# Patient Record
Sex: MALE | Race: WHITE | NOT HISPANIC OR LATINO | Employment: OTHER | URBAN - METROPOLITAN AREA
[De-identification: names, ages, dates, MRNs, and addresses within clinical notes are randomized per-mention and may not be internally consistent; named-entity substitution may affect disease eponyms.]

---

## 2023-01-01 ENCOUNTER — NURSING HOME VISIT (OUTPATIENT)
Dept: POST ACUTE CARE | Facility: EXTERNAL LOCATION | Age: 68
End: 2023-01-01
Payer: MEDICARE

## 2023-01-01 ENCOUNTER — TELEPHONE (OUTPATIENT)
Dept: PRIMARY CARE | Facility: CLINIC | Age: 68
End: 2023-01-01
Payer: MEDICARE

## 2023-01-01 ENCOUNTER — NURSING HOME VISIT (OUTPATIENT)
Dept: POST ACUTE CARE | Facility: EXTERNAL LOCATION | Age: 68
End: 2023-01-01

## 2023-01-01 DIAGNOSIS — L08.9 WOUND INFECTION: Primary | ICD-10-CM

## 2023-01-01 DIAGNOSIS — R52 TINGLING PAIN: ICD-10-CM

## 2023-01-01 DIAGNOSIS — R53.1 WEAKNESS: ICD-10-CM

## 2023-01-01 DIAGNOSIS — B95.62 METHICILLIN RESISTANT STAPHYLOCOCCUS AUREUS INFECTION AS THE CAUSE OF DISEASES CLASSIFIED ELSEWHERE: ICD-10-CM

## 2023-01-01 DIAGNOSIS — N40.1 BENIGN PROSTATIC HYPERPLASIA WITH LOWER URINARY TRACT SYMPTOMS, SYMPTOM DETAILS UNSPECIFIED: ICD-10-CM

## 2023-01-01 DIAGNOSIS — F41.9 ANXIETY: ICD-10-CM

## 2023-01-01 DIAGNOSIS — I50.9 CONGESTIVE HEART FAILURE, UNSPECIFIED HF CHRONICITY, UNSPECIFIED HEART FAILURE TYPE (MULTI): ICD-10-CM

## 2023-01-01 DIAGNOSIS — I10 PRIMARY HYPERTENSION: ICD-10-CM

## 2023-01-01 DIAGNOSIS — R56.9 SEIZURE (MULTI): ICD-10-CM

## 2023-01-01 DIAGNOSIS — J44.9 CHRONIC OBSTRUCTIVE PULMONARY DISEASE, UNSPECIFIED COPD TYPE (MULTI): ICD-10-CM

## 2023-01-01 DIAGNOSIS — I50.9 CONGESTIVE HEART FAILURE, UNSPECIFIED HF CHRONICITY, UNSPECIFIED HEART FAILURE TYPE (MULTI): Primary | ICD-10-CM

## 2023-01-01 DIAGNOSIS — E43 UNSPECIFIED SEVERE PROTEIN-CALORIE MALNUTRITION (MULTI): Primary | ICD-10-CM

## 2023-01-01 DIAGNOSIS — F41.9 ANXIETY: Primary | ICD-10-CM

## 2023-01-01 DIAGNOSIS — E66.2 MORBID (SEVERE) OBESITY WITH ALVEOLAR HYPOVENTILATION (MULTI): ICD-10-CM

## 2023-01-01 DIAGNOSIS — H52.7 REFRACTIVE ERRORS: Primary | ICD-10-CM

## 2023-01-01 DIAGNOSIS — I10 HYPERTENSION, UNSPECIFIED TYPE: ICD-10-CM

## 2023-01-01 DIAGNOSIS — R53.1 WEAKNESS: Primary | ICD-10-CM

## 2023-01-01 DIAGNOSIS — J44.9 CHRONIC OBSTRUCTIVE PULMONARY DISEASE, UNSPECIFIED COPD TYPE (MULTI): Primary | ICD-10-CM

## 2023-01-01 DIAGNOSIS — T14.8XXA WOUND INFECTION: Primary | ICD-10-CM

## 2023-01-01 DIAGNOSIS — R30.0 DYSURIA: Primary | ICD-10-CM

## 2023-01-01 DIAGNOSIS — Z99.2 DEPENDENCE ON RENAL DIALYSIS (CMS-HCC): ICD-10-CM

## 2023-01-01 DIAGNOSIS — K59.00 CONSTIPATION, UNSPECIFIED CONSTIPATION TYPE: Primary | ICD-10-CM

## 2023-01-01 DIAGNOSIS — B95.62 METHICILLIN RESISTANT STAPHYLOCOCCUS AUREUS INFECTION AS THE CAUSE OF DISEASES CLASSIFIED ELSEWHERE: Primary | ICD-10-CM

## 2023-01-01 DIAGNOSIS — D69.6 THROMBOCYTOPENIA, UNSPECIFIED (CMS-HCC): ICD-10-CM

## 2023-01-01 DIAGNOSIS — G89.4 CHRONIC PAIN SYNDROME: Primary | ICD-10-CM

## 2023-01-01 DIAGNOSIS — R30.0 DYSURIA: ICD-10-CM

## 2023-01-01 DIAGNOSIS — R14.0 ABDOMINAL BLOATING: Primary | ICD-10-CM
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
## 2023-01-01 DIAGNOSIS — R56.9 SEIZURE (MULTI): Primary | ICD-10-CM

## 2023-01-01 LAB
ABO GROUP (TYPE) IN BLOOD: NORMAL
ABO GROUP (TYPE) IN BLOOD: NORMAL
ALBUMIN (G/DL) IN SER/PLAS: 2.6 G/DL (ref 3.4–5)
ALBUMIN (G/DL) IN SER/PLAS: 2.7 G/DL (ref 3.4–5)
ALBUMIN (G/DL) IN SER/PLAS: 2.8 G/DL (ref 3.4–5)
ALBUMIN (G/DL) IN SER/PLAS: 3 G/DL (ref 3.4–5)
ALBUMIN (G/DL) IN SER/PLAS: 3.1 G/DL (ref 3.4–5)
ALBUMIN (G/DL) IN SER/PLAS: 3.2 G/DL (ref 3.4–5)
ANION GAP IN SER/PLAS: 10 MMOL/L (ref 10–20)
ANION GAP IN SER/PLAS: 11 MMOL/L (ref 10–20)
ANION GAP IN SER/PLAS: 6 MMOL/L (ref 10–20)
ANION GAP IN SER/PLAS: 6 MMOL/L (ref 10–20)
ANION GAP IN SER/PLAS: 7 MMOL/L (ref 10–20)
ANION GAP IN SER/PLAS: 8 MMOL/L (ref 10–20)
ANION GAP IN SER/PLAS: 8 MMOL/L (ref 10–20)
ANION GAP IN SER/PLAS: 9 MMOL/L (ref 10–20)
ANTIBODY SCREEN: NORMAL
ANTIBODY SCREEN: NORMAL
ATRIAL RATE: 81 BPM
BASOPHILS (10*3/UL) IN BLOOD BY AUTOMATED COUNT: 0.03 X10E9/L (ref 0–0.1)
BASOPHILS (10*3/UL) IN BLOOD BY AUTOMATED COUNT: 0.03 X10E9/L (ref 0–0.1)
BASOPHILS (10*3/UL) IN BLOOD BY AUTOMATED COUNT: 0.04 X10E9/L (ref 0–0.1)
BASOPHILS (10*3/UL) IN BLOOD BY AUTOMATED COUNT: 0.04 X10E9/L (ref 0–0.1)
BASOPHILS (10*3/UL) IN BLOOD BY AUTOMATED COUNT: 0.05 X10E9/L (ref 0–0.1)
BASOPHILS (10*3/UL) IN BLOOD BY AUTOMATED COUNT: 0.06 X10E9/L (ref 0–0.1)
BASOPHILS/100 LEUKOCYTES IN BLOOD BY AUTOMATED COUNT: 0.3 % (ref 0–2)
BASOPHILS/100 LEUKOCYTES IN BLOOD BY AUTOMATED COUNT: 0.5 % (ref 0–2)
BASOPHILS/100 LEUKOCYTES IN BLOOD BY AUTOMATED COUNT: 0.6 % (ref 0–2)
BASOPHILS/100 LEUKOCYTES IN BLOOD BY AUTOMATED COUNT: 0.8 % (ref 0–2)
BASOPHILS/100 LEUKOCYTES IN BLOOD BY AUTOMATED COUNT: 1 % (ref 0–2)
C REACTIVE PROTEIN (MG/L) IN SER/PLAS: 7.55 MG/DL
CALCIUM (MG/DL) IN SER/PLAS: 9 MG/DL (ref 8.6–10.6)
CALCIUM (MG/DL) IN SER/PLAS: 9.1 MG/DL (ref 8.6–10.6)
CALCIUM (MG/DL) IN SER/PLAS: 9.2 MG/DL (ref 8.6–10.6)
CALCIUM (MG/DL) IN SER/PLAS: 9.2 MG/DL (ref 8.6–10.6)
CALCIUM (MG/DL) IN SER/PLAS: 9.3 MG/DL (ref 8.6–10.6)
CALCIUM (MG/DL) IN SER/PLAS: 9.3 MG/DL (ref 8.6–10.6)
CALCIUM (MG/DL) IN SER/PLAS: 9.4 MG/DL (ref 8.6–10.6)
CALCIUM (MG/DL) IN SER/PLAS: 9.5 MG/DL (ref 8.6–10.6)
CALCIUM (MG/DL) IN SER/PLAS: 9.6 MG/DL (ref 8.6–10.6)
CALCIUM (MG/DL) IN SER/PLAS: 9.6 MG/DL (ref 8.6–10.6)
CARBON DIOXIDE, TOTAL (MMOL/L) IN SER/PLAS: 38 MMOL/L (ref 21–32)
CARBON DIOXIDE, TOTAL (MMOL/L) IN SER/PLAS: 39 MMOL/L (ref 21–32)
CARBON DIOXIDE, TOTAL (MMOL/L) IN SER/PLAS: 40 MMOL/L (ref 21–32)
CARBON DIOXIDE, TOTAL (MMOL/L) IN SER/PLAS: 41 MMOL/L (ref 21–32)
CARBON DIOXIDE, TOTAL (MMOL/L) IN SER/PLAS: 42 MMOL/L (ref 21–32)
CARBON DIOXIDE, TOTAL (MMOL/L) IN SER/PLAS: 43 MMOL/L (ref 21–32)
CARBON DIOXIDE, TOTAL (MMOL/L) IN SER/PLAS: 44 MMOL/L (ref 21–32)
CHLORIDE (MMOL/L) IN SER/PLAS: 90 MMOL/L (ref 98–107)
CHLORIDE (MMOL/L) IN SER/PLAS: 91 MMOL/L (ref 98–107)
CHLORIDE (MMOL/L) IN SER/PLAS: 92 MMOL/L (ref 98–107)
CHLORIDE (MMOL/L) IN SER/PLAS: 93 MMOL/L (ref 98–107)
CHLORIDE (MMOL/L) IN SER/PLAS: 94 MMOL/L (ref 98–107)
CHLORIDE (MMOL/L) IN SER/PLAS: 94 MMOL/L (ref 98–107)
CHLORIDE (MMOL/L) IN SER/PLAS: 95 MMOL/L (ref 98–107)
CHLORIDE (MMOL/L) IN SER/PLAS: 95 MMOL/L (ref 98–107)
CHLORIDE (MMOL/L) IN SER/PLAS: 96 MMOL/L (ref 98–107)
CHLORIDE (MMOL/L) IN SER/PLAS: 97 MMOL/L (ref 98–107)
CHLORIDE (MOL/L) IN SPOT URINE: 36 MMOL/L
CHLORIDE/CREATININE (MMOL/G) IN URINE: 67 MMOL/G CREAT (ref 23–275)
CREATININE (MG/DL) IN SER/PLAS: 0.48 MG/DL (ref 0.5–1.3)
CREATININE (MG/DL) IN SER/PLAS: 0.53 MG/DL (ref 0.5–1.3)
CREATININE (MG/DL) IN SER/PLAS: 0.6 MG/DL (ref 0.5–1.3)
CREATININE (MG/DL) IN SER/PLAS: 0.69 MG/DL (ref 0.5–1.3)
CREATININE (MG/DL) IN SER/PLAS: 0.71 MG/DL (ref 0.5–1.3)
CREATININE (MG/DL) IN SER/PLAS: 0.77 MG/DL (ref 0.5–1.3)
CREATININE (MG/DL) IN SER/PLAS: 0.8 MG/DL (ref 0.5–1.3)
CREATININE (MG/DL) IN SER/PLAS: 0.84 MG/DL (ref 0.5–1.3)
CREATININE (MG/DL) IN SER/PLAS: 0.86 MG/DL (ref 0.5–1.3)
CREATININE (MG/DL) IN SER/PLAS: 0.86 MG/DL (ref 0.5–1.3)
CREATININE (MG/DL) IN SER/PLAS: 0.91 MG/DL (ref 0.5–1.3)
CREATININE (MG/DL) IN SER/PLAS: 0.97 MG/DL (ref 0.5–1.3)
CREATININE (MG/DL) IN URINE: 53.9 MG/DL (ref 20–370)
EOSINOPHILS (10*3/UL) IN BLOOD BY AUTOMATED COUNT: 0.08 X10E9/L (ref 0–0.7)
EOSINOPHILS (10*3/UL) IN BLOOD BY AUTOMATED COUNT: 0.14 X10E9/L (ref 0–0.7)
EOSINOPHILS (10*3/UL) IN BLOOD BY AUTOMATED COUNT: 0.18 X10E9/L (ref 0–0.7)
EOSINOPHILS (10*3/UL) IN BLOOD BY AUTOMATED COUNT: 0.25 X10E9/L (ref 0–0.7)
EOSINOPHILS (10*3/UL) IN BLOOD BY AUTOMATED COUNT: 0.29 X10E9/L (ref 0–0.7)
EOSINOPHILS (10*3/UL) IN BLOOD BY AUTOMATED COUNT: 0.29 X10E9/L (ref 0–0.7)
EOSINOPHILS (10*3/UL) IN BLOOD BY AUTOMATED COUNT: 0.33 X10E9/L (ref 0–0.7)
EOSINOPHILS (10*3/UL) IN BLOOD BY AUTOMATED COUNT: 0.51 X10E9/L (ref 0–0.7)
EOSINOPHILS (10*3/UL) IN BLOOD BY AUTOMATED COUNT: 0.58 X10E9/L (ref 0–0.7)
EOSINOPHILS/100 LEUKOCYTES IN BLOOD BY AUTOMATED COUNT: 0.8 % (ref 0–6)
EOSINOPHILS/100 LEUKOCYTES IN BLOOD BY AUTOMATED COUNT: 1.6 % (ref 0–6)
EOSINOPHILS/100 LEUKOCYTES IN BLOOD BY AUTOMATED COUNT: 2.8 % (ref 0–6)
EOSINOPHILS/100 LEUKOCYTES IN BLOOD BY AUTOMATED COUNT: 3.6 % (ref 0–6)
EOSINOPHILS/100 LEUKOCYTES IN BLOOD BY AUTOMATED COUNT: 4.4 % (ref 0–6)
EOSINOPHILS/100 LEUKOCYTES IN BLOOD BY AUTOMATED COUNT: 4.5 % (ref 0–6)
EOSINOPHILS/100 LEUKOCYTES IN BLOOD BY AUTOMATED COUNT: 5.2 % (ref 0–6)
EOSINOPHILS/100 LEUKOCYTES IN BLOOD BY AUTOMATED COUNT: 7.6 % (ref 0–6)
EOSINOPHILS/100 LEUKOCYTES IN BLOOD BY AUTOMATED COUNT: 9.4 % (ref 0–6)
ERYTHROCYTE DISTRIBUTION WIDTH (RATIO) BY AUTOMATED COUNT: 11.7 % (ref 11.5–14.5)
ERYTHROCYTE DISTRIBUTION WIDTH (RATIO) BY AUTOMATED COUNT: 11.8 % (ref 11.5–14.5)
ERYTHROCYTE DISTRIBUTION WIDTH (RATIO) BY AUTOMATED COUNT: 11.9 % (ref 11.5–14.5)
ERYTHROCYTE DISTRIBUTION WIDTH (RATIO) BY AUTOMATED COUNT: 12.1 % (ref 11.5–14.5)
ERYTHROCYTE DISTRIBUTION WIDTH (RATIO) BY AUTOMATED COUNT: 12.2 % (ref 11.5–14.5)
ERYTHROCYTE DISTRIBUTION WIDTH (RATIO) BY AUTOMATED COUNT: 12.2 % (ref 11.5–14.5)
ERYTHROCYTE MEAN CORPUSCULAR HEMOGLOBIN CONCENTRATION (G/DL) BY AUTOMATED: 28.3 G/DL (ref 32–36)
ERYTHROCYTE MEAN CORPUSCULAR HEMOGLOBIN CONCENTRATION (G/DL) BY AUTOMATED: 29 G/DL (ref 32–36)
ERYTHROCYTE MEAN CORPUSCULAR HEMOGLOBIN CONCENTRATION (G/DL) BY AUTOMATED: 29.1 G/DL (ref 32–36)
ERYTHROCYTE MEAN CORPUSCULAR HEMOGLOBIN CONCENTRATION (G/DL) BY AUTOMATED: 29.2 G/DL (ref 32–36)
ERYTHROCYTE MEAN CORPUSCULAR HEMOGLOBIN CONCENTRATION (G/DL) BY AUTOMATED: 29.2 G/DL (ref 32–36)
ERYTHROCYTE MEAN CORPUSCULAR HEMOGLOBIN CONCENTRATION (G/DL) BY AUTOMATED: 29.3 G/DL (ref 32–36)
ERYTHROCYTE MEAN CORPUSCULAR HEMOGLOBIN CONCENTRATION (G/DL) BY AUTOMATED: 29.7 G/DL (ref 32–36)
ERYTHROCYTE MEAN CORPUSCULAR HEMOGLOBIN CONCENTRATION (G/DL) BY AUTOMATED: 30 G/DL (ref 32–36)
ERYTHROCYTE MEAN CORPUSCULAR HEMOGLOBIN CONCENTRATION (G/DL) BY AUTOMATED: 30.3 G/DL (ref 32–36)
ERYTHROCYTE MEAN CORPUSCULAR VOLUME (FL) BY AUTOMATED COUNT: 109 FL (ref 80–100)
ERYTHROCYTE MEAN CORPUSCULAR VOLUME (FL) BY AUTOMATED COUNT: 109 FL (ref 80–100)
ERYTHROCYTE MEAN CORPUSCULAR VOLUME (FL) BY AUTOMATED COUNT: 110 FL (ref 80–100)
ERYTHROCYTE MEAN CORPUSCULAR VOLUME (FL) BY AUTOMATED COUNT: 110 FL (ref 80–100)
ERYTHROCYTE MEAN CORPUSCULAR VOLUME (FL) BY AUTOMATED COUNT: 111 FL (ref 80–100)
ERYTHROCYTE MEAN CORPUSCULAR VOLUME (FL) BY AUTOMATED COUNT: 112 FL (ref 80–100)
ERYTHROCYTE MEAN CORPUSCULAR VOLUME (FL) BY AUTOMATED COUNT: 112 FL (ref 80–100)
ERYTHROCYTES (10*6/UL) IN BLOOD BY AUTOMATED COUNT: 2.16 X10E12/L (ref 4.5–5.9)
ERYTHROCYTES (10*6/UL) IN BLOOD BY AUTOMATED COUNT: 2.24 X10E12/L (ref 4.5–5.9)
ERYTHROCYTES (10*6/UL) IN BLOOD BY AUTOMATED COUNT: 2.26 X10E12/L (ref 4.5–5.9)
ERYTHROCYTES (10*6/UL) IN BLOOD BY AUTOMATED COUNT: 2.3 X10E12/L (ref 4.5–5.9)
ERYTHROCYTES (10*6/UL) IN BLOOD BY AUTOMATED COUNT: 2.31 X10E12/L (ref 4.5–5.9)
ERYTHROCYTES (10*6/UL) IN BLOOD BY AUTOMATED COUNT: 2.32 X10E12/L (ref 4.5–5.9)
ERYTHROCYTES (10*6/UL) IN BLOOD BY AUTOMATED COUNT: 2.35 X10E12/L (ref 4.5–5.9)
ERYTHROCYTES (10*6/UL) IN BLOOD BY AUTOMATED COUNT: 2.44 X10E12/L (ref 4.5–5.9)
ERYTHROCYTES (10*6/UL) IN BLOOD BY AUTOMATED COUNT: 2.53 X10E12/L (ref 4.5–5.9)
FUNGAL CULTURE/SMEAR: NORMAL
FUNGAL SMEAR: NORMAL
GFR MALE: 84 ML/MIN/1.73M2
GFR MALE: >90 ML/MIN/1.73M2
GLUCOSE (MG/DL) IN SER/PLAS: 101 MG/DL (ref 74–99)
GLUCOSE (MG/DL) IN SER/PLAS: 102 MG/DL (ref 74–99)
GLUCOSE (MG/DL) IN SER/PLAS: 103 MG/DL (ref 74–99)
GLUCOSE (MG/DL) IN SER/PLAS: 109 MG/DL (ref 74–99)
GLUCOSE (MG/DL) IN SER/PLAS: 110 MG/DL (ref 74–99)
GLUCOSE (MG/DL) IN SER/PLAS: 111 MG/DL (ref 74–99)
GLUCOSE (MG/DL) IN SER/PLAS: 112 MG/DL (ref 74–99)
GLUCOSE (MG/DL) IN SER/PLAS: 114 MG/DL (ref 74–99)
GLUCOSE (MG/DL) IN SER/PLAS: 114 MG/DL (ref 74–99)
GLUCOSE (MG/DL) IN SER/PLAS: 85 MG/DL (ref 74–99)
GLUCOSE (MG/DL) IN SER/PLAS: 86 MG/DL (ref 74–99)
GLUCOSE (MG/DL) IN SER/PLAS: 99 MG/DL (ref 74–99)
HEMATOCRIT (%) IN BLOOD BY AUTOMATED COUNT: 23.8 % (ref 41–52)
HEMATOCRIT (%) IN BLOOD BY AUTOMATED COUNT: 25.1 % (ref 41–52)
HEMATOCRIT (%) IN BLOOD BY AUTOMATED COUNT: 25.2 % (ref 41–52)
HEMATOCRIT (%) IN BLOOD BY AUTOMATED COUNT: 25.6 % (ref 41–52)
HEMATOCRIT (%) IN BLOOD BY AUTOMATED COUNT: 25.7 % (ref 41–52)
HEMATOCRIT (%) IN BLOOD BY AUTOMATED COUNT: 25.7 % (ref 41–52)
HEMATOCRIT (%) IN BLOOD BY AUTOMATED COUNT: 25.8 % (ref 41–52)
HEMATOCRIT (%) IN BLOOD BY AUTOMATED COUNT: 26.7 % (ref 41–52)
HEMATOCRIT (%) IN BLOOD BY AUTOMATED COUNT: 27.6 % (ref 41–52)
HEMOGLOBIN (G/DL) IN BLOOD: 7.1 G/DL (ref 13.5–17.5)
HEMOGLOBIN (G/DL) IN BLOOD: 7.2 G/DL (ref 13.5–17.5)
HEMOGLOBIN (G/DL) IN BLOOD: 7.3 G/DL (ref 13.5–17.5)
HEMOGLOBIN (G/DL) IN BLOOD: 7.5 G/DL (ref 13.5–17.5)
HEMOGLOBIN (G/DL) IN BLOOD: 8 G/DL (ref 13.5–17.5)
HEMOGLOBIN (G/DL) IN BLOOD: 8.2 G/DL (ref 13.5–17.5)
IMMATURE GRANULOCYTES/100 LEUKOCYTES IN BLOOD BY AUTOMATED COUNT: 0.5 % (ref 0–0.9)
IMMATURE GRANULOCYTES/100 LEUKOCYTES IN BLOOD BY AUTOMATED COUNT: 0.7 % (ref 0–0.9)
IMMATURE GRANULOCYTES/100 LEUKOCYTES IN BLOOD BY AUTOMATED COUNT: 0.8 % (ref 0–0.9)
IMMATURE GRANULOCYTES/100 LEUKOCYTES IN BLOOD BY AUTOMATED COUNT: 0.8 % (ref 0–0.9)
IMMATURE GRANULOCYTES/100 LEUKOCYTES IN BLOOD BY AUTOMATED COUNT: 1.1 % (ref 0–0.9)
IMMATURE GRANULOCYTES/100 LEUKOCYTES IN BLOOD BY AUTOMATED COUNT: 1.2 % (ref 0–0.9)
IMMATURE GRANULOCYTES/100 LEUKOCYTES IN BLOOD BY AUTOMATED COUNT: 1.5 % (ref 0–0.9)
LEUKOCYTES (10*3/UL) IN BLOOD BY AUTOMATED COUNT: 6.2 X10E9/L (ref 4.4–11.3)
LEUKOCYTES (10*3/UL) IN BLOOD BY AUTOMATED COUNT: 6.4 X10E9/L (ref 4.4–11.3)
LEUKOCYTES (10*3/UL) IN BLOOD BY AUTOMATED COUNT: 6.5 X10E9/L (ref 4.4–11.3)
LEUKOCYTES (10*3/UL) IN BLOOD BY AUTOMATED COUNT: 6.7 X10E9/L (ref 4.4–11.3)
LEUKOCYTES (10*3/UL) IN BLOOD BY AUTOMATED COUNT: 6.9 X10E9/L (ref 4.4–11.3)
LEUKOCYTES (10*3/UL) IN BLOOD BY AUTOMATED COUNT: 8.8 X10E9/L (ref 4.4–11.3)
LEUKOCYTES (10*3/UL) IN BLOOD BY AUTOMATED COUNT: 9.5 X10E9/L (ref 4.4–11.3)
LYMPHOCYTES (10*3/UL) IN BLOOD BY AUTOMATED COUNT: 0.8 X10E9/L (ref 1.2–4.8)
LYMPHOCYTES (10*3/UL) IN BLOOD BY AUTOMATED COUNT: 0.85 X10E9/L (ref 1.2–4.8)
LYMPHOCYTES (10*3/UL) IN BLOOD BY AUTOMATED COUNT: 0.89 X10E9/L (ref 1.2–4.8)
LYMPHOCYTES (10*3/UL) IN BLOOD BY AUTOMATED COUNT: 0.94 X10E9/L (ref 1.2–4.8)
LYMPHOCYTES (10*3/UL) IN BLOOD BY AUTOMATED COUNT: 0.96 X10E9/L (ref 1.2–4.8)
LYMPHOCYTES (10*3/UL) IN BLOOD BY AUTOMATED COUNT: 1.06 X10E9/L (ref 1.2–4.8)
LYMPHOCYTES (10*3/UL) IN BLOOD BY AUTOMATED COUNT: 1.07 X10E9/L (ref 1.2–4.8)
LYMPHOCYTES (10*3/UL) IN BLOOD BY AUTOMATED COUNT: 1.11 X10E9/L (ref 1.2–4.8)
LYMPHOCYTES (10*3/UL) IN BLOOD BY AUTOMATED COUNT: 1.26 X10E9/L (ref 1.2–4.8)
LYMPHOCYTES/100 LEUKOCYTES IN BLOOD BY AUTOMATED COUNT: 11.6 % (ref 13–44)
LYMPHOCYTES/100 LEUKOCYTES IN BLOOD BY AUTOMATED COUNT: 13.3 % (ref 13–44)
LYMPHOCYTES/100 LEUKOCYTES IN BLOOD BY AUTOMATED COUNT: 13.7 % (ref 13–44)
LYMPHOCYTES/100 LEUKOCYTES IN BLOOD BY AUTOMATED COUNT: 14 % (ref 13–44)
LYMPHOCYTES/100 LEUKOCYTES IN BLOOD BY AUTOMATED COUNT: 15.1 % (ref 13–44)
LYMPHOCYTES/100 LEUKOCYTES IN BLOOD BY AUTOMATED COUNT: 16 % (ref 13–44)
LYMPHOCYTES/100 LEUKOCYTES IN BLOOD BY AUTOMATED COUNT: 16.2 % (ref 13–44)
LYMPHOCYTES/100 LEUKOCYTES IN BLOOD BY AUTOMATED COUNT: 20.4 % (ref 13–44)
LYMPHOCYTES/100 LEUKOCYTES IN BLOOD BY AUTOMATED COUNT: 9 % (ref 13–44)
MAGNESIUM (MG/DL) IN SER/PLAS: 1.79 MG/DL (ref 1.6–2.4)
MAGNESIUM (MG/DL) IN SER/PLAS: 1.85 MG/DL (ref 1.6–2.4)
MAGNESIUM (MG/DL) IN SER/PLAS: 1.94 MG/DL (ref 1.6–2.4)
MAGNESIUM (MG/DL) IN SER/PLAS: 1.94 MG/DL (ref 1.6–2.4)
MAGNESIUM (MG/DL) IN SER/PLAS: 1.95 MG/DL (ref 1.6–2.4)
MAGNESIUM (MG/DL) IN SER/PLAS: 1.96 MG/DL (ref 1.6–2.4)
MAGNESIUM (MG/DL) IN SER/PLAS: 1.99 MG/DL (ref 1.6–2.4)
MAGNESIUM (MG/DL) IN SER/PLAS: 2.02 MG/DL (ref 1.6–2.4)
MAGNESIUM (MG/DL) IN SER/PLAS: 2.04 MG/DL (ref 1.6–2.4)
MONOCYTES (10*3/UL) IN BLOOD BY AUTOMATED COUNT: 0.53 X10E9/L (ref 0.1–1)
MONOCYTES (10*3/UL) IN BLOOD BY AUTOMATED COUNT: 0.57 X10E9/L (ref 0.1–1)
MONOCYTES (10*3/UL) IN BLOOD BY AUTOMATED COUNT: 0.59 X10E9/L (ref 0.1–1)
MONOCYTES (10*3/UL) IN BLOOD BY AUTOMATED COUNT: 0.6 X10E9/L (ref 0.1–1)
MONOCYTES (10*3/UL) IN BLOOD BY AUTOMATED COUNT: 0.6 X10E9/L (ref 0.1–1)
MONOCYTES (10*3/UL) IN BLOOD BY AUTOMATED COUNT: 0.68 X10E9/L (ref 0.1–1)
MONOCYTES (10*3/UL) IN BLOOD BY AUTOMATED COUNT: 0.71 X10E9/L (ref 0.1–1)
MONOCYTES (10*3/UL) IN BLOOD BY AUTOMATED COUNT: 0.75 X10E9/L (ref 0.1–1)
MONOCYTES (10*3/UL) IN BLOOD BY AUTOMATED COUNT: 0.83 X10E9/L (ref 0.1–1)
MONOCYTES/100 LEUKOCYTES IN BLOOD BY AUTOMATED COUNT: 10.2 % (ref 2–10)
MONOCYTES/100 LEUKOCYTES IN BLOOD BY AUTOMATED COUNT: 11.5 % (ref 2–10)
MONOCYTES/100 LEUKOCYTES IN BLOOD BY AUTOMATED COUNT: 13 % (ref 2–10)
MONOCYTES/100 LEUKOCYTES IN BLOOD BY AUTOMATED COUNT: 6.8 % (ref 2–10)
MONOCYTES/100 LEUKOCYTES IN BLOOD BY AUTOMATED COUNT: 7.9 % (ref 2–10)
MONOCYTES/100 LEUKOCYTES IN BLOOD BY AUTOMATED COUNT: 8.3 % (ref 2–10)
MONOCYTES/100 LEUKOCYTES IN BLOOD BY AUTOMATED COUNT: 8.6 % (ref 2–10)
MONOCYTES/100 LEUKOCYTES IN BLOOD BY AUTOMATED COUNT: 8.9 % (ref 2–10)
MONOCYTES/100 LEUKOCYTES IN BLOOD BY AUTOMATED COUNT: 9.2 % (ref 2–10)
NEUTROPHILS (10*3/UL) IN BLOOD BY AUTOMATED COUNT: 3.48 X10E9/L (ref 1.2–7.7)
NEUTROPHILS (10*3/UL) IN BLOOD BY AUTOMATED COUNT: 4.29 X10E9/L (ref 1.2–7.7)
NEUTROPHILS (10*3/UL) IN BLOOD BY AUTOMATED COUNT: 4.32 X10E9/L (ref 1.2–7.7)
NEUTROPHILS (10*3/UL) IN BLOOD BY AUTOMATED COUNT: 4.46 X10E9/L (ref 1.2–7.7)
NEUTROPHILS (10*3/UL) IN BLOOD BY AUTOMATED COUNT: 4.46 X10E9/L (ref 1.2–7.7)
NEUTROPHILS (10*3/UL) IN BLOOD BY AUTOMATED COUNT: 4.62 X10E9/L (ref 1.2–7.7)
NEUTROPHILS (10*3/UL) IN BLOOD BY AUTOMATED COUNT: 4.97 X10E9/L (ref 1.2–7.7)
NEUTROPHILS (10*3/UL) IN BLOOD BY AUTOMATED COUNT: 7.21 X10E9/L (ref 1.2–7.7)
NEUTROPHILS (10*3/UL) IN BLOOD BY AUTOMATED COUNT: 7.49 X10E9/L (ref 1.2–7.7)
NEUTROPHILS/100 LEUKOCYTES IN BLOOD BY AUTOMATED COUNT: 56.2 % (ref 40–80)
NEUTROPHILS/100 LEUKOCYTES IN BLOOD BY AUTOMATED COUNT: 64.4 % (ref 40–80)
NEUTROPHILS/100 LEUKOCYTES IN BLOOD BY AUTOMATED COUNT: 67.6 % (ref 40–80)
NEUTROPHILS/100 LEUKOCYTES IN BLOOD BY AUTOMATED COUNT: 68.2 % (ref 40–80)
NEUTROPHILS/100 LEUKOCYTES IN BLOOD BY AUTOMATED COUNT: 70 % (ref 40–80)
NEUTROPHILS/100 LEUKOCYTES IN BLOOD BY AUTOMATED COUNT: 72.3 % (ref 40–80)
NEUTROPHILS/100 LEUKOCYTES IN BLOOD BY AUTOMATED COUNT: 72.5 % (ref 40–80)
NEUTROPHILS/100 LEUKOCYTES IN BLOOD BY AUTOMATED COUNT: 78.7 % (ref 40–80)
NEUTROPHILS/100 LEUKOCYTES IN BLOOD BY AUTOMATED COUNT: 81.5 % (ref 40–80)
NON-UH HIE A/G RATIO: 0.7
NON-UH HIE ALB: 2.2 G/DL (ref 3.4–5)
NON-UH HIE ALK PHOS: 95 UNIT/L (ref 46–116)
NON-UH HIE BASO COUNT: 0.04 X1000 (ref 0–0.2)
NON-UH HIE BASOS %: 0.9 %
NON-UH HIE BILIRUBIN, TOTAL: <0.2 MG/DL (ref 0.2–1)
NON-UH HIE BUN/CREAT RATIO: 23.8
NON-UH HIE BUN: 19 MG/DL (ref 9–23)
NON-UH HIE CALCIUM: 9.2 MG/DL (ref 8.7–10.4)
NON-UH HIE CALCULATED OSMOLALITY: 283 MOSM/KG (ref 275–295)
NON-UH HIE CHLORIDE: 101 MMOL/L (ref 98–107)
NON-UH HIE CO2, VENOUS: >40 MMOL/L (ref 20–31)
NON-UH HIE CREATININE: 0.8 MG/DL (ref 0.6–1.1)
NON-UH HIE DIFF?: NO
NON-UH HIE EOS COUNT: 0.18 X1000 (ref 0–0.5)
NON-UH HIE EOSIN %: 4 %
NON-UH HIE GFR AA: >60
NON-UH HIE GLOBULIN: 3 G/DL
NON-UH HIE GLOMERULAR FILTRATION RATE: >60 ML/MIN/1.73M?
NON-UH HIE GLUCOSE: 129 MG/DL (ref 74–106)
NON-UH HIE GOT: 13 UNIT/L (ref 15–37)
NON-UH HIE GPT: <7 UNIT/L (ref 10–49)
NON-UH HIE HCT: 28 % (ref 41–52)
NON-UH HIE HGB: 9 G/DL (ref 13.5–17.5)
NON-UH HIE INSTR WBC: 4.3
NON-UH HIE K: 4.7 MMOL/L (ref 3.5–5.1)
NON-UH HIE LACTATE: 0.5 MMOL/L (ref 0.5–2.2)
NON-UH HIE LYMPH %: 20.3 %
NON-UH HIE LYMPH COUNT: 0.88 X1000 (ref 1.2–4.8)
NON-UH HIE MCH: 31.2 PG (ref 27–34)
NON-UH HIE MCHC: 32.1 G/DL (ref 32–37)
NON-UH HIE MCV: 97.1 FL (ref 80–100)
NON-UH HIE MONO %: 7.1 %
NON-UH HIE MONO COUNT: 0.31 X1000 (ref 0.1–1)
NON-UH HIE MPV: 7.5 FL (ref 7.4–10.4)
NON-UH HIE NA: 140 MMOL/L (ref 135–145)
NON-UH HIE NEUTROPHIL %: 67.7 %
NON-UH HIE NEUTROPHIL COUNT (ANC): 2.93 X1000 (ref 1.4–8.8)
NON-UH HIE NUCLEATED RBC: 0 /100WBC
NON-UH HIE PLATELET: 271 X10 (ref 150–450)
NON-UH HIE PROCALCITONIN: <0.05 NG/ML
NON-UH HIE RBC: 2.88 X10 (ref 4.7–6.1)
NON-UH HIE RDW: 13.6 % (ref 11.5–14.5)
NON-UH HIE SED RATE WESTERGREN: 72 MM/HR (ref 0–20)
NON-UH HIE TOTAL PROTEIN: 5.2 G/DL (ref 5.7–8.2)
NON-UH HIE WBC: 4.3 X10 (ref 4.5–11)
NRBC (PER 100 WBCS) BY AUTOMATED COUNT: 0 /100 WBC (ref 0–0)
OSMOLALITY, RANDOM URINE: 446 MOSM/KG (ref 200–1200)
P AXIS: 44 DEGREES
P OFFSET: 198 MS
P ONSET: 144 MS
PATIENT TEMPERATURE: 37 DEGREES C
PATIENT TEMPERATURE: 37 DEGREES C
PHOSPHATE (MG/DL) IN SER/PLAS: 2.6 MG/DL (ref 2.5–4.9)
PHOSPHATE (MG/DL) IN SER/PLAS: 2.6 MG/DL (ref 2.5–4.9)
PHOSPHATE (MG/DL) IN SER/PLAS: 2.7 MG/DL (ref 2.5–4.9)
PHOSPHATE (MG/DL) IN SER/PLAS: 2.9 MG/DL (ref 2.5–4.9)
PHOSPHATE (MG/DL) IN SER/PLAS: 3 MG/DL (ref 2.5–4.9)
PHOSPHATE (MG/DL) IN SER/PLAS: 3.1 MG/DL (ref 2.5–4.9)
PHOSPHATE (MG/DL) IN SER/PLAS: 3.2 MG/DL (ref 2.5–4.9)
PHOSPHATE (MG/DL) IN SER/PLAS: 3.4 MG/DL (ref 2.5–4.9)
PHOSPHATE (MG/DL) IN SER/PLAS: 3.5 MG/DL (ref 2.5–4.9)
PHOSPHATE (MG/DL) IN SER/PLAS: 3.6 MG/DL (ref 2.5–4.9)
PHOSPHATE (MG/DL) IN SER/PLAS: 3.8 MG/DL (ref 2.5–4.9)
PHOSPHATE (MG/DL) IN SER/PLAS: 4.1 MG/DL (ref 2.5–4.9)
PLATELETS (10*3/UL) IN BLOOD AUTOMATED COUNT: 272 X10E9/L (ref 150–450)
PLATELETS (10*3/UL) IN BLOOD AUTOMATED COUNT: 277 X10E9/L (ref 150–450)
PLATELETS (10*3/UL) IN BLOOD AUTOMATED COUNT: 292 X10E9/L (ref 150–450)
PLATELETS (10*3/UL) IN BLOOD AUTOMATED COUNT: 303 X10E9/L (ref 150–450)
PLATELETS (10*3/UL) IN BLOOD AUTOMATED COUNT: 315 X10E9/L (ref 150–450)
PLATELETS (10*3/UL) IN BLOOD AUTOMATED COUNT: 317 X10E9/L (ref 150–450)
PLATELETS (10*3/UL) IN BLOOD AUTOMATED COUNT: 320 X10E9/L (ref 150–450)
PLATELETS (10*3/UL) IN BLOOD AUTOMATED COUNT: 323 X10E9/L (ref 150–450)
PLATELETS (10*3/UL) IN BLOOD AUTOMATED COUNT: 347 X10E9/L (ref 150–450)
POCT BASE EXCESS, VENOUS: 12.8 MMOL/L (ref -2–3)
POCT BASE EXCESS, VENOUS: 16 MMOL/L (ref -2–3)
POCT HCO3, VENOUS: 43.3 MMOL/L (ref 22–26)
POCT HCO3, VENOUS: 44.8 MMOL/L (ref 22–26)
POCT OXY HEMOGLOBIN, VENOUS: 89.2 % (ref 45–75)
POCT OXY HEMOGLOBIN, VENOUS: 97.7 % (ref 45–75)
POCT PCO2, VENOUS: 74 MMHG (ref 41–51)
POCT PCO2, VENOUS: 88 MMHG (ref 41–51)
POCT PH, VENOUS: 7.3 (ref 7.33–7.43)
POCT PH, VENOUS: 7.39 (ref 7.33–7.43)
POCT PO2, VENOUS: 121 MMHG (ref 35–45)
POCT PO2, VENOUS: 57 MMHG (ref 35–45)
POCT SO2, VENOUS: 100 % (ref 45–75)
POCT SO2, VENOUS: 91 % (ref 45–75)
POTASSIUM (MMOL/L) IN SER/PLAS: 3.5 MMOL/L (ref 3.5–5.3)
POTASSIUM (MMOL/L) IN SER/PLAS: 4.1 MMOL/L (ref 3.5–5.3)
POTASSIUM (MMOL/L) IN SER/PLAS: 4.3 MMOL/L (ref 3.5–5.3)
POTASSIUM (MMOL/L) IN SER/PLAS: 4.4 MMOL/L (ref 3.5–5.3)
POTASSIUM (MMOL/L) IN SER/PLAS: 4.6 MMOL/L (ref 3.5–5.3)
POTASSIUM (MMOL/L) IN SER/PLAS: 4.8 MMOL/L (ref 3.5–5.3)
POTASSIUM (MMOL/L) IN SER/PLAS: 5 MMOL/L (ref 3.5–5.3)
POTASSIUM (MMOL/L) IN SER/PLAS: 5.1 MMOL/L (ref 3.5–5.3)
POTASSIUM URINE RANDOM: 27 MMOL/L
POTASSIUM/CREATININE (MMOL/G) IN URINE: 50 MMOL/G CREAT
PR INTERVAL: 162 MS
Q ONSET: 225 MS
QRS COUNT: 13 BEATS
QRS DURATION: 86 MS
QT INTERVAL: 324 MS
QTC CALCULATION(BAZETT): 376 MS
QTC FREDERICIA: 358 MS
R AXIS: -13 DEGREES
RH FACTOR: NORMAL
RH FACTOR: NORMAL
SARS-COV-2 RESULT: NORMAL
SARS-COV-2 RESULT: NOT DETECTED
SARS-COV-2 RESULT: NOT DETECTED
SODIUM (MMOL/L) IN SER/PLAS: 135 MMOL/L (ref 136–145)
SODIUM (MMOL/L) IN SER/PLAS: 135 MMOL/L (ref 136–145)
SODIUM (MMOL/L) IN SER/PLAS: 136 MMOL/L (ref 136–145)
SODIUM (MMOL/L) IN SER/PLAS: 138 MMOL/L (ref 136–145)
SODIUM (MMOL/L) IN SER/PLAS: 138 MMOL/L (ref 136–145)
SODIUM (MMOL/L) IN SER/PLAS: 139 MMOL/L (ref 136–145)
SODIUM (MMOL/L) IN SER/PLAS: 139 MMOL/L (ref 136–145)
SODIUM (MMOL/L) IN SER/PLAS: 141 MMOL/L (ref 136–145)
SODIUM (MMOL/L) IN SER/PLAS: 142 MMOL/L (ref 136–145)
SODIUM (MMOL/L) IN SER/PLAS: 142 MMOL/L (ref 136–145)
SODIUM URINE RANDOM: 43 MMOL/L
SODIUM/CREATININE (MMOL/G) IN URINE: 80 MMOL/G CREAT
T AXIS: 43 DEGREES
T OFFSET: 387 MS
UREA NITROGEN (MG/DL) IN SER/PLAS: 13 MG/DL (ref 6–23)
UREA NITROGEN (MG/DL) IN SER/PLAS: 18 MG/DL (ref 6–23)
UREA NITROGEN (MG/DL) IN SER/PLAS: 19 MG/DL (ref 6–23)
UREA NITROGEN (MG/DL) IN SER/PLAS: 19 MG/DL (ref 6–23)
UREA NITROGEN (MG/DL) IN SER/PLAS: 25 MG/DL (ref 6–23)
UREA NITROGEN (MG/DL) IN SER/PLAS: 27 MG/DL (ref 6–23)
UREA NITROGEN (MG/DL) IN SER/PLAS: 28 MG/DL (ref 6–23)
UREA NITROGEN (MG/DL) IN SER/PLAS: 30 MG/DL (ref 6–23)
UREA NITROGEN (MG/DL) IN SER/PLAS: 31 MG/DL (ref 6–23)
UREA NITROGEN (MG/DL) IN SER/PLAS: 33 MG/DL (ref 6–23)
UREA NITROGEN (MG/DL) IN SER/PLAS: 35 MG/DL (ref 6–23)
UREA NITROGEN (MG/DL) IN SER/PLAS: 37 MG/DL (ref 6–23)
UREA NITROGEN, RANDOM URINE: 724 MG/DL
UREA NITROGEN/CREATININE (G/G) URINE: 13.4 G/G CREAT
URINE CULTURE: NO GROWTH
VANCOMYCIN (UG/ML) IN SER/PLAS - TROUGH: 12.3 UG/ML (ref 5–20)
VANCOMYCIN (UG/ML) IN SER/PLAS - TROUGH: 15.8 UG/ML (ref 5–20)
VANCOMYCIN (UG/ML) IN SER/PLAS - TROUGH: 17.5 UG/ML (ref 5–20)
VANCOMYCIN (UG/ML) IN SER/PLAS - TROUGH: 20.5 UG/ML (ref 5–20)
VANCOMYCIN (UG/ML) IN SER/PLAS: 14.7 UG/ML
VANCOMYCIN (UG/ML) IN SER/PLAS: 16 UG/ML
VANCOMYCIN (UG/ML) IN SER/PLAS: 16.8 UG/ML
VANCOMYCIN (UG/ML) IN SER/PLAS: 17.7 UG/ML
VANCOMYCIN (UG/ML) IN SER/PLAS: NORMAL
VENTRICULAR RATE: 81 BPM

## 2023-01-01 PROCEDURE — 99308 SBSQ NF CARE LOW MDM 20: CPT | Performed by: REGISTERED NURSE

## 2023-01-01 PROCEDURE — 99309 SBSQ NF CARE MODERATE MDM 30: CPT

## 2023-01-01 PROCEDURE — 99305 1ST NF CARE MODERATE MDM 35: CPT | Performed by: INTERNAL MEDICINE

## 2023-01-01 PROCEDURE — 99309 SBSQ NF CARE MODERATE MDM 30: CPT | Performed by: REGISTERED NURSE

## 2023-01-01 PROCEDURE — 99222 1ST HOSP IP/OBS MODERATE 55: CPT | Performed by: FAMILY MEDICINE

## 2023-01-01 PROCEDURE — 99309 SBSQ NF CARE MODERATE MDM 30: CPT | Performed by: INTERNAL MEDICINE

## 2023-01-01 PROCEDURE — 99239 HOSP IP/OBS DSCHRG MGMT >30: CPT | Performed by: FAMILY MEDICINE

## 2023-01-01 PROCEDURE — 99308 SBSQ NF CARE LOW MDM 20: CPT | Performed by: INTERNAL MEDICINE

## 2023-02-14 ENCOUNTER — HOSPITAL ENCOUNTER (INPATIENT)
Dept: DATA CONVERSION | Facility: HOSPITAL | Age: 68
Discharge: NURSING, REHAB OR LONG-TERM CARE | End: 2023-03-18
Attending: STUDENT IN AN ORGANIZED HEALTH CARE EDUCATION/TRAINING PROGRAM | Admitting: STUDENT IN AN ORGANIZED HEALTH CARE EDUCATION/TRAINING PROGRAM
Payer: MEDICARE

## 2023-02-14 DIAGNOSIS — I11.0 HYPERTENSIVE HEART DISEASE WITH HEART FAILURE (MULTI): ICD-10-CM

## 2023-02-14 DIAGNOSIS — T81.32XA DISRUPTION OF INTERNAL OPERATION (SURGICAL) WOUND, NOT ELSEWHERE CLASSIFIED, INITIAL ENCOUNTER: ICD-10-CM

## 2023-02-14 DIAGNOSIS — F41.9 ANXIETY DISORDER, UNSPECIFIED: ICD-10-CM

## 2023-02-14 DIAGNOSIS — Z79.82 LONG TERM (CURRENT) USE OF ASPIRIN: ICD-10-CM

## 2023-02-14 DIAGNOSIS — M46.26 OSTEOMYELITIS OF VERTEBRA, LUMBAR REGION (MULTI): ICD-10-CM

## 2023-02-14 DIAGNOSIS — G47.33 OBSTRUCTIVE SLEEP APNEA (ADULT) (PEDIATRIC): ICD-10-CM

## 2023-02-14 DIAGNOSIS — Z20.822 CONTACT WITH AND (SUSPECTED) EXPOSURE TO COVID-19: ICD-10-CM

## 2023-02-14 DIAGNOSIS — M10.9 GOUT, UNSPECIFIED: ICD-10-CM

## 2023-02-14 DIAGNOSIS — L08.9 LOCAL INFECTION OF THE SKIN AND SUBCUTANEOUS TISSUE, UNSPECIFIED: ICD-10-CM

## 2023-02-14 DIAGNOSIS — T81.49XA INFECTION FOLLOWING A PROCEDURE, OTHER SURGICAL SITE, INITIAL ENCOUNTER: ICD-10-CM

## 2023-02-14 DIAGNOSIS — G40.909 EPILEPSY, UNSPECIFIED, NOT INTRACTABLE, WITHOUT STATUS EPILEPTICUS (MULTI): ICD-10-CM

## 2023-02-14 DIAGNOSIS — N40.0 BENIGN PROSTATIC HYPERPLASIA WITHOUT LOWER URINARY TRACT SYMPTOMS: ICD-10-CM

## 2023-02-14 DIAGNOSIS — D64.9 ANEMIA, UNSPECIFIED: ICD-10-CM

## 2023-02-14 DIAGNOSIS — N39.0 URINARY TRACT INFECTION, SITE NOT SPECIFIED: ICD-10-CM

## 2023-02-14 DIAGNOSIS — E78.5 HYPERLIPIDEMIA, UNSPECIFIED: ICD-10-CM

## 2023-02-14 DIAGNOSIS — I50.32 CHRONIC DIASTOLIC (CONGESTIVE) HEART FAILURE (MULTI): ICD-10-CM

## 2023-02-14 DIAGNOSIS — R19.7 DIARRHEA, UNSPECIFIED: ICD-10-CM

## 2023-02-14 DIAGNOSIS — L89.621 PRESSURE ULCER OF LEFT HEEL, STAGE 1: ICD-10-CM

## 2023-02-14 DIAGNOSIS — E87.5 HYPERKALEMIA: ICD-10-CM

## 2023-02-14 DIAGNOSIS — N17.9 ACUTE KIDNEY FAILURE, UNSPECIFIED (CMS-HCC): ICD-10-CM

## 2023-02-14 DIAGNOSIS — G89.29 OTHER CHRONIC PAIN: ICD-10-CM

## 2023-02-14 DIAGNOSIS — J96.10 CHRONIC RESPIRATORY FAILURE, UNSPECIFIED WHETHER WITH HYPOXIA OR HYPERCAPNIA (MULTI): ICD-10-CM

## 2023-02-14 DIAGNOSIS — T81.42XA INFECTION FOLLOWING A PROCEDURE, DEEP INCISIONAL SURGICAL SITE, INITIAL ENCOUNTER: ICD-10-CM

## 2023-02-14 DIAGNOSIS — Z16.29 RESISTANCE TO OTHER SINGLE SPECIFIED ANTIBIOTIC: ICD-10-CM

## 2023-02-14 DIAGNOSIS — Z91.199 PATIENT'S NONCOMPLIANCE WITH OTHER MEDICAL TREATMENT AND REGIMEN DUE TO UNSPECIFIED REASON: ICD-10-CM

## 2023-02-14 DIAGNOSIS — Z99.81 DEPENDENCE ON SUPPLEMENTAL OXYGEN: ICD-10-CM

## 2023-02-14 DIAGNOSIS — M41.9 SCOLIOSIS, UNSPECIFIED: ICD-10-CM

## 2023-02-14 DIAGNOSIS — B95.62 METHICILLIN RESISTANT STAPHYLOCOCCUS AUREUS INFECTION AS THE CAUSE OF DISEASES CLASSIFIED ELSEWHERE: ICD-10-CM

## 2023-02-14 DIAGNOSIS — J44.9 CHRONIC OBSTRUCTIVE PULMONARY DISEASE, UNSPECIFIED (MULTI): ICD-10-CM

## 2023-02-14 DIAGNOSIS — L89.154 PRESSURE ULCER OF SACRAL REGION, STAGE 4 (MULTI): ICD-10-CM

## 2023-02-14 DIAGNOSIS — M54.50 LOW BACK PAIN, UNSPECIFIED: ICD-10-CM

## 2023-02-14 LAB
ABO GROUP (TYPE) IN BLOOD: NORMAL
ALBUMIN (G/DL) IN SER/PLAS: 2.6 G/DL (ref 3.4–5)
ANION GAP IN SER/PLAS: 9 MMOL/L (ref 10–20)
ANTIBODY SCREEN: NORMAL
APPEARANCE, URINE: ABNORMAL
BASOPHILS (10*3/UL) IN BLOOD BY AUTOMATED COUNT: 0.08 X10E9/L (ref 0–0.1)
BASOPHILS/100 LEUKOCYTES IN BLOOD BY AUTOMATED COUNT: 0.9 % (ref 0–2)
BILIRUBIN, URINE: NEGATIVE
BLOOD, URINE: NEGATIVE
C REACTIVE PROTEIN (MG/L) IN SER/PLAS: 4.48 MG/DL
C REACTIVE PROTEIN (MG/L) IN SER/PLAS: 5.26 MG/DL
CALCIUM (MG/DL) IN SER/PLAS: 9.8 MG/DL (ref 8.6–10.6)
CARBON DIOXIDE, TOTAL (MMOL/L) IN SER/PLAS: 35 MMOL/L (ref 21–32)
CHLORIDE (MMOL/L) IN SER/PLAS: 102 MMOL/L (ref 98–107)
COLOR, URINE: YELLOW
CREATININE (MG/DL) IN SER/PLAS: 0.86 MG/DL (ref 0.5–1.3)
EOSINOPHILS (10*3/UL) IN BLOOD BY AUTOMATED COUNT: 0.37 X10E9/L (ref 0–0.7)
EOSINOPHILS/100 LEUKOCYTES IN BLOOD BY AUTOMATED COUNT: 4.1 % (ref 0–6)
ERYTHROCYTE DISTRIBUTION WIDTH (RATIO) BY AUTOMATED COUNT: 12.4 % (ref 11.5–14.5)
ERYTHROCYTE MEAN CORPUSCULAR HEMOGLOBIN CONCENTRATION (G/DL) BY AUTOMATED: 29.9 G/DL (ref 32–36)
ERYTHROCYTE MEAN CORPUSCULAR VOLUME (FL) BY AUTOMATED COUNT: 109 FL (ref 80–100)
ERYTHROCYTES (10*6/UL) IN BLOOD BY AUTOMATED COUNT: 2.86 X10E12/L (ref 4.5–5.9)
GFR MALE: >90 ML/MIN/1.73M2
GLUCOSE (MG/DL) IN SER/PLAS: 105 MG/DL (ref 74–99)
GLUCOSE, URINE: NEGATIVE MG/DL
HEMATOCRIT (%) IN BLOOD BY AUTOMATED COUNT: 31.1 % (ref 41–52)
HEMOGLOBIN (G/DL) IN BLOOD: 9.3 G/DL (ref 13.5–17.5)
IMMATURE GRANULOCYTES/100 LEUKOCYTES IN BLOOD BY AUTOMATED COUNT: 3.2 % (ref 0–0.9)
INR IN PPP BY COAGULATION ASSAY: 1 (ref 0.9–1.1)
KETONES, URINE: NEGATIVE MG/DL
LEUKOCYTE ESTERASE, URINE: ABNORMAL
LEUKOCYTES (10*3/UL) IN BLOOD BY AUTOMATED COUNT: 9 X10E9/L (ref 4.4–11.3)
LYMPHOCYTES (10*3/UL) IN BLOOD BY AUTOMATED COUNT: 1.06 X10E9/L (ref 1.2–4.8)
LYMPHOCYTES/100 LEUKOCYTES IN BLOOD BY AUTOMATED COUNT: 11.8 % (ref 13–44)
MAGNESIUM (MG/DL) IN SER/PLAS: 1.66 MG/DL (ref 1.6–2.4)
MONOCYTES (10*3/UL) IN BLOOD BY AUTOMATED COUNT: 0.79 X10E9/L (ref 0.1–1)
MONOCYTES/100 LEUKOCYTES IN BLOOD BY AUTOMATED COUNT: 8.8 % (ref 2–10)
MUCUS, URINE: ABNORMAL /LPF
NEUTROPHILS (10*3/UL) IN BLOOD BY AUTOMATED COUNT: 6.39 X10E9/L (ref 1.2–7.7)
NEUTROPHILS/100 LEUKOCYTES IN BLOOD BY AUTOMATED COUNT: 71.2 % (ref 40–80)
NITRITE, URINE: NEGATIVE
NRBC (PER 100 WBCS) BY AUTOMATED COUNT: 0 /100 WBC (ref 0–0)
PH, URINE: 5 (ref 5–8)
PHOSPHATE (MG/DL) IN SER/PLAS: 3.2 MG/DL (ref 2.5–4.9)
PLATELETS (10*3/UL) IN BLOOD AUTOMATED COUNT: 414 X10E9/L (ref 150–450)
POTASSIUM (MMOL/L) IN SER/PLAS: 4.6 MMOL/L (ref 3.5–5.3)
PROTEIN, URINE: ABNORMAL MG/DL
PROTHROMBIN TIME (PT) IN PPP BY COAGULATION ASSAY: 12 SEC (ref 9.8–13.4)
RBC, URINE: 11 /HPF (ref 0–5)
RH FACTOR: NORMAL
SARS-COV-2 RESULT: NOT DETECTED
SODIUM (MMOL/L) IN SER/PLAS: 141 MMOL/L (ref 136–145)
SPECIFIC GRAVITY, URINE: 1.02 (ref 1–1.03)
UREA NITROGEN (MG/DL) IN SER/PLAS: 15 MG/DL (ref 6–23)
UROBILINOGEN, URINE: <2 MG/DL (ref 0–1.9)
WBC CLUMPS, URINE: ABNORMAL /HPF
WBC, URINE: 297 /HPF (ref 0–5)

## 2023-02-15 LAB
ALBUMIN (G/DL) IN SER/PLAS: 2.7 G/DL (ref 3.4–5)
ANION GAP IN SER/PLAS: 12 MMOL/L (ref 10–20)
BASOPHILS (10*3/UL) IN BLOOD BY AUTOMATED COUNT: 0.07 X10E9/L (ref 0–0.1)
BASOPHILS/100 LEUKOCYTES IN BLOOD BY AUTOMATED COUNT: 0.9 % (ref 0–2)
CALCIUM (MG/DL) IN SER/PLAS: 9.5 MG/DL (ref 8.6–10.6)
CARBON DIOXIDE, TOTAL (MMOL/L) IN SER/PLAS: 32 MMOL/L (ref 21–32)
CHLORIDE (MMOL/L) IN SER/PLAS: 101 MMOL/L (ref 98–107)
CREATININE (MG/DL) IN SER/PLAS: 0.92 MG/DL (ref 0.5–1.3)
EOSINOPHILS (10*3/UL) IN BLOOD BY AUTOMATED COUNT: 0.44 X10E9/L (ref 0–0.7)
EOSINOPHILS/100 LEUKOCYTES IN BLOOD BY AUTOMATED COUNT: 5.9 % (ref 0–6)
ERYTHROCYTE DISTRIBUTION WIDTH (RATIO) BY AUTOMATED COUNT: 12.4 % (ref 11.5–14.5)
ERYTHROCYTE MEAN CORPUSCULAR HEMOGLOBIN CONCENTRATION (G/DL) BY AUTOMATED: 29.5 G/DL (ref 32–36)
ERYTHROCYTE MEAN CORPUSCULAR VOLUME (FL) BY AUTOMATED COUNT: 109 FL (ref 80–100)
ERYTHROCYTES (10*6/UL) IN BLOOD BY AUTOMATED COUNT: 2.7 X10E12/L (ref 4.5–5.9)
GFR MALE: 90 ML/MIN/1.73M2
GLUCOSE (MG/DL) IN SER/PLAS: 94 MG/DL (ref 74–99)
HEMATOCRIT (%) IN BLOOD BY AUTOMATED COUNT: 29.5 % (ref 41–52)
HEMOGLOBIN (G/DL) IN BLOOD: 8.7 G/DL (ref 13.5–17.5)
IMMATURE GRANULOCYTES/100 LEUKOCYTES IN BLOOD BY AUTOMATED COUNT: 3.3 % (ref 0–0.9)
INR IN PPP BY COAGULATION ASSAY: 1 (ref 0.9–1.1)
LEUKOCYTES (10*3/UL) IN BLOOD BY AUTOMATED COUNT: 7.5 X10E9/L (ref 4.4–11.3)
LYMPHOCYTES (10*3/UL) IN BLOOD BY AUTOMATED COUNT: 1.28 X10E9/L (ref 1.2–4.8)
LYMPHOCYTES/100 LEUKOCYTES IN BLOOD BY AUTOMATED COUNT: 17.1 % (ref 13–44)
MAGNESIUM (MG/DL) IN SER/PLAS: 1.98 MG/DL (ref 1.6–2.4)
MONOCYTES (10*3/UL) IN BLOOD BY AUTOMATED COUNT: 0.66 X10E9/L (ref 0.1–1)
MONOCYTES/100 LEUKOCYTES IN BLOOD BY AUTOMATED COUNT: 8.8 % (ref 2–10)
NEUTROPHILS (10*3/UL) IN BLOOD BY AUTOMATED COUNT: 4.77 X10E9/L (ref 1.2–7.7)
NEUTROPHILS/100 LEUKOCYTES IN BLOOD BY AUTOMATED COUNT: 64 % (ref 40–80)
NRBC (PER 100 WBCS) BY AUTOMATED COUNT: 0 /100 WBC (ref 0–0)
PATIENT TEMPERATURE: 37 DEGREES C
PHOSPHATE (MG/DL) IN SER/PLAS: 3.7 MG/DL (ref 2.5–4.9)
PLATELETS (10*3/UL) IN BLOOD AUTOMATED COUNT: 396 X10E9/L (ref 150–450)
POCT ANION GAP, ARTERIAL: 5 MMOL/L (ref 10–25)
POCT BASE EXCESS, ARTERIAL: 5.7 MMOL/L (ref -2–3)
POCT CHLORIDE, ARTERIAL: 105 MMOL/L (ref 98–107)
POCT GLUCOSE, ARTERIAL: 114 MG/DL (ref 74–99)
POCT HCO3, ARTERIAL: 31.1 MMOL/L (ref 22–26)
POCT HEMATOCRIT, ARTERIAL: 27 % (ref 41–52)
POCT HEMOGLOBIN, ARTERIAL: 8.9 G/DL (ref 13.5–17.5)
POCT IONIZED CALCIUM, ARTERIAL: 1.4 MMOL/L (ref 1.1–1.33)
POCT LACTATE, ARTERIAL: 0.5 MMOL/L (ref 0.4–2)
POCT OXY HEMOGLOBIN, ARTERIAL: 97.6 % (ref 94–98)
POCT PCO2, ARTERIAL: 49 MMHG (ref 38–42)
POCT PH, ARTERIAL: 7.41 (ref 7.38–7.42)
POCT PO2, ARTERIAL: 202 MMHG (ref 85–95)
POCT POTASSIUM, ARTERIAL: 4.4 MMOL/L (ref 3.5–5.3)
POCT SO2, ARTERIAL: 100 % (ref 94–100)
POCT SODIUM, ARTERIAL: 137 MMOL/L (ref 136–145)
POTASSIUM (MMOL/L) IN SER/PLAS: 4.6 MMOL/L (ref 3.5–5.3)
PROTHROMBIN TIME (PT) IN PPP BY COAGULATION ASSAY: 11.6 SEC (ref 9.8–13.4)
SODIUM (MMOL/L) IN SER/PLAS: 140 MMOL/L (ref 136–145)
UREA NITROGEN (MG/DL) IN SER/PLAS: 19 MG/DL (ref 6–23)

## 2023-02-16 LAB
ALBUMIN (G/DL) IN SER/PLAS: 2.6 G/DL (ref 3.4–5)
ANION GAP IN SER/PLAS: 8 MMOL/L (ref 10–20)
BASOPHILS (10*3/UL) IN BLOOD BY AUTOMATED COUNT: 0.07 X10E9/L (ref 0–0.1)
BASOPHILS/100 LEUKOCYTES IN BLOOD BY AUTOMATED COUNT: 0.8 % (ref 0–2)
CALCIUM (MG/DL) IN SER/PLAS: 9.2 MG/DL (ref 8.6–10.6)
CARBON DIOXIDE, TOTAL (MMOL/L) IN SER/PLAS: 35 MMOL/L (ref 21–32)
CHLORIDE (MMOL/L) IN SER/PLAS: 102 MMOL/L (ref 98–107)
CREATININE (MG/DL) IN SER/PLAS: 1.11 MG/DL (ref 0.5–1.3)
EOSINOPHILS (10*3/UL) IN BLOOD BY AUTOMATED COUNT: 0.35 X10E9/L (ref 0–0.7)
EOSINOPHILS/100 LEUKOCYTES IN BLOOD BY AUTOMATED COUNT: 3.8 % (ref 0–6)
ERYTHROCYTE DISTRIBUTION WIDTH (RATIO) BY AUTOMATED COUNT: 12.4 % (ref 11.5–14.5)
ERYTHROCYTE MEAN CORPUSCULAR HEMOGLOBIN CONCENTRATION (G/DL) BY AUTOMATED: 29.4 G/DL (ref 32–36)
ERYTHROCYTE MEAN CORPUSCULAR VOLUME (FL) BY AUTOMATED COUNT: 110 FL (ref 80–100)
ERYTHROCYTES (10*6/UL) IN BLOOD BY AUTOMATED COUNT: 2.54 X10E12/L (ref 4.5–5.9)
GFR MALE: 72 ML/MIN/1.73M2
GLUCOSE (MG/DL) IN SER/PLAS: 100 MG/DL (ref 74–99)
HEMATOCRIT (%) IN BLOOD BY AUTOMATED COUNT: 27.9 % (ref 41–52)
HEMOGLOBIN (G/DL) IN BLOOD: 8.2 G/DL (ref 13.5–17.5)
IMMATURE GRANULOCYTES/100 LEUKOCYTES IN BLOOD BY AUTOMATED COUNT: 3.1 % (ref 0–0.9)
LEUKOCYTES (10*3/UL) IN BLOOD BY AUTOMATED COUNT: 9.2 X10E9/L (ref 4.4–11.3)
LYMPHOCYTES (10*3/UL) IN BLOOD BY AUTOMATED COUNT: 1.25 X10E9/L (ref 1.2–4.8)
LYMPHOCYTES/100 LEUKOCYTES IN BLOOD BY AUTOMATED COUNT: 13.6 % (ref 13–44)
MAGNESIUM (MG/DL) IN SER/PLAS: 1.76 MG/DL (ref 1.6–2.4)
MONOCYTES (10*3/UL) IN BLOOD BY AUTOMATED COUNT: 0.77 X10E9/L (ref 0.1–1)
MONOCYTES/100 LEUKOCYTES IN BLOOD BY AUTOMATED COUNT: 8.4 % (ref 2–10)
NEUTROPHILS (10*3/UL) IN BLOOD BY AUTOMATED COUNT: 6.48 X10E9/L (ref 1.2–7.7)
NEUTROPHILS/100 LEUKOCYTES IN BLOOD BY AUTOMATED COUNT: 70.3 % (ref 40–80)
NRBC (PER 100 WBCS) BY AUTOMATED COUNT: 0 /100 WBC (ref 0–0)
PHOSPHATE (MG/DL) IN SER/PLAS: 3.9 MG/DL (ref 2.5–4.9)
PLATELETS (10*3/UL) IN BLOOD AUTOMATED COUNT: 354 X10E9/L (ref 150–450)
POTASSIUM (MMOL/L) IN SER/PLAS: 5.1 MMOL/L (ref 3.5–5.3)
SODIUM (MMOL/L) IN SER/PLAS: 140 MMOL/L (ref 136–145)
UREA NITROGEN (MG/DL) IN SER/PLAS: 20 MG/DL (ref 6–23)

## 2023-02-17 LAB
ALBUMIN (G/DL) IN SER/PLAS: 2.8 G/DL (ref 3.4–5)
ANION GAP IN SER/PLAS: 8 MMOL/L (ref 10–20)
BASOPHILS (10*3/UL) IN BLOOD BY AUTOMATED COUNT: 0.06 X10E9/L (ref 0–0.1)
BASOPHILS/100 LEUKOCYTES IN BLOOD BY AUTOMATED COUNT: 0.8 % (ref 0–2)
CALCIUM (MG/DL) IN SER/PLAS: 8.9 MG/DL (ref 8.6–10.6)
CARBON DIOXIDE, TOTAL (MMOL/L) IN SER/PLAS: 35 MMOL/L (ref 21–32)
CHLORIDE (MMOL/L) IN SER/PLAS: 103 MMOL/L (ref 98–107)
CREATININE (MG/DL) IN SER/PLAS: 1.05 MG/DL (ref 0.5–1.3)
EOSINOPHILS (10*3/UL) IN BLOOD BY AUTOMATED COUNT: 0.38 X10E9/L (ref 0–0.7)
EOSINOPHILS/100 LEUKOCYTES IN BLOOD BY AUTOMATED COUNT: 4.8 % (ref 0–6)
ERYTHROCYTE DISTRIBUTION WIDTH (RATIO) BY AUTOMATED COUNT: 12.5 % (ref 11.5–14.5)
ERYTHROCYTE MEAN CORPUSCULAR HEMOGLOBIN CONCENTRATION (G/DL) BY AUTOMATED: 29 G/DL (ref 32–36)
ERYTHROCYTE MEAN CORPUSCULAR VOLUME (FL) BY AUTOMATED COUNT: 112 FL (ref 80–100)
ERYTHROCYTES (10*6/UL) IN BLOOD BY AUTOMATED COUNT: 2.56 X10E12/L (ref 4.5–5.9)
GFR MALE: 77 ML/MIN/1.73M2
GLUCOSE (MG/DL) IN SER/PLAS: 97 MG/DL (ref 74–99)
HEMATOCRIT (%) IN BLOOD BY AUTOMATED COUNT: 28.6 % (ref 41–52)
HEMOGLOBIN (G/DL) IN BLOOD: 8.3 G/DL (ref 13.5–17.5)
IMMATURE GRANULOCYTES/100 LEUKOCYTES IN BLOOD BY AUTOMATED COUNT: 3.6 % (ref 0–0.9)
LEUKOCYTES (10*3/UL) IN BLOOD BY AUTOMATED COUNT: 7.9 X10E9/L (ref 4.4–11.3)
LYMPHOCYTES (10*3/UL) IN BLOOD BY AUTOMATED COUNT: 1.2 X10E9/L (ref 1.2–4.8)
LYMPHOCYTES/100 LEUKOCYTES IN BLOOD BY AUTOMATED COUNT: 15.3 % (ref 13–44)
MAGNESIUM (MG/DL) IN SER/PLAS: 1.82 MG/DL (ref 1.6–2.4)
MONOCYTES (10*3/UL) IN BLOOD BY AUTOMATED COUNT: 0.64 X10E9/L (ref 0.1–1)
MONOCYTES/100 LEUKOCYTES IN BLOOD BY AUTOMATED COUNT: 8.2 % (ref 2–10)
NEUTROPHILS (10*3/UL) IN BLOOD BY AUTOMATED COUNT: 5.29 X10E9/L (ref 1.2–7.7)
NEUTROPHILS/100 LEUKOCYTES IN BLOOD BY AUTOMATED COUNT: 67.3 % (ref 40–80)
NRBC (PER 100 WBCS) BY AUTOMATED COUNT: 0 /100 WBC (ref 0–0)
PHOSPHATE (MG/DL) IN SER/PLAS: 3.3 MG/DL (ref 2.5–4.9)
PLATELETS (10*3/UL) IN BLOOD AUTOMATED COUNT: 332 X10E9/L (ref 150–450)
POTASSIUM (MMOL/L) IN SER/PLAS: 4.9 MMOL/L (ref 3.5–5.3)
SODIUM (MMOL/L) IN SER/PLAS: 141 MMOL/L (ref 136–145)
UREA NITROGEN (MG/DL) IN SER/PLAS: 18 MG/DL (ref 6–23)

## 2023-02-18 LAB
ALBUMIN (G/DL) IN SER/PLAS: 2.9 G/DL (ref 3.4–5)
ANION GAP IN SER/PLAS: 9 MMOL/L (ref 10–20)
BASOPHILS (10*3/UL) IN BLOOD BY AUTOMATED COUNT: 0.09 X10E9/L (ref 0–0.1)
BASOPHILS/100 LEUKOCYTES IN BLOOD BY AUTOMATED COUNT: 1 % (ref 0–2)
C. DIFFICILE TOXIN, PCR: NOT DETECTED
CALCIUM (MG/DL) IN SER/PLAS: 9.2 MG/DL (ref 8.6–10.6)
CARBON DIOXIDE, TOTAL (MMOL/L) IN SER/PLAS: 35 MMOL/L (ref 21–32)
CHLORIDE (MMOL/L) IN SER/PLAS: 98 MMOL/L (ref 98–107)
CREATININE (MG/DL) IN SER/PLAS: 1.19 MG/DL (ref 0.5–1.3)
EOSINOPHILS (10*3/UL) IN BLOOD BY AUTOMATED COUNT: 0.42 X10E9/L (ref 0–0.7)
EOSINOPHILS/100 LEUKOCYTES IN BLOOD BY AUTOMATED COUNT: 4.7 % (ref 0–6)
ERYTHROCYTE DISTRIBUTION WIDTH (RATIO) BY AUTOMATED COUNT: 12.3 % (ref 11.5–14.5)
ERYTHROCYTE MEAN CORPUSCULAR HEMOGLOBIN CONCENTRATION (G/DL) BY AUTOMATED: 29.9 G/DL (ref 32–36)
ERYTHROCYTE MEAN CORPUSCULAR VOLUME (FL) BY AUTOMATED COUNT: 111 FL (ref 80–100)
ERYTHROCYTES (10*6/UL) IN BLOOD BY AUTOMATED COUNT: 2.44 X10E12/L (ref 4.5–5.9)
GFR MALE: 66 ML/MIN/1.73M2
GLUCOSE (MG/DL) IN SER/PLAS: 103 MG/DL (ref 74–99)
HEMATOCRIT (%) IN BLOOD BY AUTOMATED COUNT: 27.1 % (ref 41–52)
HEMOGLOBIN (G/DL) IN BLOOD: 8.1 G/DL (ref 13.5–17.5)
IMMATURE GRANULOCYTES/100 LEUKOCYTES IN BLOOD BY AUTOMATED COUNT: 2.8 % (ref 0–0.9)
LEUKOCYTES (10*3/UL) IN BLOOD BY AUTOMATED COUNT: 9 X10E9/L (ref 4.4–11.3)
LYMPHOCYTES (10*3/UL) IN BLOOD BY AUTOMATED COUNT: 1.26 X10E9/L (ref 1.2–4.8)
LYMPHOCYTES/100 LEUKOCYTES IN BLOOD BY AUTOMATED COUNT: 14 % (ref 13–44)
MAGNESIUM (MG/DL) IN SER/PLAS: 1.79 MG/DL (ref 1.6–2.4)
MONOCYTES (10*3/UL) IN BLOOD BY AUTOMATED COUNT: 0.65 X10E9/L (ref 0.1–1)
MONOCYTES/100 LEUKOCYTES IN BLOOD BY AUTOMATED COUNT: 7.2 % (ref 2–10)
NEUTROPHILS (10*3/UL) IN BLOOD BY AUTOMATED COUNT: 6.36 X10E9/L (ref 1.2–7.7)
NEUTROPHILS/100 LEUKOCYTES IN BLOOD BY AUTOMATED COUNT: 70.3 % (ref 40–80)
NRBC (PER 100 WBCS) BY AUTOMATED COUNT: 0 /100 WBC (ref 0–0)
PHOSPHATE (MG/DL) IN SER/PLAS: 2.9 MG/DL (ref 2.5–4.9)
PLATELETS (10*3/UL) IN BLOOD AUTOMATED COUNT: 327 X10E9/L (ref 150–450)
POTASSIUM (MMOL/L) IN SER/PLAS: 4.6 MMOL/L (ref 3.5–5.3)
SODIUM (MMOL/L) IN SER/PLAS: 137 MMOL/L (ref 136–145)
UREA NITROGEN (MG/DL) IN SER/PLAS: 18 MG/DL (ref 6–23)

## 2023-02-19 LAB
ALBUMIN (G/DL) IN SER/PLAS: 2.9 G/DL (ref 3.4–5)
ANION GAP IN SER/PLAS: 8 MMOL/L (ref 10–20)
BASOPHILS (10*3/UL) IN BLOOD BY AUTOMATED COUNT: 0.11 X10E9/L (ref 0–0.1)
BASOPHILS/100 LEUKOCYTES IN BLOOD BY AUTOMATED COUNT: 1.3 % (ref 0–2)
CALCIUM (MG/DL) IN SER/PLAS: 9.2 MG/DL (ref 8.6–10.6)
CARBON DIOXIDE, TOTAL (MMOL/L) IN SER/PLAS: 38 MMOL/L (ref 21–32)
CHLORIDE (MMOL/L) IN SER/PLAS: 97 MMOL/L (ref 98–107)
CREATININE (MG/DL) IN SER/PLAS: 1.42 MG/DL (ref 0.5–1.3)
EOSINOPHILS (10*3/UL) IN BLOOD BY AUTOMATED COUNT: 0.44 X10E9/L (ref 0–0.7)
EOSINOPHILS/100 LEUKOCYTES IN BLOOD BY AUTOMATED COUNT: 5.3 % (ref 0–6)
ERYTHROCYTE DISTRIBUTION WIDTH (RATIO) BY AUTOMATED COUNT: 12.1 % (ref 11.5–14.5)
ERYTHROCYTE MEAN CORPUSCULAR HEMOGLOBIN CONCENTRATION (G/DL) BY AUTOMATED: 29.1 G/DL (ref 32–36)
ERYTHROCYTE MEAN CORPUSCULAR VOLUME (FL) BY AUTOMATED COUNT: 111 FL (ref 80–100)
ERYTHROCYTES (10*6/UL) IN BLOOD BY AUTOMATED COUNT: 2.42 X10E12/L (ref 4.5–5.9)
GFR MALE: 53 ML/MIN/1.73M2
GLUCOSE (MG/DL) IN SER/PLAS: 99 MG/DL (ref 74–99)
HEMATOCRIT (%) IN BLOOD BY AUTOMATED COUNT: 26.8 % (ref 41–52)
HEMOGLOBIN (G/DL) IN BLOOD: 7.8 G/DL (ref 13.5–17.5)
IMMATURE GRANULOCYTES/100 LEUKOCYTES IN BLOOD BY AUTOMATED COUNT: 3.2 % (ref 0–0.9)
LEUKOCYTES (10*3/UL) IN BLOOD BY AUTOMATED COUNT: 8.4 X10E9/L (ref 4.4–11.3)
LYMPHOCYTES (10*3/UL) IN BLOOD BY AUTOMATED COUNT: 1.34 X10E9/L (ref 1.2–4.8)
LYMPHOCYTES/100 LEUKOCYTES IN BLOOD BY AUTOMATED COUNT: 16 % (ref 13–44)
MAGNESIUM (MG/DL) IN SER/PLAS: 1.84 MG/DL (ref 1.6–2.4)
MONOCYTES (10*3/UL) IN BLOOD BY AUTOMATED COUNT: 0.69 X10E9/L (ref 0.1–1)
MONOCYTES/100 LEUKOCYTES IN BLOOD BY AUTOMATED COUNT: 8.3 % (ref 2–10)
NEUTROPHILS (10*3/UL) IN BLOOD BY AUTOMATED COUNT: 5.51 X10E9/L (ref 1.2–7.7)
NEUTROPHILS/100 LEUKOCYTES IN BLOOD BY AUTOMATED COUNT: 65.9 % (ref 40–80)
NRBC (PER 100 WBCS) BY AUTOMATED COUNT: 0 /100 WBC (ref 0–0)
PHOSPHATE (MG/DL) IN SER/PLAS: 3.3 MG/DL (ref 2.5–4.9)
PLATELETS (10*3/UL) IN BLOOD AUTOMATED COUNT: 333 X10E9/L (ref 150–450)
POTASSIUM (MMOL/L) IN SER/PLAS: 4.8 MMOL/L (ref 3.5–5.3)
SODIUM (MMOL/L) IN SER/PLAS: 138 MMOL/L (ref 136–145)
UREA NITROGEN (MG/DL) IN SER/PLAS: 20 MG/DL (ref 6–23)
VANCOMYCIN (UG/ML) IN SER/PLAS - TROUGH: 21.8 UG/ML (ref 5–20)

## 2023-02-20 LAB
ALBUMIN (G/DL) IN SER/PLAS: 2.8 G/DL (ref 3.4–5)
ANION GAP IN SER/PLAS: 7 MMOL/L (ref 10–20)
CALCIUM (MG/DL) IN SER/PLAS: 9.2 MG/DL (ref 8.6–10.6)
CARBON DIOXIDE, TOTAL (MMOL/L) IN SER/PLAS: 37 MMOL/L (ref 21–32)
CHLORIDE (MMOL/L) IN SER/PLAS: 100 MMOL/L (ref 98–107)
CREATININE (MG/DL) IN SER/PLAS: 0.83 MG/DL (ref 0.5–1.3)
ERYTHROCYTE DISTRIBUTION WIDTH (RATIO) BY AUTOMATED COUNT: 12.3 % (ref 11.5–14.5)
ERYTHROCYTE MEAN CORPUSCULAR HEMOGLOBIN CONCENTRATION (G/DL) BY AUTOMATED: 29.9 G/DL (ref 32–36)
ERYTHROCYTE MEAN CORPUSCULAR VOLUME (FL) BY AUTOMATED COUNT: 111 FL (ref 80–100)
ERYTHROCYTES (10*6/UL) IN BLOOD BY AUTOMATED COUNT: 2.35 X10E12/L (ref 4.5–5.9)
GFR MALE: >90 ML/MIN/1.73M2
GLUCOSE (MG/DL) IN SER/PLAS: 102 MG/DL (ref 74–99)
HEMATOCRIT (%) IN BLOOD BY AUTOMATED COUNT: 26.1 % (ref 41–52)
HEMOGLOBIN (G/DL) IN BLOOD: 7.8 G/DL (ref 13.5–17.5)
LEUKOCYTES (10*3/UL) IN BLOOD BY AUTOMATED COUNT: 7.3 X10E9/L (ref 4.4–11.3)
MAGNESIUM (MG/DL) IN SER/PLAS: 1.79 MG/DL (ref 1.6–2.4)
NRBC (PER 100 WBCS) BY AUTOMATED COUNT: 0 /100 WBC (ref 0–0)
PHOSPHATE (MG/DL) IN SER/PLAS: 3.1 MG/DL (ref 2.5–4.9)
PLATELETS (10*3/UL) IN BLOOD AUTOMATED COUNT: 331 X10E9/L (ref 150–450)
POTASSIUM (MMOL/L) IN SER/PLAS: 5 MMOL/L (ref 3.5–5.3)
SODIUM (MMOL/L) IN SER/PLAS: 139 MMOL/L (ref 136–145)
UREA NITROGEN (MG/DL) IN SER/PLAS: 19 MG/DL (ref 6–23)

## 2023-02-21 LAB
ABO GROUP (TYPE) IN BLOOD: NORMAL
ALBUMIN (G/DL) IN SER/PLAS: 3.2 G/DL (ref 3.4–5)
ANION GAP IN SER/PLAS: 6 MMOL/L (ref 10–20)
ANTIBODY SCREEN: NORMAL
BASOPHILS (10*3/UL) IN BLOOD BY AUTOMATED COUNT: 0.09 X10E9/L (ref 0–0.1)
BASOPHILS/100 LEUKOCYTES IN BLOOD BY AUTOMATED COUNT: 1.2 % (ref 0–2)
CALCIUM (MG/DL) IN SER/PLAS: 9.9 MG/DL (ref 8.6–10.6)
CARBON DIOXIDE, TOTAL (MMOL/L) IN SER/PLAS: 38 MMOL/L (ref 21–32)
CHLORIDE (MMOL/L) IN SER/PLAS: 98 MMOL/L (ref 98–107)
CREATININE (MG/DL) IN SER/PLAS: 0.98 MG/DL (ref 0.5–1.3)
EOSINOPHILS (10*3/UL) IN BLOOD BY AUTOMATED COUNT: 0.37 X10E9/L (ref 0–0.7)
EOSINOPHILS/100 LEUKOCYTES IN BLOOD BY AUTOMATED COUNT: 4.9 % (ref 0–6)
ERYTHROCYTE DISTRIBUTION WIDTH (RATIO) BY AUTOMATED COUNT: 12.5 % (ref 11.5–14.5)
ERYTHROCYTE MEAN CORPUSCULAR HEMOGLOBIN CONCENTRATION (G/DL) BY AUTOMATED: 29 G/DL (ref 32–36)
ERYTHROCYTE MEAN CORPUSCULAR VOLUME (FL) BY AUTOMATED COUNT: 111 FL (ref 80–100)
ERYTHROCYTES (10*6/UL) IN BLOOD BY AUTOMATED COUNT: 2.58 X10E12/L (ref 4.5–5.9)
GFR MALE: 83 ML/MIN/1.73M2
GLUCOSE (MG/DL) IN SER/PLAS: 96 MG/DL (ref 74–99)
HEMATOCRIT (%) IN BLOOD BY AUTOMATED COUNT: 28.6 % (ref 41–52)
HEMOGLOBIN (G/DL) IN BLOOD: 8.3 G/DL (ref 13.5–17.5)
IMMATURE GRANULOCYTES/100 LEUKOCYTES IN BLOOD BY AUTOMATED COUNT: 3 % (ref 0–0.9)
LEUKOCYTES (10*3/UL) IN BLOOD BY AUTOMATED COUNT: 7.6 X10E9/L (ref 4.4–11.3)
LYMPHOCYTES (10*3/UL) IN BLOOD BY AUTOMATED COUNT: 1.85 X10E9/L (ref 1.2–4.8)
LYMPHOCYTES/100 LEUKOCYTES IN BLOOD BY AUTOMATED COUNT: 24.3 % (ref 13–44)
MAGNESIUM (MG/DL) IN SER/PLAS: 1.85 MG/DL (ref 1.6–2.4)
MONOCYTES (10*3/UL) IN BLOOD BY AUTOMATED COUNT: 0.62 X10E9/L (ref 0.1–1)
MONOCYTES/100 LEUKOCYTES IN BLOOD BY AUTOMATED COUNT: 8.1 % (ref 2–10)
NEUTROPHILS (10*3/UL) IN BLOOD BY AUTOMATED COUNT: 4.46 X10E9/L (ref 1.2–7.7)
NEUTROPHILS/100 LEUKOCYTES IN BLOOD BY AUTOMATED COUNT: 58.5 % (ref 40–80)
NRBC (PER 100 WBCS) BY AUTOMATED COUNT: 0 /100 WBC (ref 0–0)
PHOSPHATE (MG/DL) IN SER/PLAS: 3.2 MG/DL (ref 2.5–4.9)
PLATELETS (10*3/UL) IN BLOOD AUTOMATED COUNT: 317 X10E9/L (ref 150–450)
POTASSIUM (MMOL/L) IN SER/PLAS: 5.2 MMOL/L (ref 3.5–5.3)
RH FACTOR: NORMAL
SARS-COV-2 RESULT: NOT DETECTED
SODIUM (MMOL/L) IN SER/PLAS: 137 MMOL/L (ref 136–145)
UREA NITROGEN (MG/DL) IN SER/PLAS: 24 MG/DL (ref 6–23)
VANCOMYCIN (UG/ML) IN SER/PLAS - TROUGH: 23.3 UG/ML (ref 5–20)

## 2023-02-22 LAB
ALBUMIN (G/DL) IN SER/PLAS: 3.2 G/DL (ref 3.4–5)
ANION GAP IN SER/PLAS: 9 MMOL/L (ref 10–20)
BASOPHILS (10*3/UL) IN BLOOD BY AUTOMATED COUNT: 0.11 X10E9/L (ref 0–0.1)
BASOPHILS/100 LEUKOCYTES IN BLOOD BY AUTOMATED COUNT: 1.3 % (ref 0–2)
CALCIUM (MG/DL) IN SER/PLAS: 9.8 MG/DL (ref 8.6–10.6)
CARBON DIOXIDE, TOTAL (MMOL/L) IN SER/PLAS: 37 MMOL/L (ref 21–32)
CHLORIDE (MMOL/L) IN SER/PLAS: 99 MMOL/L (ref 98–107)
CREATININE (MG/DL) IN SER/PLAS: 0.96 MG/DL (ref 0.5–1.3)
EOSINOPHILS (10*3/UL) IN BLOOD BY AUTOMATED COUNT: 0.43 X10E9/L (ref 0–0.7)
EOSINOPHILS/100 LEUKOCYTES IN BLOOD BY AUTOMATED COUNT: 5.1 % (ref 0–6)
ERYTHROCYTE DISTRIBUTION WIDTH (RATIO) BY AUTOMATED COUNT: 12.7 % (ref 11.5–14.5)
ERYTHROCYTE MEAN CORPUSCULAR HEMOGLOBIN CONCENTRATION (G/DL) BY AUTOMATED: 29.1 G/DL (ref 32–36)
ERYTHROCYTE MEAN CORPUSCULAR VOLUME (FL) BY AUTOMATED COUNT: 112 FL (ref 80–100)
ERYTHROCYTES (10*6/UL) IN BLOOD BY AUTOMATED COUNT: 2.55 X10E12/L (ref 4.5–5.9)
GFR MALE: 85 ML/MIN/1.73M2
GLUCOSE (MG/DL) IN SER/PLAS: 115 MG/DL (ref 74–99)
HEMATOCRIT (%) IN BLOOD BY AUTOMATED COUNT: 28.5 % (ref 41–52)
HEMOGLOBIN (G/DL) IN BLOOD: 8.3 G/DL (ref 13.5–17.5)
IMMATURE GRANULOCYTES/100 LEUKOCYTES IN BLOOD BY AUTOMATED COUNT: 1.9 % (ref 0–0.9)
LEUKOCYTES (10*3/UL) IN BLOOD BY AUTOMATED COUNT: 8.4 X10E9/L (ref 4.4–11.3)
LYMPHOCYTES (10*3/UL) IN BLOOD BY AUTOMATED COUNT: 1.61 X10E9/L (ref 1.2–4.8)
LYMPHOCYTES/100 LEUKOCYTES IN BLOOD BY AUTOMATED COUNT: 19.2 % (ref 13–44)
MAGNESIUM (MG/DL) IN SER/PLAS: 1.82 MG/DL (ref 1.6–2.4)
MONOCYTES (10*3/UL) IN BLOOD BY AUTOMATED COUNT: 0.7 X10E9/L (ref 0.1–1)
MONOCYTES/100 LEUKOCYTES IN BLOOD BY AUTOMATED COUNT: 8.4 % (ref 2–10)
NEUTROPHILS (10*3/UL) IN BLOOD BY AUTOMATED COUNT: 5.36 X10E9/L (ref 1.2–7.7)
NEUTROPHILS/100 LEUKOCYTES IN BLOOD BY AUTOMATED COUNT: 64.1 % (ref 40–80)
NRBC (PER 100 WBCS) BY AUTOMATED COUNT: 0 /100 WBC (ref 0–0)
PHOSPHATE (MG/DL) IN SER/PLAS: 3.3 MG/DL (ref 2.5–4.9)
PLATELETS (10*3/UL) IN BLOOD AUTOMATED COUNT: 331 X10E9/L (ref 150–450)
POTASSIUM (MMOL/L) IN SER/PLAS: 4.8 MMOL/L (ref 3.5–5.3)
SODIUM (MMOL/L) IN SER/PLAS: 140 MMOL/L (ref 136–145)
UREA NITROGEN (MG/DL) IN SER/PLAS: 25 MG/DL (ref 6–23)
VANCOMYCIN (UG/ML) IN SER/PLAS - TROUGH: 25 UG/ML (ref 5–20)

## 2023-02-23 LAB
ALBUMIN (G/DL) IN SER/PLAS: 3.2 G/DL (ref 3.4–5)
ALBUMIN (G/DL) IN SER/PLAS: NORMAL
ANION GAP IN SER/PLAS: 9 MMOL/L (ref 10–20)
ANION GAP IN SER/PLAS: NORMAL
BASOPHILS (10*3/UL) IN BLOOD BY MANUAL COUNT - WAM: 0.1 X10E9/L (ref 0–0.1)
BASOPHILS/100 LEUKOCYTES IN BLOOD BY MANUAL COUNT - WAM: 1.7 % (ref 0–2)
CALCIUM (MG/DL) IN SER/PLAS: 9.6 MG/DL (ref 8.6–10.6)
CALCIUM (MG/DL) IN SER/PLAS: NORMAL
CARBON DIOXIDE, TOTAL (MMOL/L) IN SER/PLAS: 37 MMOL/L (ref 21–32)
CARBON DIOXIDE, TOTAL (MMOL/L) IN SER/PLAS: NORMAL
CHLORIDE (MMOL/L) IN SER/PLAS: 97 MMOL/L (ref 98–107)
CHLORIDE (MMOL/L) IN SER/PLAS: NORMAL
CREATININE (MG/DL) IN SER/PLAS: 0.9 MG/DL (ref 0.5–1.3)
CREATININE (MG/DL) IN SER/PLAS: NORMAL
EOSINOPHILS (10*3/UL) IN BLOOD BY MANUAL COUNT - WAM: 0.21 X10E9/L (ref 0–0.7)
EOSINOPHILS/100 LEUKOCYTES IN BLOOD BY MANUAL COUNT - WAM: 3.5 % (ref 0–6)
ERYTHROCYTE DISTRIBUTION WIDTH (RATIO) BY AUTOMATED COUNT: 12.9 % (ref 11.5–14.5)
ERYTHROCYTE MEAN CORPUSCULAR HEMOGLOBIN CONCENTRATION (G/DL) BY AUTOMATED: 27.7 G/DL (ref 32–36)
ERYTHROCYTE MEAN CORPUSCULAR VOLUME (FL) BY AUTOMATED COUNT: 117 FL (ref 80–100)
ERYTHROCYTES (10*6/UL) IN BLOOD BY AUTOMATED COUNT: 2.18 X10E12/L (ref 4.5–5.9)
GFR FEMALE: NORMAL
GFR MALE: >90 ML/MIN/1.73M2
GFR MALE: NORMAL
GLUCOSE (MG/DL) IN SER/PLAS: 92 MG/DL (ref 74–99)
GLUCOSE (MG/DL) IN SER/PLAS: NORMAL
HEMATOCRIT (%) IN BLOOD BY AUTOMATED COUNT: 25.6 % (ref 41–52)
HEMOGLOBIN (G/DL) IN BLOOD: 7.1 G/DL (ref 13.5–17.5)
IMMATURE GRANULOCYTES/100 LEUKOCYTES IN BLOOD BY AUTOMATED COUNT: 2.1 % (ref 0–0.9)
LEUKOCYTES (10*3/UL) IN BLOOD BY AUTOMATED COUNT: 6.1 X10E9/L (ref 4.4–11.3)
LYMPHOCYTES (10*3/UL) IN BLOOD BY MANUAL COUNT - WAM: 0.69 X10E9/L (ref 1.2–4.8)
LYMPHOCYTES/100 LEUKOCYTES IN BLOOD BY MANUAL COUNT - WAM: 11.3 % (ref 13–44)
MAGNESIUM (MG/DL) IN SER/PLAS: 1.89 MG/DL (ref 1.6–2.4)
MAGNESIUM (MG/DL) IN SER/PLAS: NORMAL
MANUAL DIFFERENTIAL Y/N: ABNORMAL
METAMYELOCYTES (10*3/UL) IN BLOOD BY MANUAL COUNT - WAM: 0.1 X10E9/L (ref 0–0)
METAMYELOCYTES/100 LEUKOCYTES IN BLOOD BY MANUAL COUNT - WAM: 1.7 % (ref 0–0)
MONOCYTES (10*3/UL) IN BLOOD BY MANUAL COUNT - WAM: 0.21 X10E9/L (ref 0.1–1)
MONOCYTES/100 LEUKOCYTES IN BLOOD BY MANUAL COUNT - WAM: 3.5 % (ref 2–10)
NEUTROPHILS (SEGS+BANDS) (10*3/UL) MANUAL COUNT - WAM: 4.78 X10E9/L (ref 1.2–7.7)
NRBC (PER 100 WBCS) BY AUTOMATED COUNT: 0 /100 WBC (ref 0–0)
PHOSPHATE (MG/DL) IN SER/PLAS: 4 MG/DL (ref 2.5–4.9)
PHOSPHATE (MG/DL) IN SER/PLAS: NORMAL
PLATELETS (10*3/UL) IN BLOOD AUTOMATED COUNT: 276 X10E9/L (ref 150–450)
POTASSIUM (MMOL/L) IN SER/PLAS: 5.2 MMOL/L (ref 3.5–5.3)
POTASSIUM (MMOL/L) IN SER/PLAS: NORMAL
RBC MORPHOLOGY IN BLOOD: NORMAL
SEGMENTED NEUTROPHILS (10*3/UL) BLOOD MANUAL - WAM: 4.78 X10E9/L (ref 1.2–7)
SEGMENTED NEUTROPHILS/100 LEUKOCYTES BY MANUAL COUNT -: 78.3 % (ref 40–80)
SODIUM (MMOL/L) IN SER/PLAS: 138 MMOL/L (ref 136–145)
SODIUM (MMOL/L) IN SER/PLAS: NORMAL
STOMATOCYTES IN BLOOD BY LIGHT MICROSCOPY: NORMAL
UREA NITROGEN (MG/DL) IN SER/PLAS: 25 MG/DL (ref 6–23)
UREA NITROGEN (MG/DL) IN SER/PLAS: NORMAL
VANCOMYCIN (UG/ML) IN SER/PLAS - TROUGH: 21.8 UG/ML (ref 5–20)

## 2023-02-24 LAB
ABO GROUP (TYPE) IN BLOOD: NORMAL
ALBUMIN (G/DL) IN SER/PLAS: 3.3 G/DL (ref 3.4–5)
ALBUMIN (G/DL) IN SER/PLAS: 3.4 G/DL (ref 3.4–5)
ALBUMIN (G/DL) IN SER/PLAS: 3.5 G/DL (ref 3.4–5)
ALBUMIN (G/DL) IN SER/PLAS: NORMAL
ANION GAP IN SER/PLAS: 10 MMOL/L (ref 10–20)
ANION GAP IN SER/PLAS: 11 MMOL/L (ref 10–20)
ANION GAP IN SER/PLAS: 9 MMOL/L (ref 10–20)
ANION GAP IN SER/PLAS: NORMAL
ANTIBODY SCREEN: NORMAL
CALCIUM (MG/DL) IN SER/PLAS: 10.6 MG/DL (ref 8.6–10.6)
CALCIUM (MG/DL) IN SER/PLAS: 9.8 MG/DL (ref 8.6–10.6)
CALCIUM (MG/DL) IN SER/PLAS: 9.8 MG/DL (ref 8.6–10.6)
CALCIUM (MG/DL) IN SER/PLAS: NORMAL
CARBON DIOXIDE, TOTAL (MMOL/L) IN SER/PLAS: 37 MMOL/L (ref 21–32)
CARBON DIOXIDE, TOTAL (MMOL/L) IN SER/PLAS: 38 MMOL/L (ref 21–32)
CARBON DIOXIDE, TOTAL (MMOL/L) IN SER/PLAS: 40 MMOL/L (ref 21–32)
CARBON DIOXIDE, TOTAL (MMOL/L) IN SER/PLAS: NORMAL
CBC DIFFERENTIAL PATH REVIEW: NORMAL
CHLORIDE (MMOL/L) IN SER/PLAS: 94 MMOL/L (ref 98–107)
CHLORIDE (MMOL/L) IN SER/PLAS: 94 MMOL/L (ref 98–107)
CHLORIDE (MMOL/L) IN SER/PLAS: 95 MMOL/L (ref 98–107)
CHLORIDE (MMOL/L) IN SER/PLAS: NORMAL
CREATININE (MG/DL) IN SER/PLAS: 0.98 MG/DL (ref 0.5–1.3)
CREATININE (MG/DL) IN SER/PLAS: 1 MG/DL (ref 0.5–1.3)
CREATININE (MG/DL) IN SER/PLAS: 1.03 MG/DL (ref 0.5–1.3)
CREATININE (MG/DL) IN SER/PLAS: NORMAL
ERYTHROCYTE DISTRIBUTION WIDTH (RATIO) BY AUTOMATED COUNT: 12.5 % (ref 11.5–14.5)
ERYTHROCYTE MEAN CORPUSCULAR HEMOGLOBIN CONCENTRATION (G/DL) BY AUTOMATED: 30.2 G/DL (ref 32–36)
ERYTHROCYTE MEAN CORPUSCULAR VOLUME (FL) BY AUTOMATED COUNT: 107 FL (ref 80–100)
ERYTHROCYTES (10*6/UL) IN BLOOD BY AUTOMATED COUNT: 2.66 X10E12/L (ref 4.5–5.9)
GFR FEMALE: NORMAL
GFR MALE: 78 ML/MIN/1.73M2
GFR MALE: 81 ML/MIN/1.73M2
GFR MALE: 83 ML/MIN/1.73M2
GFR MALE: NORMAL
GLUCOSE (MG/DL) IN SER/PLAS: 104 MG/DL (ref 74–99)
GLUCOSE (MG/DL) IN SER/PLAS: 113 MG/DL (ref 74–99)
GLUCOSE (MG/DL) IN SER/PLAS: 120 MG/DL (ref 74–99)
GLUCOSE (MG/DL) IN SER/PLAS: NORMAL
HEMATOCRIT (%) IN BLOOD BY AUTOMATED COUNT: 28.5 % (ref 41–52)
HEMOGLOBIN (G/DL) IN BLOOD: 8.6 G/DL (ref 13.5–17.5)
LEUKOCYTES (10*3/UL) IN BLOOD BY AUTOMATED COUNT: 10.2 X10E9/L (ref 4.4–11.3)
MAGNESIUM (MG/DL) IN SER/PLAS: 1.98 MG/DL (ref 1.6–2.4)
MAGNESIUM (MG/DL) IN SER/PLAS: NORMAL
NRBC (PER 100 WBCS) BY AUTOMATED COUNT: 0 /100 WBC (ref 0–0)
PHOSPHATE (MG/DL) IN SER/PLAS: 3.7 MG/DL (ref 2.5–4.9)
PHOSPHATE (MG/DL) IN SER/PLAS: 3.9 MG/DL (ref 2.5–4.9)
PHOSPHATE (MG/DL) IN SER/PLAS: 4.2 MG/DL (ref 2.5–4.9)
PHOSPHATE (MG/DL) IN SER/PLAS: NORMAL
PLATELETS (10*3/UL) IN BLOOD AUTOMATED COUNT: 334 X10E9/L (ref 150–450)
POTASSIUM (MMOL/L) IN SER/PLAS: 5.9 MMOL/L (ref 3.5–5.3)
POTASSIUM (MMOL/L) IN SER/PLAS: 6 MMOL/L (ref 3.5–5.3)
POTASSIUM (MMOL/L) IN SER/PLAS: 6.1 MMOL/L (ref 3.5–5.3)
POTASSIUM (MMOL/L) IN SER/PLAS: NORMAL
RH FACTOR: NORMAL
SODIUM (MMOL/L) IN SER/PLAS: 136 MMOL/L (ref 136–145)
SODIUM (MMOL/L) IN SER/PLAS: 137 MMOL/L (ref 136–145)
SODIUM (MMOL/L) IN SER/PLAS: 137 MMOL/L (ref 136–145)
SODIUM (MMOL/L) IN SER/PLAS: NORMAL
UREA NITROGEN (MG/DL) IN SER/PLAS: 25 MG/DL (ref 6–23)
UREA NITROGEN (MG/DL) IN SER/PLAS: 26 MG/DL (ref 6–23)
UREA NITROGEN (MG/DL) IN SER/PLAS: 28 MG/DL (ref 6–23)
UREA NITROGEN (MG/DL) IN SER/PLAS: NORMAL

## 2023-02-25 LAB
ALBUMIN (G/DL) IN SER/PLAS: 3 G/DL (ref 3.4–5)
ALBUMIN (G/DL) IN SER/PLAS: 3.4 G/DL (ref 3.4–5)
ANION GAP IN SER/PLAS: 8 MMOL/L (ref 10–20)
ANION GAP IN SER/PLAS: 9 MMOL/L (ref 10–20)
BASOPHILS (10*3/UL) IN BLOOD BY AUTOMATED COUNT: 0.06 X10E9/L (ref 0–0.1)
BASOPHILS/100 LEUKOCYTES IN BLOOD BY AUTOMATED COUNT: 0.7 % (ref 0–2)
CALCIUM (MG/DL) IN SER/PLAS: 10 MG/DL (ref 8.6–10.6)
CALCIUM (MG/DL) IN SER/PLAS: 10.1 MG/DL (ref 8.6–10.6)
CARBON DIOXIDE, TOTAL (MMOL/L) IN SER/PLAS: 39 MMOL/L (ref 21–32)
CARBON DIOXIDE, TOTAL (MMOL/L) IN SER/PLAS: 40 MMOL/L (ref 21–32)
CHLORIDE (MMOL/L) IN SER/PLAS: 93 MMOL/L (ref 98–107)
CHLORIDE (MMOL/L) IN SER/PLAS: 95 MMOL/L (ref 98–107)
CREATININE (MG/DL) IN SER/PLAS: 0.97 MG/DL (ref 0.5–1.3)
CREATININE (MG/DL) IN SER/PLAS: 1.2 MG/DL (ref 0.5–1.3)
EOSINOPHILS (10*3/UL) IN BLOOD BY AUTOMATED COUNT: 0.26 X10E9/L (ref 0–0.7)
EOSINOPHILS/100 LEUKOCYTES IN BLOOD BY AUTOMATED COUNT: 3.2 % (ref 0–6)
ERYTHROCYTE DISTRIBUTION WIDTH (RATIO) BY AUTOMATED COUNT: 12.6 % (ref 11.5–14.5)
ERYTHROCYTE MEAN CORPUSCULAR HEMOGLOBIN CONCENTRATION (G/DL) BY AUTOMATED: 29.6 G/DL (ref 32–36)
ERYTHROCYTE MEAN CORPUSCULAR VOLUME (FL) BY AUTOMATED COUNT: 112 FL (ref 80–100)
ERYTHROCYTES (10*6/UL) IN BLOOD BY AUTOMATED COUNT: 2.38 X10E12/L (ref 4.5–5.9)
GFR MALE: 65 ML/MIN/1.73M2
GFR MALE: 84 ML/MIN/1.73M2
GLUCOSE (MG/DL) IN SER/PLAS: 122 MG/DL (ref 74–99)
GLUCOSE (MG/DL) IN SER/PLAS: 141 MG/DL (ref 74–99)
HEMATOCRIT (%) IN BLOOD BY AUTOMATED COUNT: 26.7 % (ref 41–52)
HEMOGLOBIN (G/DL) IN BLOOD: 7.9 G/DL (ref 13.5–17.5)
IMMATURE GRANULOCYTES/100 LEUKOCYTES IN BLOOD BY AUTOMATED COUNT: 1.2 % (ref 0–0.9)
LEUKOCYTES (10*3/UL) IN BLOOD BY AUTOMATED COUNT: 8.1 X10E9/L (ref 4.4–11.3)
LYMPHOCYTES (10*3/UL) IN BLOOD BY AUTOMATED COUNT: 0.98 X10E9/L (ref 1.2–4.8)
LYMPHOCYTES/100 LEUKOCYTES IN BLOOD BY AUTOMATED COUNT: 12.1 % (ref 13–44)
MAGNESIUM (MG/DL) IN SER/PLAS: 1.91 MG/DL (ref 1.6–2.4)
MONOCYTES (10*3/UL) IN BLOOD BY AUTOMATED COUNT: 0.73 X10E9/L (ref 0.1–1)
MONOCYTES/100 LEUKOCYTES IN BLOOD BY AUTOMATED COUNT: 9 % (ref 2–10)
NEUTROPHILS (10*3/UL) IN BLOOD BY AUTOMATED COUNT: 5.99 X10E9/L (ref 1.2–7.7)
NEUTROPHILS/100 LEUKOCYTES IN BLOOD BY AUTOMATED COUNT: 73.8 % (ref 40–80)
NRBC (PER 100 WBCS) BY AUTOMATED COUNT: 0 /100 WBC (ref 0–0)
PHOSPHATE (MG/DL) IN SER/PLAS: 3.2 MG/DL (ref 2.5–4.9)
PHOSPHATE (MG/DL) IN SER/PLAS: 3.7 MG/DL (ref 2.5–4.9)
PLATELETS (10*3/UL) IN BLOOD AUTOMATED COUNT: 290 X10E9/L (ref 150–450)
POTASSIUM (MMOL/L) IN SER/PLAS: 5.3 MMOL/L (ref 3.5–5.3)
POTASSIUM (MMOL/L) IN SER/PLAS: 5.8 MMOL/L (ref 3.5–5.3)
SODIUM (MMOL/L) IN SER/PLAS: 136 MMOL/L (ref 136–145)
SODIUM (MMOL/L) IN SER/PLAS: 137 MMOL/L (ref 136–145)
UREA NITROGEN (MG/DL) IN SER/PLAS: 29 MG/DL (ref 6–23)
UREA NITROGEN (MG/DL) IN SER/PLAS: 30 MG/DL (ref 6–23)
VANCOMYCIN (UG/ML) IN SER/PLAS: 15.2 UG/ML

## 2023-02-26 LAB
ALBUMIN (G/DL) IN SER/PLAS: 3.3 G/DL (ref 3.4–5)
ANION GAP IN SER/PLAS: 9 MMOL/L (ref 10–20)
BASOPHILS (10*3/UL) IN BLOOD BY AUTOMATED COUNT: 0.09 X10E9/L (ref 0–0.1)
BASOPHILS/100 LEUKOCYTES IN BLOOD BY AUTOMATED COUNT: 1 % (ref 0–2)
CALCIUM (MG/DL) IN SER/PLAS: 10 MG/DL (ref 8.6–10.6)
CARBON DIOXIDE, TOTAL (MMOL/L) IN SER/PLAS: 39 MMOL/L (ref 21–32)
CHLORIDE (MMOL/L) IN SER/PLAS: 94 MMOL/L (ref 98–107)
CREATININE (MG/DL) IN SER/PLAS: 0.9 MG/DL (ref 0.5–1.3)
EOSINOPHILS (10*3/UL) IN BLOOD BY AUTOMATED COUNT: 0.46 X10E9/L (ref 0–0.7)
EOSINOPHILS/100 LEUKOCYTES IN BLOOD BY AUTOMATED COUNT: 5.2 % (ref 0–6)
ERYTHROCYTE DISTRIBUTION WIDTH (RATIO) BY AUTOMATED COUNT: 12.3 % (ref 11.5–14.5)
ERYTHROCYTE MEAN CORPUSCULAR HEMOGLOBIN CONCENTRATION (G/DL) BY AUTOMATED: 29.2 G/DL (ref 32–36)
ERYTHROCYTE MEAN CORPUSCULAR VOLUME (FL) BY AUTOMATED COUNT: 110 FL (ref 80–100)
ERYTHROCYTES (10*6/UL) IN BLOOD BY AUTOMATED COUNT: 2.48 X10E12/L (ref 4.5–5.9)
GFR MALE: >90 ML/MIN/1.73M2
GLUCOSE (MG/DL) IN SER/PLAS: 94 MG/DL (ref 74–99)
HEMATOCRIT (%) IN BLOOD BY AUTOMATED COUNT: 27.4 % (ref 41–52)
HEMOGLOBIN (G/DL) IN BLOOD: 8 G/DL (ref 13.5–17.5)
IMMATURE GRANULOCYTES/100 LEUKOCYTES IN BLOOD BY AUTOMATED COUNT: 0.8 % (ref 0–0.9)
LEUKOCYTES (10*3/UL) IN BLOOD BY AUTOMATED COUNT: 8.8 X10E9/L (ref 4.4–11.3)
LYMPHOCYTES (10*3/UL) IN BLOOD BY AUTOMATED COUNT: 1.22 X10E9/L (ref 1.2–4.8)
LYMPHOCYTES/100 LEUKOCYTES IN BLOOD BY AUTOMATED COUNT: 13.9 % (ref 13–44)
MAGNESIUM (MG/DL) IN SER/PLAS: 1.9 MG/DL (ref 1.6–2.4)
MONOCYTES (10*3/UL) IN BLOOD BY AUTOMATED COUNT: 0.88 X10E9/L (ref 0.1–1)
MONOCYTES/100 LEUKOCYTES IN BLOOD BY AUTOMATED COUNT: 10 % (ref 2–10)
NEUTROPHILS (10*3/UL) IN BLOOD BY AUTOMATED COUNT: 6.08 X10E9/L (ref 1.2–7.7)
NEUTROPHILS/100 LEUKOCYTES IN BLOOD BY AUTOMATED COUNT: 69.1 % (ref 40–80)
NRBC (PER 100 WBCS) BY AUTOMATED COUNT: 0 /100 WBC (ref 0–0)
PHOSPHATE (MG/DL) IN SER/PLAS: 3 MG/DL (ref 2.5–4.9)
PLATELETS (10*3/UL) IN BLOOD AUTOMATED COUNT: 301 X10E9/L (ref 150–450)
POTASSIUM (MMOL/L) IN SER/PLAS: 5 MMOL/L (ref 3.5–5.3)
SODIUM (MMOL/L) IN SER/PLAS: 137 MMOL/L (ref 136–145)
UREA NITROGEN (MG/DL) IN SER/PLAS: 29 MG/DL (ref 6–23)

## 2023-02-27 LAB
ALBUMIN (G/DL) IN SER/PLAS: 3.1 G/DL (ref 3.4–5)
ALBUMIN (G/DL) IN SER/PLAS: NORMAL
ANION GAP IN SER/PLAS: 8 MMOL/L (ref 10–20)
ANION GAP IN SER/PLAS: NORMAL
BASOPHILS (10*3/UL) IN BLOOD BY AUTOMATED COUNT: 0.06 X10E9/L (ref 0–0.1)
BASOPHILS/100 LEUKOCYTES IN BLOOD BY AUTOMATED COUNT: 1 % (ref 0–2)
CALCIUM (MG/DL) IN SER/PLAS: 9.4 MG/DL (ref 8.6–10.6)
CALCIUM (MG/DL) IN SER/PLAS: NORMAL
CARBON DIOXIDE, TOTAL (MMOL/L) IN SER/PLAS: 41 MMOL/L (ref 21–32)
CARBON DIOXIDE, TOTAL (MMOL/L) IN SER/PLAS: NORMAL
CHLORIDE (MMOL/L) IN SER/PLAS: 94 MMOL/L (ref 98–107)
CHLORIDE (MMOL/L) IN SER/PLAS: NORMAL
CREATININE (MG/DL) IN SER/PLAS: 0.72 MG/DL (ref 0.5–1.3)
CREATININE (MG/DL) IN SER/PLAS: NORMAL
EOSINOPHILS (10*3/UL) IN BLOOD BY AUTOMATED COUNT: 0.26 X10E9/L (ref 0–0.7)
EOSINOPHILS/100 LEUKOCYTES IN BLOOD BY AUTOMATED COUNT: 4.5 % (ref 0–6)
ERYTHROCYTE DISTRIBUTION WIDTH (RATIO) BY AUTOMATED COUNT: 12.5 % (ref 11.5–14.5)
ERYTHROCYTE MEAN CORPUSCULAR HEMOGLOBIN CONCENTRATION (G/DL) BY AUTOMATED: 30.3 G/DL (ref 32–36)
ERYTHROCYTE MEAN CORPUSCULAR VOLUME (FL) BY AUTOMATED COUNT: 107 FL (ref 80–100)
ERYTHROCYTES (10*6/UL) IN BLOOD BY AUTOMATED COUNT: 3.75 X10E12/L (ref 4.5–5.9)
GFR FEMALE: NORMAL
GFR MALE: >90 ML/MIN/1.73M2
GFR MALE: NORMAL
GLUCOSE (MG/DL) IN SER/PLAS: 90 MG/DL (ref 74–99)
GLUCOSE (MG/DL) IN SER/PLAS: NORMAL
HEMATOCRIT (%) IN BLOOD BY AUTOMATED COUNT: 40 % (ref 41–52)
HEMOGLOBIN (G/DL) IN BLOOD: 12.1 G/DL (ref 13.5–17.5)
IMMATURE GRANULOCYTES/100 LEUKOCYTES IN BLOOD BY AUTOMATED COUNT: 0.7 % (ref 0–0.9)
LEUKOCYTES (10*3/UL) IN BLOOD BY AUTOMATED COUNT: 5.8 X10E9/L (ref 4.4–11.3)
LYMPHOCYTES (10*3/UL) IN BLOOD BY AUTOMATED COUNT: 0.74 X10E9/L (ref 1.2–4.8)
LYMPHOCYTES/100 LEUKOCYTES IN BLOOD BY AUTOMATED COUNT: 12.7 % (ref 13–44)
MAGNESIUM (MG/DL) IN SER/PLAS: 1.83 MG/DL (ref 1.6–2.4)
MONOCYTES (10*3/UL) IN BLOOD BY AUTOMATED COUNT: 0.64 X10E9/L (ref 0.1–1)
MONOCYTES/100 LEUKOCYTES IN BLOOD BY AUTOMATED COUNT: 11 % (ref 2–10)
NEUTROPHILS (10*3/UL) IN BLOOD BY AUTOMATED COUNT: 4.08 X10E9/L (ref 1.2–7.7)
NEUTROPHILS/100 LEUKOCYTES IN BLOOD BY AUTOMATED COUNT: 70.1 % (ref 40–80)
NRBC (PER 100 WBCS) BY AUTOMATED COUNT: 0 /100 WBC (ref 0–0)
PHOSPHATE (MG/DL) IN SER/PLAS: 3.2 MG/DL (ref 2.5–4.9)
PHOSPHATE (MG/DL) IN SER/PLAS: NORMAL
PLATELETS (10*3/UL) IN BLOOD AUTOMATED COUNT: 204 X10E9/L (ref 150–450)
POTASSIUM (MMOL/L) IN SER/PLAS: 4.5 MMOL/L (ref 3.5–5.3)
POTASSIUM (MMOL/L) IN SER/PLAS: NORMAL
SARS-COV-2 RESULT: NOT DETECTED
SODIUM (MMOL/L) IN SER/PLAS: 138 MMOL/L (ref 136–145)
SODIUM (MMOL/L) IN SER/PLAS: NORMAL
UREA NITROGEN (MG/DL) IN SER/PLAS: 25 MG/DL (ref 6–23)
UREA NITROGEN (MG/DL) IN SER/PLAS: NORMAL
VANCOMYCIN (UG/ML) IN SER/PLAS: 13.2 UG/ML

## 2023-02-28 LAB
ABO GROUP (TYPE) IN BLOOD: NORMAL
ALBUMIN (G/DL) IN SER/PLAS: 2.9 G/DL (ref 3.4–5)
ANION GAP IN SER/PLAS: 9 MMOL/L (ref 10–20)
ANTIBODY SCREEN: NORMAL
APPEARANCE, URINE: ABNORMAL
ATRIAL RATE: 65 BPM
BASOPHILS (10*3/UL) IN BLOOD BY AUTOMATED COUNT: 0.09 X10E9/L (ref 0–0.1)
BASOPHILS/100 LEUKOCYTES IN BLOOD BY AUTOMATED COUNT: 1.3 % (ref 0–2)
BILIRUBIN, URINE: NEGATIVE
BLOOD, URINE: ABNORMAL
CALCIUM (MG/DL) IN SER/PLAS: 9.4 MG/DL (ref 8.6–10.6)
CARBON DIOXIDE, TOTAL (MMOL/L) IN SER/PLAS: 40 MMOL/L (ref 21–32)
CHLORIDE (MMOL/L) IN SER/PLAS: 94 MMOL/L (ref 98–107)
COLOR, URINE: YELLOW
CREATININE (MG/DL) IN SER/PLAS: 0.81 MG/DL (ref 0.5–1.3)
EOSINOPHILS (10*3/UL) IN BLOOD BY AUTOMATED COUNT: 0.35 X10E9/L (ref 0–0.7)
EOSINOPHILS/100 LEUKOCYTES IN BLOOD BY AUTOMATED COUNT: 4.9 % (ref 0–6)
ERYTHROCYTE DISTRIBUTION WIDTH (RATIO) BY AUTOMATED COUNT: 12.5 % (ref 11.5–14.5)
ERYTHROCYTE MEAN CORPUSCULAR HEMOGLOBIN CONCENTRATION (G/DL) BY AUTOMATED: 30.1 G/DL (ref 32–36)
ERYTHROCYTE MEAN CORPUSCULAR VOLUME (FL) BY AUTOMATED COUNT: 110 FL (ref 80–100)
ERYTHROCYTES (10*6/UL) IN BLOOD BY AUTOMATED COUNT: 2.27 X10E12/L (ref 4.5–5.9)
GFR MALE: >90 ML/MIN/1.73M2
GLUCOSE (MG/DL) IN SER/PLAS: 107 MG/DL (ref 74–99)
GLUCOSE, URINE: NEGATIVE MG/DL
HEMATOCRIT (%) IN BLOOD BY AUTOMATED COUNT: 24.9 % (ref 41–52)
HEMOGLOBIN (G/DL) IN BLOOD: 7.5 G/DL (ref 13.5–17.5)
IMMATURE GRANULOCYTES/100 LEUKOCYTES IN BLOOD BY AUTOMATED COUNT: 0.8 % (ref 0–0.9)
KETONES, URINE: NEGATIVE MG/DL
LEUKOCYTE ESTERASE, URINE: ABNORMAL
LEUKOCYTES (10*3/UL) IN BLOOD BY AUTOMATED COUNT: 7.1 X10E9/L (ref 4.4–11.3)
LYMPHOCYTES (10*3/UL) IN BLOOD BY AUTOMATED COUNT: 1.12 X10E9/L (ref 1.2–4.8)
LYMPHOCYTES/100 LEUKOCYTES IN BLOOD BY AUTOMATED COUNT: 15.8 % (ref 13–44)
MAGNESIUM (MG/DL) IN SER/PLAS: 1.86 MG/DL (ref 1.6–2.4)
MONOCYTES (10*3/UL) IN BLOOD BY AUTOMATED COUNT: 0.71 X10E9/L (ref 0.1–1)
MONOCYTES/100 LEUKOCYTES IN BLOOD BY AUTOMATED COUNT: 10 % (ref 2–10)
NEUTROPHILS (10*3/UL) IN BLOOD BY AUTOMATED COUNT: 4.78 X10E9/L (ref 1.2–7.7)
NEUTROPHILS/100 LEUKOCYTES IN BLOOD BY AUTOMATED COUNT: 67.2 % (ref 40–80)
NITRITE, URINE: NEGATIVE
NRBC (PER 100 WBCS) BY AUTOMATED COUNT: 0 /100 WBC (ref 0–0)
P AXIS: 44 DEGREES
P OFFSET: 196 MS
P ONSET: 139 MS
PH, URINE: 7 (ref 5–8)
PHOSPHATE (MG/DL) IN SER/PLAS: 3.6 MG/DL (ref 2.5–4.9)
PLATELETS (10*3/UL) IN BLOOD AUTOMATED COUNT: 286 X10E9/L (ref 150–450)
POTASSIUM (MMOL/L) IN SER/PLAS: 4.5 MMOL/L (ref 3.5–5.3)
PR INTERVAL: 166 MS
PROTEIN, URINE: ABNORMAL MG/DL
Q ONSET: 222 MS
QRS COUNT: 11 BEATS
QRS DURATION: 100 MS
QT INTERVAL: 368 MS
QTC CALCULATION(BAZETT): 382 MS
QTC FREDERICIA: 377 MS
R AXIS: -8 DEGREES
RBC, URINE: 278 /HPF (ref 0–5)
RH FACTOR: NORMAL
SODIUM (MMOL/L) IN SER/PLAS: 138 MMOL/L (ref 136–145)
SPECIFIC GRAVITY, URINE: 1.01 (ref 1–1.03)
T AXIS: 41 DEGREES
T OFFSET: 406 MS
UREA NITROGEN (MG/DL) IN SER/PLAS: 26 MG/DL (ref 6–23)
UROBILINOGEN, URINE: <2 MG/DL (ref 0–1.9)
VENTRICULAR RATE: 65 BPM
WBC, URINE: 1858 /HPF (ref 0–5)

## 2023-03-01 LAB
ALBUMIN (G/DL) IN SER/PLAS: 3 G/DL (ref 3.4–5)
ALBUMIN (G/DL) IN SER/PLAS: 3.1 G/DL (ref 3.4–5)
ANION GAP IN SER/PLAS: 12 MMOL/L (ref 10–20)
ANION GAP IN SER/PLAS: 9 MMOL/L (ref 10–20)
APPEARANCE, URINE: ABNORMAL
ATRIAL RATE: 72 BPM
BASOPHILS (10*3/UL) IN BLOOD BY AUTOMATED COUNT: 0.01 X10E9/L (ref 0–0.1)
BASOPHILS/100 LEUKOCYTES IN BLOOD BY AUTOMATED COUNT: 0.1 % (ref 0–2)
BILIRUBIN, URINE: NEGATIVE
BLOOD, URINE: ABNORMAL
CALCIUM (MG/DL) IN SER/PLAS: 9.5 MG/DL (ref 8.6–10.6)
CALCIUM (MG/DL) IN SER/PLAS: 9.8 MG/DL (ref 8.6–10.6)
CARBON DIOXIDE, TOTAL (MMOL/L) IN SER/PLAS: 39 MMOL/L (ref 21–32)
CARBON DIOXIDE, TOTAL (MMOL/L) IN SER/PLAS: 41 MMOL/L (ref 21–32)
CHLORIDE (MMOL/L) IN SER/PLAS: 92 MMOL/L (ref 98–107)
CHLORIDE (MMOL/L) IN SER/PLAS: 93 MMOL/L (ref 98–107)
COLOR, URINE: YELLOW
CREATININE (MG/DL) IN SER/PLAS: 1.15 MG/DL (ref 0.5–1.3)
CREATININE (MG/DL) IN SER/PLAS: 1.18 MG/DL (ref 0.5–1.3)
EOSINOPHILS (10*3/UL) IN BLOOD BY AUTOMATED COUNT: 0 X10E9/L (ref 0–0.7)
EOSINOPHILS/100 LEUKOCYTES IN BLOOD BY AUTOMATED COUNT: 0 % (ref 0–6)
ERYTHROCYTE DISTRIBUTION WIDTH (RATIO) BY AUTOMATED COUNT: 12.2 % (ref 11.5–14.5)
ERYTHROCYTE MEAN CORPUSCULAR HEMOGLOBIN CONCENTRATION (G/DL) BY AUTOMATED: 29.8 G/DL (ref 32–36)
ERYTHROCYTE MEAN CORPUSCULAR VOLUME (FL) BY AUTOMATED COUNT: 108 FL (ref 80–100)
ERYTHROCYTES (10*6/UL) IN BLOOD BY AUTOMATED COUNT: 2.55 X10E12/L (ref 4.5–5.9)
GFR MALE: 67 ML/MIN/1.73M2
GFR MALE: 69 ML/MIN/1.73M2
GLUCOSE (MG/DL) IN SER/PLAS: 144 MG/DL (ref 74–99)
GLUCOSE (MG/DL) IN SER/PLAS: 156 MG/DL (ref 74–99)
GLUCOSE, URINE: NEGATIVE MG/DL
HEMATOCRIT (%) IN BLOOD BY AUTOMATED COUNT: 27.5 % (ref 41–52)
HEMOGLOBIN (G/DL) IN BLOOD: 8.2 G/DL (ref 13.5–17.5)
IMMATURE GRANULOCYTES/100 LEUKOCYTES IN BLOOD BY AUTOMATED COUNT: 1.1 % (ref 0–0.9)
KETONES, URINE: ABNORMAL MG/DL
LEUKOCYTE ESTERASE, URINE: ABNORMAL
LEUKOCYTES (10*3/UL) IN BLOOD BY AUTOMATED COUNT: 7.2 X10E9/L (ref 4.4–11.3)
LYMPHOCYTES (10*3/UL) IN BLOOD BY AUTOMATED COUNT: 0.44 X10E9/L (ref 1.2–4.8)
LYMPHOCYTES/100 LEUKOCYTES IN BLOOD BY AUTOMATED COUNT: 6.2 % (ref 13–44)
MAGNESIUM (MG/DL) IN SER/PLAS: 1.87 MG/DL (ref 1.6–2.4)
MONOCYTES (10*3/UL) IN BLOOD BY AUTOMATED COUNT: 0.26 X10E9/L (ref 0.1–1)
MONOCYTES/100 LEUKOCYTES IN BLOOD BY AUTOMATED COUNT: 3.6 % (ref 2–10)
MUCUS, URINE: ABNORMAL /LPF
NEUTROPHILS (10*3/UL) IN BLOOD BY AUTOMATED COUNT: 6.36 X10E9/L (ref 1.2–7.7)
NEUTROPHILS/100 LEUKOCYTES IN BLOOD BY AUTOMATED COUNT: 89 % (ref 40–80)
NITRITE, URINE: NEGATIVE
NRBC (PER 100 WBCS) BY AUTOMATED COUNT: 0 /100 WBC (ref 0–0)
P AXIS: 39 DEGREES
P OFFSET: 195 MS
P ONSET: 135 MS
PH, URINE: 6 (ref 5–8)
PHOSPHATE (MG/DL) IN SER/PLAS: 3.9 MG/DL (ref 2.5–4.9)
PHOSPHATE (MG/DL) IN SER/PLAS: 4.3 MG/DL (ref 2.5–4.9)
PLATELETS (10*3/UL) IN BLOOD AUTOMATED COUNT: 330 X10E9/L (ref 150–450)
POTASSIUM (MMOL/L) IN SER/PLAS: 5.4 MMOL/L (ref 3.5–5.3)
POTASSIUM (MMOL/L) IN SER/PLAS: 5.5 MMOL/L (ref 3.5–5.3)
PR INTERVAL: 180 MS
PROTEIN, URINE: ABNORMAL MG/DL
Q ONSET: 225 MS
QRS COUNT: 11 BEATS
QRS DURATION: 86 MS
QT INTERVAL: 334 MS
QTC CALCULATION(BAZETT): 365 MS
QTC FREDERICIA: 355 MS
R AXIS: -1 DEGREES
RBC, URINE: 27 /HPF (ref 0–5)
SODIUM (MMOL/L) IN SER/PLAS: 137 MMOL/L (ref 136–145)
SODIUM (MMOL/L) IN SER/PLAS: 138 MMOL/L (ref 136–145)
SPECIFIC GRAVITY, URINE: 1.02 (ref 1–1.03)
SQUAMOUS EPITHELIAL CELLS, URINE: 1 /HPF
T AXIS: 39 DEGREES
T OFFSET: 392 MS
UREA NITROGEN (MG/DL) IN SER/PLAS: 27 MG/DL (ref 6–23)
UREA NITROGEN (MG/DL) IN SER/PLAS: 34 MG/DL (ref 6–23)
URINE CULTURE: NO GROWTH
UROBILINOGEN, URINE: <2 MG/DL (ref 0–1.9)
VANCOMYCIN (UG/ML) IN SER/PLAS - TROUGH: 15.1 UG/ML (ref 5–20)
VENTRICULAR RATE: 72 BPM
WBC, URINE: 199 /HPF (ref 0–5)

## 2023-03-03 NOTE — PROGRESS NOTES
Service: Infectious Disease     Subjective Data:   RAJ HARMON is a 67 year old Male who is Hospital Day # 13 and POD #11 for 1. exploration of lumbar wound dehiscence;2. removal of lumbar spinal hardware (set caps, rods, and screws);3. irrigation  and debridement of lumbar wound.     Experience chills and tachycardia (105) yesterday, no fever, slightly anxious about the future of the wound on his back. Improved overnight after pain medications. Feeling better this morning, denies  fevers, chills.    Objective Data:     Objective Information:      T   P  R  BP   MAP  SpO2   Value  36.8  78  19  134/68      96%  Date/Time 2/26 5:31 2/26 5:31 2/26 5:31 2/26 5:31    2/26 5:31  Range  (36.1C - 37C )  (78 - 107 )  (18 - 28 )  (126 - 136 )/ (56 - 68 )    (95% - 98% )   As of 25-Feb-2023 19:50:00, patient is on 5 L/min of oxygen via nasal cannula.  Highest temp of 37 C was recorded at 2/25 18:38      Pain reported at 2/26 5:12: 8 = Severe      T   P  R  BP   MAP  SpO2   Value  36.8  78  19  134/68      96%  Date/Time 2/26 5:31 2/26 5:31 2/26 5:31 2/26 5:31    2/26 5:31  Range  (36.1C - 37C )  (78 - 107 )  (18 - 28 )  (126 - 136 )/ (56 - 68 )    (95% - 98% )   As of 25-Feb-2023 19:50:00, patient is on 5 L/min of oxygen via nasal cannula.  Highest temp of 37 C was recorded at 2/25 18:38    Physical Exam Narrative:  ·  Physical Exam:    Gen: well-appearing, NAD  Head and neck: NCAT, neck supple without LAD, severe kyphoscoliosis   HEENT: MMM, normal nose without congestion, poor dentition   CV: RRR no mrg  Pulm: CTAB, prolonged exp phase, no wheezing or crackles, normal WOB on 4L  Abd: obese, soft, non-tender, non-distended  Ext: no cyanosis or clubbing, trace LE edema, thenar wasting bilat; 2-3cm diameter stage 1 ulcer on L heel  Neuro: alert and oriented x3, normal tone, face symmetric, moves all extremities spontaneously. mild tremor in hands bilaterally  Back: Wound vac placed and secured. No blood oozing or  purulence. skin irritation under the wound vac tape      Medication:    Medications:          Continuous Medications       --------------------------------  No continuous medications are active       Scheduled Medications       --------------------------------    1. Acetaminophen:  650  mg  Oral  Every 6 Hours    2. Allopurinol:  300  mg  Oral  Every 24 Hours    3. amLODIPine (NORVASC):  10  mg  Oral  Daily    4. Ascorbic Acid:  1000  mg  Oral  Daily    5. Atorvastatin:  20  mg  Oral  Daily    6. busPIRone (BUSPAR):  5  mg  Oral  Every 12 Hours    7. Docusate 50 mg - Senna 8.6 m  tablet(s)  Oral  2 Times a Day    8. Enoxaparin SubCutaneous:  40  mg  SubCutaneous  Every 24 Hours    9. fentaNYL 50 micrograms/ hour TransDermal:  1  patch  TransDermal  Every 72 Hours    10. Hydrocortisone 1% Topical:  1  application(s)  Topical  2 Times a Day    11. levETIRAcetam (KEPPRA):  500  mg  Oral  2 Times a Day    12. Metoprolol Tartrate:  12.5  mg  Oral  2 Times a Day    13. Polyethylene Glycol:  17  gram(s)  Oral  Daily    14. Sodium Hypochlorite 0.125% (Dakins Quarter):  1  application(s)  Topical  3 Times a Day    15. Sodium Zirconium Cyclosilicate:  10  gram(s)  Oral  3 Times a Day    16. Tamsulosin:  0.4  mg  Oral  Daily    17. Vancomycin - RPh to Dose - IV Piggy Back:  1  each  As Specified  Variable    18. Vancomycin 500 mg IVPB/ Premixed Soln 100 mL:  500  mg  IntraVenous Piggyback  Every 12 Hours         PRN Medications       --------------------------------    1. Acetaminophen:  650  mg  Oral  Once    2. Albuterol 90 micrograms/ Inhalation MDI:  2  inhalation  Inhalation  Every 6 Hours    3. diphenhydrAMINE:  25  mg  Oral  Every 24 Hours    4. Heparin Flush 10 unit/ mL Injectable PRN:  5  mL  IntraVenous Flush  According to Flush Policy    5. HYDROmorphone Injectable:  1  mg  IntraVenous Push  Every 3 Hours    6. Melatonin:  3  mg  Oral  At Bedtime    7. Naloxone Injectable:  0.2  mg  IntraVenous Push  Once    8.  Sodium Chloride 0.9% Injectable Flush:  10  mL  IntraVenous Flush  Every 8 Hours and as Needed        Recent Lab Results:    Results:    CBC: 2/26/2023 04:57              \     Hgb     /                              \     8.0 L    /  WBC  ----------------  Plt               8.8       ----------------    301              /     Hct     \                              /     27.4 L    \            RBC: 2.48 L    MCV: 110 H    Neutrophil  %: 69.1      RFP: 2/26/2023 04:57  NA+        Cl-     BUN  /                         137    94 L   29 H  /  --------------------------------  Glucose                ---------------------------  94    K+     HCO3-   Creat \                         5.0    39 H   0.90  \  Calcium : 10.0Anion Gap : 9 L         Albumin : 3.3 L     Phos : 3.0        I have reviewed these laboratory results:    Renal Function Panel  26-Feb-2023 04:57:00      Result Value    Glucose, Serum  94    NA  137    K  5.0    CL  94   L   Bicarbonate, Serum  39   H   Anion Gap, Serum  9   L   BUN  29   H   CREAT  0.90    GFR Male  >90    Calcium, Serum  10.0    Phosphorus, Serum  3.0    ALB  3.3   L     Complete Blood Count + Differential  26-Feb-2023 04:57:00      Result Value    White Blood Cell Count  8.8    Nucleated Erythrocyte Count  0.0    Red Blood Cell Count  2.48   L   HGB  8.0   L   HCT  27.4   L   MCV  110   H   MCHC  29.2   L   PLT  301    RDW-CV  12.3    Neutrophil %  69.1    Immature Granulocytes %  0.8    Lymphocyte %  13.9    Monocyte %  10.0    Eosinophil %  5.2    Basophil %  1.0    Neutrophil Count  6.08    Lymphocyte Count  1.22    Monocyte Count  0.88    Eosinophil Count  0.46    Basophil Count  0.09      Magnesium, Serum  26-Feb-2023 04:57:00      Result Value    Magnesium, Serum  1.90        Assessment and Plan:   Daily Risk Screen:  ·  Does patient have a central line? yes   ·  Central Line Type non-tunneled   ·  Plan for non-tunneled central line removal today? no   ·  The patient  continues to require non-tunneled central line access for parenteral medication     Comorbidities:  ·  Comorbidity Other     Code Status:  ·  Code Status Full Code     Assessment:    67 year old Male with CHF, COPD on 4L at home, seizure disorder and a history of multiple lumbar spine surgeries complicated  by MRSA wound infection with a known chronic open lumbar wound status post multiple debridements by plastic surgery, who presented to [place] with worsening low back pain as well as generalized fatigue and weakness, found to have MRSA infection of sacral  wound with exposed hardware, and elevated inflammatory markers. He presented to [facility] as a transfer for surgical evaluation by spine team. He was started on vancomcyin and zosyn on 2/10 in [place], which were continued on admission. BCx from 2/10 with NGTD.  Nsgy was consulted, and he underwent removal of lumbar spinal hardware and irrigation and debridement of lumbar wound on 2/15. Plastic surgery was consulted and recommended leaving wound open with TID dressing changes and plan for return to OR on 2/22  for lumbar spinal wound I&D and potential wound vac. Intraoperative wound cultures had no growth aerobically or anaerobically. Pt has a hx of chronic pain, and pain was managed after surgery with fentanyl patch (per home regimen), Tylenol (per home regimen),  and IV dilaudid 1mg IV q3h for moderate pain and 2mg IV q3h for severe pain. Pain regimen can be down-titrated to home regimen after plastic surgery intervention. Zosyn was discontinued on 2/19 given no evidence of pseudomonas. Pt continues to be on vancomycin.  Patient was having loose bowel movements multiple times daily but was negative for C.diff and stool softeners were stopped. Plastic surgery recommended wound vac and it was placed on 2/22, replaced on 2/24.  Patient had hyperkalemic episode on 2/34 of potassium 6.0 (repeat from 6.1) with EKG showing peaked T-waves, ordered insulin & D5 & Ca gluconate  & Lokelma  10 mg TID, held Spironolactone. Hyperkalemia resolved with these interventions.    Updates 2/26:  -Potassium 6.0 yesterday (repeat from 6.1) with EKG showing peaked T-waves, ordered insulin & D5 & Ca gluconate & Lokelma,  held Spironolactone.   -K 5.0 today, c/w Lokelma TID  -hydrocortisone for itching on lower back   -Plan to evaluate patient in OR next week Tues  -Wound vac replaced by plastics on 2/24  -Vanc level 15.2 on 2/25 , c/w Vanc 500 q12h  -Vanc to continue for 8 weeks until 2/15 - 4/12  -Closely monitor vancomycin levels as patient had a break  in medication and needs to be ensured to continue to receive   -Pain regimen currently at 2mg q3h as needed  -Tremor to be discussed with patient and wife and chart review   -Outpatient f/u with Dr. Harrison    Cx history:     ·  2/10 OSH bedside wound cx - MRSA (final)      ·  2/15 OR deep wound cs - NGTD (final 2/17)    Abx hx:  1) Vancomcyin 2/10-   2) Zosyn 2/10- 2/19 (d/c given no culture evidence of pseudomonas)      #MRSA sacral wound infection  Micro:   -BCx 2/10 x2 NGTD  -[place] Wound cx 2/10/23: MRSA (S: clinda, vanc; R: TMP-SMX, tetracyclines)  -ESR elevated at >130  -CRP elevated at 5.26  -Intraoperative wound cx 2/15/23: no growth aerobically or anaerobically   - S/p below on 2/15:   1. exploration of lumbar wound dehiscence  2. removal of lumbar spinal hardware (set caps, rods, and screws)  3. irrigation and debridement of lumbar wound  - Plastic surgery recs  2/15:   1. WTD Kerlix dressings with Dakin's/Saline solution packed to the entire depth of the wound three times daily and PRN if soiled   -Lumbar CT without contrast, obtained on 2/18: soft tissue defect overlying lumbar and lower thoracic spine, soft tissue thickening, scattered soft tissue emphysema; multilevel degenerative changes of lumbar spine  - Plastics decided to place wound vac on 2/22 instead of I&D, reassessed and replaced wound vac on 2/24  Abx:   Plan:   1) Deliaomninain  2/10 - 4/12   2) Zosyn 2/10- 2/19 (d/c given no culture evidence of pseudomonas)    #Chronic pain - lower back  -home pain regimen: scheduled acetaminophen, fentanyl patch 50mcg q72h, dilaudid 4mg PO TID prn breakthrough  -bowel regimen  -follows with pain medicine Dr. Almazan at [facility]   -post-op pain regimen: Tylenol 650mg q6h, fentanyl patch 50mcg q72h, 1mg IV dilaudid q3h for moderate pain, 2mg IV dilaudid q3h for severe pain    #Hyperkalemia - resolved 5.0 now  -6.0 on 2/24/2023 with EKG of peaked T-waves, 5.8 on 2/23 resolved peaked T-waves  -hx of hyperkalemic  episodes on review  -no CKD  -given insulin, d50, calcium gluconate, and lokelma   -will hold maxime   -conitnue to monitor    #COPD, 4L at baseline  -SpO2 goal 88-92%  -Started on proventill (takes at home per wife)     #HFpEF   #HTN  -TTE Feb 2022: EF 65-70%, normal LV diastolic function, normal RV  -Hold ASA for possible procedure  -C/w home atorva, metop 12.5 BID, maxime 50 BID, losartan 50 qd, amlodipine 10  -Hold maxime for hyperkalemia on 2/24    #Seizure disorder - continue Keppra  #Anxiety - continue Buspar   #BPH - continue tamsulosin   #Gout - continue allopurinol     DVT ppx: lovenox   Code status: FULL (confirmed on admission)  NOK: Wife Mike [phone]            Attestation:   Note Completion:  I am a:  Resident/Fellow   Attending Attestation I saw and evaluated the patient.  I personally obtained the key and critical portions of the history and physical exam or was physically present for key and  critical portions performed by the resident/fellow. I reviewed the resident/fellow?s documentation and discussed the patient with the resident/fellow.  I agree with the resident/fellow?s medical decision making as documented in the note.     I personally evaluated the patient on 26-Feb-2023         Electronic Signatures:  Mame Kent)  (Signed 26-Feb-2023 10:48)   Authored: Note Completion   Co-Signer: Service, Subjective Data,  Objective Data, Assessment and Plan, Note Completion  Freddie Caraballo (Resident))  (Signed 26-Feb-2023 10:41)   Authored: Service, Subjective Data, Objective Data, Assessment  and Plan, Note Completion      Last Updated: 26-Feb-2023 10:48 by Mame Kent)

## 2023-03-03 NOTE — PROGRESS NOTES
Service: Plastic Surgery     Subjective Data:   RAJ HARMON is a 67 year old Male who is Hospital Day #6 and POD #4 for 1. exploration of lumbar wound dehiscence; 2. removal of lumbar spinal hardware (set caps, rods, and screws); 3. irrigation  and debridement of lumbar wound.     Patient found resting comfortably in bed on AM assessment. Aware of plan for return to OR with plastic surgery on Wednesday 2/22 for additional lumbar spinal wound I&D, possible application of wound  vac. Having interim dressing changes completed per nursing. Patient OOB with assistance for voiding in bedside commode. Denies headache, dizziness, fever, chills, chest pain, dyspnea, abdominal pain, nausea, vomiting, diarrhea or constipation.    Overnight Events: Patient had an uneventful night.     Objective Data:     Objective Information:      T   P  R  BP   MAP  SpO2   Value  36.6  78  18  152/86      99%  Date/Time 2/19 12:54 2/19 12:54 2/19 12:54 2/19 12:54    2/19 12:54  Range  (35.8C - 36.6C )  (71 - 90 )  (17 - 18 )  (119 - 152 )/ (69 - 86 )    (96% - 100% )    As of 19-Feb-2023 07:30:00, patient is on 4 L/min of oxygen via nasal cannula.    Pain reported at 2/19 14:35: 10 = Severe    Physical Exam by System:    Constitutional: Alert and cooperative, NAD, nontoxic  in appearance.   Eyes: EOMI, PERRL.   ENMT: MMM, poor dentition, no ulceration/lesions.   Head/Neck: NC/AT.   Respiratory/Thorax: Unlabored respirations on 4L  NC.   Cardiovascular: Regular rate, extremities W/WP.   Gastrointestinal: Soft, large panus, nt/nd.   Genitourinary: Voiding per urinal.   Musculoskeletal: Generalized deconditioning.   Neurological: A&O x3.   Psychological: Appropriate behavior and mood.   Skin: Stage 1 ulcer on L heal. Large, 10-15 cm, full  thickness wound to the midline lumbosacral region. Wound bed with mixed fibroglandular appearance and slight slough. Surrounding tissue intact, mild periwound erythema.      Medication:    Medications:        Continuous Medications       --------------------------------  No continuous medications are active       Scheduled Medications       --------------------------------  1. Acetaminophen:  650  mg  Oral  Every 6 Hours    2. Allopurinol:  300  mg  Oral  Every 24 Hours    3. amLODIPine (NORVASC):  10  mg  Oral  Daily    4. Ascorbic Acid:  1000  mg  Oral  Daily    5. Atorvastatin:  20  mg  Oral  Daily    6. busPIRone (BUSPAR):  5  mg  Oral  Every 12 Hours    7. Docusate 50 mg - Senna 8.6 m  tablet(s)  Oral  2 Times a Day    8. Enoxaparin SubCutaneous:  40  mg  SubCutaneous  Every 24 Hours    9. fentaNYL 50 micrograms/ hour TransDermal:  1  patch  TransDermal  Every 72 Hours    10. levETIRAcetam (KEPPRA):  500  mg  Oral  2 Times a Day    11. Metoprolol Tartrate:  12.5  mg  Oral  2 Times a Day    12. Polyethylene Glycol:  17  gram(s)  Oral  Daily    13. Sodium Hypochlorite 0.125% (Dakins Quarter):  1  application(s)  Topical  3 Times a Day    14. Spironolactone:  50  mg  Oral  2 Times a Day    15. Tamsulosin:  0.4  mg  Oral  Daily    16. Vancomycin - h to Dose - IV Piggy Back:  1  each  As Specified  Variable    17. Vancomycin IV Piggy Back:  1250  mg  IntraVenous Piggyback  Every 12 Hours         PRN Medications       --------------------------------  1. Acetaminophen:  650  mg  Oral  Once    2. Albuterol 90 micrograms/ Inhalation MDI:  2  inhalation  Inhalation  Every 6 Hours    3. HYDROmorphone Injectable:  1  mg  IntraVenous Push  Every 3 Hours    4. HYDROmorphone Injectable:  2  mg  IntraVenous Push  Every 3 Hours    5. Melatonin:  3  mg  Oral  At Bedtime    6. Naloxone Injectable:  0.2  mg  IntraVenous Push  Once    7. Sodium Chloride 0.9% Injectable Flush:  10  mL  IntraVenous Flush  Every 8 Hours and as Needed        Recent Lab Results:    Results:    I have reviewed these laboratory results:    Complete Blood Count + Differential  2023 05:24:00      Result Value     White Blood Cell Count  8.4    Nucleated Erythrocyte Count  0.0    Red Blood Cell Count  2.42   L   HGB  7.8   L   HCT  26.8   L   MCV  111   H   MCHC  29.1   L   PLT  333    RDW-CV  12.1    Neutrophil %  65.9    Immature Granulocytes %  3.2   H   Lymphocyte %  16.0    Monocyte %  8.3    Eosinophil %  5.3    Basophil %  1.3    Neutrophil Count  5.51    Lymphocyte Count  1.34    Monocyte Count  0.69    Eosinophil Count  0.44    Basophil Count  0.11   H     Renal Function Panel  19-Feb-2023 05:24:00      Result Value    Glucose, Serum  99    NA  138    K  4.8    CL  97   L   Bicarbonate, Serum  38   H   Anion Gap, Serum  8   L   BUN  20    CREAT  1.42   H   GFR Male  53   A   Calcium, Serum  9.2    Phosphorus, Serum  3.3    ALB  2.9   L     Magnesium, Serum  19-Feb-2023 05:24:00      Result Value    Magnesium, Serum  1.84        Radiology Results:    Results:    Impression:  1. There has been interval removal of the posterior fusion hardware  from the lumbar spine. There is again a large soft tissue defect with  marked thinning of the skin and subcutaneous soft tissues and  probable dehiscence overlying the lumbar and lower thoracic spine  with nonspecific soft tissue thickening and loss of fat soft tissue  planes as well as scattered soft tissue emphysema along the periphery  of the defect. The possibility of osteomyelitis cannot be excluded on  the basis of this examination.  2. Marked, multilevel degenerative changes of the lumbar spine with  multilevel postoperative changes from laminectomies and  posterolateral fusion masses.    CT L Spine without Contrast [Feb 18 2023  2:16PM]      Assessment and Plan:   Daily Risk Screen:  ·  Does patient have a central line? n/a consulting service     Comorbidities:  ·  Comorbidity Other     Code Status:  ·  Code Status Full Code     Assessment:    HARMON RAJ CALI is a 67 year old Male known to the plastic surgery team with a h/o multiple lumbar spinal surgeries which  have been c/b recurrent MRSA infections  requiring multiple extensive plastic surgery interventions including multiple debridements and attempted closures. Admitted to [facility] as transfer on 2/14 for concern of MRSA infection of chronically open lumbar wound with exposed hardware. Patient now  s/p lumbar spinal wound I&D and removal of spinal hardware per NSGY on 2/15. Plastic Surgery consulted for spinal wound debridement and coverage.     OR course (this admission):   2/15 - Lumbar spinal wound exploration, I&D and removal of spinal hardware (per NSGY)    Plan/Recommendations:   - Plan for OR with plastics Wednesday 2/22 for lumbar spinal wound I&D, possible application wound vac       · Please ensure COVID and T&S are updated for return to OR     · NPO at [facility] on 2/22  - Continue interim TID and PRN local wound care/dressing changes (nursing orders placed)     ·  WTD kerlix dressings with 1/4 strength Dankins solution packed to the entire depth of the wound  - CT L-spine w/o contrast obtained for surgical planning, will f/u results with plastics attending 2/20  - Ensure diligent pressure offloading to lumbar spine/wound site with frequent repositioning, q2h turns   - Nutrition optimization to promote wound healing      ·  Can consider addition of daily MV and nutritional supplements/shakes as able   - Cx history:     ·  2/10 OSH bedside wound cx - MRSA (final)      ·  2/15 OR deep wound cs - NGTD (final 2/17)  - Continue IV ABX per ID recs (currently IV Vanc/Zosyn)   - Appreciate remaining supportive care per primary service   - Plastics will continue to follow    Patient and plan discussed with Dr. Jaime.     Kiki Lin PA-C  Plastic and Reconstructive Surgery   [facility]  Pager #[phone]  Team phones: [phone]    Time spent on the assessment of patient, gathering and interpreting data, review of medical record/patient history, personally reviewing radiographic imaging and formulation of this note  40 minutes. With greater than 50% spent in personal discussion with  patient.       Electronic Signatures:  Kiki Lin (PAC)  (Signed 19-Feb-2023 17:19)   Authored: Service, Subjective Data, Objective Data, Assessment  and Plan, Note Completion      Last Updated: 19-Feb-2023 17:19 by Kiki Lin (PAC)

## 2023-03-03 NOTE — PROGRESS NOTES
Service: Infectious Disease     Subjective Data:   RAJ HARMON is a 67 year old Male who is Hospital Day # 7 and POD #5 for 1. exploration of lumbar wound dehiscence;2. removal of lumbar spinal hardware (set caps, rods, and screws);3. irrigation  and debridement of lumbar wound.     No acute events overnight. Patient had not acute complaints. Still having loose bowel movements. Denies fevers, chills, abdominal pain. Patient says he would rather not go to SNF and that his wife can  administer the IV medications as she is a retired nurse but he will discuss this with her again.    Objective Data:     Objective Information:      T   P  R  BP   MAP  SpO2   Value  36.7  80  18  133/70      98%  Date/Time 2/20 13:09 2/20 13:09 2/20 13:09 2/20 13:09 2/20 13:09  Range  (36C - 36.7C )  (71 - 83 )  (18 - 18 )  (121 - 152 )/ (56 - 86 )    (97% - 100% )   As of 20-Feb-2023 13:09:00, patient is on 4 L/min of oxygen via ventilator assisted;  nasal cannula.      Pain reported at 2/20 13:09: 8 = Severe      T   P  R  BP   MAP  SpO2   Value  36.7  80  18  133/70      98%  Date/Time 2/20 13:09 2/20 13:09 2/20 13:09 2/20 13:09 2/20 13:09  Range  (36C - 36.7C )  (71 - 83 )  (18 - 18 )  (121 - 152 )/ (56 - 86 )    (97% - 100% )   As of 20-Feb-2023 13:09:00, patient is on 4 L/min of oxygen via ventilator assisted;  nasal cannula.    Physical Exam Narrative:  ·  Physical Exam:    Gen: well-appearing, NAD  Head and neck: NCAT, neck supple without LAD, severe kyphoscoliosis   HEENT: MMM, normal nose without congestion, poor dentition   CV: RRR no mrg  Pulm: CTAB, prolonged exp phase, no wheezing or crackles, normal WOB on 4L  Abd: obese, soft, non-tender, non-distended  Ext: no cyanosis or clubbing, trace LE edema, thenar wasting bilat; 2-3cm diameter stage 1 ulcer on L heel  Neuro: alert and oriented x3, normal tone, face symmetric, moves all extremities spontaneously       Medication:    Medications:          Continuous  Medications       --------------------------------  No continuous medications are active       Scheduled Medications       --------------------------------    1. Acetaminophen:  650  mg  Oral  Every 6 Hours    2. Allopurinol:  300  mg  Oral  Every 24 Hours    3. amLODIPine (NORVASC):  10  mg  Oral  Daily    4. Ascorbic Acid:  1000  mg  Oral  Daily    5. Atorvastatin:  20  mg  Oral  Daily    6. busPIRone (BUSPAR):  5  mg  Oral  Every 12 Hours    7. Docusate 50 mg - Senna 8.6 m  tablet(s)  Oral  2 Times a Day    8. Enoxaparin SubCutaneous:  40  mg  SubCutaneous  Every 24 Hours    9. fentaNYL 50 micrograms/ hour TransDermal:  1  patch  TransDermal  Every 72 Hours    10. levETIRAcetam (KEPPRA):  500  mg  Oral  2 Times a Day    11. Metoprolol Tartrate:  12.5  mg  Oral  2 Times a Day    12. Polyethylene Glycol:  17  gram(s)  Oral  Daily    13. Sodium Hypochlorite 0.125% (Dakins Quarter):  1  application(s)  Topical  3 Times a Day    14. Spironolactone:  50  mg  Oral  2 Times a Day    15. Tamsulosin:  0.4  mg  Oral  Daily    16. Vancomycin - RPh to Dose - IV Piggy Back:  1  each  As Specified  Variable    17. Vancomycin IV Piggy Back:  1250  mg  IntraVenous Piggyback  Every 12 Hours         PRN Medications       --------------------------------    1. Acetaminophen:  650  mg  Oral  Once    2. Albuterol 90 micrograms/ Inhalation MDI:  2  inhalation  Inhalation  Every 6 Hours    3. HYDROmorphone Injectable:  1  mg  IntraVenous Push  Every 3 Hours    4. HYDROmorphone Injectable:  2  mg  IntraVenous Push  Every 3 Hours    5. Melatonin:  3  mg  Oral  At Bedtime    6. Naloxone Injectable:  0.2  mg  IntraVenous Push  Once    7. Sodium Chloride 0.9% Injectable Flush:  10  mL  IntraVenous Flush  Every 8 Hours and as Needed        Recent Lab Results:    Results:    CBC: 2023 05:46              \     Hgb     /                              \     7.8 L    /  WBC  ----------------  Plt               7.3       ----------------     331              /     Hct     \                              /     26.1 L    \            RBC: 2.35 L    MCV: 111 H          RFP: 2/20/2023 05:46  NA+        Cl-     BUN  /                         139    100    19  /  --------------------------------  Glucose                ---------------------------  102 H    K+     HCO3-   Creat \                         5.0    37 H   0.83  \  Calcium : 9.2Anion Gap : 7 L         Albumin : 2.8 L     Phos : 3.1        I have reviewed these laboratory results:    Complete Blood Count  20-Feb-2023 05:46:00      Result Value    White Blood Cell Count  7.3    Nucleated Erythrocyte Count  0.0    Red Blood Cell Count  2.35   L   HGB  7.8   L   HCT  26.1   L   MCV  111   H   MCHC  29.9   L   PLT  331    RDW-CV  12.3      Renal Function Panel  20-Feb-2023 05:46:00      Result Value    Glucose, Serum  102   H   NA  139    K  5.0    CL  100    Bicarbonate, Serum  37   H   Anion Gap, Serum  7   L   BUN  19    CREAT  0.83    GFR Male  >90    Calcium, Serum  9.2    Phosphorus, Serum  3.1    ALB  2.8   L     Magnesium, Serum  20-Feb-2023 05:46:00      Result Value    Magnesium, Serum  1.79        Radiology Results:    Results:        Impression:    1. There has been interval removal of the posterior fusion hardware  from the lumbar spine. There is again a large soft tissue defect with  marked thinning of the skin and subcutaneous soft tissues and  probable dehiscence overlying the lumbar and lower thoracic spine  with nonspecific soft tissue thickening and loss of fat soft tissue  planes as well as scattered soft tissue emphysema along the periphery  of the defect. The possibility of osteomyelitis cannot be excluded on  the basis of this examination.  2. Marked, multilevel degenerative changes of the lumbar spine with  multilevel postoperative changes from laminectomies and  posterolateral fusion masses.     CT L Spine without Contrast [Feb 18 2023  2:16PM]      Assessment and Plan:    Daily Risk Screen:  ·  Does patient have a central line? yes   ·  Central Line Type PICC   ·  Plan for PICC removal today? no   ·  The patient continues to require a PICC for parenteral medication     Comorbidities:  ·  Comorbidity Other     Code Status:  ·  Code Status Full Code     Assessment:    67 year old Male with CHF, COPD on 4L at home, seizure disorder and a history of multiple lumbar spine surgeries complicated  by MRSA wound infection with a known chronic open lumbar wound status post multiple debridements by plastic surgery, who presented to [place] with worsening low back pain as well as generalized fatigue and weakness, found to have MRSA infection of sacral  wound with exposed hardware, and elevated inflammatory markers. He presented to [facility]  as a transfer for surgical evaluation by spine team. He was started on vancomcyin and zosyn on 2/10 in [place], which were continued on admission.  BCx from 2/10 with NGTD. Nsgy was consulted, and he underwent removal of lumbar spinal hardware and irrigation and debridement of lumbar wound on 2/15. Plastic surgery was consulted and recommended leaving wound open with TID dressing changes and plan  for return to OR on 2/22 for lumbar spinal wound I&D and potential wound vac. Intraoperative wound cultures had no growth aerobically or anaerobically. Pt has a hx of chronic pain, and pain was managed after surgery with fentanyl patch (per home regimen),  Tylenol (per home regimen), and IV dilaudid 1mg IV q3h for moderate pain and 2mg IV q3h for severe pain. Pain regimen can be down-titrated to home regimen after plastic surgery intervention. Zosyn was discontinued on 2/19 given no evidence of pseudomonas.  Pt continues to be on vancomycin. Patient was having loose bowel movements multiple times daily but was negative for C.diff and stool softeners were stopped. Plan is for lumbar wound I&D and possible wound vac on 2/22.     Updates 2/20:   - C/w vancomycin  - Plan for  I&D with plastics on 2/22  - Optimize diet per nutrition recs for optimal wound healing  - Patient not in favor of going to SNF given past negative experience. His wife is a retired nurse and  he will discuss with her whether she can administer his IV antibiotics at home  - Closely monitor vancomycin levels as patient had a break in medication and needs to be ensured to continue to receive     Updates 2/19:  - C/w vanc  - D/c Zosyn (2/19) given no culture evidence of pseudomonas  - C. diff negative   - 2//18 CT LSpine demonstrates:  interval removal of the posterior fusion hardware from the lumbar spine. There is again a large soft tissue defect with marked thinning  of the skin and subcutaneous soft tissues and probable dehiscence overlying the lumbar and lower thoracic spine with nonspecific soft tissue thickening and loss of fat soft tissue planes as well as scattered soft tissue emphysema along the periphery of  the defect. The possibility of osteomyelitis cannot be excluded on the basis of this examination. 2. Marked, multilevel degenerative changes of the lumbar spine with multilevel postoperative changes from laminectomies and posterolateral fusion masses.  [ ] f/u Plastic Surg recs      #MRSA sacral wound infection  Micro:   -BCx 2/10 x2 NGTD  -[place] Wound cx 2/10/23: MRSA (S: clinda, vanc; R: TMP-SMX, tetracyclines)  -ESR elevated at >130  -CRP elevated at 5.26  -Intraoperative wound cx 2/15/23: no growth aerobically or anaerobically   - S/p below on 2/15:   1. exploration of lumbar wound dehiscence  2. removal of lumbar spinal hardware (set caps, rods, and screws)  3. irrigation and debridement of lumbar wound  - Plastic surgery recs  2/15:   1. WTD Kerlix dressings with Dakin's/Saline solution packed to the entire depth of the wound three times daily and PRN if soiled   2. return to OR with plastic surgery on 2/22 for lumbar spinal wound I&D and possible application of wound vac  3. Lumbar CT without  contrast, obtained on 2/18: soft tissue defect overlying lumbar and lower thoracic spine, soft tissue thickening, scattered soft tissue emphysema; multilevel degenerative changes of lumbar spine  Abx:   Vancomycin dosing: Pt received vancomycin 2/10 - 2/13, supratherapeutic (20.4) and not resumed on admission on 2/14, 1x dose 1.5g IV intra-operatively on 2/15, and resumed on 2/17 with 1500mg q12h dosing   Plan:   1) Vancomcyin 2/10-   2) Zosyn 2/10- 2/19  3) Appreciate plastic surgery recs  4) F/u intraoperative cultures     #Chronic pain - lower back  -home pain regimen: scheduled acetaminophen, fentanyl patch 50mcg q72h, dilaudid 4mg PO TID prn breakthrough  -bowel regimen  -follows with pain medicine Dr. Al[facility]zan at [facility]   -post-op pain regimen: Tylenol 650mg q6h, fentanyl patch 50mcg q72h, 1mg IV dilaudid q3h for moderate pain, 2mg IV dilaudid q3h for severe  pain    #COPD, 4L at baseline  -SpO2 goal 88-92%  -Started on proventill (takes at home per wife)     #HFpEF   #HTN  -TTE Feb 2022: EF 65-70%, normal LV diastolic function, normal RV  -Hold ASA for possible procedure  -C/w home atorva, metop 12.5 BID, maxime 50 BID, losartan 50 qd, amlodipine 10    #Seizure disorder - continue Keppra  #Anxiety - continue Buspar   #BPH - continue tamsulosin   #Gout - continue allopurinol     DVT ppx: lovenox   Code status: FULL (confirmed on admission)  NOK: Wife Mike [phone]      Attestation:   Note Completion:  I am a:  Resident/Fellow   Attending Attestation I saw and evaluated the patient.  I personally obtained the key and critical portions of the history and physical exam or was physically present for key and  critical portions performed by the resident/fellow. I reviewed the resident/fellow?s documentation and discussed the patient with the resident/fellow.  I agree with the resident/fellow?s medical decision making as documented in the note.     I personally evaluated the patient on 20-Feb-2023          Electronic Signatures:  Freddie Caraballo (Resident))  (Signed 20-Feb-2023 15:14)   Authored: Service, Subjective Data, Objective Data, Assessment  and Plan, Note Completion  Darrian Galvez)  (Signed 21-Feb-2023 14:24)   Authored: Note Completion   Co-Signer: Service, Subjective Data, Objective Data, Assessment and Plan, Note Completion      Last Updated: 21-Feb-2023 14:24 by Darrian Galvez)

## 2023-03-03 NOTE — PROGRESS NOTES
Service: Infectious Disease     Subjective Data:   RAJ HARMON is a 67 year old Male who is Hospital Day # 4 and POD #2 for 1. exploration of lumbar wound dehiscence;2. removal of lumbar spinal hardware (set caps, rods, and screws);3. irrigation  and debridement of lumbar wound.    Additional Information:    This AM, pt endorsed soft, mushy stools 3x/yesterday. Endorsed some pain prior to receiving his next dose of narcotics.     Objective Data:     Objective Information:      T   P  R  BP   MAP  SpO2   Value  36.4  79  20  129/56      96%  Date/Time 2/17 14:03 2/17 14:03 2/17 14:03 2/17 14:03    2/17 14:03  Range  (36.4C - 36.8C )  (75 - 97 )  (18 - 20 )  (109 - 129 )/ (56 - 72 )    (96% - 99% )   As of 17-Feb-2023 09:35:00, patient is on 4 L/min of oxygen via nasal cannula.      Pain reported at 2/17 5:34: 9 = Severe    Physical Exam Narrative:  ·  Physical Exam:    Gen: well-appearing, NAD  Head and neck: NCAT, neck supple without LAD, severe kyphoscoliosis   HEENT: MMM, normal nose without congestion, poor dentition   CV: RRR no mrg  Pulm: CTAB, prolonged exp phase, no wheezing or crackles, normal WOB on 4L  Abd: obese, soft, non-tender, non-distended  Ext: no cyanosis or clubbing, trace LE edema, thenar wasting bilat; 2-3cm diameter stage 1 ulcer on L heel  Neuro: alert and oriented x3, normal tone, face symmetric, moves all extremities spontaneously     Recent Lab Results:    Results:    CBC: 2/17/2023 08:03              \     Hgb     /                              \     8.3 L    /  WBC  ----------------  Plt               7.9       ----------------    332              /     Hct     \                              /     28.6 L    \            RBC: 2.56 L    MCV: 112 H    Neutrophil  %: 67.3      RFP: 2/17/2023 08:03  NA+        Cl-     BUN  /                         141    103    18  /  --------------------------------  Glucose                ---------------------------  97    K+     HCO3-    Creat \                         4.9    35 H   1.05  \  Calcium : 8.9Anion Gap : 8 L         Albumin : 2.8 L     Phos : 3.3      Assessment and Plan:   Daily Risk Screen:  ·  Does patient have a central line? yes   ·  Central Line Type PICC   ·  Plan for PICC removal today? no   ·  The patient continues to require a PICC for parenteral medication     Comorbidities:  ·  Comorbidity Other     Code Status:  ·  Code Status Full Code     Assessment:    67 year old Male with CHF, COPD on 4L at home, seizure disorder and a history of multiple lumbar spine surgeries complicated  by MRSA wound infection with a known chronic open lumbar wound status post multiple debridements by plastic surgery, who presented to [place] with worsening low back pain as well as generalized fatigue and weakness, found to have MRSA infection of sacral  wound with exposed hardware. He presents to [facility] as a transfer for surgical evaluation by spine team. High suspicion for osteomyelitis in this host with ESR > 130; OM has not been ruled out. Patient unable to tolerate MRI d/t kyphoscoliosis. Will continue  vanc for MRSA, and zosyn because of wound contamination with diarrhea.     Updates 2/17  - Pt received vancomycin 2/10 - 2/13, supratherapeutic (20.4) and not resumed on admission, 1x dose 1.5g IV intra-operatively on 2/15, and resumed on 2/17   - C/w zosyn today, will dc based on intra-operative wound cx  - Plastic surgery requesting CT L spine for operative planning, however pt endorses claustrophobia and incr. back pain with CT scans, requesting medications prior to CT scan vs. anesthesia  - Micro: deep wound cx with no growth aerobically or anaerobically   - DVT ppx resumed today     #MRSA sacral wound infection  -BCx 2/10 x2 NGTD  -[place] Wound cx 2/10/23: MRSA (S: clinda, vanc; R: TMP-SMX, tetracyclines)  -ESR elevated at >130  -CRP elevated at 5.26  - S/p below on 2/15:   1. exploration of lumbar wound dehiscence  2. removal of lumbar  spinal hardware (set caps, rods, and screws)  3. irrigation and debridement of lumbar wound  - Plastic surgery recs  2/15:   WTD Kerlix dressings with Dakin's/Saline solution packed to the entire depth of the wound three times daily and PRN if soiled   return to OR with plastic surgery on 2/22 for lumbar spinal wound I&D and possible application of wound vac  Lumbar CT without contrast  Abx:   Vancomycin dosing: Pt received vancomycin 2/10 - 2/13, supratherapeutic (20.4) and not resumed on admission on 2/14, 1x dose 1.5g IV intra-operatively on 2/15, and resumed on 2/17   Plan:   1) Vancomcyin 2/10-   2) Zosyn 2/10-; will plan to DC zosyn based on intra-operative wound cultures   3) Appreciate plastic surgery recs    #Chronic pain - lower back  -home pain regimen: scheduled acetaminophen, fentanyl patch 50mcg q72h, dilaudid 4mg PO TID prn breakthrough  -bowel regimen  -follows with pain medicine Dr. Almazan at [facility]   -post-op pain regimen: Tylenol 650mg q6h, fentanyl patch 50mcg q72h, 1mg IV dilaudid q3h for moderate pain, 2mg IV dilaudid q3h for severe  pain    #COPD, 4L at baseline  -SpO2 goal 88-92%  -Started on proventill (takes at home per wife)     #HFpEF   #HTN  -TTE Feb 2022: EF 65-70%, normal LV diastolic function, normal RV  -Hold ASA for possible procedure  -C/w home atorva, metop 12.5 BID, maxime 50 BID, losartan 50 qd, amlodipine 10    #Seizure disorder - continue Keppra  #Anxiety - continue Buspar   #BPH - continue tamsulosin   #Gout - continue allopurinol     DVT ppx: lovenox   Code status: FULL (confirmed on admission)  NOK: Wife Mike [phone]      Attestation:   Note Completion:  I am a:  Resident/Fellow   Attending Attestation I saw and evaluated the patient.  I personally obtained the key and critical portions of the history and physical exam or was physically present for key and  critical portions performed by the resident/fellow. I reviewed the resident/fellow?s documentation and discussed  the patient with the resident/fellow.  I agree with the resident/fellow?s medical decision making as documented in the note.     I personally evaluated the patient on 17-Feb-2023         Electronic Signatures:  Christa Funk (Resident))  (Signed 17-Feb-2023 19:20)   Authored: Service, Subjective Data, Objective Data, Assessment  and Plan, Note Completion  Darrian Galvez)  (Signed 18-Feb-2023 08:22)   Authored: Note Completion   Co-Signer: Service, Subjective Data, Objective Data, Assessment and Plan, Note Completion      Last Updated: 18-Feb-2023 08:22 by Darrian Galvez)

## 2023-03-03 NOTE — H&P
"    History of Present Illness:   HPI:    HPI:  67 year old Male with CHF, COPD on 4L at home, seizure disorder and a history of multiple lumbar spine surgeries complicated by MRSA wound infection with a known chronic open lumbar wound status post multiple debridements by plastic surgery, who presented  to [facility] with worsening low back pain as well as generalized fatigue and weakness, found to have MRSA infection of sacral wound with exposed hardware. He presents to [facility] as a transfer for surgical evaluation by spine team.     He states over the past few weeks he has had worsening of his lower back pain. His pain regimen is no longer able to keep up with the pain, and it finally became too much so he presented to the ED. He also has been feeling \"out of it\" and more fatigued  during this time. He has always had wounds in that area, but states it got much worse when he was in the SNF after his Nov admission at [facility]. He was not able to get into see a spine surgeon until March. No fevers, chills, weight loss, n/v. Has been having  diarrhea since being hospitalized at [facility].      [facility] Course:  He was admitted on 2/9. He was afebrile, HDS, and on his home 4L. Initial labs showed no leukocytosis, stable hgb at 9 (baseline). Initial K was 8.4, but it normalized with cocktails and kayexalate.  EKG showed NSR with no changes per report. CXR with RLL consolidation and mild pulmonary vascular congestion. UA >182 WBC, no nitrites, no UCx. He was stared on CTX/azthro for CAP and UTI. On 2/10, his ESR was >130 and CRP 20, and wound culture on 2/10  grew MRSA (S: clinda, vanc; R: TMP-SMX, tetracyclines), for which he was broadened to vanc/zosyn on 2/10. BCx 2/10 x2 NGTD. CT L spine showed new wound overlying thoracolumbar spine with protruding hardware, and OM could not be excluded. PICC was placed  on 2/13 and placement confirmed by ECG. Throughout the course, he had no fevers, hypotension or hypoxia beyond his baseline. His " pain was initially treated with dilaudid, but switched to fentanyl patch and tramadol.     OSH studies:   -Wound culture on 2/10 MRSA (S: clinda, vanc; R: TMP-SMX, tetracyclines),  -CT L spine:    1. Newly seen large high-grade wound overlying the thoracolumbar spine  with protruding hardware.  2. Wound extends near or to the margin of posterior elements including  increased heterotopic ossifications since remote CT scan.   Subtle osteomyelitis not reliably excluded  3 Unchanged pelvic calcification which is inseparable from the  posteroinferior bladder wall.      Surgical/Wound History (from chart review):  - He underwent multiple lumbar spine surgeries by Dr. Troy at  [facility] beginning around ~2000, culminating in a L2-L4 decompression and fusion. Per report, these were complicated by multiple MRSA infections.  - Dr. Troy placed an intrathecal pain pump ~2013. The pain pump was then replaced and managed by Dr. Almazan At [facility] beginning  in 2020. It has since been removed.   - He developed multiple decubitus ulcers beginning in December 2021, and underwent 2 debridements of multiple ulcers in February 2022 at [facility].  - Presented to [facility] in March 2022 with a nonhealing lumbar wound with exposure of the spine instrumentation and intrathecal catheter. He was taken to surgery at that time for removal of the entire pump system by Dr. Shafer and multiple debridements of  the wound by Dr. Jaime. Dr. Jaime then closed the wound with rotational flaps on March 29, 2022. Unfortunately, due to the prominence of the spine instrumentation at L2 and L3, as well as the patient's body habitus and sedentary lifestyle, the flap  repair did not heal and the patient was taken back to surgery by Dr. Jaime on April 27, 2022 for excisional debridement, at which time it was noted that the spine instrumentation was exposed. Dr. Jaime performed another excisional debridement on May  3, 2022, then a complex wound  "closure on May 7, 2022. This also did not heal, and Dr. Jaime performed further excisional debridements on May 17th, , and . During one of these debridements,  his intrathecal pain pump was removed.   - Plastics FUV with Dr. Jaime in 23: pt had increasing pain, and concern that hardware may be loose or fractured     Microbiology History:  -[place] Wound cx 2/10/23: MRSA (S: clinda, vanc; R: TMP-SMX, tetracyclines)  -Wound culture \"Deep lower wound\" 3/19/22: MRSA (S: vanc; I: clinda; R: TMP-SMX, tetracycline)  -Intraop wound cx at [facility] 2/3/22: E faecalis (mod growth, pan sens), Cornebacterium (heavy growth), Providencia (few, R: ceftaz, Cipro, Bactrim, levofloxacin; I: unasyn), Acinetobacter (one colony; R: barry, Cipro, piperacillin, tetracycline, tobra, Bactrim,  levofloxacin; S: gent, ceftaz), MRSA (S: clinda, vanc; R: TMP-SMX, tetracyclines)      Medical History:  PMH: above  Medications: reviewed from pharmacy med rec  done with wife who has med list and takes care of patient, see below  OARRS:  -dilaudid 4mg tab, #90, 30d supply   -Fentanyl patch 50mcg/hr, #10, 30d supply   -chronic opioid use for years, followed by pain medicine Dr. Almazan at [facility]  Allergies: NKDA  PSH: above    FamHx:   -Mother:  of COPD   -Siblings: lung disease and heart disease    SocHx:  Home: lives at home with wife  ADLs: feeds self, wife has to cook and clean and take care of his medical needs, walks with rolator and even has trouble then  Education/work: retired   Substance use:  -Alcohol: remote use  -Tobacco: denies  -Recreational drugs: remote use THC    ROS: 10-point ROS negative unless otherwise mentioned in HPI      Comorbidities:   Comorbidites:  ·  Comorbid Conditions chronic obstructive pulmonary disease, congestive heart failure   ·  Type of Congestive Heart Failure diastolic   ·  CHF Additional Specificity with hypertension   ·  COPD with oxygen dependence   ·  Chronic Respiratory " Failure yes   ·  Oxygen Delivery at Home nasal cannula   ·  Oxygen Frequency continuous            Intolerances:  ·  codeine : GI Upset    Medications Prior to Admission:     Osteo Bi-Flex 250 mg-200 mg oral tablet: 1 tab(s) orally 2 times a day  fentaNYL 50 mcg/hr transdermal film, extended release: 1 patch transdermal every 72 hours  aspirin 81 mg oral delayed release tablet: 1 tab(s) orally once a day  tamsulosin 0.4 mg oral capsule: 1 cap(s) orally once a day  levETIRAcetam 500 mg oral tablet: 1 tab(s) orally 2 times a day  busPIRone 5 mg oral tablet: 1 tab(s) orally every 12 hours  lactulose 10 g/15 mL oral syrup: 30 milliliter(s) orally once a day, As Needed  ascorbic acid 1000 mg oral tablet: 1 tab(s) orally once a day  atorvastatin 20 mg oral tablet: 1 tab(s) orally once a day  docusate sodium 100 mg oral capsule: 1 cap(s) orally once a day (at bedtime)  metoprolol tartrate 25 mg oral tablet: 0.5 tab(s) orally 2 times a day  amLODIPine 10 mg oral tablet: 1 tab(s) orally once a day  allopurinol 300 mg oral tablet: 1 tab(s) orally every 24 hours  losartan 50 mg oral tablet: 1 tab(s) orally once a day  spironolactone 50 mg oral tablet: 1 tab(s) orally 2 times a day  Dilaudid 4 mg oral tablet: 1 tab(s) orally 3 times a day, As Needed.    Objective:     Objective Information:        T   P  R  BP   MAP  SpO2   Value  36.3  101  17  120/74      98%  Date/Time 2/14 3:42 2/14 3:42 2/14 3:42 2/14 3:42    2/14 3:42  Range  (36.3C - 36.3C )  (101 - 101 )  (17 - 17 )  (120 - 120 )/ (74 - 74 )    (98% - 98% )    Physical Exam Narrative:  ·  Physical Exam:    Gen: well-appearing, NAD  Head and neck: NCAT, neck supple without LAD, severe kyphoscoliosis   HEENT: MMM, normal nose without congestion, poor dentition   CV: RRR no mrg  Pulm: CTAB, prolonged exp phase, no wheezing or crackles, normal WOB on 4L  Abd: obese, soft, non-tender, non-distended  Back: 10-15cm stage IV ulcer in the lumbosacral region, midline, exposed  hardware, minimal surrounding erythema (see images)  Ext: no cyanosis or clubbing, trace LE edema, thenar wasting bilat; 2-3cm diameter stage 1 ulcer on L heel  Neuro: alert and oriented x3, normal tone, face symmetric, moves all extremities spontaneously     Medications:    Medications:          Continuous Medications       --------------------------------  No continuous medications are active       Scheduled Medications       --------------------------------    1. Allopurinol:  300  mg  Oral  Every 24 Hours    2. amLODIPine (NORVASC):  10  mg  Oral  Daily    3. Ascorbic Acid:  1000  mg  Oral  Daily    4. Atorvastatin:  20  mg  Oral  Daily    5. busPIRone (BUSPAR):  5  mg  Oral  Every 12 Hours    6. Docusate 50 mg - Senna 8.6 m  tablet(s)  Oral  2 Times a Day    7. fentaNYL 50 micrograms/ hour TransDermal:  1  patch  TransDermal  Every 72 Hours    8. Heparin SubCutaneous:  5000  unit(s)  SubCutaneous  Every 8 Hours    9. levETIRAcetam (KEPPRA):  500  mg  Oral  2 Times a Day    10. Losartan:  50  mg  Oral  Daily    11. Metoprolol Tartrate:  12.5  mg  Oral  2 Times a Day    12. Multivitamin with Minerals:  1  tablet(s)  Oral  Daily    13. Polyethylene Glycol:  17  gram(s)  Oral  Daily    14. Spironolactone:  50  mg  Oral  2 Times a Day    15. Tamsulosin:  0.4  mg  Oral  Daily         PRN Medications       --------------------------------    1. HYDROmorphone:  4  mg  Oral  Every 8 Hours    2. Melatonin:  3  mg  Oral  At Bedtime    3. Naloxone Injectable:  0.2  mg  IntraVenous Push  Once    4. Sodium Chloride 0.9% Injectable Flush:  10  mL  IntraVenous Flush  Every 8 Hours and as Needed        Recent Lab Results:    Results:        I have reviewed these laboratory results:    Basic Metabolic Panel  Trending View      Result 2023 09:32:00  2023 11:10:00  2023 05:20:00    Glucose, Serum 125   H   104   H   96       137   138    K 4.4   4.8   5.7   H       105   108   H     Bicarbonate, Serum 32   29   25    Anion Gap, Serum 7   L   8   L   11    BUN 16   19   25   H    CREAT 0.80   0.89   1.11    GFR Male >90   >90   72    Calcium, Serum 9.4   9.4   9.6        Vancomycin Level, Trough  13-Feb-2023 01:25:00      Result Value    Lab Comment:  Called- RB to MARQUITA ANNE , 02/13/2023 02:04    Vancomycin Level, Trough  20.4   HH     Vancomycin Level, Random  11-Feb-2023 12:53:00      Result Value    Vancomycin Level, Random  18.5      Complete Blood Count  11-Feb-2023 05:20:00      Result Value    White Blood Cell Count  8.8    Nucleated Erythrocyte Count  0.0    Red Blood Cell Count  2.79   L   HGB  9.0   L   HCT  30.6   L   MCV  110   H   MCHC  29.4   L   PLT  386    RDW-CV  13.2      Sedimentation Rate, Erythrocyte  11-Feb-2023 05:20:00      Result Value    Sedimentation Rate, Erythrocyte  >130   H       Radiology Results:    Results:        Impression:    Newly seen large high-grade wound overlying the thoracolumbar spine  with protruding hardware.     Wound extends near or to the margin of posterior elements including  increased heterotopic ossifications since remote CT scan. Subtle  osteomyelitis not reliably excluded due to the complexity of  underlying abnormality although no well-delineated evident discrete  bony destructive tract identified. Further evaluation with nuclear  medicine imaging, and or MRI could also be considered.     Unchanged pelvic calcification which is inseparable from the  posteroinferior bladder wall. The calcification may be within the  wall within the lumen or along the margin of the wall. If there is  clinical concern, bladder ultrasound correlation could be considered.     Additional similar pre-existing findings as reported.     CT L Spine without Contrast [Feb 10 2023  6:27PM]      Assessment and Plan:   Assessment:    67 year old Male with CHF, COPD on 4L at home, seizure disorder and a history of multiple lumbar spine surgeries complicated  by MRSA  wound infection with a known chronic open lumbar wound status post multiple debridements by plastic surgery, who presented to [place] with worsening low back pain as well as generalized fatigue and weakness, found to have MRSA infection of sacral  wound with exposed hardware. He presents to [facility] as a transfer for surgical evaluation by spine team. High suspicion for osteomyelitis in this host with ESR > 130; OM has not been ruled out. Patient unable to tolerate MRI d/t kyphoscoliosis. Will continue  vanc for MRSA, and zosyn because of wound contamination with diarrhea.     #MRSA sacral wound infection  -BCx 2/10 x2 NGTD  -[place] Wound cx 2/10/23: MRSA (S: clinda, vanc; R: TMP-SMX, tetracyclines)  -c/w vanc/zosyn   -with culture history and continued diarrhea from kayexalate, concern for multiple organisms and anaerobes    -consider MRI L spine for OM   [ ] spine c/s in AM    #COPD, 4L at baseline  -SpO2 goal 88-92%  [ ] no inhalers on med rec from wife, but will need to fu because was on symbicort  and albuterol in the past    #HFpEF   #HTN  -TTE Feb 2022: EF 65-70%, normal LV diastolic function, normal RV  -Hold ASA for possible procedure  -c/w home atorva, metop 12.5 BID, maxime 50 BID, losartan 50 qd, amlodipine 10    #Chronic pain - lower back  -c/w home pain regimen: scheduled acetaminophen, fentanyl patch 50mcg q72h, dilaudid 4mg PO TID prn breakthrough  -bowel regimen  -follows with pain medicine Dr. Al[facility]zan at [facility]     #Seizure disorder - continue keppra  #Anxiety - continue Buspar   #BPH - continue tamsulosin   #Gout - continue allopurinol     DVT ppx: SQL  Code status: FULL (confirmed on admission)  NOK: Wife Mike [phone]      Daljit Rashid MD  Med-Peds PGY-2  [facility]   [facility] [id number]    Attestation:   Note Completion:  I am a:  Resident/Fellow   Attending Attestation I saw and evaluated the patient.  I personally obtained the key and critical portions of the history and physical exam  or was physically present for key and  critical portions performed by the resident/fellow. I reviewed the resident/fellow?s documentation and discussed the patient with the resident/fellow.  I agree with the resident/fellow?s medical decision making as documented in the note.     I personally evaluated the patient on 14-Feb-2023         Electronic Signatures:  Ankur Harrison)  (Signed 14-Feb-2023 06:21)   Authored: Note Completion   Co-Signer: History of Present Illness, Comorbidities, Allergies, Medications Prior to Admission, Objective, Assessment and Plan, Note Completion  Daljit Rashid (Resident))  (Signed 14-Feb-2023 05:10)   Authored: History of Present Illness, Comorbidities,  Allergies, Medications Prior to Admission, Objective, Assessment and Plan, Note Completion      Last Updated: 14-Feb-2023 06:21 by Ankur Harrison)

## 2023-03-03 NOTE — PROGRESS NOTES
Service: Plastic Surgery     Subjective Data:   RAJ HARMON is a 67 year old Male who is Hospital Day # 13 and POD #11 for 1. exploration of lumbar wound dehiscence;2. removal of lumbar spinal hardware (set caps, rods, and screws);3. irrigation  and debridement of lumbar wound.     NAD. Notes chronic numbness to hands and feet and states he has been feeling more cold lately. Denies fever, chest pain, SOB, abd pain, n/v/d.    Objective Data:     Objective Information:      T   P  R  BP   MAP  SpO2   Value  36.4  87  18  133/67      97%  Date/Time 2/26 13:46 2/26 13:46 2/26 13:46 2/26 13:46    2/26 13:46  Range  (36.1C - 37C )  (78 - 107 )  (18 - 28 )  (126 - 136 )/ (56 - 68 )    (95% - 98% )   As of 25-Feb-2023 19:50:00, patient is on 5 L/min of oxygen via nasal cannula.  Highest temp of 37 C was recorded at 2/25 18:38      Pain reported at 2/26 5:12: 8 = Severe    Physical Exam by System:    Constitutional: Alert and cooperative, NAD   Eyes: EOMI, clear sclera   ENMT: MMM   Head/Neck: NC/AT   Respiratory/Thorax: Unlabored respirations on 4L  NC   Genitourinary: Voiding per urinal/commode   Musculoskeletal: Wound vac intact to lumbar spine  without leak or obstruction. +tenderness surrounding vac site   Neurological: A&O x3   Psychological: Appropriate behavior and mood   Skin: Warm and dry, no lesions, no rashes     Recent Lab Results:    Results:    CBC: 2/26/2023 04:57              \     Hgb     /                              \     8.0 L    /  WBC  ----------------  Plt               8.8       ----------------    301              /     Hct     \                              /     27.4 L    \            RBC: 2.48 L    MCV: 110 H    Neutrophil  %: 69.1      RFP: 2/26/2023 04:57  NA+        Cl-     BUN  /                         137    94 L   29 H  /  --------------------------------  Glucose                ---------------------------  94    K+     HCO3-   Creat \                         5.0    39  H   0.90  \  Calcium : 10.0Anion Gap : 9 L         Albumin : 3.3 L     Phos : 3.0      Assessment and Plan:   Daily Risk Screen:  ·  Does patient have an indwelling urinary catheter? n/a consulting service   ·  Does patient have a central line? n/a consulting service     Comorbidities:  ·  Comorbidity Other     Code Status:  ·  Code Status Full Code     Assessment:    RAJ HARMON is a 67 year old Male known to the plastic surgery team with a h/o multiple lumbar spinal surgeries which have been c/b recurrent MRSA infections  requiring multiple extensive plastic surgery interventions including multiple debridements and attempted closures. Admitted to [facility] as transfer on 2/14 for concern of MRSA infection of chronically open lumbar wound with exposed hardware. Patient now  s/p lumbar spinal wound I&D and removal of spinal hardware per NSGY on 2/15. Plastic Surgery consulted for spinal wound debridement and coverage.     OR course (this admission):   2/15 - Lumbar spinal wound exploration, I&D and removal of spinal hardware (per NSGY)    Plan/Recommendations:   - Plan to return to OR Tuesday 2/28 for debridement of lumbar spine wound, potential flap vs. wound vac application   -> NPO @mn, ensure covid and T&S are updated   - Ensure diligent pressure offloading to lumbar spine/wound site with frequent repositioning, q2h turns   - Nutrition optimization to promote wound healing, consider addition of daily MV and nutritional supplements/shakes as able   - Cx history:     ·  2/10 OSH bedside wound cx - MRSA (final)      ·  2/15 OR deep wound cs - NGTD (final 2/17)  - Continue IV ABX per ID recs (currently IV Vanc)   - Appreciate remaining supportive care per primary service   - Plastics will continue to follow    Patient and plan discussed with NIGEL KayC  Plastic and Reconstructive Surgery  Doc Halo, Pager [phone], Team phones: [phone], [phone]    Time spent on the assessment of patient,  gathering and interpreting data, review of medical record/patient history, personally reviewing radiographic imaging and formulation of this note 30 minutes. With greater than 50% spent in personal discussion with  patient.          Electronic Signatures:  Aisha Shannon (PAC)  (Signed 26-Feb-2023 14:21)   Authored: Service, Subjective Data, Objective Data, Assessment  and Plan, Note Completion      Last Updated: 26-Feb-2023 14:21 by Aisha Shannon (PAC)

## 2023-03-03 NOTE — PROGRESS NOTES
Service: Infectious Disease     Subjective Data:   RAJ HARMON is a 67 year old Male who is Hospital Day # 15 and POD #13 for 1. exploration of lumbar wound dehiscence;2. removal of lumbar spinal hardware (set caps, rods, and screws);3. irrigation  and debridement of lumbar wound.     Evaluated at bedside today. ready to go to surgery with plastics for debridement. Denies fevers, chills, SOB, CP, abdominal pain.    Objective Data:     Objective Information:      T   P  R  BP   MAP  SpO2   Value  36.3  78  18  117/65      95%  Date/Time 2/28 7:52 2/28 7:52 2/28 7:52 2/28 7:52    2/28 7:52  Range  (36.2C - 36.4C )  (62 - 78 )  (18 - 19 )  (117 - 175 )/ (62 - 78 )    (95% - 98% )   As of 27-Feb-2023 21:47:00, patient is on 4 L/min of oxygen via nasal cannula.      Pain reported at 2/28 8:54: 8 = Severe      T   P  R  BP   MAP  SpO2   Value  36.3  78  18  117/65      95%  Date/Time 2/28 7:52 2/28 7:52 2/28 7:52 2/28 7:52    2/28 7:52  Range  (36.2C - 36.4C )  (62 - 78 )  (18 - 19 )  (117 - 175 )/ (62 - 78 )    (95% - 98% )   As of 27-Feb-2023 21:47:00, patient is on 4 L/min of oxygen via nasal cannula.    Physical Exam Narrative:  ·  Physical Exam:    Gen: well-appearing, NAD  Head and neck: NCAT, neck supple without LAD, severe kyphoscoliosis   HEENT: MMM, normal nose without congestion, poor dentition   CV: RRR no mrg  Pulm: CTAB, prolonged exp phase, no wheezing or crackles, normal WOB on 4L  Abd: obese, soft, non-tender, non-distended  Ext: no cyanosis or clubbing, trace LE edema, thenar wasting bilat; 2-3cm diameter stage 1 ulcer on L heel  Neuro: alert and oriented x3, normal tone, face symmetric, moves all extremities spontaneously. mild tremor in hands bilaterally  Back: Wound vac placed and secured. No blood oozing or purulence. skin irritation under the wound vac tape      Medication:    Medications:          Continuous Medications       --------------------------------  No continuous medications  are active       Scheduled Medications       --------------------------------    1. Acetaminophen:  650  mg  Oral  Every 6 Hours    2. Allopurinol:  300  mg  Oral  Every 24 Hours    3. amLODIPine (NORVASC):  10  mg  Oral  Daily    4. Ascorbic Acid:  1000  mg  Oral  Daily    5. Atorvastatin:  20  mg  Oral  Daily    6. busPIRone (BUSPAR):  5  mg  Oral  Every 12 Hours    7. Docusate 50 mg - Senna 8.6 m  tablet(s)  Oral  2 Times a Day    8. Enoxaparin SubCutaneous:  40  mg  SubCutaneous  Every 24 Hours    9. fentaNYL 50 micrograms/ hour TransDermal:  1  patch  TransDermal  Every 72 Hours    10. Hydrocortisone 1% Topical:  1  application(s)  Topical  2 Times a Day    11. levETIRAcetam (KEPPRA):  500  mg  Oral  2 Times a Day    12. Metoprolol Tartrate:  12.5  mg  Oral  2 Times a Day    13. Polyethylene Glycol:  17  gram(s)  Oral  Daily    14. Sodium Hypochlorite 0.125% (Dakins Quarter):  1  application(s)  Topical  3 Times a Day    15. Tamsulosin:  0.4  mg  Oral  Daily    16. Vancomycin - RPh to Dose - IV Piggy Back:  1  each  As Specified  Variable    17. Vancomycin 750 mg/ D5W IVPB Premixed Soln 150 mL:  750  mg  IntraVenous Piggyback  Every 12 Hours         PRN Medications       --------------------------------    1. Albuterol 90 micrograms/ Inhalation MDI:  2  inhalation  Inhalation  Every 6 Hours    2. Calcium Carbonate Chewable:  500  mg  Oral  Every 2 Hours    3. diphenhydrAMINE:  25  mg  Oral  Every 24 Hours    4. Heparin Flush 10 unit/ mL Injectable PRN:  5  mL  IntraVenous Flush  According to Flush Policy    5. HYDROmorphone Injectable:  1  mg  IntraVenous Push  Every 3 Hours    6. Melatonin:  3  mg  Oral  At Bedtime    7. Naloxone Injectable:  0.2  mg  IntraVenous Push  Once    8. Sodium Chloride 0.9% Injectable Flush:  10  mL  IntraVenous Flush  Every 8 Hours and as Needed        Recent Lab Results:    Results:    CBC: 2023 06:02              \     Hgb     /                              \     7.5 L     /  WBC  ----------------  Plt               7.1       ----------------    286              /     Hct     \                              /     24.9 L    \            RBC: 2.27 L    MCV: 110 H    Neutrophil  %: 67.2      RFP: 2/28/2023 06:02  NA+        Cl-     BUN  /                         138    94 L   26 H  /  --------------------------------  Glucose                ---------------------------  107 H    K+     HCO3-   Creat \                         4.5    40 HH   0.81  \  Calcium : 9.4Anion Gap : 9 L         Albumin : 2.9 L     Phos : 3.6        I have reviewed these laboratory results:    Renal Function Panel  28-Feb-2023 06:02:00      Result Value    Lab Comment:  BIC CALLED RB TO JHOAN DANIELS , 02/28/2023 07:26    Glucose, Serum  107   H   NA  138    K  4.5    CL  94   L   Bicarbonate, Serum  40   HH   Anion Gap, Serum  9   L   BUN  26   H   CREAT  0.81    GFR Male  >90    Calcium, Serum  9.4    Phosphorus, Serum  3.6    ALB  2.9   L     Complete Blood Count + Differential  28-Feb-2023 06:02:00      Result Value    White Blood Cell Count  7.1    Nucleated Erythrocyte Count  0.0    Red Blood Cell Count  2.27   L   HGB  7.5   L   HCT  24.9   L   MCV  110   H   MCHC  30.1   L   PLT  286    RDW-CV  12.5    Neutrophil %  67.2    Immature Granulocytes %  0.8    Lymphocyte %  15.8    Monocyte %  10.0    Eosinophil %  4.9    Basophil %  1.3    Neutrophil Count  4.78    Lymphocyte Count  1.12   L   Monocyte Count  0.71    Eosinophil Count  0.35    Basophil Count  0.09      Magnesium, Serum  28-Feb-2023 06:02:00      Result Value    Magnesium, Serum  1.86        Assessment and Plan:   Daily Risk Screen:  ·  Does patient have a central line? yes   ·  Central Line Type PICC   ·  Plan for PICC removal today? no   ·  The patient continues to require a PICC for parenteral medication     Comorbidities:  ·  Comorbidity Other     Code Status:  ·  Code Status Full Code     Assessment:    67 year old Male with CHF,  COPD on 4L at home, seizure disorder and a history of multiple lumbar spine surgeries complicated  by MRSA wound infection with a known chronic open lumbar wound status post multiple debridements by plastic surgery, who presented to [place] with worsening low back pain as well as generalized fatigue and weakness, found to have MRSA infection of sacral  wound with exposed hardware, and elevated inflammatory markers (ESR >130,  CRP elevated at 5.26). He presented to [facility] as a transfer for surgical evaluation by spine team. He was started on vancomcyin and zosyn  on 2/10 in [place], which were continued on admission. BCx from 2/10 with NGTD. Nsgy was consulted, and he underwent removal of lumbar spinal hardware and irrigation and debridement of lumbar wound on 2/15. Plastic surgery was consulted and recommended  leaving wound open with TID dressing changes and plan for return to OR on 2/22 for lumbar spinal wound I&D and potential wound vac. Intraoperative wound cultures had no growth aerobically or anaerobically. Pt has a hx of chronic pain, and pain was managed  after surgery with fentanyl patch (per home regimen), Tylenol (per home regimen), and IV dilaudid 1mg IV q3h for moderate pain and 2mg IV q3h for severe pain. Pain regimen can be down-titrated to home regimen after plastic surgery intervention. Zosyn  was discontinued on 2/19 given no evidence of pseudomonas. Pt continues to be on vancomycin. Patient was having loose bowel movements multiple times daily but was negative for C.diff and stool softeners were stopped. Plastic surgery recommended wound  vac and it was placed on 2/22, replaced on 2/24. Patient had hyperkalemic episode on 2/34 of potassium 6.0 (repeat from 6.1) with EKG showing  peaked T-waves, ordered insulin & D5 & Ca gluconate & Lokelma 10 mg TID, held Spironolactone . Hyperkalemia resolved with these interventions. Patient had another wound vac replaced on 2/27. Went to OR with plastic surgery on  2/28 for debridement.     Uppdates 2/28:  -OR today with plastics for debridement  -Continue to monitor bicarb (underlying CHILANGO)    Updates 2/26:  -Potassium 6.0 yesterday (repeat from 6.1) with EKG showing peaked T-waves, ordered insulin & D5 & Ca gluconate & Lokelma,  held Spironolactone.   -K 5.0 today, c/w Lokelma TID  -hydrocortisone for itching on lower back   -Plan to evaluate patient in OR next week Tues  -Wound vac replaced by plastics on 2/24  -Vanc level 15.2 on 2/25 , c/w Vanc 500 q12h  -Vanc to continue for 8 weeks until 2/15 - 4/12  -Closely monitor vancomycin levels as patient had a break  in medication and needs to be ensured to continue to receive   -Pain regimen currently at 2mg q3h as needed  -Tremor to be discussed with patient and wife and chart review   -Outpatient f/u with Dr. Harrison    Cx history:     ·  2/10 OSH bedside wound cx - MRSA (final)      ·  2/15 OR deep wound cs - NGTD (final 2/17)    Abx hx:  1) Vancomcyin 2/10-   2) Zosyn 2/10- 2/19 (d/c given no culture evidence of pseudomonas)      #MRSA sacral wound infection  Micro:   -BCx 2/10 x2 NGTD  -[place] Wound cx 2/10/23: MRSA (S: clinda, vanc; R: TMP-SMX, tetracyclines)    -Intraoperative wound cx 2/15/23: no growth aerobically or anaerobically   - S/p below on 2/15:   1. exploration of lumbar wound dehiscence  2. removal of lumbar spinal hardware (set caps, rods, and screws)  3. irrigation and debridement of lumbar wound  - Plastic surgery recs  2/15:   1. WTD Kerlix dressings with Dakin's/Saline solution packed to the entire depth of the wound three times daily and PRN if soiled   -Lumbar CT without contrast, obtained on 2/18: soft tissue defect overlying lumbar and lower thoracic spine, soft tissue thickening, scattered soft tissue emphysema; multilevel degenerative changes of lumbar spine  - Plastics decided to place wound vac on 2/22 instead of I&D, reassessed and replaced wound vac on 2/24 and on 2/27. Went to OR on 2/28  Abx:    Plan:   1) Vancomcyin 2/10 - 4/12   2) Zosyn 2/10- 2/19 (d/c given no culture evidence of pseudomonas)    #Chronic pain - lower back  -home pain regimen: scheduled acetaminophen, fentanyl patch 50mcg q72h, dilaudid 4mg PO TID prn breakthrough  -bowel regimen  -follows with pain medicine Dr. Almazan at [facility]   -post-op pain regimen: Tylenol 650mg q6h, fentanyl patch 50mcg q72h, 1mg IV dilaudid q3h for moderate pain, 2mg IV dilaudid q3h for severe pain    #Hyperkalemia - resolved 5.0 now  -6.0 on 2/24/2023 with EKG of peaked T-waves, 5.8 on 2/23 resolved peaked T-waves  -hx of hyperkalemic  episodes on review  -no CKD  -given insulin, d50, calcium gluconate, and lokelma   -will hold maxime   -conitnue to monitor    #COPD, 4L at baseline  -SpO2 goal 88-92%  -Started on proventill (takes at home per wife)     #HFpEF   #HTN  -TTE Feb 2022: EF 65-70%, normal LV diastolic function, normal RV  -Hold ASA for possible procedure  -C/w home atorva, metop 12.5 BID, maxime 50 BID, losartan 50 qd, amlodipine 10  -Hold maxime for hyperkalemia on 2/24    #Seizure disorder - continue Keppra  #Anxiety - continue Buspar   #BPH - continue tamsulosin   #Gout - continue allopurinol     #CHILANGO  -Not compliant with CPAP at home  -Elevated bicarb inpatient, likely chronic    DVT ppx: lovenox   Code status: FULL (confirmed on admission)  NOK: Wife Mike [phone]            Attestation:   Note Completion:  I am a:  Resident/Fellow   Attending Attestation I saw and evaluated the patient.  I personally obtained the key and critical portions of the history and physical exam or was physically present for key and  critical portions performed by the resident/fellow. I reviewed the resident/fellow?s documentation and discussed the patient with the resident/fellow.  I agree with the resident/fellow?s medical decision making as documented in the note.     I personally evaluated the patient on 28-Feb-2023         Electronic Signatures:  Donte  Mame BRANDON)  (Signed 28-Feb-2023 11:19)   Authored: Note Completion   Co-Signer: Service, Subjective Data, Objective Data, Assessment and Plan, Note Completion  Freddie Caraballo (Resident))  (Signed 28-Feb-2023 11:01)   Authored: Service, Subjective Data, Objective Data, Assessment  and Plan, Note Completion      Last Updated: 28-Feb-2023 11:19 by Mame Kent)

## 2023-03-03 NOTE — PROGRESS NOTES
Service: Plastic Surgery     Subjective Data:   RAJ HARMON is a 67 year old Male who is Hospital Day # 14 and POD #12 for 1. exploration of lumbar wound dehiscence;2. removal of lumbar spinal hardware (set caps, rods, and screws);3. irrigation  and debridement of lumbar wound.     No acute concerns. Notes some abdominal cramping he attributes to having to have a BM. Wound vac changed at bedside 2/2 leak. OR tomorrow as an add on for flap vs. wound vac application. Denies fever,  chest pain, SOB, abd pain, n/v/d.    Objective Data:     Objective Information:      T   P  R  BP   MAP  SpO2   Value  36.4  62  19  175/78      98%  Date/Time 2/27 5:00 2/27 5:00 2/27 5:00 2/27 5:00    2/27 5:00  Range  (36.4C - 36.8C )  (62 - 87 )  (18 - 20 )  (133 - 175 )/ (60 - 78 )    (96% - 98% )   As of 27-Feb-2023 08:50:00, patient is on 4 L/min of oxygen via nasal cannula.      Pain reported at 2/27 8:50: 9 = Severe    Physical Exam by System:    Constitutional: Alert and cooperative, NAD   Eyes: EOMI, clear sclera   ENMT: MMM   Head/Neck: NC/AT   Respiratory/Thorax: Unlabored respirations on 4L  NC   Genitourinary: Voiding per urinal/commode   Musculoskeletal: Wound vac intact to lumbar spine  without leak or obstruction. +tenderness surrounding wound. Skin tear noted to distal edge and pinpoint bleeding 2/2 to dry/fragile skin.   Neurological: A&O x3   Psychological: Appropriate behavior and mood   Skin: Warm and dry, no lesions, no rashes     Recent Lab Results:    Results:    CBC: 2/27/2023 04:43              \     Hgb     /                              \     12.1 L    /  WBC  ----------------  Plt               5.8       ----------------    204              /     Hct     \                              /     40.0 L    \            RBC: 3.75 L    MCV: 107 H    Neutrophil  %: 70.1      RFP: 2/27/2023 09:52  NA+        Cl-     BUN  /                         Canceled    Canceled    Canceled   /  --------------------------------  Glucose                ---------------------------  Canceled    K+     HCO3-   Creat \                         Canceled    Canceled    Canceled  \  Calcium : CanceledAnion Gap : Canceled          Albumin : Canceled     Phos : Canceled The performance characteristics of phosphorus testing in   heparinized plasma have been validated by the individual     laboratory site where testing is performed. Testing    on heparinized plasma is not approved by the FDA;    however, suc      Assessment and Plan:   Daily Risk Screen:  ·  Does patient have an indwelling urinary catheter? n/a consulting service   ·  Does patient have a central line? n/a consulting service     Comorbidities:  ·  Comorbidity Other     Code Status:  ·  Code Status Full Code     Assessment:    RAJ HARMON is a 67 year old Male known to the plastic surgery team with a h/o multiple lumbar spinal surgeries which have been c/b recurrent MRSA infections  requiring multiple extensive plastic surgery interventions including multiple debridements and attempted closures. Admitted to [facility] as transfer on 2/14 for concern of MRSA infection of chronically open lumbar wound with exposed hardware. Patient now  s/p lumbar spinal wound I&D and removal of spinal hardware per NSGY on 2/15. Plastic Surgery consulted for spinal wound debridement and coverage.     OR course (this admission):   2/15 - Lumbar spinal wound exploration, I&D and removal of spinal hardware (per NSGY)    Plan/Recommendations:   - Wound vac changed at bedside 2/27, cavilon skin barrier film applied 2/2 skin tear at the distal aspect of wound  - Plan to return to OR Tuesday 2/28 for debridement of lumbar spine wound, potential flap vs. wound vac application (add on)   -> NPO @mn, covid and T&S pending   - Ensure diligent pressure offloading to lumbar spine/wound site with frequent repositioning, q2h turns   - Nutrition optimization to promote wound  healing, consider addition of daily MV and nutritional supplements/shakes as able   - Cx history:     ·  2/10 OSH bedside wound cx - MRSA (final)      ·  2/15 OR deep wound cs - NGTD (final 2/17)  - Continue IV ABX per ID recs (currently IV Vanc)   - Appreciate remaining supportive care per primary service   - Plastics will continue to follow    Patient and plan discussed with Dr. Yeni Shannon, PA-C  Plastic and Reconstructive Surgery  Doc [place], Pager [phone], Team phones: [phone]    Time spent on the assessment of patient, gathering and interpreting data, review of medical record/patient history, personally reviewing radiographic imaging and formulation of this note 30 minutes. With greater than 50% spent in personal discussion with  patient.          Electronic Signatures:  Aisha Shannon (PAC)  (Signed 27-Feb-2023 12:10)   Authored: Service, Subjective Data, Objective Data, Assessment  and Plan, Note Completion      Last Updated: 27-Feb-2023 12:10 by Aisha Shannon (PAC)

## 2023-03-03 NOTE — PROGRESS NOTES
Service: Plastic Surgery     Subjective Data:   RAJ HARMON is a 67 year old Male who is Hospital Day # 9 and POD #7 for 1. exploration of lumbar wound dehiscence;2. removal of lumbar spinal hardware (set caps, rods, and screws);3. irrigation  and debridement of lumbar wound.     No acute concerns. Aware of plans for vac change on Friday, and following plans pending wound appearance. Tolerating diet. Denies any issues with vac overnight.     Denies any fever, chills, night sweats, CP, SOB, palpitations, nausea, vomiting, or abdominal pain/discomfort.    Overnight Events: Patient had an uneventful night.     Objective Data:     Objective Information:      T   P  R  BP   MAP  SpO2   Value  36.5  78  18  126/66      96%  Date/Time 2/22 6:12 2/22 6:12 2/22 6:12 2/22 6:12    2/22 6:12  Range  (36.1C - 37.1C )  (76 - 100 )  (16 - 19 )  (121 - 152 )/ (58 - 73 )    (96% - 99% )   As of 22-Feb-2023 01:19:00, patient is on 4 L/min of oxygen via nasal cannula.  Highest temp of 37.1 C was recorded at 2/21 14:35      Pain reported at 2/22 8:33: 8 = Severe    Physical Exam by System:    Constitutional: Alert and cooperative, NAD   Eyes: EOMI, PERRL.   ENMT: MMM, no ulcerations/lesions   Head/Neck: NC/AT   Respiratory/Thorax: Unlabored respirations on 4L  NC.   Cardiovascular: Regular rate, extremities W/WP.   Gastrointestinal: Soft, large panus, nt/nd.   Genitourinary: Voiding per urinal/commode   Musculoskeletal: Generalized deconditioning.   Neurological: A&O x3.   Psychological: Appropriate behavior and mood.   Skin: Stage 1 ulcer on L heal. Wound vac in place  to  lumbar spinal wound. Vac maintaining suction without leak/blockage. Scant serosanguinous drainage in canister. Mild periwound erythema.     Medication:    Medications:      1. Vancomycin - RPh to Dose - IV Piggy Back:  1  each  As Specified  Variable      ALTERNATIVE MEDICINES:    1. Melatonin:  3  mg  Oral  At Bedtime   PRN          ANTI-INFECTIVES:    1. Vancomycin 1 gram IVPB/ Premixed Soln 200 mL:  1  gram(s)  IntraVenous Piggyback  Every 12 Hours      CARDIOVASCULAR AGENTS:    1. Spironolactone:  50  mg  Oral  2 Times a Day    2. Tamsulosin:  0.4  mg  Oral  Daily    3. Metoprolol Tartrate:  12.5  mg  Oral  2 Times a Day    4. amLODIPine (NORVASC):  10  mg  Oral  Daily      CENTRAL NERVOUS SYSTEM AGENTS:    1. Acetaminophen:  650  mg  Oral  Once   PRN       2. Acetaminophen:  650  mg  Oral  Every 6 Hours    3. fentaNYL 50 micrograms/ hour TransDermal:  1  patch  TransDermal  Every 72 Hours    4. HYDROmorphone Injectable:  1  mg  IntraVenous Push  Every 3 Hours   PRN       5. HYDROmorphone Injectable:  2  mg  IntraVenous Push  Every 3 Hours   PRN       6. levETIRAcetam (KEPPRA):  500  mg  Oral  2 Times a Day    7. busPIRone (BUSPAR):  5  mg  Oral  Every 12 Hours      COAGULATION MODIFIERS:    1. Enoxaparin SubCutaneous:  40  mg  SubCutaneous  Every 24 Hours      GASTROINTESTINAL AGENTS:    1. Docusate 50 mg - Senna 8.6 m  tablet(s)  Oral  2 Times a Day    2. Polyethylene Glycol:  17  gram(s)  Oral  Daily      METABOLIC AGENTS:    1. Allopurinol:  300  mg  Oral  Every 24 Hours    2. Atorvastatin:  20  mg  Oral  Daily      MISCELLANEOUS AGENTS:    1. Naloxone Injectable:  0.2  mg  IntraVenous Push  Once   PRN         NUTRITIONAL PRODUCTS:    1. Sodium Chloride 0.9% Injectable Flush:  10  mL  IntraVenous Flush  Every 8 Hours and as Needed   PRN       2. Ascorbic Acid:  1000  mg  Oral  Daily      RESPIRATORY AGENTS:    1. diphenhydrAMINE:  25  mg  Oral  Every 24 Hours   PRN       2. Albuterol 90 micrograms/ Inhalation MDI:  2  inhalation  Inhalation  Every 6 Hours   PRN         TOPICAL AGENTS:    1. Sodium Hypochlorite 0.125% ([place]):  1  application(s)  Topical  3 Times a Day      Recent Lab Results:    Results:    CBC: 2023 06:33              \     Hgb     /                              \     8.3 L    /  WBC   ----------------  Plt               8.4       ----------------    331              /     Hct     \                              /     28.5 L    \            RBC: 2.55 L    MCV: 112 H    Neutrophil  %: 64.1      RFP: 2/22/2023 06:33  NA+        Cl-     BUN  /                         140    99    25 H /  --------------------------------  Glucose                ---------------------------  115 H    K+     HCO3-   Creat \                         4.8    37 H   0.96  \  Calcium : 9.8Anion Gap : 9 L         Albumin : 3.2 L     Phos : 3.3      Radiology Results:    Results:    Impression:  1. There has been interval removal of the posterior fusion hardware  from the lumbar spine. There is again a large soft tissue defect with  marked thinning of the skin and subcutaneous soft tissues and  probable dehiscence overlying the lumbar and lower thoracic spine  with nonspecific soft tissue thickening and loss of fat soft tissue  planes as well as scattered soft tissue emphysema along the periphery  of the defect. The possibility of osteomyelitis cannot be excluded on  the basis of this examination.  2. Marked, multilevel degenerative changes of the lumbar spine with  multilevel postoperative changes from laminectomies and  posterolateral fusion masses.    CT L Spine without Contrast [Feb 18 2023  2:16PM]      Assessment and Plan:   Daily Risk Screen:  ·  Does patient have an indwelling urinary catheter? n/a consulting service   ·  Does patient have a central line? n/a consulting service     Comorbidities:  ·  Comorbidity Other     Code Status:  ·  Code Status Full Code     Assessment:    RAJ HARMON is a 67 year old Male known to the plastic surgery team with a h/o multiple lumbar spinal surgeries which have been c/b recurrent MRSA infections  requiring multiple extensive plastic surgery interventions including multiple debridements and attempted closures. Admitted to [facility] as transfer on 2/14 for concern of MRSA  infection of chronically open lumbar wound with exposed hardware. Patient now  s/p lumbar spinal wound I&D and removal of spinal hardware per NSGY on 2/15. Plastic Surgery consulted for spinal wound debridement and coverage.     OR course (this admission):   2/15 - Lumbar spinal wound exploration, I&D and removal of spinal hardware (per NSGY)    Plan/Recommendations:   - Plan for vac change with plastics on Friday 2/24.     · Further planning pending wound appearance during next vac change  - Ensure diligent pressure offloading to lumbar spine/wound site with frequent repositioning, q2h turns   - Nutrition optimization to promote wound healing      ·  Can consider addition of daily MV and nutritional supplements/shakes as able   - Cx history:     ·  2/10 OSH bedside wound cx - MRSA (final)      ·  2/15 OR deep wound cs - NGTD (final 2/17)  - Continue IV ABX per ID recs (currently IV Vanc)   - Appreciate remaining supportive care per primary service   - Plastics will continue to follow    Patient and plan discussed with Dr. Jaime.     FIDEL Ace-CNP  Plastic and Reconstructive Surgery  Doc [facility], Pager #[phone], Team phones: [phone]     Time spent on the assessment of patient, gathering and interpreting data, review of medical record/patient history, personally reviewing radiographic imaging and formulation of this note 30 minutes. With greater than 50% spent in personal discussion with  patient.       Electronic Signatures:  Angel Johnson (APRREBECA-CNP)  (Signed 22-Feb-2023 10:04)   Authored: Service, Subjective Data, Objective Data, Assessment  and Plan, Note Completion      Last Updated: 22-Feb-2023 10:04 by Angel Johnson (APRN-CNP)

## 2023-03-03 NOTE — PROGRESS NOTES
Service: Infectious Disease     Subjective Data:   RAJ HARMON is a 67 year old Male who is Hospital Day # 1.    Overnight Events: Patient had an uneventful night.   Additional Information:    This AM, pt with primary complaint of back pain, inadequately controlled by current regimen. States that he is at his baseline O2 requirement. Denies leg weakness,  numbness, and states he is able to get around at home with his rollator.     Objective Data:     Objective Information:      T   P  R  BP   MAP  SpO2   Value  36.9  73  18  121/63      98%  Date/Time 2/14 12:51 2/14 12:51 2/14 12:51 2/14 12:51    2/14 12:51  Range  (36.2C - 36.9C )  (73 - 101 )  (16 - 18 )  (120 - 134 )/ (63 - 74 )    (98% - 98% )   As of 14-Feb-2023 13:10:00, patient is on 5 L/min of oxygen via nasal cannula.  Highest temp of 36.9 C was recorded at 2/14 12:51      Pain reported at 2/14 15:19: 10 = Severe    Physical Exam Narrative:  ·  Physical Exam:    Gen: well-appearing, NAD  Head and neck: NCAT, neck supple without LAD, severe kyphoscoliosis   HEENT: MMM, normal nose without congestion, poor dentition   CV: RRR no mrg  Pulm: CTAB, prolonged exp phase, no wheezing or crackles, normal WOB on 4L  Abd: obese, soft, non-tender, non-distended  Back: 10-15cm stage IV ulcer in the lumbosacral region, midline, exposed hardware, minimal surrounding erythema (see images)  Ext: no cyanosis or clubbing, trace LE edema, thenar wasting bilat; 2-3cm diameter stage 1 ulcer on L heel  Neuro: alert and oriented x3, normal tone, face symmetric, moves all extremities spontaneously     Medication:    Medications:          Continuous Medications       --------------------------------  No continuous medications are active       Scheduled Medications       --------------------------------    1. Acetaminophen:  650  mg  Oral  Every 6 Hours    2. Allopurinol:  300  mg  Oral  Every 24 Hours    3. amLODIPine (NORVASC):  10  mg  Oral  Daily    4. Ascorbic Acid:   1000  mg  Oral  Daily    5. Atorvastatin:  20  mg  Oral  Daily    6. busPIRone (BUSPAR):  5  mg  Oral  Every 12 Hours    7. Docusate 50 mg - Senna 8.6 m  tablet(s)  Oral  2 Times a Day    8. Enoxaparin SubCutaneous:  40  mg  SubCutaneous  Every 12 Hours    9. fentaNYL 50 micrograms/ hour TransDermal:  1  patch  TransDermal  Every 72 Hours    10. levETIRAcetam (KEPPRA):  500  mg  Oral  2 Times a Day    11. Losartan:  50  mg  Oral  Daily    12. Metoprolol Tartrate:  12.5  mg  Oral  2 Times a Day    13. Multivitamin with Minerals:  1  tablet(s)  Oral  Daily    14. Piperacillin - Tazobactam 4.5 gram/Iso-osmotic 100 mL Premix IVPB:  100  mL  IntraVenous Piggyback  Every 6 Hours    15. Polyethylene Glycol:  17  gram(s)  Oral  Daily    16. Spironolactone:  50  mg  Oral  2 Times a Day    17. Tamsulosin:  0.4  mg  Oral  Daily         PRN Medications       --------------------------------    1. HYDROmorphone:  4  mg  Oral  Every 4 Hours    2. Melatonin:  3  mg  Oral  At Bedtime    3. Naloxone Injectable:  0.2  mg  IntraVenous Push  Once    4. Sodium Chloride 0.9% Injectable Flush:  10  mL  IntraVenous Flush  Every 8 Hours and as Needed        Recent Lab Results:    Results:    CBC: 2023 06:29              \     Hgb     /                              \     9.3 L    /  WBC  ----------------  Plt               9.0       ----------------    414              /     Hct     \                              /     31.1 L    \            RBC: 2.86 L    MCV: 109 H    Neutrophil  %: 71.2      RFP: 2023 06:29  NA+        Cl-     BUN  /                         141    102    15  /  --------------------------------  Glucose                ---------------------------  105 H    K+     HCO3-   Creat \                         4.6    35 H   0.86  \  Calcium : 9.8Anion Gap : 9 L         Albumin : 2.6 L     Phos : 3.2      Assessment and Plan:   Daily Risk Screen:  ·  Does patient have a central line? yes   ·  Central Line  Type PICC   ·  Plan for PICC removal today? no   ·  The patient continues to require a PICC for parenteral medication     Code Status:  ·  Code Status Full Code     Assessment:    67 year old Male with CHF, COPD on 4L at home, seizure disorder and a history of multiple lumbar spine surgeries complicated  by MRSA wound infection with a known chronic open lumbar wound status post multiple debridements by plastic surgery, who presented to [place] with worsening low back pain as well as generalized fatigue and weakness, found to have MRSA infection of sacral  wound with exposed hardware. He presents to [facility] as a transfer for surgical evaluation by spine team. High suspicion for osteomyelitis in this host with ESR > 130; OM has not been ruled out. Patient unable to tolerate MRI d/t kyphoscoliosis. Will continue  vanc for MRSA, and zosyn because of wound contamination with diarrhea.     Updates 2/14  -Consulted nsgy, tentative plan to remove hardware  -Consulted plastic surgery, avail for OR w/ NSGY tomorrow if needed   -Increased dilaudid to 4mg PO q4h from q8h as needed for break through pain     #MRSA sacral wound infection  -BCx 2/10 x2 NGTD  -[place] Wound cx 2/10/23: MRSA (S: clinda, vanc; R: TMP-SMX, tetracyclines)  -ESR elevated at >130  -CRP elevated at 5.26  Plan:   1) Vancomcyin 2/10-   2) Zosyn 2/10-  3) Neurosurgery c/s, appreciate recs, possible OR tomorrow   4) Plastic surgery c/s, available to assist in OR tomorrow with nsgy     #COPD, 4L at baseline  -SpO2 goal 88-92%  [ ] no inhalers on med rec from wife, but will need to fu because was on symbicort  and albuterol in the past    #HFpEF   #HTN  -TTE Feb 2022: EF 65-70%, normal LV diastolic function, normal RV  -Hold ASA for possible procedure  -C/w home atorva, metop 12.5 BID, maxime 50 BID, losartan 50 qd, amlodipine 10    #Chronic pain - lower back  -home pain regimen: scheduled acetaminophen, fentanyl patch 50mcg q72h, dilaudid 4mg PO TID prn  breakthrough  -will follow above pain regimen, but will increase to dilaudid 4mg PO q4h as needed breakthrough   -bowel regimen  -follows with [facility] medicine [facility] Al[facility]     #Seizure disorder - continue Keppra  #Anxiety - continue Buspar   #BPH - continue tamsulosin   #Gout - continue allopurinol     DVT ppx: SQL (will hold at midnight in case of procedure tomorrow)   Code status: FULL (confirmed on admission)  NOK: Wife Mike [phone]      Attestation:   Note Completion:  I am a:  Resident/Fellow   Attending Attestation I saw and evaluated the patient.  I personally obtained the key and critical portions of the history and physical exam or was physically present for key and  critical portions performed by the resident/fellow. I reviewed the resident/fellow?s documentation and discussed the patient with the resident/fellow.  I agree with the resident/fellow?s medical decision making as documented in the note.     I personally evaluated the patient on 14-Feb-2023         Electronic Signatures:  Ankur Harrison)  (Signed 15-Feb-2023 05:53)   Authored: Note Completion   Co-Signer: Service, Subjective Data, Objective Data, Assessment and Plan, Note Completion  Christa Funk (Resident))  (Signed 14-Feb-2023 17:15)   Authored: Service, Subjective Data, Objective Data, Assessment  and Plan, Note Completion      Last Updated: 15-Feb-2023 05:53 by Ankur Harrison)

## 2023-03-03 NOTE — PROGRESS NOTES
Service: Infectious Disease     Subjective Data:   RAJ HARMON is a 67 year old Male who is Hospital Day # 11 and POD #9 for 1. exploration of lumbar wound dehiscence;2. removal of lumbar spinal hardware (set caps, rods, and screws);3. irrigation  and debridement of lumbar wound.    Objective Data:     Objective Information:      T   P  R  BP   MAP  SpO2   Value  36.2  77  18  126/59   90  97%  Date/Time  5:25  5:25  5:25  5:25   4:43  5:25  Range  (36C - 36.9C )  (69 - 84 )  (18 - 18 )  (117 - 128 )/ (59 - 73 )  (90 - 90 )  (96% - 97% )  Highest temp of 36.9 C was recorded at  9:21      Pain reported at  4:05: 9 = Severe    Physical Exam Narrative:  ·  Physical Exam:    Gen: well-appearing, NAD  Head and neck: NCAT, neck supple without LAD, severe kyphoscoliosis   HEENT: MMM, normal nose without congestion, poor dentition   CV: RRR no mrg  Pulm: CTAB, prolonged exp phase, no wheezing or crackles, normal WOB on 4L  Abd: obese, soft, non-tender, non-distended  Ext: no cyanosis or clubbing, trace LE edema, thenar wasting bilat; 2-3cm diameter stage 1 ulcer on L heel  Neuro: alert and oriented x3, normal tone, face symmetric, moves all extremities spontaneously   Back: Wound vac placed and secured. No blood oozing or purulence.      Medication:    Medications:          Continuous Medications       --------------------------------  No continuous medications are active       Scheduled Medications       --------------------------------    1. Acetaminophen:  650  mg  Oral  Every 6 Hours    2. Allopurinol:  300  mg  Oral  Every 24 Hours    3. amLODIPine (NORVASC):  10  mg  Oral  Daily    4. Ascorbic Acid:  1000  mg  Oral  Daily    5. Atorvastatin:  20  mg  Oral  Daily    6. busPIRone (BUSPAR):  5  mg  Oral  Every 12 Hours    7. Dextrose 50% in Water Injectable:  25  gram(s)  IntraVenous Push  Once    8. Docusate 50 mg - Senna 8.6 m  tablet(s)  Oral  2 Times a Day    9.  Enoxaparin SubCutaneous:  40  mg  SubCutaneous  Every 24 Hours    10. fentaNYL 50 micrograms/ hour TransDermal:  1  patch  TransDermal  Every 72 Hours    11. Hydrocortisone 1% Topical:  1  application(s)  Topical  2 Times a Day    12. Insulin Regular Injectable:  10  unit(s)  IntraVenous Push  Once    13. levETIRAcetam (KEPPRA):  500  mg  Oral  2 Times a Day    14. Metoprolol Tartrate:  12.5  mg  Oral  2 Times a Day    15. Polyethylene Glycol:  17  gram(s)  Oral  Daily    16. Sodium Hypochlorite 0.125% (Dakins Quarter):  1  application(s)  Topical  3 Times a Day    17. Tamsulosin:  0.4  mg  Oral  Daily    18. Vancomycin - RPh to Dose - IV Piggy Back:  1  each  As Specified  Variable    19. Vancomycin 500 mg IVPB/ Premixed Soln 100 mL:  500  mg  IntraVenous Piggyback  Every 12 Hours         PRN Medications       --------------------------------    1. Acetaminophen:  650  mg  Oral  Once    2. Albuterol 90 micrograms/ Inhalation MDI:  2  inhalation  Inhalation  Every 6 Hours    3. diphenhydrAMINE:  25  mg  Oral  Every 24 Hours    4. HYDROmorphone Injectable:  1  mg  IntraVenous Push  Every 3 Hours    5. Melatonin:  3  mg  Oral  At Bedtime    6. Naloxone Injectable:  0.2  mg  IntraVenous Push  Once    7. Sodium Chloride 0.9% Injectable Flush:  10  mL  IntraVenous Flush  Every 8 Hours and as Needed        Recent Lab Results:    Results:        RFP: 2/23/2023 09:02  NA+        Cl-     BUN  /                         138    97 L   25 H  /  --------------------------------  Glucose                ---------------------------  92    K+     HCO3-   Creat \                         5.2    37 H   0.90  \  Calcium : 9.6Anion Gap : 9 L         Albumin : 3.2 L     Phos : 4.0        I have reviewed these laboratory results:    Renal Function Panel  Trending View      Result 24-Feb-2023 10:58:00  24-Feb-2023 08:31:00    Lab Comment: BIC CALLED RB TO ROBBIE HUGHES , 02/24/2023 11:53   K CALLED RB TO SANDEEP SOLER ,  02/24/2023 10:41    Glucose, Serum 120   H   104   H       136    K 6.0   H   6.1   HH    CL 94   L   95   L    Bicarbonate, Serum 40   HH   37   H    Anion Gap, Serum 9   L   10    BUN 26   H   25   H    CREAT 1.03   0.98    GFR Male 78   83    Calcium, Serum 9.8   9.8    Phosphorus, Serum 3.9   3.7    ALB 3.4   3.5        Complete Blood Count  24-Feb-2023 08:32:00      Result Value    White Blood Cell Count  10.2    Nucleated Erythrocyte Count  0.0    Red Blood Cell Count  2.66   L   HGB  8.6   L   HCT  28.5   L   MCV  107   H   MCHC  30.2   L   PLT  334    RDW-CV  12.5      Magnesium, Serum  24-Feb-2023 08:31:00      Result Value    Magnesium, Serum  1.98        Assessment and Plan:   Daily Risk Screen:  ·  Does patient have a central line? yes   ·  Central Line Type PICC   ·  Plan for PICC removal today? no   ·  The patient continues to require a PICC for parenteral medication     Comorbidities:  ·  Comorbidity Other     Code Status:  ·  Code Status Full Code     Assessment:    67 year old Male with CHF, COPD on 4L at home, seizure disorder and a history of multiple lumbar spine surgeries complicated  by MRSA wound infection with a known chronic open lumbar wound status post multiple debridements by plastic surgery, who presented to [place] with worsening low back pain as well as generalized fatigue and weakness, found to have MRSA infection of sacral  wound with exposed hardware, and elevated inflammatory markers. He presented to [facility]  as a transfer for surgical evaluation by spine team. He was started on vancomcyin and zosyn on 2/10 in [place], which were continued on admission.  BCx from 2/10 with NGTD. Nsgy was consulted, and he underwent removal of lumbar spinal hardware and irrigation and debridement of lumbar wound on 2/15. Plastic surgery was consulted and recommended leaving wound open with TID dressing changes and plan  for return to OR on 2/22 for lumbar spinal wound I&D and potential wound vac.  Intraoperative wound cultures had no growth aerobically or anaerobically. Pt has a hx of chronic pain, and pain was managed after surgery with fentanyl patch (per home regimen),  Tylenol (per home regimen), and IV dilaudid 1mg IV q3h for moderate pain and 2mg IV q3h for severe pain. Pain regimen can be down-titrated to home regimen after plastic surgery intervention. Zosyn was discontinued on 2/19 given no evidence of pseudomonas.  Pt continues to be on vancomycin. Patient was having loose bowel movements multiple times daily but was negative for C.diff and stool softeners were stopped. Plastic surgery recommended wound vac and it was placed on 2/22.    Updates 2/24:  -  -hydrocortisone for itching on lower back   -No I&D to be done with plastic surgery tomorrow given wounds look better on imaging now from plastics standpoint, to reassess wound vac on Friday and evaluated for wound closure next week.  -Vanc level  25 yesterday, continue to monitor  -EDILIA resolved   -Vanc to continue for 8 weeks until 4/12  -Closely monitor vancomycin levels as patient had a break in medication and needs to be ensured to continue to receive   -Pain regimen currently  at 2mg q3h as needed  -Outpatient f/u with Dr. Harrison    Cx history:     ·  2/10 OSH bedside wound cx - MRSA (final)      ·  2/15 OR deep wound cs - NGTD (final 2/17)    Abx hx:67 year old Male with CHF, COPD on 4L at home, seizure disorder and a history of multiple lumbar spine surgeries complicated by MRSA wound infection with a known chronic open lumbar  wound status post multiple debridements by plastic surgery, who presented to [place] with worsening low back pain as well as generalized fatigue and weakness, found to have MRSA infection of sacral wound with exposed hardware, and elevated inflammatory  markers. [facility]. He was started on vancomcyin and zosyn on 2/10 in [place], which were continued on  admission. BCx from 2/10 with NGTD. Nsgy was consulted, and he underwent removal of lumbar  spinal hardware and irrigation and debridement of lumbar wound on 2/15. Plastic surgery was consulted and recommended leaving wound open with TID dressing changes and plan for return to OR on 2/22 for lumbar spinal wound I&D and potential wound vac. Intraoperative  wound cultures had no growth aerobically or anaerobically. Pt has a hx of chronic pain, and pain was managed after surgery with fentanyl patch (per home regimen), Tylenol (per home regimen), and IV dilaudid 1mg IV q3h for moderate pain and 2mg IV q3h  for severe pain. Pain regimen can be down-titrated to home regimen after plastic surgery intervention. Zosyn was discontinued on 2/19 given no evidence of pseudomonas. Pt continues to be on vancomycin. Patient was having loose bowel movements multiple  times daily but was negative for C.diff and stool softeners were stopped. Plastic surgery recommended wound vac and it was placed on 2/22, replaced on 2/24.    Updates 2/24:  -Potassium 6.0 (repeat from 6.1), ordered insulin & D5 & Ca gluconate & Lokelma, held Spironolactone  -hydrocortisone for itching on lower back   -Wound vac replaced by plastics today, will reassess for surgery next week   -Vanc level 21.8 yesterday, Vanc was decreased to 500 q12h  -Vanc to continue for 8 weeks until 2/15 - 4/12  -Closely monitor vancomycin levels as patient had a break in medication and needs to be ensured to continue to receive   -Pain regimen  currently at 2mg q3h as needed  -Tremor to be discussed with patient and wife and chart review   -Outpatient f/u with Dr. Harrison    Cx history:     ·  2/10 OSH bedside wound cx - MRSA (final)      ·  2/15 OR deep wound cs - NGTD (final 2/17)    Abx hx:  1) Vancomcyin 2/10-   2) Zosyn 2/10- 2/19 (d/c given no culture evidence of pseudomonas)      #MRSA sacral wound infection  Micro:   -BCx 2/10 x2 NGTD  -[place] Wound cx 2/10/23: MRSA (S:  clinda, vanc; R: TMP-SMX, tetracyclines)  -ESR elevated at >130  -CRP elevated at 5.26  -Intraoperative wound cx 2/15/23: no growth aerobically or anaerobically   - S/p below on 2/15:   1. exploration of lumbar wound dehiscence  2. removal of lumbar spinal hardware (set caps, rods, and screws)  3. irrigation and debridement of lumbar wound  - Plastic surgery recs  2/15:   1. WTD Kerlix dressings with Dakin's/Saline solution packed to the entire depth of the wound three times daily and PRN if soiled   2. return to OR with plastic surgery on 2/22 for lumbar spinal wound I&D and possible application of wound vac  3. Lumbar CT without contrast, obtained on 2/18: soft tissue defect overlying lumbar and lower thoracic spine, soft tissue thickening, scattered soft tissue emphysema; multilevel degenerative changes of lumbar spine  - Plastics decided to place wound vac on 2/22 instead of I&D and to reassess wound vac on Friday and evaluated for wound closure next week.  Abx:   Vancomycin dosing: Pt received vancomycin 2/10 - 2/13, supratherapeutic (20.4) and not resumed on admission on 2/14, 1x dose 1.5g IV intra-operatively on 2/15, and resumed on 2/17 with 1500mg q12h dosing, redosed to 1250mg q12h on 2/19  Plan:   1) Vancomcyin 2/10-   2) Zosyn 2/10- 2/19 (d/c given no culture evidence of pseudomonas)  3) Appreciate plastic surgery recs  4) F/u intraoperative cultures     #Chronic pain - lower back  -home pain regimen: scheduled acetaminophen, fentanyl patch 50mcg q72h, dilaudid 4mg PO TID prn breakthrough  -bowel regimen  -follows with pain medicine Dr. Almazan at [facility]   -post-op pain regimen: Tylenol 650mg q6h, fentanyl patch 50mcg q72h, 1mg IV dilaudid q3h for moderate pain, 2mg IV dilaudid q3h for severe pain    #Hyperkalemia  -6.0 on 2/24/2023  -hx of hyperkalemic episodes on review  -no CKD  -given insulin, d50, calcium gluconate, and lokelma   -will hold maxime   -conitnue to monitor    #COPD, 4L at baseline  -SpO2  goal 88-92%  -Started on proventill (takes at home per wife)     #HFpEF   #HTN  -TTE Feb 2022: EF 65-70%, normal LV diastolic function, normal RV  -Hold ASA for possible procedure  -C/w home atorva, metop 12.5 BID, maxime 50 BID, losartan 50 qd, amlodipine 10  -Hold maxime for hyperkalemia on 2/24    #Seizure disorder - continue Keppra  #Anxiety - continue Buspar   #BPH - continue tamsulosin   #Gout - continue allopurinol     DVT ppx: lovenox   Code status: FULL (confirmed on admission)  NOK: Wife Mike [phone]              1) Vancomcyin 2/10-   2) Zosyn 2/10- 2/19 (d/c given no culture evidence of pseudomonas)      #MRSA sacral wound infection  Micro:   -BCx 2/10 x2 NGTD  -[place] Wound cx 2/10/23: MRSA (S: clinda, vanc; R: TMP-SMX, tetracyclines)  -ESR elevated at >130  -CRP elevated at 5.26  -Intraoperative wound cx 2/15/23: no growth aerobically or anaerobically   - S/p below on 2/15:   1. exploration of lumbar wound dehiscence  2. removal of lumbar spinal hardware (set caps, rods, and screws)  3. irrigation and debridement of lumbar wound  - Plastic surgery recs  2/15:   1. WTD Kerlix dressings with Dakin's/Saline solution packed to the entire depth of the wound three times daily and PRN if soiled   2. return to OR with plastic surgery on 2/22 for lumbar spinal wound I&D and possible application of wound vac  3. Lumbar CT without contrast, obtained on 2/18: soft tissue defect overlying lumbar and lower thoracic spine, soft tissue thickening, scattered soft tissue emphysema; multilevel degenerative changes of lumbar spine  - Plastics decided to place wound vac on 2/22 instead of I&D and to reassess wound vac on Friday and evaluated for wound closure next week.  Abx:   Vancomycin dosing: Pt received vancomycin 2/10 - 2/13, supratherapeutic (20.4) and not resumed on admission on 2/14, 1x dose 1.5g IV intra-operatively on 2/15, and resumed on 2/17 with 1500mg q12h dosing, redosed to 1250mg q12h on 2/19  Plan:    1) Vancomcyin 2/10-   2) Zosyn 2/10- 2/19 (d/c given no culture evidence of pseudomonas)  3) Appreciate plastic surgery recs  4) F/u intraoperative cultures     #Chronic pain - lower back  -home pain regimen: scheduled acetaminophen, fentanyl patch 50mcg q72h, dilaudid 4mg PO TID prn breakthrough  -bowel regimen  -follows with pain medicine Dr. Al[facility]zan at [facility]   -post-op pain regimen: Tylenol 650mg q6h, fentanyl patch 50mcg q72h, 1mg IV dilaudid q3h for moderate pain, 2mg IV dilaudid q3h for severe  pain    #COPD, 4L at baseline  -SpO2 goal 88-92%  -Started on proventill (takes at home per wife)     #HFpEF   #HTN  -TTE Feb 2022: EF 65-70%, normal LV diastolic function, normal RV  -Hold ASA for possible procedure  -C/w home atorva, metop 12.5 BID, maxime 50 BID, losartan 50 qd, amlodipine 10    #Seizure disorder - continue Keppra  #Anxiety - continue Buspar   #BPH - continue tamsulosin   #Gout - continue allopurinol     DVT ppx: lovenox   Code status: FULL (confirmed on admission)  NOK: Wife Mike [phone]      Attestation:   Note Completion:  I am a:  Resident/Fellow   Attending Attestation I saw and evaluated the patient.  I personally obtained the key and critical portions of the history and physical exam or was physically present for key and  critical portions performed by the resident/fellow. I reviewed the resident/fellow?s documentation and discussed the patient with the resident/fellow.  I agree with the resident/fellow?s medical decision making as documented in the note.     I personally evaluated the patient on 24-Feb-2023         Electronic Signatures:  Mame Kent)  (Signed 24-Feb-2023 20:17)   Authored: Note Completion   Co-Signer: Service, Subjective Data, Objective Data, Assessment and Plan, Note Completion  Freddie Caraballo (Resident))  (Signed 24-Feb-2023 15:27)   Authored: Service, Subjective Data, Objective Data, Assessment  and Plan, Note Completion      Last Updated: 24-Feb-2023  20:17 by Mame Kent)

## 2023-03-03 NOTE — PROGRESS NOTES
Service: Infectious Disease     Subjective Data:   RAJ HARMON is a 67 year old Male who is Hospital Day # 10 and POD #8 for 1. exploration of lumbar wound dehiscence;2. removal of lumbar spinal hardware (set caps, rods, and screws);3. irrigation  and debridement of lumbar wound.     Pt evaluated at bedside today. Had no complaints beside itching on lower back from tape. denies fevers, chills.    Objective Data:     Objective Information:      T   P  R  BP   MAP  SpO2   Value  36.2  84  18  119/62   90  97%  Date/Time 2/23 12:59 2/23 12:59 2/23 12:59 2/23 12:59  2/23 4:43 2/23 12:59  Range  (36.2C - 36.9C )  (69 - 84 )  (18 - 18 )  (107 - 146 )/ (60 - 73 )  (90 - 90 )  (96% - 97% )   As of 22-Feb-2023 08:33:00, patient is on 4 L/min of oxygen via nasal cannula with humidification.  Highest temp of 36.9 C was recorded at 2/23 9:21      Pain reported at 2/23 10:24: 8 = Severe    Physical Exam Narrative:  ·  Physical Exam:    Gen: well-appearing, NAD  Head and neck: NCAT, neck supple without LAD, severe kyphoscoliosis   HEENT: MMM, normal nose without congestion, poor dentition   CV: RRR no mrg  Pulm: CTAB, prolonged exp phase, no wheezing or crackles, normal WOB on 4L  Abd: obese, soft, non-tender, non-distended  Ext: no cyanosis or clubbing, trace LE edema, thenar wasting bilat; 2-3cm diameter stage 1 ulcer on L heel  Neuro: alert and oriented x3, normal tone, face symmetric, moves all extremities spontaneously   Back: Wound vac placed and secured. No blood oozing or purulence.      Recent Lab Results:    Results:    CBC: 2/23/2023 06:01              \     Hgb     /                              \     7.1 L    /  WBC  ----------------  Plt               6.1       ----------------    276              /     Hct     \                              /     25.6 L    \            RBC: 2.18 L    MCV: 117 H          RFP: 2/23/2023 09:02  NA+        Cl-     BUN  /                         138    97 L   25 H   /  --------------------------------  Glucose                ---------------------------  92    K+     HCO3-   Creat \                         5.2    37 H   0.90  \  Calcium : 9.6Anion Gap : 9 L         Albumin : 3.2 L     Phos : 4.0      Assessment and Plan:   Daily Risk Screen:  ·  Does patient have a central line? yes   ·  Central Line Type PICC   ·  Plan for PICC removal today? no   ·  The patient continues to require a PICC for parenteral medication     Comorbidities:  ·  Comorbidity Other     Code Status:  ·  Code Status Full Code     Assessment:    67 year old Male with CHF, COPD on 4L at home, seizure disorder and a history of multiple lumbar spine surgeries complicated  by MRSA wound infection with a known chronic open lumbar wound status post multiple debridements by plastic surgery, who presented to [place] with worsening low back pain as well as generalized fatigue and weakness, found to have MRSA infection of sacral  wound with exposed hardware, and elevated inflammatory markers. He presented to [facility]  as a transfer for surgical evaluation by spine team. He was started on vancomcyin and zosyn on 2/10 in [place], which were continued on admission.  BCx from 2/10 with NGTD. Nsgy was consulted, and he underwent removal of lumbar spinal hardware and irrigation and debridement of lumbar wound on 2/15. Plastic surgery was consulted and recommended leaving wound open with TID dressing changes and plan  for return to OR on 2/22 for lumbar spinal wound I&D and potential wound vac. Intraoperative wound cultures had no growth aerobically or anaerobically. Pt has a hx of chronic pain, and pain was managed after surgery with fentanyl patch (per home regimen),  Tylenol (per home regimen), and IV dilaudid 1mg IV q3h for moderate pain and 2mg IV q3h for severe pain. Pain regimen can be down-titrated to home regimen after plastic surgery intervention. Zosyn was discontinued on 2/19 given no evidence of pseudomonas.  Pt  continues to be on vancomycin. Patient was having loose bowel movements multiple times daily but was negative for C.diff and stool softeners were stopped. Plastic surgery recommended wound vac and it was placed on 2/22.    Updates 2/23:  -hydrocortisone for itching on lower back   -No I&D to be done with plastic surgery tomorrow given wounds look better on imaging now from plastics standpoint, to reassess wound vac on Friday and evaluated for wound closure next  week.  -Vanc level 25 yesterday, continue to monitor  -EDILIA resolved   -Vanc to continue for 8 weeks until 4/12  -Closely monitor vancomycin levels as patient had a break in medication and needs to be ensured to continue to receive    -Pain regimen currently at 2mg q3h as needed  -Outpatient f/u with Dr. Harrison    Cx history:     ·  2/10 OSH bedside wound cx - MRSA (final)      ·  2/15 OR deep wound cs - NGTD (final 2/17)    Abx hx:  1) Vancomcyin 2/10-   2) Zosyn 2/10- 2/19 (d/c given no culture evidence of pseudomonas)      #MRSA sacral wound infection  Micro:   -BCx 2/10 x2 NGTD  -[place] Wound cx 2/10/23: MRSA (S: clinda, vanc; R: TMP-SMX, tetracyclines)  -ESR elevated at >130  -CRP elevated at 5.26  -Intraoperative wound cx 2/15/23: no growth aerobically or anaerobically   - S/p below on 2/15:   1. exploration of lumbar wound dehiscence  2. removal of lumbar spinal hardware (set caps, rods, and screws)  3. irrigation and debridement of lumbar wound  - Plastic surgery recs  2/15:   1. WTD Kerlix dressings with Dakin's/Saline solution packed to the entire depth of the wound three times daily and PRN if soiled   2. return to OR with plastic surgery on 2/22 for lumbar spinal wound I&D and possible application of wound vac  3. Lumbar CT without contrast, obtained on 2/18: soft tissue defect overlying lumbar and lower thoracic spine, soft tissue thickening, scattered soft tissue emphysema; multilevel degenerative changes of lumbar spine  - Plastics decided to  place wound vac on 2/22 instead of I&D and to reassess wound vac on Friday and evaluated for wound closure next week.  Abx:   Vancomycin dosing: Pt received vancomycin 2/10 - 2/13, supratherapeutic (20.4) and not resumed on admission on 2/14, 1x dose 1.5g IV intra-operatively on 2/15, and resumed on 2/17 with 1500mg q12h dosing, redosed to 1250mg q12h on 2/19  Plan:   1) Vancomcyin 2/10-   2) Zosyn 2/10- 2/19 (d/c given no culture evidence of pseudomonas)  3) Appreciate plastic surgery recs  4) F/u intraoperative cultures     #Chronic pain - lower back  -home pain regimen: scheduled acetaminophen, fentanyl patch 50mcg q72h, dilaudid 4mg PO TID prn breakthrough  -bowel regimen  -follows with pain medicine Dr[facility]zan [facility]   -post-op pain regimen: Tylenol 650mg q6h, fentanyl patch 50mcg q72h, 1mg IV dilaudid q3h for moderate pain, 2mg IV dilaudid q3h for severe  pain    #COPD, 4L at baseline  -SpO2 goal 88-92%  -Started on proventill (takes at home per wife)     #HFpEF   #HTN  -TTE Feb 2022: EF 65-70%, normal LV diastolic function, normal RV  -Hold ASA for possible procedure  -C/w home atorva, metop 12.5 BID, maxime 50 BID, losartan 50 qd, amlodipine 10    #Seizure disorder - continue Keppra  #Anxiety - continue Buspar   #BPH - continue tamsulosin   #Gout - continue allopurinol     DVT ppx: lovenox   Code status: FULL (confirmed on admission)  NOK: Wife Mike [phone]      Attestation:   Note Completion:  I am a:  Resident/Fellow   Attending Attestation I saw and evaluated the patient.  I personally obtained the key and critical portions of the history and physical exam or was physically present for key and  critical portions performed by the resident/fellow. I reviewed the resident/fellow?s documentation and discussed the patient with the resident/fellow.  I agree with the resident/fellow?s medical decision making as documented in the note.     I personally evaluated the patient on 23-Feb-2023          Electronic Signatures:  Freddie Caraballo (Resident))  (Signed 23-Feb-2023 14:10)   Authored: Service, Subjective Data, Objective Data, Assessment  and Plan, Note Completion  Darrian Galvez)  (Signed 23-Feb-2023 14:43)   Authored: Note Completion   Co-Signer: Service, Subjective Data, Objective Data, Assessment and Plan, Note Completion      Last Updated: 23-Feb-2023 14:43 by Darrian Galvez)

## 2023-03-03 NOTE — PROGRESS NOTES
Service: Infectious Disease     Subjective Data:   RAJ HARMON is a 67 year old Male who is Hospital Day # 8 and POD #6 for 1. exploration of lumbar wound dehiscence;2. removal of lumbar spinal hardware (set caps, rods, and screws);3. irrigation  and debridement of lumbar wound.     Patient evaluated at bedside today. No acute complaints. still having loose stools. No christian diarrhea. Denies fevers, chills, abdominal pain.    Objective Data:     Objective Information:      T   P  R  BP   MAP  SpO2   Value  37.1  83  16  121/60      96%  Date/Time 2/21 14:35 2/21 14:35 2/21 14:35 2/21 14:35    2/21 14:35  Range  (36C - 37.1C )  (74 - 100 )  (16 - 19 )  (121 - 152 )/ (60 - 73 )    (96% - 99% )   As of 21-Feb-2023 14:49:00, patient is on 4 L/min of oxygen via nasal cannula.  Highest temp of 37.1 C was recorded at 2/21 14:35      Pain reported at 2/21 17:00: 8 = Severe      T   P  R  BP   MAP  SpO2   Value  37.1  83  16  121/60      96%  Date/Time 2/21 14:35 2/21 14:35 2/21 14:35 2/21 14:35    2/21 14:35  Range  (36C - 37.1C )  (74 - 100 )  (16 - 19 )  (121 - 152 )/ (60 - 73 )    (96% - 99% )   As of 21-Feb-2023 14:49:00, patient is on 4 L/min of oxygen via nasal cannula.  Highest temp of 37.1 C was recorded at 2/21 14:35    Physical Exam Narrative:  ·  Physical Exam:    Gen: well-appearing, NAD  Head and neck: NCAT, neck supple without LAD, severe kyphoscoliosis   HEENT: MMM, normal nose without congestion, poor dentition   CV: RRR no mrg  Pulm: CTAB, prolonged exp phase, no wheezing or crackles, normal WOB on 4L  Abd: obese, soft, non-tender, non-distended  Ext: no cyanosis or clubbing, trace LE edema, thenar wasting bilat; 2-3cm diameter stage 1 ulcer on L heel  Neuro: alert and oriented x3, normal tone, face symmetric, moves all extremities spontaneously       Medication:    Medications:          Continuous Medications       --------------------------------  No continuous medications are active        Scheduled Medications       --------------------------------    1. Acetaminophen:  650  mg  Oral  Every 6 Hours    2. Allopurinol:  300  mg  Oral  Every 24 Hours    3. amLODIPine (NORVASC):  10  mg  Oral  Daily    4. Ascorbic Acid:  1000  mg  Oral  Daily    5. Atorvastatin:  20  mg  Oral  Daily    6. busPIRone (BUSPAR):  5  mg  Oral  Every 12 Hours    7. Docusate 50 mg - Senna 8.6 m  tablet(s)  Oral  2 Times a Day    8. Enoxaparin SubCutaneous:  40  mg  SubCutaneous  Every 24 Hours    9. fentaNYL 50 micrograms/ hour TransDermal:  1  patch  TransDermal  Every 72 Hours    10. levETIRAcetam (KEPPRA):  500  mg  Oral  2 Times a Day    11. Metoprolol Tartrate:  12.5  mg  Oral  2 Times a Day    12. Polyethylene Glycol:  17  gram(s)  Oral  Daily    13. Sodium Hypochlorite 0.125% (Dakins Quarter):  1  application(s)  Topical  3 Times a Day    14. Spironolactone:  50  mg  Oral  2 Times a Day    15. Tamsulosin:  0.4  mg  Oral  Daily    16. Vancomycin - RPh to Dose - IV Piggy Back:  1  each  As Specified  Variable    17. Vancomycin 1 gram IVPB/ Premixed Soln 200 mL:  1  gram(s)  IntraVenous Piggyback  Every 12 Hours         PRN Medications       --------------------------------    1. Acetaminophen:  650  mg  Oral  Once    2. Albuterol 90 micrograms/ Inhalation MDI:  2  inhalation  Inhalation  Every 6 Hours    3. HYDROmorphone Injectable:  1  mg  IntraVenous Push  Every 3 Hours    4. HYDROmorphone Injectable:  2  mg  IntraVenous Push  Every 3 Hours    5. Melatonin:  3  mg  Oral  At Bedtime    6. Naloxone Injectable:  0.2  mg  IntraVenous Push  Once    7. Sodium Chloride 0.9% Injectable Flush:  10  mL  IntraVenous Flush  Every 8 Hours and as Needed        Recent Lab Results:    Results:    CBC: 2023 05:45              \     Hgb     /                              \     8.3 L    /  WBC  ----------------  Plt               7.6       ----------------    317              /     Hct     \                              /      28.6 L    \            RBC: 2.58 L    MCV: 111 H    Neutrophil  %: 58.5      RFP: 2/21/2023 05:45  NA+        Cl-     BUN  /                         137    98    24 H /  --------------------------------  Glucose                ---------------------------  96    K+     HCO3-   Creat \                         5.2    38 H   0.98  \  Calcium : 9.9Anion Gap : 6 L         Albumin : 3.2 L     Phos : 3.2        I have reviewed these laboratory results:    Complete Blood Count + Differential  21-Feb-2023 05:45:00      Result Value    White Blood Cell Count  7.6    Nucleated Erythrocyte Count  0.0    Red Blood Cell Count  2.58   L   HGB  8.3   L   HCT  28.6   L   MCV  111   H   MCHC  29.0   L   PLT  317    RDW-CV  12.5    Neutrophil %  58.5    Immature Granulocytes %  3.0   H   Lymphocyte %  24.3    Monocyte %  8.1    Eosinophil %  4.9    Basophil %  1.2    Neutrophil Count  4.46    Lymphocyte Count  1.85    Monocyte Count  0.62    Eosinophil Count  0.37    Basophil Count  0.09      Renal Function Panel  21-Feb-2023 05:45:00      Result Value    Glucose, Serum  96    NA  137    K  5.2    CL  98    Bicarbonate, Serum  38   H   Anion Gap, Serum  6   L   BUN  24   H   CREAT  0.98    GFR Male  83    Calcium, Serum  9.9    Phosphorus, Serum  3.2    ALB  3.2   L     Magnesium, Serum  21-Feb-2023 05:45:00      Result Value    Magnesium, Serum  1.85      Coronavirus 2019 by PCR  20-Feb-2023 23:48:00      Result Value    Fluid Source  Nasal, Nasopharyngeal    Coronavirus 2019,PCR  NOT DETECTED    Reference Range: Not Detected .  This assay is designed to detect the ORF1ab and/or S genes of SARS-CoV-2 via   nucleic acid amplification. A Not Detected result does not preclude   2019-nCoV infection since the adequacy of sample jose luis        Assessment and Plan:   Daily Risk Screen:  ·  Does patient have a central line? yes   ·  Central Line Type PICC   ·  Plan for PICC removal today? no   ·  The patient continues to require a PICC  for parenteral medication     Comorbidities:  ·  Comorbidity Other     Code Status:  ·  Code Status Full Code     Assessment:    67 year old Male with CHF, COPD on 4L at home, seizure disorder and a history of multiple lumbar spine surgeries complicated  by MRSA wound infection with a known chronic open lumbar wound status post multiple debridements by plastic surgery, who presented to [facility] with worsening low back pain as well as generalized fatigue and weakness, found to have MRSA infection of sacral  wound with exposed hardware, and elevated inflammatory markers. He presented to [facility]  as a transfer for surgical evaluation by spine team. He was started on vancomcyin and zosyn on 2/10 in [facility], which were continued on admission.  BCx from 2/10 with NGTD. Nsgy was consulted, and he underwent removal of lumbar spinal hardware and irrigation and debridement of lumbar wound on 2/15. Plastic surgery was consulted and recommended leaving wound open with TID dressing changes and plan  for return to OR on 2/22 for lumbar spinal wound I&D and potential wound vac. Intraoperative wound cultures had no growth aerobically or anaerobically. Pt has a hx of chronic pain, and pain was managed after surgery with fentanyl patch (per home regimen),  Tylenol (per home regimen), and IV dilaudid 1mg IV q3h for moderate pain and 2mg IV q3h for severe pain. Pain regimen can be down-titrated to home regimen after plastic surgery intervention. Zosyn was discontinued on 2/19 given no evidence of pseudomonas.  Pt continues to be on vancomycin. Patient was having loose bowel movements multiple times daily but was negative for C.diff and stool softeners were stopped. Plan is for lumbar wound I&D and possible wound vac on 2/22.     Updates 2/21:  -No I&D to be done with plastic surgery tomorrow given wounds look better on imaging now from plastics standpoint  -Wound vac placed today, to be replaced Friday, and evaluation for wound closure next  week  -Vanc level 23.3   -EDILIA improving   -Outpatient f/u with Dr. Harrison  -Duration of ABX?    Updates 2/20:   - C/w vancomycin  - Plan for I&D with plastics on 2/22  - Optimize diet per nutrition recs for optimal wound healing  - Patient not in favor of going to SNF given past negative experience. His wife is a retired nurse and  he will discuss with her whether she can administer his IV antibiotics at home  - Closely monitor vancomycin levels as patient had a break in medication and needs to be ensured to continue to receive     Updates 2/19:  - C/w vanc  - D/c Zosyn (2/19) given no culture evidence of pseudomonas  - C. diff negative   - 2//18 CT LSpine demonstrates:  interval removal of the posterior fusion hardware from the lumbar spine. There is again a large soft tissue defect with marked thinning  of the skin and subcutaneous soft tissues and probable dehiscence overlying the lumbar and lower thoracic spine with nonspecific soft tissue thickening and loss of fat soft tissue planes as well as scattered soft tissue emphysema along the periphery of  the defect. The possibility of osteomyelitis cannot be excluded on the basis of this examination. 2. Marked, multilevel degenerative changes of the lumbar spine with multilevel postoperative changes from laminectomies and posterolateral fusion masses.  [ ] f/u Plastic Surg recs      #MRSA sacral wound infection  Micro:   -BCx 2/10 x2 NGTD  -[place] Wound cx 2/10/23: MRSA (S: clinda, vanc; R: TMP-SMX, tetracyclines)  -ESR elevated at >130  -CRP elevated at 5.26  -Intraoperative wound cx 2/15/23: no growth aerobically or anaerobically   - S/p below on 2/15:   1. exploration of lumbar wound dehiscence  2. removal of lumbar spinal hardware (set caps, rods, and screws)  3. irrigation and debridement of lumbar wound  - Plastic surgery recs  2/15:   1. WTD Kerlix dressings with Dakin's/Saline solution packed to the entire depth of the wound three times daily and PRN if  soiled   2. return to OR with plastic surgery on 2/22 for lumbar spinal wound I&D and possible application of wound vac  3. Lumbar CT without contrast, obtained on 2/18: soft tissue defect overlying lumbar and lower thoracic spine, soft tissue thickening, scattered soft tissue emphysema; multilevel degenerative changes of lumbar spine  Abx:   Vancomycin dosing: Pt received vancomycin 2/10 - 2/13, supratherapeutic (20.4) and not resumed on admission on 2/14, 1x dose 1.5g IV intra-operatively on 2/15, and resumed on 2/17 with 1500mg q12h dosing   Plan:   1) Vancomcyin 2/10-   2) Zosyn 2/10- 2/19  3) Appreciate plastic surgery recs  4) F/u intraoperative cultures     #Chronic pain - lower back  -home pain regimen: scheduled acetaminophen, fentanyl patch 50mcg q72h, dilaudid 4mg PO TID prn breakthrough  -bowel regimen  -follows with pain medicine Dr. Almazan at [facility]   -post-op pain regimen: Tylenol 650mg q6h, fentanyl patch 50mcg q72h, 1mg IV dilaudid q3h for moderate pain, 2mg IV dilaudid q3h for severe  pain    #COPD, 4L at baseline  -SpO2 goal 88-92%  -Started on proventill (takes at home per wife)     #HFpEF   #HTN  -TTE Feb 2022: EF 65-70%, normal LV diastolic function, normal RV  -Hold ASA for possible procedure  -C/w home atorva, metop 12.5 BID, maxime 50 BID, losartan 50 qd, amlodipine 10    #Seizure disorder - continue Keppra  #Anxiety - continue Buspar   #BPH - continue tamsulosin   #Gout - continue allopurinol     DVT ppx: lovenox   Code status: FULL (confirmed on admission)  NOK: Wife Mike [phone]      Attestation:   Note Completion:  I am a:  Resident/Fellow   Attending Attestation I saw and evaluated the patient.  I personally obtained the key and critical portions of the history and physical exam or was physically present for key and  critical portions performed by the resident/fellow. I reviewed the resident/fellow?s documentation and discussed the patient with the resident/fellow.  I agree with  the resident/fellow?s medical decision making as documented in the note.     I personally evaluated the patient on 21-Feb-2023         Electronic Signatures:  Freddie Caraballo (Resident))  (Signed 21-Feb-2023 19:13)   Authored: Service, Subjective Data, Objective Data, Assessment  and Plan, Note Completion  Darrian Galvez)  (Signed 22-Feb-2023 13:19)   Authored: Note Completion   Co-Signer: Service, Subjective Data, Objective Data, Assessment and Plan, Note Completion      Last Updated: 22-Feb-2023 13:19 by Darrian Galvez)

## 2023-03-03 NOTE — PROGRESS NOTES
Service: Plastic Surgery     Subjective Data:   RAJ HARMON is a 67 year old Male who is Hospital Day # 10 and POD #8 for 1. exploration of lumbar wound dehiscence;2. removal of lumbar spinal hardware (set caps, rods, and screws);3. irrigation  and debridement of lumbar wound.     Pt in NAD. Evaluated bedside, currently working with OT. Pt states he is overall doing well and denies any issues in the PM interval with wound vac. He endorses tolerating diet, urinating and passing  gas.   Denies any fever, chills, night sweats, CP, SOB, palpitations, nausea, vomiting, diarrhea, abdominal pain.    Objective Data:     Objective Information:      T   P  R  BP   MAP  SpO2   Value  36.9  82  18  117/70   90  96%  Date/Time 2/23 9:21 2/23 9:21 2/23 9:21 2/23 9:21 2/23 4:43 2/23 9:21  Range  (36.3C - 36.9C )  (69 - 84 )  (18 - 18 )  (107 - 146 )/ (60 - 73 )  (90 - 90 )  (96% - 97% )   As of 22-Feb-2023 08:33:00, patient is on 4 L/min of oxygen via nasal cannula with humidification.  Highest temp of 36.9 C was recorded at 2/23 9:21      Pain reported at 2/23 7:01: 8 = Severe    Physical Exam by System:    Constitutional: Alert and cooperative, NAD   Eyes: EOMI, PERRL.   ENMT: MMM, no ulcerations/lesions   Head/Neck: NC/AT   Respiratory/Thorax: Unlabored respirations on 4L  NC.   Cardiovascular: Regular rate, extremities W/WP.   Gastrointestinal: Soft, large panus, nt/nd.   Genitourinary: Voiding per urinal/commode   Musculoskeletal: Generalized deconditioning.   Neurological: A&O x3.   Psychological: Appropriate behavior and mood.   Skin: Stage 1 ulcer on L heal. Wound vac in place  to  lumbar spinal wound. Vac maintaining suction without leak/blockage. Scant serosanguinous drainage in canister. Periwound erythema. Surrounding tissue remains, soft with minimal ttp. No areas of palpable fluid collection or areas of induration at/  around the vac site.     Recent Lab Results:    Results:    CBC: 2/23/2023 06:01               \     Hgb     /                              \     7.1 L    /  WBC  ----------------  Plt               6.1       ----------------    276              /     Hct     \                              /     25.6 L    \            RBC: 2.18 L    MCV: 117 H          RFP: 2/23/2023 06:01  NA+        Cl-     BUN  /                         Canceled    Canceled    Canceled  /  --------------------------------  Glucose                ---------------------------  Canceled    K+     HCO3-   Creat \                         Canceled    Canceled    Canceled  \  Calcium : CanceledAnion Gap : Canceled          Albumin : Canceled     Phos : Canceled The performance characteristics of phosphorus testing in   heparinized plasma have been validated by the individual     laboratory site where testing is performed. Testing    on heparinized plasma is not approved by the FDA;    however, suc      Radiology Results:    Results:    Impression:  1. There has been interval removal of the posterior fusion hardware  from the lumbar spine. There is again a large soft tissue defect with  marked thinning of the skin and subcutaneous soft tissues and  probable dehiscence overlying the lumbar and lower thoracic spine  with nonspecific soft tissue thickening and loss of fat soft tissue  planes as well as scattered soft tissue emphysema along the periphery  of the defect. The possibility of osteomyelitis cannot be excluded on  the basis of this examination.  2. Marked, multilevel degenerative changes of the lumbar spine with  multilevel postoperative changes from laminectomies and  posterolateral fusion masses.    CT L Spine without Contrast [Feb 18 2023  2:16PM]      Assessment and Plan:   Daily Risk Screen:  ·  Does patient have an indwelling urinary catheter? n/a consulting service   ·  Does patient have a central line? n/a consulting service     Comorbidities:  ·  Comorbidity Other     Code Status:  ·  Code Status Full Code      Assessment:    RAJ HARMON is a 67 year old Male known to the plastic surgery team with a h/o multiple lumbar spinal surgeries which have been c/b recurrent MRSA infections  requiring multiple extensive plastic surgery interventions including multiple debridements and attempted closures. Admitted to [facility] as transfer on 2/14 for concern of MRSA infection of chronically open lumbar wound with exposed hardware. Patient now  s/p lumbar spinal wound I&D and removal of spinal hardware per NSGY on 2/15. Plastic Surgery consulted for spinal wound debridement and coverage.     OR course (this admission):   2/15 - Lumbar spinal wound exploration, I&D and removal of spinal hardware (per NSGY)    A: Pt overall doing well, discussed plans for bedside change 2/24. Pt expressed understanding and is agreeable.     Plan/Recommendations:   - Plan for vac change with plastics on Friday 2/24.     · Further planning pending wound appearance during next vac change  - Ensure diligent pressure offloading to lumbar spine/wound site with frequent repositioning, q2h turns   - Nutrition optimization to promote wound healing      ·  Can consider addition of daily MV and nutritional supplements/shakes as able   - Cx history:     ·  2/10 OSH bedside wound cx - MRSA (final)      ·  2/15 OR deep wound cs - NGTD (final 2/17)  - Continue IV ABX per ID recs (currently IV Vanc)   - Appreciate remaining supportive care per primary service   - Plastics will continue to follow    Patient and plan discussed with Dr. Jaime.     Shirley Romero PA-C   Plastic and Reconstructive Surgery    Available via [facility], pager: [phone] or Team phones: [phone]     Time spent on the assessment of patient, gathering and interpreting data, review of medical record/patient history, personally reviewing radiographic imaging and formulation of this note 30 minutes. With greater than 50% spent in personal discussion with  patient.        Electronic  Signatures:  Shirley Romero (PA (PHYSICIAN))  (Signed 23-Feb-2023 10:16)   Authored: Service, Subjective Data, Objective Data, Assessment  and Plan, Note Completion      Last Updated: 23-Feb-2023 10:16 by Shirley Romero (PA (PHYSICIAN))

## 2023-03-03 NOTE — PROGRESS NOTES
Service: Plastic Surgery     Subjective Data:   RAJ HARMON is a 67 year old Male who is Hospital Day # 12 and POD #10 for 1. exploration of lumbar wound dehiscence;2. removal of lumbar spinal hardware (set caps, rods, and screws);3. irrigation  and debridement of lumbar wound.     No acute concerns. +BM this morning. WV intact. Denies fever, chest pain, SOB, abd pain, n/v/d.    Objective Data:     Objective Information:      T   P  R  BP   MAP  SpO2   Value  36.1  81  18  126/56      95%  Date/Time 2/25 5:12 2/25 5:12 2/25 5:12 2/25 5:12    2/25 5:12  Range  (36.1C - 36.7C )  (77 - 104 )  (16 - 18 )  (126 - 155 )/ (56 - 71 )    (93% - 100% )   As of 24-Feb-2023 20:42:00, patient is on 4 L/min of oxygen via nasal cannula.      Pain reported at 2/25 9:57: 8 = Severe    Physical Exam by System:    Constitutional: Alert and cooperative, NAD   Eyes: EOMI, clear sclera   ENMT: MMM   Head/Neck: NC/AT   Respiratory/Thorax: Unlabored respirations on 4L  NC   Genitourinary: Voiding per urinal/commode   Musculoskeletal: Wound vac intact to lumbar spine  without leak or obstruction   Neurological: A&O x3   Psychological: Appropriate behavior and mood   Skin: Warm and dry, no lesions, no rashes     Recent Lab Results:    Results:    CBC: 2/25/2023 05:28              \     Hgb     /                              \     7.9 L    /  WBC  ----------------  Plt               8.1       ----------------    290              /     Hct     \                              /     26.7 L    \            RBC: 2.38 L    MCV: 112 H    Neutrophil  %: 73.8      RFP: 2/25/2023 05:28  NA+        Cl-     BUN  /                         136    95 L   29 H  /  --------------------------------  Glucose                ---------------------------  122 H    K+     HCO3-   Creat \                         5.8 H   39 H    0.97  \  Calcium : 10.1Anion Gap : 8 L         Albumin : 3.0 L     Phos : 3.7      Assessment and Plan:   Daily Risk  Screen:  ·  Does patient have an indwelling urinary catheter? n/a consulting service   ·  Does patient have a central line? n/a consulting service     Comorbidities:  ·  Comorbidity Other     Code Status:  ·  Code Status Full Code     Assessment:    RAJ HARMON is a 67 year old Male known to the plastic surgery team with a h/o multiple lumbar spinal surgeries which have been c/b recurrent MRSA infections  requiring multiple extensive plastic surgery interventions including multiple debridements and attempted closures. Admitted to [facility] as transfer on 2/14 for concern of MRSA infection of chronically open lumbar wound with exposed hardware. Patient now  s/p lumbar spinal wound I&D and removal of spinal hardware per NSGY on 2/15. Plastic Surgery consulted for spinal wound debridement and coverage.     OR course (this admission):   2/15 - Lumbar spinal wound exploration, I&D and removal of spinal hardware (per NSGY)    Plan/Recommendations:   - Plan to return to OR for debridement of lumbar spine wound, potential flap vs. wound vac application  - Ensure diligent pressure offloading to lumbar spine/wound site with frequent repositioning, q2h turns   - Nutrition optimization to promote wound healing, consider addition of daily MV and nutritional supplements/shakes as able   - Cx history:     ·  2/10 OSH bedside wound cx - MRSA (final)      ·  2/15 OR deep wound cs - NGTD (final 2/17)  - Continue IV ABX per ID recs (currently IV Vanc)   - Appreciate remaining supportive care per primary service   - Plastics will continue to follow    Aisha hSannon PA-C  Plastic and Reconstructive Surgery  [facility], Pager [phone], Team phones: [phone]    Time spent on the assessment of patient, gathering and interpreting data, review of medical record/patient history, personally reviewing radiographic imaging and formulation of this note 30 minutes. With greater than 50% spent in personal discussion with   patient.        Electronic Signatures:  Aisha Shannon (PAC)  (Signed 25-Feb-2023 11:05)   Authored: Service, Subjective Data, Objective Data, Assessment  and Plan, Note Completion      Last Updated: 25-Feb-2023 11:05 by Aisha Shannon (PAC)

## 2023-03-03 NOTE — PROGRESS NOTES
Service: Infectious Disease     Subjective Data:   RAJ HARMON is a 67 year old Male who is Hospital Day # 3 and POD #1 for 1. exploration of lumbar wound dehiscence;2. removal of lumbar spinal hardware (set caps, rods, and screws);3. irrigation  and debridement of lumbar wound.    Additional Information:    This AM, pt endorsed back pain. Described it as an 8/10, however stated that this was because he had only just woken up. Stated that the wound was draining significantly  yesterday evening, however it had improved this AM. Felt that the dilaudid was helping with his pain. Felt his breathing was slightly worse than normal, but continues to be satting fine on 4L NC. 2 BM recorded in chart.     Objective Data:     Objective Information:      T   P  R  BP   MAP  SpO2   Value  36.7  97  18  127/70      99%  Date/Time 2/16 9:35 2/16 9:35 2/16 9:35 2/16 9:35    2/16 9:35  Range  (35.6C - 36.7C )  (74 - 99 )  (18 - 18 )  (110 - 136 )/ (61 - 70 )    (96% - 99% )   As of 15-Feb-2023 21:15:00, patient is on 4 L/min of oxygen via nasal cannula.      Pain reported at 2/16 4:30: sleeping    Physical Exam Narrative:  ·  Physical Exam:    Gen: well-appearing, NAD  Head and neck: NCAT, neck supple without LAD, severe kyphoscoliosis   HEENT: MMM, normal nose without congestion, poor dentition   CV: RRR no mrg  Pulm: CTAB, prolonged exp phase, no wheezing or crackles, normal WOB on 4L  Abd: obese, soft, non-tender, non-distended  Ext: no cyanosis or clubbing, trace LE edema, thenar wasting bilat; 2-3cm diameter stage 1 ulcer on L heel  Neuro: alert and oriented x3, normal tone, face symmetric, moves all extremities spontaneously     Medication:    Medications:          Continuous Medications       --------------------------------  No continuous medications are active       Scheduled Medications       --------------------------------    1. Acetaminophen:  650  mg  Oral  Every 6 Hours    2. Allopurinol:  300  mg  Oral   Every 24 Hours    3. amLODIPine (NORVASC):  10  mg  Oral  Daily    4. Ascorbic Acid:  1000  mg  Oral  Daily    5. Atorvastatin:  20  mg  Oral  Daily    6. busPIRone (BUSPAR):  5  mg  Oral  Every 12 Hours    7. Docusate 50 mg - Senna 8.6 m  tablet(s)  Oral  2 Times a Day    8. fentaNYL 50 micrograms/ hour TransDermal:  1  patch  TransDermal  Every 72 Hours    9. levETIRAcetam (KEPPRA):  500  mg  Oral  2 Times a Day    10. Metoprolol Tartrate:  12.5  mg  Oral  2 Times a Day    11. Piperacillin - Tazobactam 4.5 gram/Iso-osmotic 100 mL Premix IVPB:  100  mL  IntraVenous Piggyback  Every 6 Hours    12. Polyethylene Glycol:  17  gram(s)  Oral  Daily    13. Spironolactone:  50  mg  Oral  2 Times a Day    14. Tamsulosin:  0.4  mg  Oral  Daily         PRN Medications       --------------------------------    1. Acetaminophen:  650  mg  Oral  Once    2. Albuterol 90 micrograms/ Inhalation MDI:  2  inhalation  Inhalation  Every 6 Hours    3. HYDROmorphone Injectable:  1  mg  IntraVenous Push  Every 3 Hours    4. HYDROmorphone Injectable:  2  mg  IntraVenous Push  Every 3 Hours    5. Melatonin:  3  mg  Oral  At Bedtime    6. Naloxone Injectable:  0.2  mg  IntraVenous Push  Once    7. Sodium Chloride 0.9% Injectable Flush:  10  mL  IntraVenous Flush  Every 8 Hours and as Needed        Recent Lab Results:    Results:    CBC: 2023 06:18              \     Hgb     /                              \     8.2 L    /  WBC  ----------------  Plt               9.2       ----------------    354              /     Hct     \                              /     27.9 L    \            RBC: 2.54 L    MCV: 110 H    Neutrophil  %: 70.3      RFP: 2023 06:18  NA+        Cl-     BUN  /                         140    102    20  /  --------------------------------  Glucose                ---------------------------  100 H    K+     HCO3-   Creat \                         5.1    35 H   1.11  \  Calcium : 9.2Anion Gap : 8 L          Albumin : 2.6 L     Phos : 3.9        ---------- Recent Arterial Blood Gas Results----------     2/15/2023 13:40  pO2 202  pH 7.41  pCO2 49  SO2 100  Base Excess 5.7null    Assessment and Plan:   Daily Risk Screen:  ·  Does patient have a central line? yes   ·  Central Line Type PICC   ·  Plan for PICC removal today? no   ·  The patient continues to require a PICC for parenteral medication     Comorbidities:  ·  Comorbidity Other     Code Status:  ·  Code Status Full Code     Assessment:    67 year old Male with CHF, COPD on 4L at home, seizure disorder and a history of multiple lumbar spine surgeries complicated  by MRSA wound infection with a known chronic open lumbar wound status post multiple debridements by plastic surgery, who presented to [facility] with worsening low back pain as well as generalized fatigue and weakness, found to have MRSA infection of sacral  wound with exposed hardware. He presents to [facility] as a transfer for surgical evaluation by spine team. High suspicion for osteomyelitis in this host with ESR > 130; OM has not been ruled out. Patient unable to tolerate MRI d/t kyphoscoliosis. Will continue  vanc for MRSA, and zosyn because of wound contamination with diarrhea.     Updates 2/16  - Plastic surgery recs:  WTD Kerlix dressings with Dakin's/Saline solution packed to the entire depth of the wound three times daily and PRN if soiled   return to OR with plastic surgery on 2/22 for lumbar spinal wound I&D and possible application of wound vac.  - Pain control: Tylenol 650mg q6h, 1mg IV dilaudid q3h for moderate pain, 2mg IV dilaudid q3h for severe pain: received total 10mg IV dilaudid post-operatively until this AM   - No plans to change pain regimen today d/t patient's persistent pain, recency of surgery, and plan for surgery next week  - Micro: deep wound cx: no granulocytes/organisms   - Abx: c/w vancomycin, will plan to DC zosyn based on intra-operative wound cultures   - NSGY: ok to resume DVT  ppx on POD2 (2/17)     #MRSA sacral wound infection  -BCx 2/10 x2 NGTD  -[place] Wound cx 2/10/23: MRSA (S: clinda, vanc; R: TMP-SMX, tetracyclines)  -ESR elevated at >130  -CRP elevated at 5.26  - S/p below on 2/15:   1. exploration of lumbar wound dehiscence  2. removal of lumbar spinal hardware (set caps, rods, and screws)  3. irrigation and debridement of lumbar wound  - Plastic surgery recs  2/15:   WTD Kerlix dressings with Dakin's/Saline solution packed to the entire depth of the wound three times daily and PRN if soiled   return to OR with plastic surgery on 2/22 for lumbar spinal wound I&D and possible application of wound vac.  Plan:   1) Vancomcyin 2/10-   2) Zosyn 2/10-; will plan to DC zosyn based on intra-operative wound cultures   3) Appreciate plastic surgery recs  4) F/u nsgy recs     #Chronic pain - lower back  -home pain regimen: scheduled acetaminophen, fentanyl patch 50mcg q72h, dilaudid 4mg PO TID prn breakthrough  -bowel regimen  -follows with pain medicine Dr. Almazan at [facility]   -post-op pain regimen: Tylenol 650mg q6h, fentanyl patch 50mcg q72h, 1mg IV dilaudid q3h for moderate pain, 2mg IV dilaudid q3h for severe  pain    #COPD, 4L at baseline  -SpO2 goal 88-92%  -Started on proventill (takes at home per wife)     #HFpEF   #HTN  -TTE Feb 2022: EF 65-70%, normal LV diastolic function, normal RV  -Hold ASA for possible procedure  -C/w home atorva, metop 12.5 BID, maxime 50 BID, losartan 50 qd, amlodipine 10    #Seizure disorder - continue Keppra  #Anxiety - continue Buspar   #BPH - continue tamsulosin   #Gout - continue allopurinol     DVT ppx: SQL (will resume based on nsgy recs)   Code status: FULL (confirmed on admission)  NOK: Wife Mike [phone]      Attestation:   Note Completion:  I am a:  Resident/Fellow   Attending Attestation I saw and evaluated the patient.  I personally obtained the key and critical portions of the history and physical exam or was physically present for key and   critical portions performed by the resident/fellow. I reviewed the resident/fellow?s documentation and discussed the patient with the resident/fellow.  I agree with the resident/fellow?s medical decision making as documented in the note.     I personally evaluated the patient on 16-Feb-2023         Electronic Signatures:  Ankur Harrison (MD)  (Signed 16-Feb-2023 12:28)   Authored: Note Completion   Co-Signer: Assessment and Plan, Note Completion  Christa Funk (Resident))  (Signed 16-Feb-2023 10:45)   Authored: Service, Subjective Data, Objective Data, Assessment  and Plan, Note Completion      Last Updated: 16-Feb-2023 12:28 by Ankur Harrison)

## 2023-03-03 NOTE — PROGRESS NOTES
Service: Infectious Disease     Subjective Data:   RAJ HARMON is a 67 year old Male who is Hospital Day # 14 and POD #12 for 1. exploration of lumbar wound dehiscence;2. removal of lumbar spinal hardware (set caps, rods, and screws);3. irrigation  and debridement of lumbar wound.     Evaluated at [facility]. No acute complaints. Denies fevers, chills.    Objective Data:     Objective Information:      T   P  R  BP   MAP  SpO2   Value  36.4  62  19  175/78      98%  Date/Time 2/27 5:00 2/27 5:00 2/27 5:00 2/27 5:00    2/27 5:00  Range  (36.4C - 36.8C )  (62 - 87 )  (18 - 20 )  (133 - 175 )/ (60 - 78 )    (96% - 98% )   As of 27-Feb-2023 08:50:00, patient is on 4 L/min of oxygen via nasal cannula.      Pain reported at 2/27 8:50: 9 = Severe      T   P  R  BP   MAP  SpO2   Value  36.4  62  19  175/78      98%  Date/Time 2/27 5:00 2/27 5:00 2/27 5:00 2/27 5:00    2/27 5:00  Range  (36.4C - 36.8C )  (62 - 87 )  (18 - 20 )  (133 - 175 )/ (60 - 78 )    (96% - 98% )   As of 27-Feb-2023 08:50:00, patient is on 4 L/min of oxygen via nasal cannula.    Physical Exam Narrative:  ·  Physical Exam:    Gen: well-appearing, NAD  Head and neck: NCAT, neck supple without LAD, severe kyphoscoliosis   HEENT: MMM, normal nose without congestion, poor dentition   CV: RRR no mrg  Pulm: CTAB, prolonged exp phase, no wheezing or crackles, normal WOB on 4L  Abd: obese, soft, non-tender, non-distended  Ext: no cyanosis or clubbing, trace LE edema, thenar wasting bilat; 2-3cm diameter stage 1 ulcer on L heel  Neuro: alert and oriented x3, normal tone, face symmetric, moves all extremities spontaneously. mild tremor in hands bilaterally  Back: Wound vac placed and secured. No blood oozing or purulence. skin irritation under the wound vac tape      Medication:    Medications:          Continuous Medications       --------------------------------  No continuous medications are active       Scheduled Medications        --------------------------------    1. Acetaminophen:  650  mg  Oral  Every 6 Hours    2. Allopurinol:  300  mg  Oral  Every 24 Hours    3. amLODIPine (NORVASC):  10  mg  Oral  Daily    4. Ascorbic Acid:  1000  mg  Oral  Daily    5. Atorvastatin:  20  mg  Oral  Daily    6. busPIRone (BUSPAR):  5  mg  Oral  Every 12 Hours    7. Docusate 50 mg - Senna 8.6 m  tablet(s)  Oral  2 Times a Day    8. Enoxaparin SubCutaneous:  40  mg  SubCutaneous  Every 24 Hours    9. fentaNYL 50 micrograms/ hour TransDermal:  1  patch  TransDermal  Every 72 Hours    10. Hydrocortisone 1% Topical:  1  application(s)  Topical  2 Times a Day    11. levETIRAcetam (KEPPRA):  500  mg  Oral  2 Times a Day    12. Metoprolol Tartrate:  12.5  mg  Oral  2 Times a Day    13. Polyethylene Glycol:  17  gram(s)  Oral  Daily    14. Sodium Hypochlorite 0.125% (Dakins Quarter):  1  application(s)  Topical  3 Times a Day    15. Tamsulosin:  0.4  mg  Oral  Daily    16. Vancomycin - RPh to Dose - IV Piggy Back:  1  each  As Specified  Variable    17. Vancomycin 500 mg IVPB/ Premixed Soln 100 mL:  500  mg  IntraVenous Piggyback  Every 12 Hours         PRN Medications       --------------------------------    1. Albuterol 90 micrograms/ Inhalation MDI:  2  inhalation  Inhalation  Every 6 Hours    2. Calcium Carbonate Chewable:  500  mg  Oral  Every 2 Hours    3. diphenhydrAMINE:  25  mg  Oral  Every 24 Hours    4. Heparin Flush 10 unit/ mL Injectable PRN:  5  mL  IntraVenous Flush  According to Flush Policy    5. HYDROmorphone Injectable:  1  mg  IntraVenous Push  Every 3 Hours    6. Melatonin:  3  mg  Oral  At Bedtime    7. Naloxone Injectable:  0.2  mg  IntraVenous Push  Once    8. Sodium Chloride 0.9% Injectable Flush:  10  mL  IntraVenous Flush  Every 8 Hours and as Needed        Recent Lab Results:    Results:    CBC: 2023 04:43              \     Hgb     /                              \     12.1 L    /  WBC  ----------------  Plt                5.8       ----------------    204              /     Hct     \                              /     40.0 L    \            RBC: 3.75 L    MCV: 107 H    Neutrophil  %: 70.1      RFP: 2/27/2023 09:52  NA+        Cl-     BUN  /                         Canceled    Canceled    Canceled  /  --------------------------------  Glucose                ---------------------------  Canceled    K+     HCO3-   Creat \                         Canceled    Canceled    Canceled  \  Calcium : CanceledAnion Gap : Canceled          Albumin : Canceled     Phos : Canceled The performance characteristics of phosphorus testing in   heparinized plasma have been validated by the individual     laboratory site where testing is performed. Testing    on heparinized plasma is not approved by the FDA;    however, suc        I have reviewed these laboratory results:    Renal Function Panel  27-Feb-2023 04:43:00      Result Value    Lab Comment:  [facility] CALLED  RB TO DEBORAH COSBY, 02/27/2023 06:11    Glucose, Serum  90    NA  138    K  4.5    CL  94   L   Bicarbonate, Serum  41   HH   Anion Gap, Serum  8   L   BUN  25   H   CREAT  0.72    GFR Male  >90    Calcium, Serum  9.4    Phosphorus, Serum  3.2    ALB  3.1   L     Complete Blood Count + Differential  27-Feb-2023 04:43:00      Result Value    White Blood Cell Count  5.8    Nucleated Erythrocyte Count  0.0    Red Blood Cell Count  3.75   L   HGB  12.1   L   HCT  40.0   L   MCV  107   H   MCHC  30.3   L   PLT  204    RDW-CV  12.5    Neutrophil %  70.1    Immature Granulocytes %  0.7    Lymphocyte %  12.7    Monocyte %  11.0    Eosinophil %  4.5    Basophil %  1.0    Neutrophil Count  4.08    Lymphocyte Count  0.74   L   Monocyte Count  0.64    Eosinophil Count  0.26    Basophil Count  0.06      Magnesium, Serum  27-Feb-2023 04:43:00      Result Value    Magnesium, Serum  1.83        Assessment and Plan:   Daily Risk Screen:  ·  Does patient have a central line? yes   ·  Central Line Type  non-tunneled   ·  Plan for non-tunneled central line removal today? no   ·  The patient continues to require non-tunneled central line access for parenteral medication     Comorbidities:  ·  Comorbidity Other     Code Status:  ·  Code Status Full Code     Assessment:    67 year old Male with CHF, COPD on 4L at home, seizure disorder and a history of multiple lumbar spine surgeries complicated  by MRSA wound infection with a known chronic open lumbar wound status post multiple debridements by plastic surgery, who presented to [facility] with worsening low back pain as well as generalized fatigue and weakness, found to have MRSA infection of sacral  wound with exposed hardware, and elevated inflammatory markers. He presented to [facility] as a transfer for surgical evaluation by spine team. He was started on vancomcyin and zosyn on 2/10 in [place], which were continued on admission. BCx from 2/10 with NGTD.  Nsgy was consulted, and he underwent removal of lumbar spinal hardware and irrigation and debridement of lumbar wound on 2/15. Plastic surgery was consulted and recommended leaving wound open with TID dressing changes and plan for return to OR on 2/22  for lumbar spinal wound I&D and potential wound vac. Intraoperative wound cultures had no growth aerobically or anaerobically. Pt has a hx of chronic pain, and pain was managed after surgery with fentanyl patch (per home regimen), Tylenol (per home regimen),  and IV dilaudid 1mg IV q3h for moderate pain and 2mg IV q3h for severe pain. Pain regimen can be down-titrated to home regimen after plastic surgery intervention. Zosyn was discontinued on 2/19 given no evidence of pseudomonas. Pt continues to be on vancomycin.  Patient was having loose bowel movements multiple times daily but was negative for C.diff and stool softeners were stopped. Plastic surgery recommended wound vac and it was placed on 2/22, replaced on 2/24.  Patient had hyperkalemic episode on 2/34 of potassium 6.0  (repeat from 6.1) with EKG showing peaked T-waves, ordered insulin & D5 & Ca gluconate & Lokelma  10 mg TID, held Spironolactone. Hyperkalemia resolved with these interventions.    Uppdates 2/27:  -Replaced vac by plastics today   -OR tomorrow for debridgement   -NPO at midnight   -continue to monitor bicarb (underlying CHILANGO)    Updates 2/26:  -Potassium 6.0 yesterday (repeat from 6.1) with EKG showing peaked T-waves, ordered insulin & D5 & Ca gluconate & Lokelma,  held Spironolactone.   -K 5.0 today, c/w Lokelma TID  -hydrocortisone for itching on lower back   -Plan to evaluate patient in OR next week Tues  -Wound vac replaced by plastics on 2/24  -Vanc level 15.2 on 2/25 , c/w Vanc 500 q12h  -Vanc to continue for 8 weeks until 2/15 - 4/12  -Closely monitor vancomycin levels as patient had a break  in medication and needs to be ensured to continue to receive   -Pain regimen currently at 2mg q3h as needed  -Tremor to be discussed with patient and wife and chart review   -Outpatient f/u with Dr. Harrison    Cx history:     ·  2/10 OSH bedside wound cx - MRSA (final)      ·  2/15 OR deep wound cs - NGTD (final 2/17)    Abx hx:  1) Vancomcyin 2/10-   2) Zosyn 2/10- 2/19 (d/c given no culture evidence of pseudomonas)      #MRSA sacral wound infection  Micro:   -BCx 2/10 x2 NGTD  -[place] Wound cx 2/10/23: MRSA (S: clinda, vanc; R: TMP-SMX, tetracyclines)  -ESR elevated at >130  -CRP elevated at 5.26  -Intraoperative wound cx 2/15/23: no growth aerobically or anaerobically   - S/p below on 2/15:   1. exploration of lumbar wound dehiscence  2. removal of lumbar spinal hardware (set caps, rods, and screws)  3. irrigation and debridement of lumbar wound  - Plastic surgery recs  2/15:   1. WTD Kerlix dressings with Dakin's/Saline solution packed to the entire depth of the wound three times daily and PRN if soiled   -Lumbar CT without contrast, obtained on 2/18: soft tissue defect overlying lumbar and lower thoracic spine, soft  tissue thickening, scattered soft tissue emphysema; multilevel degenerative changes of lumbar spine  - Plastics decided to place wound vac on 2/22 instead of I&D, reassessed and replaced wound vac on 2/24  Abx:   Plan:   1) Vancomcyin 2/10 - 4/12   2) Zosyn 2/10- 2/19 (d/c given no culture evidence of pseudomonas)    #Chronic pain - lower back  -home pain regimen: scheduled acetaminophen, fentanyl patch 50mcg q72h, dilaudid 4mg PO TID prn breakthrough  -bowel regimen  -follows with pain medicine Dr. Almazan at [facility]   -post-op pain regimen: Tylenol 650mg q6h, fentanyl patch 50mcg q72h, 1mg IV dilaudid q3h for moderate pain, 2mg IV dilaudid q3h for severe pain    #Hyperkalemia - resolved 5.0 now  -6.0 on 2/24/2023 with EKG of peaked T-waves, 5.8 on 2/23 resolved peaked T-waves  -hx of hyperkalemic  episodes on review  -no CKD  -given insulin, d50, calcium gluconate, and lokelma   -will hold maxime   -conitnue to monitor    #COPD, 4L at baseline  -SpO2 goal 88-92%  -Started on proventill (takes at home per wife)     #HFpEF   #HTN  -TTE Feb 2022: EF 65-70%, normal LV diastolic function, normal RV  -Hold ASA for possible procedure  -C/w home atorva, metop 12.5 BID, maxime 50 BID, losartan 50 qd, amlodipine 10  -Hold maxime for hyperkalemia on 2/24    #Seizure disorder - continue Keppra  #Anxiety - continue Buspar   #BPH - continue tamsulosin   #Gout - continue allopurinol     DVT ppx: lovenox   Code status: FULL (confirmed on admission)  NOK: Wife Mike [phone]            Attestation:   Note Completion:  I am a:  Resident/Fellow   Attending Attestation I saw and evaluated the patient.  I personally obtained the key and critical portions of the history and physical exam or was physically present for key and  critical portions performed by the resident/fellow. I reviewed the resident/fellow?s documentation and discussed the patient with the resident/fellow.  I agree with the resident/fellow?s medical decision making as  documented in the note.     I personally evaluated the patient on 27-Feb-2023         Electronic Signatures:  Mame Kent (MD)  (Signed 27-Feb-2023 16:03)   Authored: Note Completion   Co-Signer: Service, Subjective Data, Objective Data, Assessment and Plan, Note Completion  Freddie Caraballo (Resident))  (Signed 27-Feb-2023 12:45)   Authored: Service, Subjective Data, Objective Data, Assessment  and Plan, Note Completion      Last Updated: 27-Feb-2023 16:03 by Mame Kent)

## 2023-03-03 NOTE — PROGRESS NOTES
Service: Neurosurgery     Subjective Data:   RAJ OCHOA is a 67 year old Male who is Hospital Day # 2.    Objective Data:     Objective Information:      T   P  R  BP   MAP  SpO2   Value  35.6  74  18  123/70      98%  Date/Time 2/15 5:37 2/15 5:37 2/15 5:37 2/15 5:37    2/15 5:37  Range  (35.6C - 36.9C )  (73 - 101 )  (16 - 18 )  (118 - 134 )/ (63 - 74 )    (98% - 99% )   As of 14-Feb-2023 20:15:00, patient is on 5 L/min of oxygen via nasal cannula.  Highest temp of 36.9 C was recorded at 2/14 12:51      Pain reported at 2/15 3:14: 8 = Severe    Physical Exam by System:    Neurological: Exposed hardware  A&Ox3  RUE 4+  LUE 5  BLE 5     Recent Lab Results:    Results:    CBC: 2/14/2023 06:29              \     Hgb     /                              \     9.3 L    /  WBC  ----------------  Plt               9.0       ----------------    414              /     Hct     \                              /     31.1 L    \            RBC: 2.86 L    MCV: 109 H    Neutrophil  %: 71.2      RFP: 2/14/2023 06:29  NA+        Cl-     BUN  /                         141    102    15  /  --------------------------------  Glucose                ---------------------------  105 H    K+     HCO3-   Creat \                         4.6    35 H   0.86  \  Calcium : 9.8Anion Gap : 9 L         Albumin : 2.6 L     Phos : 3.2      Coagulation: 2/14/2023 18:38  PT  /                    12.0  /  -------<    INR          ----------<      1.0  PTT\                              \                       Assessment and Plan:   Daily Risk Screen:  ·  Does patient have a central line? n/a consulting service     Code Status:  ·  Code Status Full Code     Assessment:    Raj Ochoa is a 67 yr old F with h/o multiple lumbar surgeries c/b 3 MRSA infections s/p intrathecal morphine pump (2013 SWG), COPD (on 4L home O2), CPAP QHS,  gout, HLD, ESRD, BPH, seizures, MRDD p/w infected decubitus ulcer, 2/6/22 s/p debridement of midback,  sacral, left gluteal, left heel ulcers, dc'd on PO doxy to SNF, 2/28 p/w L heel ulcer, open thoracic and lower back wound, 2/28 s/p low back I &D, exposed  catheter found, 3/7 p/w nonhealing low back wound w/ exposed catheter, 3/19 s/p removal of ITP reservoir and catheter, placement of wound vac by plastics.   Now presenting with worsening sacral ulcer with exposed spinal hardware.     Recs:  [facility] primary  OR wed 2/15 hardware removal   Plastics surgery recs  Will appreciate primary team for documenting RCRI      Attestation:   Note Completion:  I am a:  Resident/Fellow   Attending Attestation I saw and evaluated the patient.  I personally obtained the key and critical portions of the history and physical exam or was physically present for key and  critical portions performed by the resident/fellow. I reviewed the resident/fellow?s documentation and discussed the patient with the resident/fellow.  I agree with the resident/fellow?s medical decision making as documented in the note.     I personally evaluated the patient on 15-Feb-2023         Electronic Signatures:  Alisa Wing (Resident))  (Signed 15-Feb-2023 06:05)   Authored: Service, Subjective Data, Objective Data, Assessment  and Plan, Note Completion  ANG Espinoza)  (Signed 15-Feb-2023 07:30)   Authored: Note Completion   Co-Signer: Service, Subjective Data, Objective Data, Assessment and Plan, Note Completion      Last Updated: 15-Feb-2023 07:30 by ANG Espinoza)

## 2023-03-03 NOTE — PROGRESS NOTES
Service: Infectious Disease     Subjective Data:   RAJ HARMON is a 67 year old Male who is Hospital Day # 9 and POD #7 for 1. exploration of lumbar wound dehiscence;2. removal of lumbar spinal hardware (set caps, rods, and screws);3. irrigation  and debridement of lumbar wound.     Had itching over the wound vac site overnight and releived with benadryl. Evaluated at bedside this morning. No acute complaints. denies fevers, chills, diarrhea. Still endorsing pain over the wound  site that is relieved with the current pain regimen.    Objective Data:     Objective Information:      T   P  R  BP   MAP  SpO2   Value  36.3  79  18  107/60      96%  Date/Time 2/22 13:08 2/22 13:08 2/22 13:08 2/22 13:08    2/22 13:08  Range  (36.1C - 37.1C )  (76 - 100 )  (16 - 19 )  (107 - 152 )/ (58 - 73 )    (96% - 99% )   As of 22-Feb-2023 08:33:00, patient is on 4 L/min of oxygen via nasal cannula with humidification.  Highest temp of 37.1 C was recorded at 2/21 14:35      Pain reported at 2/22 17:11: 8 = Severe      T   P  R  BP   MAP  SpO2   Value  36.3  79  18  107/60      96%  Date/Time 2/22 13:08 2/22 13:08 2/22 13:08 2/22 13:08    2/22 13:08  Range  (36.1C - 37.1C )  (76 - 100 )  (16 - 19 )  (107 - 152 )/ (58 - 73 )    (96% - 99% )   As of 22-Feb-2023 08:33:00, patient is on 4 L/min of oxygen via nasal cannula with humidification.  Highest temp of 37.1 C was recorded at 2/21 14:35    Physical Exam Narrative:  ·  Physical Exam:    Gen: well-appearing, NAD  Head and neck: NCAT, neck supple without LAD, severe kyphoscoliosis   HEENT: MMM, normal nose without congestion, poor dentition   CV: RRR no mrg  Pulm: CTAB, prolonged exp phase, no wheezing or crackles, normal WOB on 4L  Abd: obese, soft, non-tender, non-distended  Ext: no cyanosis or clubbing, trace LE edema, thenar wasting bilat; 2-3cm diameter stage 1 ulcer on L heel  Neuro: alert and oriented x3, normal tone, face symmetric, moves all extremities  spontaneously   Back: Wound vac placed and secured. No blood oozing or purulence.      Medication:    Medications:          Continuous Medications       --------------------------------  No continuous medications are active       Scheduled Medications       --------------------------------    1. Acetaminophen:  650  mg  Oral  Every 6 Hours    2. Allopurinol:  300  mg  Oral  Every 24 Hours    3. amLODIPine (NORVASC):  10  mg  Oral  Daily    4. Ascorbic Acid:  1000  mg  Oral  Daily    5. Atorvastatin:  20  mg  Oral  Daily    6. busPIRone (BUSPAR):  5  mg  Oral  Every 12 Hours    7. Docusate 50 mg - Senna 8.6 m  tablet(s)  Oral  2 Times a Day    8. Enoxaparin SubCutaneous:  40  mg  SubCutaneous  Every 24 Hours    9. fentaNYL 50 micrograms/ hour TransDermal:  1  patch  TransDermal  Every 72 Hours    10. levETIRAcetam (KEPPRA):  500  mg  Oral  2 Times a Day    11. Metoprolol Tartrate:  12.5  mg  Oral  2 Times a Day    12. Polyethylene Glycol:  17  gram(s)  Oral  Daily    13. Sodium Hypochlorite 0.125% (Dakins Quarter):  1  application(s)  Topical  3 Times a Day    14. Spironolactone:  50  mg  Oral  2 Times a Day    15. Tamsulosin:  0.4  mg  Oral  Daily    16. Vancomycin - RPh to Dose - IV Piggy Back:  1  each  As Specified  Variable    17. Vancomycin 750 mg/ D5W IVPB Premixed Soln 150 mL:  750  mg  IntraVenous Piggyback  Every 12 Hours         PRN Medications       --------------------------------    1. Acetaminophen:  650  mg  Oral  Once    2. Albuterol 90 micrograms/ Inhalation MDI:  2  inhalation  Inhalation  Every 6 Hours    3. diphenhydrAMINE:  25  mg  Oral  Every 24 Hours    4. HYDROmorphone Injectable:  1  mg  IntraVenous Push  Every 3 Hours    5. HYDROmorphone Injectable:  2  mg  IntraVenous Push  Every 3 Hours    6. Melatonin:  3  mg  Oral  At Bedtime    7. Naloxone Injectable:  0.2  mg  IntraVenous Push  Once    8. Sodium Chloride 0.9% Injectable Flush:  10  mL  IntraVenous Flush  Every 8 Hours and as  Needed        Recent Lab Results:    Results:    CBC: 2/22/2023 06:33              \     Hgb     /                              \     8.3 L    /  WBC  ----------------  Plt               8.4       ----------------    331              /     Hct     \                              /     28.5 L    \            RBC: 2.55 L    MCV: 112 H    Neutrophil  %: 64.1      RFP: 2/22/2023 06:33  NA+        Cl-     BUN  /                         140    99    25 H /  --------------------------------  Glucose                ---------------------------  115 H    K+     HCO3-   Creat \                         4.8    37 H   0.96  \  Calcium : 9.8Anion Gap : 9 L         Albumin : 3.2 L     Phos : 3.3        I have reviewed these laboratory results:    Vancomycin Level, Trough  22-Feb-2023 13:34:00      Result Value    Lab Comment:  VANCT CALLED RB TO JULITA NYE , 02/22/2023 14:25    Vancomycin Level, Trough  25.0   HH     Complete Blood Count + Differential  22-Feb-2023 06:33:00      Result Value    White Blood Cell Count  8.4    Nucleated Erythrocyte Count  0.0    Red Blood Cell Count  2.55   L   HGB  8.3   L   HCT  28.5   L   MCV  112   H   MCHC  29.1   L   PLT  331    RDW-CV  12.7    Neutrophil %  64.1    Immature Granulocytes %  1.9   H   Lymphocyte %  19.2    Monocyte %  8.4    Eosinophil %  5.1    Basophil %  1.3    Neutrophil Count  5.36    Lymphocyte Count  1.61    Monocyte Count  0.70    Eosinophil Count  0.43    Basophil Count  0.11   H     Renal Function Panel  22-Feb-2023 06:33:00      Result Value    Glucose, Serum  115   H   NA  140    K  4.8    CL  99    Bicarbonate, Serum  37   H   Anion Gap, Serum  9   L   BUN  25   H   CREAT  0.96    GFR Male  85    Calcium, Serum  9.8    Phosphorus, Serum  3.3    ALB  3.2   L     Magnesium, Serum  22-Feb-2023 06:33:00      Result Value    Magnesium, Serum  1.82        Assessment and Plan:   Daily Risk Screen:  ·  Does patient have a central line? yes   ·  Central Line Type  PICC   ·  Plan for PICC removal today? no   ·  The patient continues to require a PICC for parenteral medication     Comorbidities:  ·  Comorbidity Other     Code Status:  ·  Code Status Full Code     Assessment:    67 year old Male with CHF, COPD on 4L at home, seizure disorder and a history of multiple lumbar spine surgeries complicated  by MRSA wound infection with a known chronic open lumbar wound status post multiple debridements by plastic surgery, who presented to [place] with worsening low back pain as well as generalized fatigue and weakness, found to have MRSA infection of sacral  wound with exposed hardware, and elevated inflammatory markers. He presented to [facility]  as a transfer for surgical evaluation by spine team. He was started on vancomcyin and zosyn on 2/10 in [place], which were continued on admission.  BCx from 2/10 with NGTD. Nsgy was consulted, and he underwent removal of lumbar spinal hardware and irrigation and debridement of lumbar wound on 2/15. Plastic surgery was consulted and recommended leaving wound open with TID dressing changes and plan  for return to OR on 2/22 for lumbar spinal wound I&D and potential wound vac. Intraoperative wound cultures had no growth aerobically or anaerobically. Pt has a hx of chronic pain, and pain was managed after surgery with fentanyl patch (per home regimen),  Tylenol (per home regimen), and IV dilaudid 1mg IV q3h for moderate pain and 2mg IV q3h for severe pain. Pain regimen can be down-titrated to home regimen after plastic surgery intervention. Zosyn was discontinued on 2/19 given no evidence of pseudomonas.  Pt continues to be on vancomycin. Patient was having loose bowel movements multiple times daily but was negative for C.diff and stool softeners were stopped. Plastic surgery recommended wound vac and it was placed on 2/22.    Updates 2/22:  -No I&D to be done with plastic surgery tomorrow given wounds look better on imaging now from plastics  standpoint, to reassess wound vac on Friday and evaluated for wound closure next week.  -Vanc level 25, vanc held and to redose  by pharmacy  -EDILIA improving   -Vanc to continue for 8 weeks until 4/12  -Closely monitor vancomycin levels as patient had a break in medication and needs to be ensured to continue to receive   -Pain regimen currently at 2mg q3h as needed  -Outpatient f/u with Dr. Harrison      #MRSA sacral wound infection  Micro:   -BCx 2/10 x2 NGTD  -[place] Wound cx 2/10/23: MRSA (S: clinda, vanc; R: TMP-SMX, tetracyclines)  -ESR elevated at >130  -CRP elevated at 5.26  -Intraoperative wound cx 2/15/23: no growth aerobically or anaerobically   - S/p below on 2/15:   1. exploration of lumbar wound dehiscence  2. removal of lumbar spinal hardware (set caps, rods, and screws)  3. irrigation and debridement of lumbar wound  - Plastic surgery recs  2/15:   1. WTD Kerlix dressings with Dakin's/Saline solution packed to the entire depth of the wound three times daily and PRN if soiled   2. return to OR with plastic surgery on 2/22 for lumbar spinal wound I&D and possible application of wound vac  3. Lumbar CT without contrast, obtained on 2/18: soft tissue defect overlying lumbar and lower thoracic spine, soft tissue thickening, scattered soft tissue emphysema; multilevel degenerative changes of lumbar spine  - Plastics decided to place wound vac on 2/22 instead of I&D and to reassess wound vac on Friday and evaluated for wound closure next week.  Abx:   Vancomycin dosing: Pt received vancomycin 2/10 - 2/13, supratherapeutic (20.4) and not resumed on admission on 2/14, 1x dose 1.5g IV intra-operatively on 2/15, and resumed on 2/17 with 1500mg q12h dosing, redosed to 1250mg q12h on 2/19  Plan:   1) Vancomcyin 2/10-   2) Zosyn 2/10- 2/19 (d/c given no culture evidence of pseudomonas)  3) Appreciate plastic surgery recs  4) F/u intraoperative cultures     #Chronic pain - lower back  -home pain regimen: scheduled  acetaminophen, fentanyl patch 50mcg q72h, dilaudid 4mg PO TID prn breakthrough  -bowel regimen  -follows with pain medicine Dr. Almazan at [facility]   -post-op pain regimen: Tylenol 650mg q6h, fentanyl patch 50mcg q72h, 1mg IV dilaudid q3h for moderate pain, 2mg IV dilaudid q3h for severe  pain    #COPD, 4L at baseline  -SpO2 goal 88-92%  -Started on proventill (takes at home per wife)     #HFpEF   #HTN  -TTE Feb 2022: EF 65-70%, normal LV diastolic function, normal RV  -Hold ASA for possible procedure  -C/w home atorva, metop 12.5 BID, maxime 50 BID, losartan 50 qd, amlodipine 10    #Seizure disorder - continue Keppra  #Anxiety - continue Buspar   #BPH - continue tamsulosin   #Gout - continue allopurinol     DVT ppx: lovenox   Code status: FULL (confirmed on admission)  NOK: Wife Mike [phone]      Attestation:   Note Completion:  I am a:  Resident/Fellow   Attending Attestation I saw and evaluated the patient.  I personally obtained the key and critical portions of the history and physical exam or was physically present for key and  critical portions performed by the resident/fellow. I reviewed the resident/fellow?s documentation and discussed the patient with the resident/fellow.  I agree with the resident/fellow?s medical decision making as documented in the note.     I personally evaluated the patient on 22-Feb-2023         Electronic Signatures:  Freddie Caraballo (Resident))  (Signed 22-Feb-2023 17:42)   Authored: Service, Subjective Data, Objective Data, Assessment  and Plan, Note Completion  Darrian Galvez)  (Signed 23-Feb-2023 10:32)   Authored: Note Completion   Co-Signer: Service, Subjective Data, Objective Data, Assessment and Plan, Note Completion      Last Updated: 23-Feb-2023 10:32 by Darrian Galvez)

## 2023-03-03 NOTE — PROGRESS NOTES
Service: Infectious Disease     Subjective Data:   RAJ HARMON is a 67 year old Male who is Hospital Day # 12 and POD #10 for 1. exploration of lumbar wound dehiscence;2. removal of lumbar spinal hardware (set caps, rods, and screws);3. irrigation  and debridement of lumbar wound.     No acute events overnight. Patient evaluated at bedside today. Had no acute complaints. No loose stools for the past 48 hrs. Denies fevers, chills, abdominal pain.    Objective Data:     Objective Information:      T   P  R  BP   MAP  SpO2   Value  36.1  81  18  126/56      95%  Date/Time 2/25 5:12 2/25 5:12 2/25 5:12 2/25 5:12    2/25 5:12  Range  (36.1C - 36.7C )  (77 - 104 )  (16 - 18 )  (126 - 155 )/ (56 - 71 )    (93% - 100% )   As of 24-Feb-2023 20:42:00, patient is on 4 L/min of oxygen via nasal cannula.      Pain reported at 2/25 9:57: 8 = Severe      T   P  R  BP   MAP  SpO2   Value  36.1  81  18  126/56      95%  Date/Time 2/25 5:12 2/25 5:12 2/25 5:12 2/25 5:12    2/25 5:12  Range  (36.1C - 36.7C )  (77 - 104 )  (16 - 18 )  (126 - 155 )/ (56 - 71 )    (93% - 100% )   As of 24-Feb-2023 20:42:00, patient is on 4 L/min of oxygen via nasal cannula.    Physical Exam Narrative:  ·  Physical Exam:    Gen: well-appearing, NAD  Head and neck: NCAT, neck supple without LAD, severe kyphoscoliosis   HEENT: MMM, normal nose without congestion, poor dentition   CV: RRR no mrg  Pulm: CTAB, prolonged exp phase, no wheezing or crackles, normal WOB on 4L  Abd: obese, soft, non-tender, non-distended  Ext: no cyanosis or clubbing, trace LE edema, thenar wasting bilat; 2-3cm diameter stage 1 ulcer on L heel  Neuro: alert and oriented x3, normal tone, face symmetric, moves all extremities spontaneously   Back: Wound vac placed and secured. No blood oozing or purulence. skin irritation under the wound vac tape      Medication:    Medications:          Continuous Medications       --------------------------------  No continuous  medications are active       Scheduled Medications       --------------------------------    1. Acetaminophen:  650  mg  Oral  Every 6 Hours    2. Allopurinol:  300  mg  Oral  Every 24 Hours    3. amLODIPine (NORVASC):  10  mg  Oral  Daily    4. Ascorbic Acid:  1000  mg  Oral  Daily    5. Atorvastatin:  20  mg  Oral  Daily    6. busPIRone (BUSPAR):  5  mg  Oral  Every 12 Hours    7. Docusate 50 mg - Senna 8.6 m  tablet(s)  Oral  2 Times a Day    8. Enoxaparin SubCutaneous:  40  mg  SubCutaneous  Every 24 Hours    9. fentaNYL 50 micrograms/ hour TransDermal:  1  patch  TransDermal  Every 72 Hours    10. Hydrocortisone 1% Topical:  1  application(s)  Topical  2 Times a Day    11. levETIRAcetam (KEPPRA):  500  mg  Oral  2 Times a Day    12. Metoprolol Tartrate:  12.5  mg  Oral  2 Times a Day    13. Polyethylene Glycol:  17  gram(s)  Oral  Daily    14. Sodium Hypochlorite 0.125% (Dakins Quarter):  1  application(s)  Topical  3 Times a Day    15. Sodium Zirconium Cyclosilicate:  10  gram(s)  Oral  3 Times a Day    16. Tamsulosin:  0.4  mg  Oral  Daily    17. Vancomycin - RPh to Dose - IV Piggy Back:  1  each  As Specified  Variable    18. Vancomycin 500 mg IVPB/ Premixed Soln 100 mL:  500  mg  IntraVenous Piggyback  Every 12 Hours         PRN Medications       --------------------------------    1. Acetaminophen:  650  mg  Oral  Once    2. Albuterol 90 micrograms/ Inhalation MDI:  2  inhalation  Inhalation  Every 6 Hours    3. diphenhydrAMINE:  25  mg  Oral  Every 24 Hours    4. HYDROmorphone Injectable:  1  mg  IntraVenous Push  Every 3 Hours    5. Melatonin:  3  mg  Oral  At Bedtime    6. Naloxone Injectable:  0.2  mg  IntraVenous Push  Once    7. Sodium Chloride 0.9% Injectable Flush:  10  mL  IntraVenous Flush  Every 8 Hours and as Needed        Recent Lab Results:    Results:    CBC: 2023 05:28              \     Hgb     /                              \     7.9 L    /  WBC  ----------------  Plt                8.1       ----------------    290              /     Hct     \                              /     26.7 L    \            RBC: 2.38 L    MCV: 112 H    Neutrophil  %: 73.8      RFP: 2/25/2023 05:28  NA+        Cl-     BUN  /                         136    95 L   29 H  /  --------------------------------  Glucose                ---------------------------  122 H    K+     HCO3-   Creat \                         5.8 H   39 H    0.97  \  Calcium : 10.1Anion Gap : 8 L         Albumin : 3.0 L     Phos : 3.7        I have reviewed these laboratory results:    Renal Function Panel  25-Feb-2023 05:28:00      Result Value    Glucose, Serum  122   H   NA  136    K  5.8   H   CL  95   L   Bicarbonate, Serum  39   H   Anion Gap, Serum  8   L   BUN  29   H   CREAT  0.97    GFR Male  84    Calcium, Serum  10.1    Phosphorus, Serum  3.7    ALB  3.0   L     Complete Blood Count + Differential  25-Feb-2023 05:28:00      Result Value    White Blood Cell Count  8.1    Nucleated Erythrocyte Count  0.0    Red Blood Cell Count  2.38   L   HGB  7.9   L   HCT  26.7   L   MCV  112   H   MCHC  29.6   L   PLT  290    RDW-CV  12.6    Neutrophil %  73.8    Immature Granulocytes %  1.2   H   Lymphocyte %  12.1    Monocyte %  9.0    Eosinophil %  3.2    Basophil %  0.7    Neutrophil Count  5.99    Lymphocyte Count  0.98   L   Monocyte Count  0.73    Eosinophil Count  0.26    Basophil Count  0.06      Magnesium, Serum  25-Feb-2023 05:28:00      Result Value    Magnesium, Serum  1.91        Assessment and Plan:   Daily Risk Screen:  ·  Does patient have a central line? yes   ·  Central Line Type non-tunneled   ·  Plan for non-tunneled central line removal today? no   ·  The patient continues to require non-tunneled central line access for parenteral medication     Comorbidities:  ·  Comorbidity Other     Code Status:  ·  Code Status Full Code     Assessment:    67 year old Male with CHF, COPD on 4L at home, seizure disorder and a history  of multiple lumbar spine surgeries complicated  by MRSA wound infection with a known chronic open lumbar wound status post multiple debridements by plastic surgery, who presented to [place] with worsening low back pain as well as generalized fatigue and weakness, found to have MRSA infection of sacral  wound with exposed hardware, and elevated inflammatory markers. He presented to [facility] as a transfer for surgical evaluation by spine team. He was started on vancomcyin and zosyn on 2/10 in [place], which were continued on admission. BCx from 2/10 with NGTD.  Nsgy was consulted, and he underwent removal of lumbar spinal hardware and irrigation and debridement of lumbar wound on 2/15. Plastic surgery was consulted and recommended leaving wound open with TID dressing changes and plan for return to OR on 2/22  for lumbar spinal wound I&D and potential wound vac. Intraoperative wound cultures had no growth aerobically or anaerobically. Pt has a hx of chronic pain, and pain was managed after surgery with fentanyl patch (per home regimen), Tylenol (per home regimen),  and IV dilaudid 1mg IV q3h for moderate pain and 2mg IV q3h for severe pain. Pain regimen can be down-titrated to home regimen after plastic surgery intervention. Zosyn was discontinued on 2/19 given no evidence of pseudomonas. Pt continues to be on vancomycin.  Patient was having loose bowel movements multiple times daily but was negative for C.diff and stool softeners were stopped. Plastic surgery recommended wound vac and it was placed on 2/22, replaced on 2/24.    Updates 2/24:  -Potassium 6.0 yesterday (repeat from 6.1) with EKG showing peaked T-waves, ordered insulin & D5 & Ca gluconate & Lokelma, held Spironolactone.   -K 5.8 today, c/w Lokelma  -hydrocortisone for itching on lower back   -Plan to evaluate patient in OR next week  -Wound vac replaced by plastics on 2/24  -Vanc level 21.8 on 2/23, Vanc was decreased to 500 q12h  -Vanc to continue for 8 weeks  until 2/15 - 4/12  -Closely monitor vancomycin levels as patient had a break in medication and needs to be ensured to continue to receive   -Pain regimen  currently at 2mg q3h as needed  -Tremor to be discussed with patient and wife and chart review   -Outpatient f/u with Dr. Harrison    Cx history:     ·  2/10 OSH bedside wound cx - MRSA (final)      ·  2/15 OR deep wound cs - NGTD (final 2/17)    Abx hx:  1) Vancomcyin 2/10-   2) Zosyn 2/10- 2/19 (d/c given no culture evidence of pseudomonas)      #MRSA sacral wound infection  Micro:   -BCx 2/10 x2 NGTD  -[place] Wound cx 2/10/23: MRSA (S: clinda, vanc; R: TMP-SMX, tetracyclines)  -ESR elevated at >130  -CRP elevated at 5.26  -Intraoperative wound cx 2/15/23: no growth aerobically or anaerobically   - S/p below on 2/15:   1. exploration of lumbar wound dehiscence  2. removal of lumbar spinal hardware (set caps, rods, and screws)  3. irrigation and debridement of lumbar wound  - Plastic surgery recs  2/15:   1. WTD Kerlix dressings with Dakin's/Saline solution packed to the entire depth of the wound three times daily and PRN if soiled   -Lumbar CT without contrast, obtained on 2/18: soft tissue defect overlying lumbar and lower thoracic spine, soft tissue thickening, scattered soft tissue emphysema; multilevel degenerative changes of lumbar spine  - Plastics decided to place wound vac on 2/22 instead of I&D, reassessed and replaced wound vac on 2/24  Abx:   Plan:   1) Vancomcyin 2/10 - 4/12   2) Zosyn 2/10- 2/19 (d/c given no culture evidence of pseudomonas)    #Chronic pain - lower back  -home pain regimen: scheduled acetaminophen, fentanyl patch 50mcg q72h, dilaudid 4mg PO TID prn breakthrough  -bowel regimen  -follows with pain medicine Dr. Almazan at [facility]   -post-op pain regimen: Tylenol 650mg q6h, fentanyl patch 50mcg q72h, 1mg IV dilaudid q3h for moderate pain, 2mg IV dilaudid q3h for severe pain    #Hyperkalemia  -6.0 on 2/24/2023 with EKG of peaked T-waves,  5.8 on 2/23 resolved peaked T-waves  -hx of hyperkalemic episodes on review  -no CKD  -given insulin, d50, calcium gluconate, and lokelma   -will hold maxime   -conitnue  to monitor    #COPD, 4L at baseline  -SpO2 goal 88-92%  -Started on proventill (takes at home per wife)     #HFpEF   #HTN  -TTE Feb 2022: EF 65-70%, normal LV diastolic function, normal RV  -Hold ASA for possible procedure  -C/w home atorva, metop 12.5 BID, maxime 50 BID, losartan 50 qd, amlodipine 10  -Hold maxime for hyperkalemia on 2/24    #Seizure disorder - continue Keppra  #Anxiety - continue Buspar   #BPH - continue tamsulosin   #Gout - continue allopurinol     DVT ppx: lovenox   Code status: FULL (confirmed on admission)  NOK: Wife Mike [phone]            Attestation:   Note Completion:  I am a:  Resident/Fellow   Attending Attestation I saw and evaluated the patient.  I personally obtained the key and critical portions of the history and physical exam or was physically present for key and  critical portions performed by the resident/fellow. I reviewed the resident/fellow?s documentation and discussed the patient with the resident/fellow.  I agree with the resident/fellow?s medical decision making as documented in the note.     I personally evaluated the patient on 25-Feb-2023         Electronic Signatures:  Mame Kent)  (Signed 26-Feb-2023 09:49)   Authored: Note Completion   Co-Signer: Service, Subjective Data, Objective Data, Assessment and Plan, Note Completion  Freddie Caraballo (Resident))  (Signed 25-Feb-2023 14:23)   Authored: Service, Subjective Data, Objective Data, Assessment  and Plan, Note Completion      Last Updated: 26-Feb-2023 09:49 by Mame Kent)

## 2023-03-03 NOTE — PROGRESS NOTES
Service: Plastic Surgery     Subjective Data:   RAJ HARMON is a 67 year old Male who is Hospital Day #8 and POD #6 for 1. exploration of lumbar wound dehiscence; 2. removal of lumbar spinal hardware (set caps, rods, and screws);3. irrigation  and debridement of lumbar wound.     No acute concerns. Aware of plan for return to OR with plastic surgery on Wednesday 2/22 for additional lumbar spinal wound I&D, possible application of wound vac. Having interim dressing changes completed  per nursing. Patient OOB with assistance for voiding in bedside commode. Denies headache, dizziness, fever, chills, chest pain, dyspnea, abdominal pain, nausea, vomiting, diarrhea or constipation.    Overnight Events: Patient had an uneventful night.     Objective Data:     Objective Information:      T   P  R  BP   MAP  SpO2   Value  36.4  100  18  142/73      96%  Date/Time 2/21 9:42 2/21 9:42 2/21 9:42 2/21 9:42    2/21 9:42  Range  (36C - 37C )  (74 - 100 )  (18 - 19 )  (126 - 152 )/ (67 - 73 )    (96% - 99% )    As of 21-Feb-2023 09:13:00, patient is on 4 L/min of oxygen via nasal cannula.    Highest temp of 37 C was recorded at 2/21 5:23    Pain reported at 2/21 9:42: 8 = Severe    Physical Exam by System:    Constitutional: Alert and cooperative, NAD, nontoxic  in appearance.   Eyes: EOMI, PERRL.   ENMT: MMM, poor dentition, no ulceration/lesions.   Head/Neck: NC/AT.   Respiratory/Thorax: Unlabored respirations on 4L  NC.   Cardiovascular: Regular rate, extremities W/WP.   Gastrointestinal: Soft, large panus, nt/nd.   Genitourinary: Voiding per urinal.   Musculoskeletal: Generalized deconditioning.   Neurological: A&O x3.   Psychological: Appropriate behavior and mood.   Skin: Stage 1 ulcer on L heal. Large, 10-15 cm, full  thickness wound to the midline lumbosacral region. Wound bed with mixed fibroglandular appearance and slight slough. Surrounding tissue intact, mild periwound erythema.      Medication:    Medications:        Continuous Medications       --------------------------------  No continuous medications are active       Scheduled Medications       --------------------------------  1. Acetaminophen:  650  mg  Oral  Every 6 Hours    2. Allopurinol:  300  mg  Oral  Every 24 Hours    3. amLODIPine (NORVASC):  10  mg  Oral  Daily    4. Ascorbic Acid:  1000  mg  Oral  Daily    5. Atorvastatin:  20  mg  Oral  Daily    6. busPIRone (BUSPAR):  5  mg  Oral  Every 12 Hours    7. Docusate 50 mg - Senna 8.6 m  tablet(s)  Oral  2 Times a Day    8. Enoxaparin SubCutaneous:  40  mg  SubCutaneous  Every 24 Hours    9. fentaNYL 50 micrograms/ hour TransDermal:  1  patch  TransDermal  Every 72 Hours    10. levETIRAcetam (KEPPRA):  500  mg  Oral  2 Times a Day    11. Metoprolol Tartrate:  12.5  mg  Oral  2 Times a Day    12. Polyethylene Glycol:  17  gram(s)  Oral  Daily    13. Sodium Hypochlorite 0.125% (Dakins Quarter):  1  application(s)  Topical  3 Times a Day    14. Spironolactone:  50  mg  Oral  2 Times a Day    15. Tamsulosin:  0.4  mg  Oral  Daily    16. Vancomycin - RPh to Dose - IV Piggy Back:  1  each  As Specified  Variable    17. Vancomycin 1 gram IVPB/ Premixed Soln 200 mL:  1  gram(s)  IntraVenous Piggyback  Every 12 Hours         PRN Medications       --------------------------------  1. Acetaminophen:  650  mg  Oral  Once    2. Albuterol 90 micrograms/ Inhalation MDI:  2  inhalation  Inhalation  Every 6 Hours    3. HYDROmorphone Injectable:  1  mg  IntraVenous Push  Every 3 Hours    4. HYDROmorphone Injectable:  2  mg  IntraVenous Push  Every 3 Hours    5. Melatonin:  3  mg  Oral  At Bedtime    6. Naloxone Injectable:  0.2  mg  IntraVenous Push  Once    7. Sodium Chloride 0.9% Injectable Flush:  10  mL  IntraVenous Flush  Every 8 Hours and as Needed        Recent Lab Results:    Results:    I have reviewed these laboratory results:    Complete Blood Count + Differential  21-Feb-2023  05:45:00      Result Value    White Blood Cell Count  7.6    Nucleated Erythrocyte Count  0.0    Red Blood Cell Count  2.58   L   HGB  8.3   L   HCT  28.6   L   MCV  111   H   MCHC  29.0   L   PLT  317    RDW-CV  12.5    Neutrophil %  58.5    Immature Granulocytes %  3.0   H   Lymphocyte %  24.3    Monocyte %  8.1    Eosinophil %  4.9    Basophil %  1.2    Neutrophil Count  4.46    Lymphocyte Count  1.85    Monocyte Count  0.62    Eosinophil Count  0.37    Basophil Count  0.09      Renal Function Panel  21-Feb-2023 05:45:00      Result Value    Glucose, Serum  96    NA  137    K  5.2    CL  98    Bicarbonate, Serum  38   H   Anion Gap, Serum  6   L   BUN  24   H   CREAT  0.98    GFR Male  83    Calcium, Serum  9.9    Phosphorus, Serum  3.2    ALB  3.2   L     Magnesium, Serum  21-Feb-2023 05:45:00      Result Value    Magnesium, Serum  1.85        Radiology Results:    Results:    Impression:  1. There has been interval removal of the posterior fusion hardware  from the lumbar spine. There is again a large soft tissue defect with  marked thinning of the skin and subcutaneous soft tissues and  probable dehiscence overlying the lumbar and lower thoracic spine  with nonspecific soft tissue thickening and loss of fat soft tissue  planes as well as scattered soft tissue emphysema along the periphery  of the defect. The possibility of osteomyelitis cannot be excluded on  the basis of this examination.  2. Marked, multilevel degenerative changes of the lumbar spine with  multilevel postoperative changes from laminectomies and  posterolateral fusion masses.    CT L Spine without Contrast [Feb 18 2023  2:16PM]      Assessment and Plan:   Daily Risk Screen:  ·  Does patient have a central line? n/a consulting service     Comorbidities:  ·  Comorbidity Other     Code Status:  ·  Code Status Full Code     Assessment:    HARMON RAJ CALI is a 67 year old Male known to the plastic surgery team with a h/o multiple lumbar spinal  surgeries which have been c/b recurrent MRSA infections  requiring multiple extensive plastic surgery interventions including multiple debridements and attempted closures. Admitted to [facility] as transfer on 2/14 for concern of MRSA infection of chronically open lumbar wound with exposed hardware. Patient now  s/p lumbar spinal wound I&D and removal of spinal hardware per NSGY on 2/15. Plastic Surgery consulted for spinal wound debridement and coverage.     OR course (this admission):   2/15 - Lumbar spinal wound exploration, I&D and removal of spinal hardware (per NSGY)    Plan/Recommendations:   - Plan for OR with plastics Wednesday 2/22 for lumbar spinal wound I&D, possible application wound vac       · Please ensure COVID and T&S are updated for return to OR     · NPO at [facility] on 2/22  - Continue interim TID and PRN local wound care/dressing changes (nursing orders placed)     ·  WTD kerlix dressings with 1/4 strength Dankins solution packed to the entire depth of the wound  - CT L-spine w/o contrast obtained for surgical planning, results reviewed   - Ensure diligent pressure offloading to lumbar spine/wound site with frequent repositioning, q2h turns   - Nutrition optimization to promote wound healing      ·  Can consider addition of daily MV and nutritional supplements/shakes as able   - Cx history:     ·  2/10 OSH bedside wound cx - MRSA (final)      ·  2/15 OR deep wound cs - NGTD (final 2/17)  - Continue IV ABX per ID recs (currently IV Vanc)   - Appreciate remaining supportive care per primary service   - Plastics will continue to follow    Patient and plan discussed with Dr. Jaime.     Kiki Lin PA-C  Plastic and Reconstructive Surgery   [facility]  Pager #[phone]  Team phones: [phone]    Time spent on the assessment of patient, gathering and interpreting data, review of medical record/patient history, personally reviewing radiographic imaging and formulation of this note 30 minutes. With  greater than 50% spent in personal discussion with  patient.       Electronic Signatures:  Kiki Lin (PAC)  (Signed 21-Feb-2023 10:44)   Authored: Service, Subjective Data, Objective Data, Assessment  and Plan, Note Completion      Last Updated: 21-Feb-2023 10:44 by Kiki Lin (PAC)

## 2023-03-03 NOTE — PROGRESS NOTES
Service: Plastic Surgery     Subjective Data:   RAJ HARMON is a 67 year old Male who is Hospital Day # 15 and POD #0 for 1. Excision and debridement down to and including subcutaneous tissue;2. Partial adjacent tissue transfer;3. ;4. ;5.     Pt evaluated in PM interval after return to [facility] from PACU following OR this afternoon. Overall patient reports he is doing well. Pain is present to the lumbar spine which he describes as an ache but  is responsive to regular pain medication. Wound vac holding suction without alarming since OR. Denies fever, chest pain, SOB, abd pain, n/v/d.    Objective Data:     Objective Information:      T   P  R  BP   MAP  SpO2   Value  36.6  117  18  123/61      93%  Date/Time 2/28 20:27 2/28 20:27 2/28 20:27 2/28 20:27 2/28 20:27  Range  (36.2C - 36.7C )  (62 - 117 )  (18 - 22 )  (113 - 175 )/ (61 - 78 )    (93% - 98% )   As of 28-Feb-2023 08:55:00, patient is on 4 L/min of oxygen via nasal cannula.      Pain reported at 2/28 20:42: 9 = Severe    Physical Exam by System:    Constitutional: Alert and cooperative, NAD.   Eyes: EOMI, clear sclera.   ENMT: MMM.   Head/Neck: NC/AT.   Respiratory/Thorax: Unlabored respirations on O2  via NC. Symmetric chest rise.   Genitourinary: Voiding per urinal/commode.   Musculoskeletal: Wound vac intact to lumbar spine  without leak or obstruction. +tenderness surrounding wound. Skin tear noted to distal edge and pinpoint bleeding 2/2 to dry/fragile skin. Scant bloody output to collecting canister (<5cc).   Neurological: A&O x3.   Psychological: Appropriate behavior and mood.   Skin: Warm and dry, no lesions, no rashes.     Medication:    Medications:          Continuous Medications       --------------------------------  No continuous medications are active       Scheduled Medications       --------------------------------    1. Acetaminophen:  650  mg  Oral  Every 6 Hours    2. Allopurinol:  300  mg  Oral  Every 24 Hours    3. amLODIPine  (NORVASC):  10  mg  Oral  Daily    4. Ascorbic Acid:  1000  mg  Oral  Daily    5. Atorvastatin:  20  mg  Oral  Daily    6. busPIRone (BUSPAR):  5  mg  Oral  Every 12 Hours    7. Docusate 50 mg - Senna 8.6 m  tablet(s)  Oral  2 Times a Day    8. Enoxaparin SubCutaneous:  40  mg  SubCutaneous  Every 24 Hours    9. fentaNYL 50 micrograms/ hour TransDermal:  1  patch  TransDermal  Every 72 Hours    10. Hydrocortisone 1% Topical:  1  application(s)  Topical  2 Times a Day    11. levETIRAcetam (KEPPRA):  500  mg  Oral  2 Times a Day    12. Metoprolol Tartrate:  12.5  mg  Oral  2 Times a Day    13. Polyethylene Glycol:  17  gram(s)  Oral  Daily    14. Sodium Hypochlorite 0.125% (Dakins Quarter):  1  application(s)  Topical  3 Times a Day    15. Tamsulosin:  0.4  mg  Oral  Daily    16. Vancomycin - RPh to Dose - IV Piggy Back:  1  each  As Specified  Variable    17. Vancomycin 750 mg/ D5W IVPB Premixed Soln 150 mL:  750  mg  IntraVenous Piggyback  Every 12 Hours         PRN Medications       --------------------------------    1. Albuterol 90 micrograms/ Inhalation MDI:  2  inhalation  Inhalation  Every 6 Hours    2. Calcium Carbonate Chewable:  500  mg  Oral  Every 2 Hours    3. diphenhydrAMINE:  25  mg  Oral  Every 24 Hours    4. Heparin Flush 10 unit/ mL Injectable PRN:  5  mL  IntraVenous Flush  According to Flush Policy    5. HYDROmorphone Injectable:  1  mg  IntraVenous Push  Every 3 Hours    6. HYDROmorphone Injectable:  2  mg  IntraVenous Push  Every 3 Hours    7. Melatonin:  3  mg  Oral  At Bedtime    8. Naloxone Injectable:  0.2  mg  IntraVenous Push  Once    9. Sodium Chloride 0.9% Injectable Flush:  10  mL  IntraVenous Flush  Every 8 Hours and as Needed        Recent Lab Results:    Results:    CBC: 2023 06:02              \     Hgb     /                              \     7.5 L    /  WBC  ----------------  Plt               7.1       ----------------    286              /     Hct     \                               /     24.9 L    \            RBC: 2.27 L    MCV: 110 H    Neutrophil  %: 67.2      RFP: 2/28/2023 06:02  NA+        Cl-     BUN  /                         138    94 L   26 H  /  --------------------------------  Glucose                ---------------------------  107 H    K+     HCO3-   Creat \                         4.5    40 HH   0.81  \  Calcium : 9.4Anion Gap : 9 L         Albumin : 2.9 L     Phos : 3.6      Assessment and Plan:   Daily Risk Screen:  ·  Does patient have an indwelling urinary catheter? n/a consulting service   ·  Does patient have a central line? n/a consulting service     Comorbidities:  ·  Comorbidity Other     Code Status:  ·  Code Status Full Code     Assessment:    RAJ HARMON is a 67 year old Male known to the plastic surgery team with a h/o multiple lumbar spinal surgeries which have been c/b recurrent MRSA infections  requiring multiple extensive plastic surgery interventions including multiple debridements and attempted closures. Admitted to [facility] as transfer on 2/14 for concern of MRSA infection of chronically open lumbar wound with exposed hardware. Patient now  s/p lumbar spinal wound I&D and removal of spinal hardware per NSGY on 2/15. Plastic Surgery consulted for spinal wound debridement and coverage.     OR course (this admission):   2/15 - Lumbar spinal wound exploration, I&D and removal of spinal hardware (per NSGY)  2/28 - Lumbar wound excision and debridement down to and including subcutaneous tissue, partial adjacent tissue transfer, wound vac application (per plastic surgery)     Plan/Recommendations:   S/p lumbar wound I&D, partial adjacent tissue transfer, wound vac placement   - Plan to return to OR Tuesday 3/7 for additional debridement of lumbar spine wound, potential flap vs wound vac application       · NPO at midnight        · Update T&S and COVID 3/6   - Maintain wound vac to lumbar spine at -125mmHg low continuous suction        · No  interim bedside changes necessary prior to return to OR 3/7        · Nursing to monitor/record wound vac canister output       · Please notify plastic surgery with any concerns regarding wound vac leak or malfunction   - Ensure diligent pressure offloading to lumbar spine/wound site with frequent repositioning, Q2h turns   - Nutrition optimization to promote wound healing, consider addition of daily MV and nutritional supplements/shakes as able   - Culture history       · 2/10 OSH bedside withound cx - MRSA (final)        · 2/15 OR deep withound cs - NGTD (final 2/17)       · Urine cultures obtained intraop 2/28 due to concerns of dark/sedimentary urine to more, results pending  - Continue IV ABX per ID recs (currently IV Vanc)   - Appreciate remaining supportive care per primary service   - Plastics will continue to follow    Patient and plan discussed with Dr. Yeni Ariza, PA-C  Plastic and Reconstructive Surgery  [facility], Pager [phone], Team phones: [phone]    Time spent on the assessment of patient, gathering and interpreting data, review of medical record/patient history, personally reviewing radiographic imaging and formulation of this note 30 minutes. With greater than 50% spent in personal discussion with  patient.          Electronic Signatures:  Zunilda Ariza (PAC)  (Signed 28-Feb-2023 21:45)   Authored: Service, Subjective Data, Objective Data, Assessment  and Plan, Note Completion      Last Updated: 28-Feb-2023 21:45 by Zunilda Ariza (PAC)

## 2023-03-03 NOTE — PROGRESS NOTES
Service: Infectious Disease     Subjective Data:   RAJ HARMON is a 67 year old Male who is Hospital Day # 6 and POD #4 for 1. exploration of lumbar wound dehiscence;2. removal of lumbar spinal hardware (set caps, rods, and screws);3. irrigation  and debridement of lumbar wound.    Additional Information:    This AM, pt continued to endorse soft, mushy stools, and persistent pain which is well controlled with his current pain regimen.  He just wants to speak w/ plastics  about the results of his CT. We have notified plastics of the results and they have reassured they will review the imaging. and get back to him. We informed the patient the imaging was ordered for surgical planning.    Objective Data:     Objective Information:      T   P  R  BP   MAP  SpO2   Value  36.1  71  18  121/69      100%  Date/Time 2/19 5:01 2/19 5:01 2/19 5:01 2/19 5:01    2/19 5:01  Range  (35.8C - 36.6C )  (71 - 90 )  (17 - 18 )  (119 - 144 )/ (69 - 72 )    (96% - 100% )   As of 19-Feb-2023 07:30:00, patient is on 4 L/min of oxygen via nasal cannula.      Pain reported at 2/19 7:30: 8 = Severe    Physical Exam Narrative:  ·  Physical Exam:    Gen: well-appearing, NAD  Head and neck: NCAT, neck supple without LAD, severe kyphoscoliosis   HEENT: MMM, normal nose without congestion, poor dentition   CV: RRR no mrg  Pulm: CTAB, prolonged exp phase, no wheezing or crackles, normal WOB on 4L  Abd: obese, soft, non-tender, non-distended  Ext: no cyanosis or clubbing, trace LE edema, thenar wasting bilat; 2-3cm diameter stage 1 ulcer on L heel  Neuro: alert and oriented x3, normal tone, face symmetric, moves all extremities spontaneously     Medication:    Medications:          Continuous Medications       --------------------------------  No continuous medications are active       Scheduled Medications       --------------------------------    1. Acetaminophen:  650  mg  Oral  Every 6 Hours    2. Allopurinol:  300  mg  Oral  Every  24 Hours    3. amLODIPine (NORVASC):  10  mg  Oral  Daily    4. Ascorbic Acid:  1000  mg  Oral  Daily    5. Atorvastatin:  20  mg  Oral  Daily    6. busPIRone (BUSPAR):  5  mg  Oral  Every 12 Hours    7. Docusate 50 mg - Senna 8.6 m  tablet(s)  Oral  2 Times a Day    8. Enoxaparin SubCutaneous:  40  mg  SubCutaneous  Every 24 Hours    9. fentaNYL 50 micrograms/ hour TransDermal:  1  patch  TransDermal  Every 72 Hours    10. levETIRAcetam (KEPPRA):  500  mg  Oral  2 Times a Day    11. Metoprolol Tartrate:  12.5  mg  Oral  2 Times a Day    12. Polyethylene Glycol:  17  gram(s)  Oral  Daily    13. Sodium Hypochlorite 0.125% (Dakins Quarter):  1  application(s)  Topical  3 Times a Day    14. Spironolactone:  50  mg  Oral  2 Times a Day    15. Tamsulosin:  0.4  mg  Oral  Daily    16. Vancomycin - RPh to Dose - IV Piggy Back:  1  each  As Specified  Variable    17. Vancomycin IV Piggy Back:  1250  mg  IntraVenous Piggyback  Every 12 Hours         PRN Medications       --------------------------------    1. Acetaminophen:  650  mg  Oral  Once    2. Albuterol 90 micrograms/ Inhalation MDI:  2  inhalation  Inhalation  Every 6 Hours    3. HYDROmorphone Injectable:  1  mg  IntraVenous Push  Every 3 Hours    4. HYDROmorphone Injectable:  2  mg  IntraVenous Push  Every 3 Hours    5. Melatonin:  3  mg  Oral  At Bedtime    6. Naloxone Injectable:  0.2  mg  IntraVenous Push  Once    7. Sodium Chloride 0.9% Injectable Flush:  10  mL  IntraVenous Flush  Every 8 Hours and as Needed        Recent Lab Results:    Results:    CBC: 2023 05:24              \     Hgb     /                              \     7.8 L    /  WBC  ----------------  Plt               8.4       ----------------    333              /     Hct     \                              /     26.8 L    \            RBC: 2.42 L    MCV: 111 H    Neutrophil  %: 65.9      RFP: 2023 05:24  NA+        Cl-     BUN  /                         138    97 L   20   /  --------------------------------  Glucose                ---------------------------  99    K+     HCO3-   Creat \                         4.8    38 H   1.42 H  \  Calcium : 9.2Anion Gap : 8 L         Albumin : 2.9 L     Phos : 3.3      Radiology Results:    Results:        Impression:    1. There has been interval removal of the posterior fusion hardware  from the lumbar spine. There is again a large soft tissue defect with  marked thinning of the skin and subcutaneous soft tissues and  probable dehiscence overlying the lumbar and lower thoracic spine  with nonspecific soft tissue thickening and loss of fat soft tissue  planes as well as scattered soft tissue emphysema along the periphery  of the defect. The possibility of osteomyelitis cannot be excluded on  the basis of this examination.  2. Marked, multilevel degenerative changes of the lumbar spine with  multilevel postoperative changes from laminectomies and  posterolateral fusion masses.     CT L Spine without Contrast [Feb 18 2023  2:16PM]      Assessment and Plan:   Daily Risk Screen:  ·  Does patient have a central line? yes   ·  Central Line Type PICC   ·  Plan for PICC removal today? no   ·  The patient continues to require a PICC for parenteral medication     Comorbidities:  ·  Comorbidity Other     Code Status:  ·  Code Status Full Code     Assessment:    67 year old Male with CHF, COPD on 4L at home, seizure disorder and a history of multiple lumbar spine surgeries complicated  by MRSA wound infection with a known chronic open lumbar wound status post multiple debridements by plastic surgery, who presented to [place] with worsening low back pain as well as generalized fatigue and weakness, found to have MRSA infection of sacral  wound with exposed hardware, and elevated inflammatory markers. He presented to [facility]  as a transfer for surgical evaluation by spine team. He was started on vancomcyin and zosyn on 2/10 in [place], which were continued on  admission.  BCx from 2/10 with NGTD. Nsgy was consulted, and he underwent removal of lumbar spinal hardware and irrigation and debridement of lumbar wound on 2/15. Plastic surgery was consulted and recommended leaving wound open with TID dressing changes and plan  for return to OR on 2/22 for lumbar spinal wound I&D and potential wound vac. Intraoperative wound cultures had no growth aerobically or anaerobically. Pt has a hx of chronic pain, and pain was managed after surgery with fentanyl patch (per home regimen),  Tylenol (per home regimen), and IV dilaudid 1mg IV q3h for moderate pain and 2mg IV q3h for severe pain. Pain regimen can be down-titrated to home regimen after plastic surgery intervention. Pt continues to be on vanc/zosyn.     Updates 2/19:  - C/w vanc  - D/c Zosyn (2/19) given no culture evidence of pseudomonas  - C. diff negative   - 2//18 CT LSpine demonstrates:  interval removal of the posterior fusion hardware from the lumbar spine. There is again a large soft tissue defect with marked thinning  of the skin and subcutaneous soft tissues and probable dehiscence overlying the lumbar and lower thoracic spine with nonspecific soft tissue thickening and loss of fat soft tissue planes as well as scattered soft tissue emphysema along the periphery of  the defect. The possibility of osteomyelitis cannot be excluded on the basis of this examination. 2. Marked, multilevel degenerative changes of the lumbar spine with multilevel postoperative changes from laminectomies and posterolateral fusion masses.  [ ] f/u Plastic Surg recs    #MRSA sacral wound infection  Micro:   -BCx 2/10 x2 NGTD  -[place] Wound cx 2/10/23: MRSA (S: clinda, vanc; R: TMP-SMX, tetracyclines)  -ESR elevated at >130  -CRP elevated at 5.26  -Intraoperative wound cx 2/15/23: no growth aerobically or anaerobically   - S/p below on 2/15:   1. exploration of lumbar wound dehiscence  2. removal of lumbar spinal hardware (set caps, rods, and  screws)  3. irrigation and debridement of lumbar wound  - Plastic surgery recs  2/15:   1. WTD Kerlix dressings with Dakin's/Saline solution packed to the entire depth of the wound three times daily and PRN if soiled   2. return to OR with plastic surgery on 2/22 for lumbar spinal wound I&D and possible application of wound vac  3. Lumbar CT without contrast, obtained on 2/18: soft tissue defect overlying lumbar and lower thoracic spine, soft tissue thickening, scattered soft tissue emphysema; multilevel degenerative changes of lumbar spine  Abx:   Vancomycin dosing: Pt received vancomycin 2/10 - 2/13, supratherapeutic (20.4) and not resumed on admission on 2/14, 1x dose 1.5g IV intra-operatively on 2/15, and resumed on 2/17 with 1500mg q12h dosing   Plan:   1) Vancomcyin 2/10-   2) Zosyn 2/10-   3) Appreciate plastic surgery recs  4) F/u intraoperative cultures     #Chronic pain - lower back  -home pain regimen: scheduled acetaminophen, fentanyl patch 50mcg q72h, dilaudid 4mg PO TID prn breakthrough  -bowel regimen  -follows with pain medicine Dr. Almazan at [facility]   -post-op pain regimen: Tylenol 650mg q6h, fentanyl patch 50mcg q72h, 1mg IV dilaudid q3h for moderate pain, 2mg IV dilaudid q3h for severe  pain    #COPD, 4L at baseline  -SpO2 goal 88-92%  -Started on proventill (takes at home per wife)     #HFpEF   #HTN  -TTE Feb 2022: EF 65-70%, normal LV diastolic function, normal RV  -Hold ASA for possible procedure  -C/w home atorva, metop 12.5 BID, maxime 50 BID, losartan 50 qd, amlodipine 10    #Seizure disorder - continue Keppra  #Anxiety - continue Buspar   #BPH - continue tamsulosin   #Gout - continue allopurinol     DVT ppx: lovenox   Code status: FULL (confirmed on admission)  NOK: Giulia Mckeon [phone]      Attestation:   Note Completion:  I am a:  Resident/Fellow   Attending Attestation I saw and evaluated the patient.  I personally obtained the key and critical portions of the history and physical exam  or was physically present for key and  critical portions performed by the resident/fellow. I reviewed the resident/fellow?s documentation and discussed the patient with the resident/fellow.  I agree with the resident/fellow?s medical decision making as documented in the note.     I personally evaluated the patient on 19-Feb-2023         Electronic Signatures:  Myriam Estrada (Resident))  (Signed 19-Feb-2023 11:59)   Authored: Service, Subjective Data, Objective Data, Assessment  and Plan, Note Completion  Darrian Galvez)  (Signed 20-Feb-2023 14:49)   Authored: Note Completion   Co-Signer: Service, Subjective Data, Objective Data, Assessment and Plan, Note Completion      Last Updated: 20-Feb-2023 14:49 by Darrian Galvez)

## 2023-03-03 NOTE — PROGRESS NOTES
Service: Plastic Surgery     Subjective Data:   RAJ HARMON is a 67 year old Male who is Hospital Day # 11 and POD #9 for 1. exploration of lumbar wound dehiscence;2. removal of lumbar spinal hardware (set caps, rods, and screws);3. irrigation  and debridement of lumbar wound.     Pt resting comfortably in bed in NAD. He is currently eating breakfast and states he overall is doing well.     Denies any fever, chills, night sweats, CP, SOB, palpitations, nausea, vomiting, diarrhea, abdominal pain.    Objective Data:     Objective Information:      T   P  R  BP   MAP  SpO2   Value  36.2  77  18  126/59   90  97%  Date/Time 2/24 5:25 2/24 5:25 2/24 5:25 2/24 5:25  2/23 4:43 2/24 5:25  Range  (36C - 36.9C )  (69 - 84 )  (18 - 18 )  (117 - 128 )/ (59 - 73 )  (90 - 90 )  (96% - 97% )  Highest temp of 36.9 C was recorded at 2/23 9:21      Pain reported at 2/24 9:00: 5 = Moderate    Physical Exam by System:    Constitutional: Alert and cooperative, NAD   Eyes: EOMI, PERRL.   ENMT: MMM, no ulcerations/lesions   Head/Neck: NC/AT   Respiratory/Thorax: Unlabored respirations on 4L  NC.   Cardiovascular: Regular rate   Gastrointestinal: Soft, large panus, nt/nd.   Genitourinary: Voiding per urinal/commode   Musculoskeletal: Generalized deconditioning.   Neurological: A&O x3.   Psychological: Appropriate behavior and mood.   Skin: Lumbar wound: Pink/red granulation tissue with  minimal exudate. Periwound erythema. Surrounding tissue remains, soft with minimal ttp. No areas of palpable fluid collection or areas of induration at/ around the vac site.     Recent Lab Results:    Results:    CBC: 2/24/2023 08:32              \     Hgb     /                              \     8.6 L    /  WBC  ----------------  Plt               10.2       ----------------    334              /     Hct     \                              /     28.5 L    \            RBC: 2.66 L    MCV: 107 H          RFP: 2/24/2023 10:58  NA+        Cl-      BUN  /                         137    94 L   26 H  /  --------------------------------  Glucose                ---------------------------  120 H    K+     HCO3-   Creat \                         6.0 H   40 HH    1.03  \  Calcium : 9.8Anion Gap : 9 L         Albumin : 3.4     Phos : 3.9      Radiology Results:    Results:    Impression:  1. There has been interval removal of the posterior fusion hardware  from the lumbar spine. There is again a large soft tissue defect with  marked thinning of the skin and subcutaneous soft tissues and  probable dehiscence overlying the lumbar and lower thoracic spine  with nonspecific soft tissue thickening and loss of fat soft tissue  planes as well as scattered soft tissue emphysema along the periphery  of the defect. The possibility of osteomyelitis cannot be excluded on  the basis of this examination.  2. Marked, multilevel degenerative changes of the lumbar spine with  multilevel postoperative changes from laminectomies and  posterolateral fusion masses.    CT L Spine without Contrast [Feb 18 2023  2:16PM]      Assessment and Plan:   Daily Risk Screen:  ·  Does patient have an indwelling urinary catheter? n/a consulting service   ·  Does patient have a central line? n/a consulting service     Comorbidities:  ·  Comorbidity Other     Code Status:  ·  Code Status Full Code     Assessment:    RAJ HARMON is a 67 year old Male known to the plastic surgery team with a h/o multiple lumbar spinal surgeries which have been c/b recurrent MRSA infections  requiring multiple extensive plastic surgery interventions including multiple debridements and attempted closures. Admitted to [facility] as transfer on 2/14 for concern of MRSA infection of chronically open lumbar wound with exposed hardware. Patient now  s/p lumbar spinal wound I&D and removal of spinal hardware per NSGY on 2/15. Plastic Surgery consulted for spinal wound debridement and coverage.     OR course (this  admission):   2/15 - Lumbar spinal wound exploration, I&D and removal of spinal hardware (per NSGY)    A: Pt continues to progress well. Wound vac therapy continuing without issue.      Plan/Recommendations:   - Vac changed bedside 2/24 - Plans for return to OR for further intervention TBD - likely next week.   - Ensure diligent pressure offloading to lumbar spine/wound site with frequent repositioning, q2h turns      - take care to not tug or pull vac tubing / tape.   - Nutrition optimization to promote wound healing      ·  Can consider addition of daily MV and nutritional supplements/shakes as able   - Cx history:     ·  2/10 OSH bedside wound cx - MRSA (final)      ·  2/15 OR deep wound cs - NGTD (final 2/17)  - Continue IV ABX per ID recs (currently IV Vanc)   - Appreciate remaining supportive care per primary service   - Plastics will continue to follow    Patient and plan discussed with Dr. Jaime.     Shirley Romero PA-C   Plastic and Reconstructive Surgery    Available via [facility], pager: [phone] or Team phones: [phone]     Time spent on the assessment of patient, gathering and interpreting data, review of medical record/patient history, personally reviewing radiographic imaging and formulation of this note 30 minutes. With greater than 50% spent in personal discussion with  patient.        Electronic Signatures:  Shirley Romero (PA (PHYSICIAN))  (Signed 24-Feb-2023 12:53)   Authored: Service, Subjective Data, Objective Data, Assessment  and Plan, Note Completion      Last Updated: 24-Feb-2023 12:53 by Shirley Romero (PA (PHYSICIAN))

## 2023-03-03 NOTE — PROGRESS NOTES
Service: Plastic Surgery     Subjective Data:   RAJ HARMON is a 67 year old Male who is Hospital Day # 3 and POD #1 for 1. exploration of lumbar wound dehiscence;2. removal of lumbar spinal hardware (set caps, rods, and screws);3. irrigation  and debridement of lumbar wound.     Sitting up resting in bed this AM. States pain is under control. Denies any acute concerns at this time.    Denies any fever, chills, night sweats, CP, SOB, palpitations, nausea, vomiting, or abdominal pain/discomfort.    Additional Information:    OR yesterday with neurosurgery for debridement and removal of infected hardware    Objective Data:     Objective Information:      T   P  R  BP   MAP  SpO2   Value  36.7  97  18  127/70      99%  Date/Time 2/16 9:35 2/16 9:35 2/16 9:35 2/16 9:35    2/16 9:35  Range  (35.6C - 36.7C )  (74 - 99 )  (18 - 18 )  (110 - 136 )/ (61 - 70 )    (96% - 99% )   As of 15-Feb-2023 21:15:00, patient is on 4 L/min of oxygen via nasal cannula.      Pain reported at 2/16 10:07: 10 = Severe    Physical Exam by System:    Constitutional: Alert and cooperative, well appearing,  NAD   Eyes: EOMI, PERRL   ENMT: MMM, poor dentition, no ulceration/lesions   Head/Neck: NC/AT   Respiratory/Thorax: Unlabored respirations on 4L  NC   Gastrointestinal: Soft, large panus, nt/nd   Genitourinary: Voiding per urinal   Neurological: A&O x3   Psychological: Appropriate behavior and mood   Skin: Stage 1 ulcer on L heal. Large 10-15 cm stage  IV decubitus ulcer in the lumbosacral region, midline. Wound bed with fibrinous slough/pink. Surrounding tissue intact, some erythema     Medication:    Medications:      ALTERNATIVE MEDICINES:    1. Melatonin:  3  mg  Oral  At Bedtime   PRN         ANTI-INFECTIVES:    1. Piperacillin - Tazobactam 4.5 gram/Iso-osmotic 100 mL Premix IVPB:  100  mL  IntraVenous Piggyback  Every 6 Hours      CARDIOVASCULAR AGENTS:    1. Spironolactone:  50  mg  Oral  2 Times a Day    2. Tamsulosin:  0.4   mg  Oral  Daily    3. Metoprolol Tartrate:  12.5  mg  Oral  2 Times a Day    4. amLODIPine (NORVASC):  10  mg  Oral  Daily      CENTRAL NERVOUS SYSTEM AGENTS:    1. Acetaminophen:  650  mg  Oral  Once   PRN       2. Acetaminophen:  650  mg  Oral  Every 6 Hours    3. fentaNYL 50 micrograms/ hour TransDermal:  1  patch  TransDermal  Every 72 Hours    4. HYDROmorphone Injectable:  1  mg  IntraVenous Push  Every 3 Hours   PRN       5. HYDROmorphone Injectable:  2  mg  IntraVenous Push  Every 3 Hours   PRN       6. levETIRAcetam (KEPPRA):  500  mg  Oral  2 Times a Day    7. busPIRone (BUSPAR):  5  mg  Oral  Every 12 Hours      GASTROINTESTINAL AGENTS:    1. Docusate 50 mg - Senna 8.6 m  tablet(s)  Oral  2 Times a Day    2. Polyethylene Glycol:  17  gram(s)  Oral  Daily      METABOLIC AGENTS:    1. Allopurinol:  300  mg  Oral  Every 24 Hours    2. Atorvastatin:  20  mg  Oral  Daily      MISCELLANEOUS AGENTS:    1. Naloxone Injectable:  0.2  mg  IntraVenous Push  Once   PRN         NUTRITIONAL PRODUCTS:    1. Sodium Chloride 0.9% Injectable Flush:  10  mL  IntraVenous Flush  Every 8 Hours and as Needed   PRN       2. Ascorbic Acid:  1000  mg  Oral  Daily      RESPIRATORY AGENTS:    1. Albuterol 90 micrograms/ Inhalation MDI:  2  inhalation  Inhalation  Every 6 Hours   PRN         TOPICAL AGENTS:    1. Sodium Hypochlorite 0.125% (Dakins Quarter):  1  application(s)  Topical  3 Times a Day      Recent Lab Results:    Results:    CBC: 2023 06:18              \     Hgb     /                              \     8.2 L    /  WBC  ----------------  Plt               9.2       ----------------    354              /     Hct     \                              /     27.9 L    \            RBC: 2.54 L    MCV: 110 H    Neutrophil  %: 70.3      RFP: 2023 06:18  NA+        Cl-     BUN  /                         140    102    20  /  --------------------------------  Glucose                ---------------------------  100  H    K+     HCO3-   Creat \                         5.1    35 H   1.11  \  Calcium : 9.2Anion Gap : 8 L         Albumin : 2.6 L     Phos : 3.9        ---------- Recent Arterial Blood Gas Results----------     2/15/2023 13:40  pO2 202  pH 7.41  pCO2 49  SO2 100  Base Excess 5.7null    Assessment and Plan:   Daily Risk Screen:  ·  Does patient have an indwelling urinary catheter? n/a consulting service   ·  Does patient have a central line? n/a consulting service     Comorbidities:  ·  Comorbidity Other     Code Status:  ·  Code Status Full Code     Assessment:    Inpatient surgical course:  2/15/23: [facility] neurosurgery: S/P exploration of lumbar wound dehiscence, removal of lumbar spinal hardware, I&D of lumbar wound    Plan:   - WTD kerlix dressings with quarter strength Dankins solution packed to the entire depth of the wound TID and PRN.  - Plan for OR 2/22 with plastic surgery for lumbar spinal wound I&D and possible vac.      · Please ensure COVID and T&S are up to date     · NPO at [facility] on 2/22  - Will continue to follow in the interim    Discussed with FIDEL Jones-CNP  Plastic and Reconstructive Surgery  Doc Halo, Pager #[phone], Team phones: [phone]      Time spent on the assessment of patient, gathering and interpreting data, review of medical record/patient history, personally reviewing radiographic imaging and formulation of this note 45 minutes. With greater than 50% spent in personal discussion with  patient.        Electronic Signatures:  Angel Johnson (FIDEL-CNP)  (Signed 16-Feb-2023 11:04)   Authored: Service, Subjective Data, Objective Data, Assessment  and Plan, Note Completion      Last Updated: 16-Feb-2023 11:04 by Angel Johnson (APRN-CNP)

## 2023-03-03 NOTE — H&P
History & Physical Reviewed:   I have reviewed the History and Physical dated:  14-Feb-2023   History and Physical reviewed and relevant findings noted. Patient examined to review pertinent physical  findings.: No significant changes   Home Medications Reviewed: no changes noted   Allergies Reviewed: no changes noted       ERAS (Enhanced Recovery After Surgery):  ·  ERAS Patient: no     Consent:   COVID-19 Consent:  ·  COVID-19 Risk Consent Surgeon has reviewed key risks related to the risk of josiah COVID-19 and if they contract COVID-19 what the risks are.     Attestation:   Note Completion:  I am a:  Resident/Fellow   Attending Attestation I saw and evaluated the patient.  I personally obtained the key and critical portions of the history and physical exam or was physically present for key and  critical portions performed by the resident/fellow. I reviewed the resident/fellow?s documentation and discussed the patient with the resident/fellow.  I agree with the resident/fellow?s medical decision making as documented in the note.     I personally evaluated the patient on 15-Feb-2023         Electronic Signatures:  ANG Espinoza)  (Signed 15-Feb-2023 07:28)   Authored: Note Completion   Co-Signer: History & Physical Reviewed, ERAS, Consent, Note Completion  Rachel Acosta (Resident))  (Signed 14-Feb-2023 20:30)   Authored: History & Physical Reviewed, ERAS, Consent,  Note Completion      Last Updated: 15-Feb-2023 07:28 by ANG Espinoza)

## 2023-03-03 NOTE — PROGRESS NOTES
Service: Infectious Disease     Subjective Data:   RAJ HARMON is a 67 year old Male who is Hospital Day # 2 and POD #0 for 1. exploration of lumbar wound dehiscence;2. removal of lumbar spinal hardware (set caps, rods, and screws);3. irrigation  and debridement of lumbar wound.    Additional Information:    This AM, pt c/o headache. Recently received dilaudid. No other concerns.     Objective Data:     Objective Information:      T   P  R  BP   MAP  SpO2   Value  35.6  79  18  136/61      98%  Date/Time 2/15 5:37 2/15 8:50 2/15 8:50 2/15 8:50    2/15 5:37  Range  (35.6C - 36.9C )  (73 - 101 )  (16 - 18 )  (118 - 136 )/ (61 - 74 )    (98% - 99% )   As of 15-Feb-2023 08:41:00, patient is on 4 L/min of oxygen via nasal cannula.  Highest temp of 36.9 C was recorded at 2/14 12:51      Pain reported at 2/15 15:44: talking on phone with wife.    Physical Exam Narrative:  ·  Physical Exam:    Gen: well-appearing, NAD  Head and neck: NCAT, neck supple without LAD, severe kyphoscoliosis   HEENT: MMM, normal nose without congestion, poor dentition   CV: RRR no mrg  Pulm: CTAB, prolonged exp phase, no wheezing or crackles, normal WOB on 4L  Abd: obese, soft, non-tender, non-distended  Back: 10-15cm stage IV ulcer in the lumbosacral region, midline, exposed hardware, minimal surrounding erythema (see images)  Ext: no cyanosis or clubbing, trace LE edema, thenar wasting bilat; 2-3cm diameter stage 1 ulcer on L heel  Neuro: alert and oriented x3, normal tone, face symmetric, moves all extremities spontaneously     Recent Lab Results:    Results:    CBC: 2/15/2023 06:31              \     Hgb     /                              \     8.7 L    /  WBC  ----------------  Plt               7.5       ----------------    396              /     Hct     \                              /     29.5 L    \            RBC: 2.70 L    MCV: 109 H    Neutrophil  %: 64.0      RFP: 2/15/2023 06:31  NA+        Cl-     BUN  /                          140    101    19  /  --------------------------------  Glucose                ---------------------------  94    K+     HCO3-   Creat \                         4.6    32    0.92  \  Calcium : 9.5Anion Gap : 12          Albumin : 2.7 L    Phos : 3.7      Coagulation: 2/15/2023 06:31  PT  /                    11.6  /  -------<    INR          ----------<      1.0  PTT\                              \                       ---------- Recent Arterial Blood Gas Results----------     2/15/2023 13:40  pO2 202  pH 7.41  pCO2 49  SO2 100  Base Excess 5.7null    Assessment and Plan:   Daily Risk Screen:  ·  Does patient have a central line? yes   ·  Central Line Type PICC   ·  Plan for PICC removal today? no   ·  The patient continues to require a PICC for parenteral medication     Comorbidities:  ·  Comorbidity Other     Code Status:  ·  Code Status Full Code     Assessment:    67 year old Male with CHF, COPD on 4L at home, seizure disorder and a history of multiple lumbar spine surgeries complicated  by MRSA wound infection with a known chronic open lumbar wound status post multiple debridements by plastic surgery, who presented to [facility] [place] with worsening low back pain as well as generalized fatigue and weakness, found to have MRSA infection of sacral  wound with exposed hardware. He presents to [facility] as a transfer for surgical evaluation by spine team. High suspicion for osteomyelitis in this host with ESR > 130; OM has not been ruled out. Patient unable to tolerate MRI d/t kyphoscoliosis. Will continue  vanc for MRSA, and zosyn because of wound contamination with diarrhea.     Updates 2/15  - Pt is s/p exploration of lumbar wound dehiscence, removal of lumbar spinal hardware, irrigation and debridement of lumbar wound with NSGY and plastic surgery  - Will f/u plastic surgery recs and nsgy recs, per nsgy post-operative note, current recs are wet-to-dry dakins dressings with eventual plan for washout + wound vac  placement     #MRSA sacral wound infection  -BCx 2/10 x2 NGTD  -[place] Wound cx 2/10/23: MRSA (S: clinda, vanc; R: TMP-SMX, tetracyclines)  -ESR elevated at >130  -CRP elevated at 5.26  - S/p below on 2/15:   1. exploration of lumbar wound dehiscence  2. removal of lumbar spinal hardware (set caps, rods, and screws)  3. irrigation and debridement of lumbar wound  Plan:   1) Vancomcyin 2/10-   2) Zosyn 2/10-  3) F/u plastic surgery recs  4) F/u nsgy recs     #COPD, 4L at baseline  -SpO2 goal 88-92%  -Started on proventill (takes at home per wife)     #HFpEF   #HTN  -TTE Feb 2022: EF 65-70%, normal LV diastolic function, normal RV  -Hold ASA for possible procedure  -C/w home atorva, metop 12.5 BID, maxime 50 BID, losartan 50 qd, amlodipine 10    #Chronic pain - lower back  -home pain regimen: scheduled acetaminophen, fentanyl patch 50mcg q72h, dilaudid 4mg PO TID prn breakthrough  -will follow above pain regimen, but will increase to dilaudid 4mg PO q4h as needed breakthrough   -bowel regimen  -follows with pain medicine Dr. Al[facility]zan [facility]     #Seizure disorder - continue Keppra  #Anxiety - continue Buspar   #BPH - continue tamsulosin   #Gout - continue allopurinol     DVT ppx: SQL (will hold at midnight in case of procedure tomorrow)   Code status: FULL (confirmed on admission)  NOK: Wife Mike [phone]      Attestation:   Note Completion:  I am a:  Resident/Fellow   Attending Attestation I saw and evaluated the patient.  I personally obtained the key and critical portions of the history and physical exam or was physically present for key and  critical portions performed by the resident/fellow. I reviewed the resident/fellow?s documentation and discussed the patient with the resident/fellow.  I agree with the resident/fellow?s medical decision making as documented in the note.     I personally evaluated the patient on 15-Feb-2023         Electronic Signatures:  Ankur Harrison)  (Signed 15-Feb-2023  16:59)   Authored: Note Completion   Co-Signer: Service, Subjective Data, Objective Data, Assessment and Plan, Note Completion  Christa Funk (Resident))  (Signed 15-Feb-2023 16:56)   Authored: Service, Subjective Data, Objective Data, Assessment  and Plan, Note Completion      Last Updated: 15-Feb-2023 16:59 by Ankur Harrison)

## 2023-03-03 NOTE — PROGRESS NOTES
Service: Infectious Disease     Subjective Data:   RAJ HARMON is a 67 year old Male who is Hospital Day # 5 and POD #3 for 1. exploration of lumbar wound dehiscence;2. removal of lumbar spinal hardware (set caps, rods, and screws);3. irrigation  and debridement of lumbar wound.    Additional Information:    This AM, pt continued to endorse soft, mushy stools, and persistent pain which is well controlled with his current pain regimen. He stated that he was amenable to  trying the lumbar CT scan with premedication with ativan and dilaudid.     Objective Data:     Objective Information:      T   P  R  BP   MAP  SpO2   Value  36.6  78  18  119/72      96%  Date/Time 2/18 12:57 2/18 12:57 2/18 12:57 2/18 12:57    2/18 12:57  Range  (35.8C - 36.8C )  (73 - 79 )  (17 - 20 )  (119 - 144 )/ (56 - 73 )    (96% - 98% )   As of 18-Feb-2023 08:55:00, patient is on 4 L/min of oxygen via nasal cannula.      Pain reported at 2/18 8:55: 9 = Severe    Physical Exam Narrative:  ·  Physical Exam:    Gen: well-appearing, NAD  Head and neck: NCAT, neck supple without LAD, severe kyphoscoliosis   HEENT: MMM, normal nose without congestion, poor dentition   CV: RRR no mrg  Pulm: CTAB, prolonged exp phase, no wheezing or crackles, normal WOB on 4L  Abd: obese, soft, non-tender, non-distended  Ext: no cyanosis or clubbing, trace LE edema, thenar wasting bilat; 2-3cm diameter stage 1 ulcer on L heel  Neuro: alert and oriented x3, normal tone, face symmetric, moves all extremities spontaneously     Medication:    Medications:          Continuous Medications       --------------------------------  No continuous medications are active       Scheduled Medications       --------------------------------    1. Acetaminophen:  650  mg  Oral  Every 6 Hours    2. Allopurinol:  300  mg  Oral  Every 24 Hours    3. amLODIPine (NORVASC):  10  mg  Oral  Daily    4. Ascorbic Acid:  1000  mg  Oral  Daily    5. Atorvastatin:  20  mg  Oral  Daily     6. busPIRone (BUSPAR):  5  mg  Oral  Every 12 Hours    7. Docusate 50 mg - Senna 8.6 m  tablet(s)  Oral  2 Times a Day    8. Enoxaparin SubCutaneous:  40  mg  SubCutaneous  Every 24 Hours    9. fentaNYL 50 micrograms/ hour TransDermal:  1  patch  TransDermal  Every 72 Hours    10. levETIRAcetam (KEPPRA):  500  mg  Oral  2 Times a Day    11. Metoprolol Tartrate:  12.5  mg  Oral  2 Times a Day    12. Piperacillin - Tazobactam 4.5 gram/Iso-osmotic 100 mL Premix IVPB:  100  mL  IntraVenous Piggyback  Every 6 Hours    13. Polyethylene Glycol:  17  gram(s)  Oral  Daily    14. Sodium Hypochlorite 0.125% (Dakins Quarter):  1  application(s)  Topical  3 Times a Day    15. Spironolactone:  50  mg  Oral  2 Times a Day    16. Tamsulosin:  0.4  mg  Oral  Daily    17. Vancomycin - RPh to Dose - IV Piggy Back:  1  each  As Specified  Variable    18. Vancomycin IV Piggy Back:  1500  mg  IntraVenous Piggyback  Every 12 Hours         PRN Medications       --------------------------------    1. Acetaminophen:  650  mg  Oral  Once    2. Albuterol 90 micrograms/ Inhalation MDI:  2  inhalation  Inhalation  Every 6 Hours    3. HYDROmorphone Injectable:  1  mg  IntraVenous Push  Every 3 Hours    4. HYDROmorphone Injectable:  2  mg  IntraVenous Push  Every 3 Hours    5. Melatonin:  3  mg  Oral  At Bedtime    6. Naloxone Injectable:  0.2  mg  IntraVenous Push  Once    7. Sodium Chloride 0.9% Injectable Flush:  10  mL  IntraVenous Flush  Every 8 Hours and as Needed        Recent Lab Results:    Results:    CBC: 2023 05:57              \     Hgb     /                              \     8.1 L    /  WBC  ----------------  Plt               9.0       ----------------    327              /     Hct     \                              /     27.1 L    \            RBC: 2.44 L    MCV: 111 H    Neutrophil  %: 70.3      RFP: 2023 05:57  NA+        Cl-     BUN  /                         137    98    18   /  --------------------------------  Glucose                ---------------------------  103 H    K+     HCO3-   Creat \                         4.6    35 H   1.19  \  Calcium : 9.2Anion Gap : 9 L         Albumin : 2.9 L     Phos : 2.9      Radiology Results:    Results:        Impression:    1. There has been interval removal of the posterior fusion hardware  from the lumbar spine. There is again a large soft tissue defect with  marked thinning of the skin and subcutaneous soft tissues and  probable dehiscence overlying the lumbar and lower thoracic spine  with nonspecific soft tissue thickening and loss of fat soft tissue  planes as well as scattered soft tissue emphysema along the periphery  of the defect. The possibility of osteomyelitis cannot be excluded on  the basis of this examination.  2. Marked, multilevel degenerative changes of the lumbar spine with  multilevel postoperative changes from laminectomies and  posterolateral fusion masses.     CT L Spine without Contrast [Feb 18 2023  2:16PM]      Assessment and Plan:   Daily Risk Screen:  ·  Does patient have a central line? yes   ·  Central Line Type PICC   ·  Plan for PICC removal today? no   ·  The patient continues to require a PICC for parenteral medication     Comorbidities:  ·  Comorbidity Other     Code Status:  ·  Code Status Full Code     Assessment:    67 year old Male with CHF, COPD on 4L at home, seizure disorder and a history of multiple lumbar spine surgeries complicated  by MRSA wound infection with a known chronic open lumbar wound status post multiple debridements by plastic surgery, who presented to [place] with worsening low back pain as well as generalized fatigue and weakness, found to have MRSA infection of sacral  wound with exposed hardware, and elevated inflammatory markers. He presented to [facility]  as a transfer for surgical evaluation by spine team. He was started on vancomcyin and zosyn on 2/10 in [place], which were continued on  admission.  BCx from 2/10 with NGTD. Nsgy was consulted, and he underwent removal of lumbar spinal hardware and irrigation and debridement of lumbar wound on 2/15. Plastic surgery was consulted and recommended leaving wound open with TID dressing changes and plan  for return to OR on 2/22 for lumbar spinal wound I&D and potential wound vac. Intraoperative wound cultures had no growth aerobically or anaerobically. Pt has a hx of chronic pain, and pain was managed after surgery with fentanyl patch (per home regimen),  Tylenol (per home regimen), and IV dilaudid 1mg IV q3h for moderate pain and 2mg IV q3h for severe pain. Pain regimen can be down-titrated to home regimen after plastic surgery intervention. Pt continues to be on vanc/zosyn.     Updates 2/18  - C/w vanc and zosyn  - Obtained CT L-spine with premedication for pain and anxiety, results: soft tissue defect overlying lumbar and lower thoracic spine, soft tissue thickening, scattered soft tissue emphysema; multilevel degenerative changes of lumbar spine   - C. diff negative     #MRSA sacral wound infection  Micro:   -BCx 2/10 x2 NGTD  -[place] Wound cx 2/10/23: MRSA (S: clinda, vanc; R: TMP-SMX, tetracyclines)  -ESR elevated at >130  -CRP elevated at 5.26  -Intraoperative wound cx 2/15/23: no growth aerobically or anaerobically   - S/p below on 2/15:   1. exploration of lumbar wound dehiscence  2. removal of lumbar spinal hardware (set caps, rods, and screws)  3. irrigation and debridement of lumbar wound  - Plastic surgery recs  2/15:   1. WTD Kerlix dressings with Dakin's/Saline solution packed to the entire depth of the wound three times daily and PRN if soiled   2. return to OR with plastic surgery on 2/22 for lumbar spinal wound I&D and possible application of wound vac  3. Lumbar CT without contrast, obtained on 2/18: soft tissue defect overlying lumbar and lower thoracic spine, soft tissue thickening, scattered soft tissue emphysema; multilevel  degenerative changes of lumbar spine  Abx:   Vancomycin dosing: Pt received vancomycin 2/10 - 2/13, supratherapeutic (20.4) and not resumed on admission on 2/14, 1x dose 1.5g IV intra-operatively on 2/15, and resumed on 2/17 with 1500mg q12h dosing   Plan:   1) Vancomcyin 2/10-   2) Zosyn 2/10-   3) Appreciate plastic surgery recs  4) F/u intraoperative cultures     #Chronic pain - lower back  -home pain regimen: scheduled acetaminophen, fentanyl patch 50mcg q72h, dilaudid 4mg PO TID prn breakthrough  -bowel regimen  -follows with pain medicine Dr. Almazan at [facility]   -post-op pain regimen: Tylenol 650mg q6h, fentanyl patch 50mcg q72h, 1mg IV dilaudid q3h for moderate pain, 2mg IV dilaudid q3h for severe  pain    #COPD, 4L at baseline  -SpO2 goal 88-92%  -Started on proventill (takes at home per wife)     #HFpEF   #HTN  -TTE Feb 2022: EF 65-70%, normal LV diastolic function, normal RV  -Hold ASA for possible procedure  -C/w home atorva, metop 12.5 BID, maxime 50 BID, losartan 50 qd, amlodipine 10    #Seizure disorder - continue Keppra  #Anxiety - continue Buspar   #BPH - continue tamsulosin   #Gout - continue allopurinol     DVT ppx: lovenox   Code status: FULL (confirmed on admission)  NOK: Wife Mike [phone]      Attestation:   Note Completion:  I am a:  Resident/Fellow   Attending Attestation I saw and evaluated the patient.  I personally obtained the key and critical portions of the history and physical exam or was physically present for key and  critical portions performed by the resident/fellow. I reviewed the resident/fellow?s documentation and discussed the patient with the resident/fellow.  I agree with the resident/fellow?s medical decision making as documented in the note.     I personally evaluated the patient on 18-Feb-2023         Electronic Signatures:  Christa Funk (Resident))  (Signed 18-Feb-2023 15:46)   Authored: Service, Subjective Data, Objective Data, Assessment  and Plan, Note  Completion  Darrian Galvez)  (Signed 19-Feb-2023 08:10)   Authored: Note Completion   Co-Signer: Assessment and Plan, Note Completion      Last Updated: 19-Feb-2023 08:10 by Darrian Galvez)

## 2023-03-20 NOTE — LETTER
Patient: Andrea Ochoa  : 1955    Encounter Date: 2023    HISTORY & PHYSICAL    Subjective  Chief complaint: Andrea Ochoa is a 67 y.o. male who is a acute skilled care patient being seen and evaluated for multiple medical problems.  Patient presents for weakness.     HPI:  Patient is a 66 year old male who was admitted with right sided weakness and questionable TIA, no stroke. Neurology was consulted during his hospital stay and the carotid doppler result pending mild to moderate disease bilaterally. He was given aspirin statin. Pt also had a UTI and was given Keflex as well as Levaquin for an infected decub ulcer. Patient was discharged to SNF for further care.         Past Medical History:   Diagnosis Date   • Anxiety    • CHF (congestive heart failure) (CMS/[facility])    • Chronic respiratory failure with hypoxia (CMS/[facility])    • COPD (chronic obstructive pulmonary disease) (CMS/[facility])    • Dependence on supplemental oxygen    • Difficulty walking    • Encounter for removal of internal fixation device    • Gout    • Lack of coordination    • Local infection of the skin and subcutaneous tissue, unspecified    • Low back pain    • Seizures (CMS/[facility])    • Transient cerebral ischemic attack    • UTI (urinary tract infection)    • Weakness        Past Surgical History:   Procedure Laterality Date   • OTHER SURGICAL HISTORY  10/10/2019    Lumbar laminectomy   • OTHER SURGICAL HISTORY  10/10/2019    Lumbar vertebral fusion   • OTHER SURGICAL HISTORY  10/10/2019    Insertion of implantable intrathecal access system       Family History   Problem Relation Name Age of Onset   • No Known Problems Mother     • No Known Problems Father         Social History     Socioeconomic History   • Marital status:      Spouse name: Not on file   • Number of children: Not on file   • Years of education: Not on file   • Highest education level: Not on file   Occupational History   • Not on file   Tobacco Use   • Smoking  status: Not on file   • Smokeless tobacco: Not on file   Vaping Use   • Vaping status: Not on file   Substance and Sexual Activity   • Alcohol use: Not on file   • Drug use: Not on file   • Sexual activity: Not on file   Other Topics Concern   • Not on file   Social History Narrative   • Not on file     Social Determinants of Health     Financial Resource Strain: Not on file   Food Insecurity: Not on file   Transportation Needs: Not on file   Physical Activity: Not on file   Stress: Not on file   Social Connections: Not on file   Intimate Partner Violence: Not on file   Housing Stability: Not on file       Vital signs: 143/60, 98, 64, 18, 98%    Objective  Physical Exam  Constitutional:       Appearance: Normal appearance.   HENT:      Head: Normocephalic and atraumatic.      Nose: Nose normal.      Mouth/Throat:      Mouth: Mucous membranes are moist.      Pharynx: Oropharynx is clear.   Eyes:      Extraocular Movements: Extraocular movements intact.      Pupils: Pupils are equal, round, and reactive to light.   Cardiovascular:      Rate and Rhythm: Normal rate and regular rhythm.   Pulmonary:      Effort: No respiratory distress.      Breath sounds: Normal breath sounds. No wheezing, rhonchi or rales.   Abdominal:      General: Bowel sounds are normal. There is no distension.      Palpations: Abdomen is soft.      Tenderness: There is no abdominal tenderness. There is no guarding.   Musculoskeletal:      Right lower leg: No edema.      Left lower leg: No edema.   Skin:     General: Skin is warm and dry.   Neurological:      Mental Status: He is alert and oriented to person, place, and time. Mental status is at baseline.   Psychiatric:         Mood and Affect: Mood normal.         Behavior: Behavior normal.         Assessment/Plan  Problem List Items Addressed This Visit          Nervous    Seizure (CMS/HCC)       Circulatory    CHF (congestive heart failure) (CMS/[facility])    HTN (hypertension)        Infectious/Inflammatory    Methicillin resistant Staphylococcus aureus infection as the cause of diseases classified elsewhere       Other    Anxiety - Primary     Medications, treatments, and labs reviewed  Continue medications and treatments as listed in [facility]    Scribe Attestation  I, Delmar Bernal   attest that this documentation has been prepared under the direction and in the presence of Angel Lewis DO.    Provider Attestation - Scribe documentation  All medical record entries made by the Scribe were at my direction and personally dictated by me. I have reviewed the chart and agree that the record accurately reflects my personal performance of the history, physical exam, discussion and plan.    Angel Lewis DO          Electronically Signed By: Angel Lewis DO   5/13/23 10:31 PM

## 2023-03-21 NOTE — LETTER
Patient: Andrea Ochoa  : 1955    Encounter Date: 2023    PROGRESS NOTE    Subjective  Chief complaint: Andrea Ochoa is a 67 y.o. male who is a acute skilled care patient being seen and evaluated for weakness.    HPI:  3/21/2023 Patient continues to actively participate in therapy.  Worked on UE strengthening exercises (2 sets of 10) in therapy today to increase UE and trunk strength, endurance and activity tolerance for improved ADLs, transfers and mobility.        Objective  Vital signs: 127/56  Physical Exam  Constitutional:       General: He is not in acute distress.  Eyes:      Extraocular Movements: Extraocular movements intact.   Pulmonary:      Effort: Pulmonary effort is normal.      Comments: Continuous 02  Musculoskeletal:      Cervical back: Neck supple.   Skin:     Comments: Buttock wound wac on    Neurological:      Mental Status: He is alert.   Psychiatric:         Mood and Affect: Mood normal.         Behavior: Behavior is cooperative.         Assessment/Plan  Problem List Items Addressed This Visit          Nervous    Seizure (CMS/HCC)     Stable, continue Keppra            Circulatory    CHF (congestive heart failure) (CMS/HCC)    HTN (hypertension)     Continue metoprolol and amlodipine            Infectious/Inflammatory    Methicillin resistant Staphylococcus aureus infection as the cause of diseases classified elsewhere - Primary     Continue vancomycin IV continue weekly lab and fax to Dr. Harrison [phone]            Other    Anxiety     Stable continue duloxetine and BuSpar          Medications, treatments, and labs reviewed  Continue medications and treatments as listed in [facility]    Scribe Attestation  I, Delmar Aragon   attest that this documentation has been prepared under the direction and in the presence of FIDEL Angulo-MARSHAL    Provider Attestation - Scribe documentation  All medical record entries made by the Scribe were at my direction and  personally dictated by me. I have reviewed the chart and agree that the record accurately reflects my personal performance of the history, physical exam, discussion and plan.   PATRICIA Angulo        Electronically Signed By: PATRICIA Angulo   4/13/23 12:52 PM

## 2023-03-22 NOTE — PROGRESS NOTES
PROGRESS NOTE    Subjective   Chief complaint: Andrea Ochoa is a 67 y.o. male who is a acute skilled care patient being seen and evaluated for weakness.    HPI:  3/21/2023 Patient continues to actively participate in therapy.  Worked on UE strengthening exercises (2 sets of 10) in therapy today to increase UE and trunk strength, endurance and activity tolerance for improved ADLs, transfers and mobility.        Objective   Vital signs: 127/56  Physical Exam  Constitutional:       General: He is not in acute distress.  Eyes:      Extraocular Movements: Extraocular movements intact.   Pulmonary:      Effort: Pulmonary effort is normal.      Comments: Continuous 02  Musculoskeletal:      Cervical back: Neck supple.   Skin:     Comments: Buttock wound wac on    Neurological:      Mental Status: He is alert.   Psychiatric:         Mood and Affect: Mood normal.         Behavior: Behavior is cooperative.         Assessment/Plan   Problem List Items Addressed This Visit          Nervous    Seizure (CMS/HCC)     Stable, continue Keppra            Circulatory    CHF (congestive heart failure) (CMS/HCC)    HTN (hypertension)     Continue metoprolol and amlodipine            Infectious/Inflammatory    Methicillin resistant Staphylococcus aureus infection as the cause of diseases classified elsewhere - Primary     Continue vancomycin IV continue weekly lab and fax to Dr. Harrison [phone]            Other    Anxiety     Stable continue duloxetine and BuSpar          Medications, treatments, and labs reviewed  Continue medications and treatments as listed in [facility]    Scribe Attestation  I, Delmar Aragon   attest that this documentation has been prepared under the direction and in the presence of FIDEL Angulo[facility]    Provider Attestation - Scribe documentation  All medical record entries made by the Scribe were at my direction and personally dictated by me. I have reviewed the chart and agree that the  record accurately reflects my personal performance of the history, physical exam, discussion and plan.   Angel Arroyo, APRN-CNP

## 2023-03-22 NOTE — LETTER
Patient: Andrea Ochoa  : 1955    Encounter Date: 2023    PROGRESS NOTE    Subjective  Chief complaint: Andrea Ochoa is a 67 y.o. male who is a acute skilled care patient being seen and evaluated for weakness.    HPI:  3/21/2023 Patient continues to actively participate in therapy.  Worked on UE strengthening exercises (2 sets of 10) in therapy today to increase UE and trunk strength, endurance and activity tolerance for improved ADLs, transfers and mobility.     3/22/23  Patient continues to work towards goals in therapy.  Worked on bed mobility training today.  Patient completed transfer supine to sit with minimal assist.        Objective  Vital signs: 143/76  Physical Exam  Constitutional:       General: He is not in acute distress.  Eyes:      Extraocular Movements: Extraocular movements intact.   Pulmonary:      Effort: Pulmonary effort is normal.   Musculoskeletal:      Cervical back: Neck supple.   Neurological:      Mental Status: He is alert.   Psychiatric:         Mood and Affect: Mood normal.         Behavior: Behavior is cooperative.         Assessment/Plan  Problem List Items Addressed This Visit          Circulatory    CHF (congestive heart failure) (CMS/[facility])     Stable continue to monitor          HTN (hypertension)     Continue metoprolol and amlodipine            Infectious/Inflammatory    Methicillin resistant Staphylococcus aureus infection as the cause of diseases classified elsewhere     Continue vancomycin IV continue weekly lab and fax to Dr. Harrison [phone]            Other    Anxiety - Primary     Stable continue duloxetine and BuSpar          Medications, treatments, and labs reviewed  Continue medications and treatments as listed in [facility]    Scribe Attestation  I, Delmar Aragon   attest that this documentation has been prepared under the direction and in the presence of PATRICIA Angulo    Provider Attestation - Scribe documentation  All  medical record entries made by the Scribe were at my direction and personally dictated by me. I have reviewed the chart and agree that the record accurately reflects my personal performance of the history, physical exam, discussion and plan.   PATRICIA Angulo        Electronically Signed By: PATRICIA Angulo   4/17/23 10:27 AM

## 2023-03-23 NOTE — PROGRESS NOTES
PROGRESS NOTE    Subjective   Chief complaint: Andrea Ochoa is a 67 y.o. male who is a acute skilled care patient being seen and evaluated for weakness.    HPI:  3/21/2023 Patient continues to actively participate in therapy.  Worked on UE strengthening exercises (2 sets of 10) in therapy today to increase UE and trunk strength, endurance and activity tolerance for improved ADLs, transfers and mobility.     3/22/23  Patient continues to work towards goals in therapy.  Worked on bed mobility training today.  Patient completed transfer supine to sit with minimal assist.        Objective   Vital signs: 143/76  Physical Exam  Constitutional:       General: He is not in acute distress.  Eyes:      Extraocular Movements: Extraocular movements intact.   Pulmonary:      Effort: Pulmonary effort is normal.   Musculoskeletal:      Cervical back: Neck supple.   Neurological:      Mental Status: He is alert.   Psychiatric:         Mood and Affect: Mood normal.         Behavior: Behavior is cooperative.         Assessment/Plan   Problem List Items Addressed This Visit          Circulatory    CHF (congestive heart failure) (CMS/HCC)     Stable continue to monitor          HTN (hypertension)     Continue metoprolol and amlodipine            Infectious/Inflammatory    Methicillin resistant Staphylococcus aureus infection as the cause of diseases classified elsewhere     Continue vancomycin IV continue weekly lab and fax to Dr. Harrison [phone]            Other    Anxiety - Primary     Stable continue duloxetine and BuSpar          Medications, treatments, and labs reviewed  Continue medications and treatments as listed in [facility]    Scribe Attestation  I, Delmar Aragon   attest that this documentation has been prepared under the direction and in the presence of PATRICIA Angulo    Provider Attestation - Scribe documentation  All medical record entries made by the Scribe were at my direction and personally  dictated by me. I have reviewed the chart and agree that the record accurately reflects my personal performance of the history, physical exam, discussion and plan.   Angel Arroyo, APRN-CNP

## 2023-03-24 NOTE — LETTER
Patient: Andrea Ochoa  : 1955    Encounter Date: 2023    PROGRESS NOTE    Subjective  Chief complaint: Andrea Ochoa is a 67 y.o. male who is a acute skilled care patient being seen and evaluated for weakness.    HPI:  3/21/2023 Patient continues to actively participate in therapy.  Worked on UE strengthening exercises (2 sets of 10) in therapy today to increase UE and trunk strength, endurance and activity tolerance for improved ADLs, transfers and mobility.     3/22/23  Patient continues to work towards goals in therapy.  Worked on bed mobility training today.  Patient completed transfer supine to sit with minimal assist.      3/24/23 Patient worked on strengthening exercises, transfers and static standing today in therapy.  Transfers supine to sit with CGA, sit to supine with CGA/Min A and sit to stand to walker with Min A.  Per therapist , patient reported some SOB and lightheadedness.      Objective  Vital signs: 143/76  Physical Exam  Constitutional:       General: He is not in acute distress.  Eyes:      Extraocular Movements: Extraocular movements intact.   Pulmonary:      Effort: Pulmonary effort is normal.   Musculoskeletal:      Cervical back: Neck supple.   Neurological:      Mental Status: He is alert.   Psychiatric:         Mood and Affect: Mood normal.         Behavior: Behavior is cooperative.         Assessment/Plan  Problem List Items Addressed This Visit          Circulatory    CHF (congestive heart failure) (CMS/HCC)     Stable continue to monitor          HTN (hypertension)     Continue metoprolol and amlodipine            Infectious/Inflammatory    Methicillin resistant Staphylococcus aureus infection as the cause of diseases classified elsewhere     Continue vancomycin IV continue weekly lab and fax to Dr. Harrison [phone]            Other    Anxiety - Primary     Medications, treatments, and labs reviewed  Continue medications and treatments as listed in [facility]    Scribe  Attestation  I, Delmar Aragon   attest that this documentation has been prepared under the direction and in the presence of PATRICIA Angulo    Provider Attestation - Scribe documentation  All medical record entries made by the Scribe were at my direction and personally dictated by me. I have reviewed the chart and agree that the record accurately reflects my personal performance of the history, physical exam, discussion and plan.   PATRICIA Angulo        Electronically Signed By: PATRICIA Angulo   4/20/23 10:03 AM

## 2023-03-27 NOTE — LETTER
Patient: Andrea Ochoa  : 1955    Encounter Date: 2023    PROGRESS NOTE    Subjective  Chief complaint: Andrea Ochoa is a 67 y.o. male who is a acute skilled care patient being seen and evaluated for monthly general medical care and follow-up.    HPI:   Patient presents for general medical care and f/u.  Patient seen and examined at bedside.  No issues per nursing.  Patient has no acute complaints.        Objective  Vital signs: 20, 143/76, 98.0, 70, 96%  Physical Exam  Constitutional:       General: He is not in acute distress.  Eyes:      Extraocular Movements: Extraocular movements intact.   Pulmonary:      Effort: Pulmonary effort is normal.      Breath sounds: Normal breath sounds.   Abdominal:      General: Bowel sounds are normal.      Palpations: Abdomen is soft.   Musculoskeletal:      Cervical back: Neck supple.      Right lower leg: No edema.      Left lower leg: No edema.   Neurological:      Mental Status: He is alert.   Psychiatric:         Mood and Affect: Mood normal.         Behavior: Behavior is cooperative.         Assessment/Plan  Problem List Items Addressed This Visit          Nervous    Seizure (CMS/HCC)     Stable, continue Keppra            Circulatory    CHF (congestive heart failure) (CMS/HCC)     Stable continue to monitor          HTN (hypertension)     Continue metoprolol and amlodipine            Infectious/Inflammatory    Methicillin resistant Staphylococcus aureus infection as the cause of diseases classified elsewhere     Continue vancomycin IV continue weekly lab and fax to Dr. Harrison [phone]            Other    Anxiety - Primary     Stable continue duloxetine and BuSpar          Medications, treatments, and labs reviewed  Continue medications and treatments as listed in [facility]    Scribe Attestation  I, Delmar Aragon   attest that this documentation has been prepared under the direction and in the presence of Angel Arroyo,  APRN-CNP    Provider Attestation - Scribe documentation  All medical record entries made by the Scribe were at my direction and personally dictated by me. I have reviewed the chart and agree that the record accurately reflects my personal performance of the history, physical exam, discussion and plan.   PATRICIA Angulo        Electronically Signed By: PATRICIA Angulo   5/1/23  1:06 PM

## 2023-03-29 NOTE — PROGRESS NOTES
PROGRESS NOTE    Subjective   Chief complaint: Andrea Ochoa is a 67 y.o. male who is a acute skilled care patient being seen and evaluated for weakness.    HPI:  3/21/2023 Patient continues to actively participate in therapy.  Worked on UE strengthening exercises (2 sets of 10) in therapy today to increase UE and trunk strength, endurance and activity tolerance for improved ADLs, transfers and mobility.     3/22/23  Patient continues to work towards goals in therapy.  Worked on bed mobility training today.  Patient completed transfer supine to sit with minimal assist.      3/24/23 Patient worked on strengthening exercises, transfers and static standing today in therapy.  Transfers supine to sit with CGA, sit to supine with CGA/Min A and sit to stand to walker with Min A.  Per therapist , patient reported some SOB and lightheadedness.      Objective   Vital signs: 143/76  Physical Exam  Constitutional:       General: He is not in acute distress.  Eyes:      Extraocular Movements: Extraocular movements intact.   Pulmonary:      Effort: Pulmonary effort is normal.   Musculoskeletal:      Cervical back: Neck supple.   Neurological:      Mental Status: He is alert.   Psychiatric:         Mood and Affect: Mood normal.         Behavior: Behavior is cooperative.         Assessment/Plan   Problem List Items Addressed This Visit          Circulatory    CHF (congestive heart failure) (CMS/HCC)     Stable continue to monitor          HTN (hypertension)     Continue metoprolol and amlodipine            Infectious/Inflammatory    Methicillin resistant Staphylococcus aureus infection as the cause of diseases classified elsewhere     Continue vancomycin IV continue weekly lab and fax to Dr. Harrison [phone]            Other    Anxiety - Primary     Medications, treatments, and labs reviewed  Continue medications and treatments as listed in [facility]    Scribe Attestation  I, Delmar Aragon   attest that this documentation  has been prepared under the direction and in the presence of PATRICIA Angulo    Provider Attestation - Scribe documentation  All medical record entries made by the Scribe were at my direction and personally dictated by me. I have reviewed the chart and agree that the record accurately reflects my personal performance of the history, physical exam, discussion and plan.   PATRICIA Angulo

## 2023-04-03 NOTE — LETTER
Patient: Andrea Ochoa  : 1955    Encounter Date: 2023    HISTORY & PHYSICAL    Subjective  Chief complaint: Andrea Ochoa is a 67 y.o. male who is a acute skilled care patient being seen and evaluated for multiple medical problems.  Patient presents for weakness.    HPI:  Patient is a 67 year old male with a past medical history of COPD, HTN, CHF, and IBS. Patient was sent to the ED from facility due to abnormal labs. NH reports that pt had an increased CO2 level as well as an elevated sodium causing some concern. Pt also has been experiencing some AMS for the past two days. Pt has MRSA and is currently on antibiotics. Pt was stable and discharged back to facility.         Past Medical History:   Diagnosis Date   • Anxiety    • CHF (congestive heart failure) (CMS/HCC)    • Chronic respiratory failure with hypoxia (CMS/HCC)    • COPD (chronic obstructive pulmonary disease) (CMS/HCC)    • Dependence on supplemental oxygen    • Difficulty walking    • Encounter for removal of internal fixation device    • Gout    • Lack of coordination    • Local infection of the skin and subcutaneous tissue, unspecified    • Low back pain    • Seizures (CMS/HCC)    • Transient cerebral ischemic attack    • UTI (urinary tract infection)    • Weakness        Past Surgical History:   Procedure Laterality Date   • OTHER SURGICAL HISTORY  10/10/2019    Lumbar laminectomy   • OTHER SURGICAL HISTORY  10/10/2019    Lumbar vertebral fusion   • OTHER SURGICAL HISTORY  10/10/2019    Insertion of implantable intrathecal access system       Family History   Problem Relation Name Age of Onset   • No Known Problems Mother     • No Known Problems Father         Social History     Socioeconomic History   • Marital status:      Spouse name: Not on file   • Number of children: Not on file   • Years of education: Not on file   • Highest education level: Not on file   Occupational History   • Not on file   Tobacco Use   • Smoking  status: Not on file   • Smokeless tobacco: Not on file   Vaping Use   • Vaping status: Not on file   Substance and Sexual Activity   • Alcohol use: Not on file   • Drug use: Not on file   • Sexual activity: Not on file   Other Topics Concern   • Not on file   Social History Narrative   • Not on file     Social Determinants of Health     Financial Resource Strain: Not on file   Food Insecurity: Not on file   Transportation Needs: Not on file   Physical Activity: Not on file   Stress: Not on file   Social Connections: Not on file   Intimate Partner Violence: Not on file   Housing Stability: Not on file       Vital signs: 158/72, 97.9, 72, 18, 98%    Objective    Constitutional:       General: He is not in acute distress.  Eyes:      Extraocular Movements: Extraocular movements intact.   Pulmonary:      Effort: Pulmonary effort is normal.      Breath sounds: Normal breath sounds.   Abdominal:      General: Bowel sounds are normal.      Palpations: Abdomen is soft.   Musculoskeletal:      Cervical back: Neck supple.      Right lower leg: No edema.      Left lower leg: No edema.   Neurological:      Mental Status: He is alert.   Psychiatric:         Mood and Affect: Mood normal.         Behavior: Behavior is cooperative.     Assessment/Plan  Problem List Items Addressed This Visit          Nervous    Seizure (CMS/HCC)       Circulatory    CHF (congestive heart failure) (CMS/HCC)    HTN (hypertension)       Infectious/Inflammatory    Methicillin resistant Staphylococcus aureus infection as the cause of diseases classified elsewhere       Other    Anxiety - Primary     Medications, treatments, and labs reviewed  Continue medications and treatments as listed in [facility]    Scribe Attestation  I, Delmar Bernal   attest that this documentation has been prepared under the direction and in the presence of Angel Lewis DO.    Provider Attestation - Scribe documentation  All medical record entries made by the Scribe  were at my direction and personally dictated by me. I have reviewed the chart and agree that the record accurately reflects my personal performance of the history, physical exam, discussion and plan.  An interactive audio and/or video telecommunication system which permits real time communications between the patient (at the originating site) and provider (at a distant site) was utilized to provide this telehealth service after obtaining verbal consent.   Angel Lewis DO          Electronically Signed By: Angel Lewis DO   5/16/23  6:22 AM

## 2023-04-05 NOTE — LETTER
Patient: Andrea Ochoa  : 1955    Encounter Date: 2023    PROGRESS NOTE    Subjective  Chief complaint: Andrea Ochoa is a 67 y.o. male who is a acute skilled care patient being seen and evaluated for weakness.    HPI:  3/21/2023 Patient continues to actively participate in therapy.  Worked on UE strengthening exercises (2 sets of 10) in therapy today to increase UE and trunk strength, endurance and activity tolerance for improved ADLs, transfers and mobility.     3/22/23  Patient continues to work towards goals in therapy.  Worked on bed mobility training today.  Patient completed transfer supine to sit with minimal assist.      3/24/23 Patient worked on strengthening exercises, transfers and static standing today in therapy.  Transfers supine to sit with CGA, sit to supine with CGA/Min A and sit to stand to walker with Min A.  Per therapist , patient reported some SOB and lightheadedness.    23  Patient continues working in therapy.  Worked on therapy strengthening exercises to help with transfers and ambulating.  Patient requires max/mod assist for bed mobility.  Able to scoot to edge of bed on his own.      23 Patient has been working in therapy to improve strength, endurance, and ADLs.  Patient continues to work toward goals.  No new concerns today.  Denies n/v/f/c pain.        Objective  Vital signs: 18, 145/78, 97.9, 72, 98%  Physical Exam  Vitals reviewed.   Constitutional:       General: He is not in acute distress.  HENT:      Head: Normocephalic.      Mouth/Throat:      Mouth: Mucous membranes are moist.   Cardiovascular:      Rate and Rhythm: Normal rate.   Pulmonary:      Effort: Pulmonary effort is normal. No respiratory distress.   Abdominal:      General: Bowel sounds are normal.   Skin:     General: Skin is warm.   Neurological:      Mental Status: He is alert. Mental status is at baseline.         Assessment/Plan  Problem List Items Addressed This Visit          Nervous     Seizure (CMS/[facility])     Stable, continue Keppra            Circulatory    CHF (congestive heart failure) (CMS/[facility])     Stable continue to monitor          HTN (hypertension)     Continue metoprolol and amlodipine            Other    Anxiety - Primary     Stable continue duloxetine and BuSpar          Medications, treatments, and labs reviewed  Continue medications and treatments as listed in [facility]    Scribe Attestation  Ana CAMARENA Scribe   attest that this documentation has been prepared under the direction and in the presence of PATRICIA Angulo    Provider Attestation - Scribe documentation  All medical record entries made by the Scribe were at my direction and personally dictated by me. I have reviewed the chart and agree that the record accurately reflects my personal performance of the history, physical exam, discussion and plan.   PATRICIA Angulo        Electronically Signed By: PATRICIA Angulo   5/8/23  1:09 PM

## 2023-04-06 NOTE — PROGRESS NOTES
PROGRESS NOTE    Subjective   Chief complaint: Andrea Ochoa is a 67 y.o. male who is a acute skilled care patient being seen and evaluated for weakness.    HPI:  3/21/2023 Patient continues to actively participate in therapy.  Worked on UE strengthening exercises (2 sets of 10) in therapy today to increase UE and trunk strength, endurance and activity tolerance for improved ADLs, transfers and mobility.     3/22/23  Patient continues to work towards goals in therapy.  Worked on bed mobility training today.  Patient completed transfer supine to sit with minimal assist.      3/24/23 Patient worked on strengthening exercises, transfers and static standing today in therapy.  Transfers supine to sit with CGA, sit to supine with CGA/Min A and sit to stand to walker with Min A.  Per therapist , patient reported some SOB and lightheadedness.    4/4/23  Patient continues working in therapy.  Worked on therapy strengthening exercises to help with transfers and ambulating.  Patient requires max/mod assist for bed mobility.  Able to scoot to edge of bed on his own.      4/5/23 Patient has been working in therapy to improve strength, endurance, and ADLs.  Patient continues to work toward goals.  No new concerns today.  Denies n/v/f/c pain.        Objective   Vital signs: 18, 145/78, 97.9, 72, 98%  Physical Exam  Vitals reviewed.   Constitutional:       General: He is not in acute distress.  HENT:      Head: Normocephalic.      Mouth/Throat:      Mouth: Mucous membranes are moist.   Cardiovascular:      Rate and Rhythm: Normal rate.   Pulmonary:      Effort: Pulmonary effort is normal. No respiratory distress.   Abdominal:      General: Bowel sounds are normal.   Skin:     General: Skin is warm.   Neurological:      Mental Status: He is alert. Mental status is at baseline.         Assessment/Plan   Problem List Items Addressed This Visit          Nervous    Seizure (CMS/HCC)     Stable, continue Keppra            Circulatory     CHF (congestive heart failure) (CMS/[facility])     Stable continue to monitor          HTN (hypertension)     Continue metoprolol and amlodipine            Other    Anxiety - Primary     Stable continue duloxetine and BuSpar          Medications, treatments, and labs reviewed  Continue medications and treatments as listed in [facility]    Scribe Attestation  IAna Scribe   attest that this documentation has been prepared under the direction and in the presence of PATRICIA Angulo    Provider Attestation - Scribe documentation  All medical record entries made by the Scribe were at my direction and personally dictated by me. I have reviewed the chart and agree that the record accurately reflects my personal performance of the history, physical exam, discussion and plan.   PATRICIA Angulo

## 2023-04-10 NOTE — PROGRESS NOTES
HISTORY & PHYSICAL    Subjective   Chief complaint: Andrea Ochoa is a 67 y.o. male who is a acute skilled care patient being seen and evaluated for multiple medical problems.  Patient presents for weakness.     HPI:  Patient is a 66 year old male who was admitted with right sided weakness and questionable TIA, no stroke. Neurology was consulted during his hospital stay and the carotid doppler result pending mild to moderate disease bilaterally. He was given aspirin statin. Pt also had a UTI and was given Keflex as well as Levaquin for an infected decub ulcer. Patient was discharged to SNF for further care.         Past Medical History:   Diagnosis Date    Anxiety     CHF (congestive heart failure) (CMS/[facility])     Chronic respiratory failure with hypoxia (CMS/[facility])     COPD (chronic obstructive pulmonary disease) (CMS/[facility])     Dependence on supplemental oxygen     Difficulty walking     Encounter for removal of internal fixation device     Gout     Lack of coordination     Local infection of the skin and subcutaneous tissue, unspecified     Low back pain     Seizures (CMS/[facility])     Transient cerebral ischemic attack     UTI (urinary tract infection)     Weakness        Past Surgical History:   Procedure Laterality Date    OTHER SURGICAL HISTORY  10/10/2019    Lumbar laminectomy    OTHER SURGICAL HISTORY  10/10/2019    Lumbar vertebral fusion    OTHER SURGICAL HISTORY  10/10/2019    Insertion of implantable intrathecal access system       Family History   Problem Relation Name Age of Onset    No Known Problems Mother      No Known Problems Father         Social History     Socioeconomic History    Marital status:      Spouse name: Not on file    Number of children: Not on file    Years of education: Not on file    Highest education level: Not on file   Occupational History    Not on file   Tobacco Use    Smoking status: Not on file    Smokeless tobacco: Not on file   Vaping Use    Vaping status: Not on file    Substance and Sexual Activity    Alcohol use: Not on file    Drug use: Not on file    Sexual activity: Not on file   Other Topics Concern    Not on file   Social History Narrative    Not on file     Social Determinants of Health     Financial Resource Strain: Not on file   Food Insecurity: Not on file   Transportation Needs: Not on file   Physical Activity: Not on file   Stress: Not on file   Social Connections: Not on file   Intimate Partner Violence: Not on file   Housing Stability: Not on file       Vital signs: 143/60, 98, 64, 18, 98%    Objective   Physical Exam  Constitutional:       Appearance: Normal appearance.   HENT:      Head: Normocephalic and atraumatic.      Nose: Nose normal.      Mouth/Throat:      Mouth: Mucous membranes are moist.      Pharynx: Oropharynx is clear.   Eyes:      Extraocular Movements: Extraocular movements intact.      Pupils: Pupils are equal, round, and reactive to light.   Cardiovascular:      Rate and Rhythm: Normal rate and regular rhythm.   Pulmonary:      Effort: No respiratory distress.      Breath sounds: Normal breath sounds. No wheezing, rhonchi or rales.   Abdominal:      General: Bowel sounds are normal. There is no distension.      Palpations: Abdomen is soft.      Tenderness: There is no abdominal tenderness. There is no guarding.   Musculoskeletal:      Right lower leg: No edema.      Left lower leg: No edema.   Skin:     General: Skin is warm and dry.   Neurological:      Mental Status: He is alert and oriented to person, place, and time. Mental status is at baseline.   Psychiatric:         Mood and Affect: Mood normal.         Behavior: Behavior normal.         Assessment/Plan   Problem List Items Addressed This Visit          Nervous    Seizure (CMS/HCC)       Circulatory    CHF (congestive heart failure) (CMS/[facility])    HTN (hypertension)       Infectious/Inflammatory    Methicillin resistant Staphylococcus aureus infection as the cause of diseases classified  elsewhere       Other    Anxiety - Primary     Medications, treatments, and labs reviewed  Continue medications and treatments as listed in [facility]    Scribe Attestation  I, Delmar Bernal   attest that this documentation has been prepared under the direction and in the presence of Angel Lewis DO.    Provider Attestation - Scribe documentation  All medical record entries made by the Scribe were at my direction and personally dictated by me. I have reviewed the chart and agree that the record accurately reflects my personal performance of the history, physical exam, discussion and plan.    Angel Lewis DO

## 2023-04-10 NOTE — LETTER
Patient: Andrea Ochoa  : 1955    Encounter Date: 04/10/2023    PROGRESS NOTE    Subjective  Chief complaint: Andrea Ochoa is a 67 y.o. male who is a acute skilled care patient being seen and evaluated for weakness.    HPI:  3/21/2023 Patient continues to actively participate in therapy.  Worked on UE strengthening exercises (2 sets of 10) in therapy today to increase UE and trunk strength, endurance and activity tolerance for improved ADLs, transfers and mobility.     3/22/23  Patient continues to work towards goals in therapy.  Worked on bed mobility training today.  Patient completed transfer supine to sit with minimal assist.      3/24/23 Patient worked on strengthening exercises, transfers and static standing today in therapy.  Transfers supine to sit with CGA, sit to supine with CGA/Min A and sit to stand to walker with Min A.  Per therapist , patient reported some SOB and lightheadedness.    23  Patient continues working in therapy.  Worked on therapy strengthening exercises to help with transfers and ambulating.  Patient requires max/mod assist for bed mobility.  Able to scoot to edge of bed on his own.      23 Patient has been working in therapy to improve strength, endurance, and ADLs.  Patient continues to work toward goals.  No new concerns today.  Denies n/v/f/c pain.      4/10/23  Patient declined out of bed activity today in PT.  Was instructed on therapy exercises for strengthening to allow for progression with transfers.  Patient stated he was too tired after OT visit.  Patient worked on bed mobility with OT.  Transferred supine to sit on edge of bed and back with moderate assist.  Performed sit to stand transfer from edge of bed to standing with wheeled walker and max assist.      Objective  Vital signs:  20, 167/81, 98.1, 89, 95%  Physical Exam  Constitutional:       General: He is not in acute distress.  Eyes:      Extraocular Movements: Extraocular movements intact.    Pulmonary:      Effort: Pulmonary effort is normal.   Musculoskeletal:      Cervical back: Neck supple.   Neurological:      Mental Status: He is alert.   Psychiatric:         Mood and Affect: Mood normal.         Behavior: Behavior is cooperative.         Assessment/Plan  Problem List Items Addressed This Visit          Nervous    Seizure (CMS/HCC) - Primary     Stable, continue Keppra            Circulatory    CHF (congestive heart failure) (CMS/[facility])     Stable continue to monitor          HTN (hypertension)     Continue metoprolol and amlodipine            Infectious/Inflammatory    Methicillin resistant Staphylococcus aureus infection as the cause of diseases classified elsewhere     Continue vancomycin IV continue weekly lab and fax to Dr. Harrison [phone]          Medications, treatments, and labs reviewed  Continue medications and treatments as listed in [facility]    Scribe Attestation  IAna Scribe   attest that this documentation has been prepared under the direction and in the presence of PATRICIA Angulo    Provider Attestation - Scribe documentation  All medical record entries made by the Scribe were at my direction and personally dictated by me. I have reviewed the chart and agree that the record accurately reflects my personal performance of the history, physical exam, discussion and plan.   PATRICIA Angulo        Electronically Signed By: PATRICIA Angulo   5/8/23  2:55 PM

## 2023-04-10 NOTE — PROGRESS NOTES
PROGRESS NOTE    Subjective   Chief complaint: Andrea Ochoa is a 67 y.o. male who is a acute skilled care patient being seen and evaluated for weakness.    HPI:  3/21/2023 Patient continues to actively participate in therapy.  Worked on UE strengthening exercises (2 sets of 10) in therapy today to increase UE and trunk strength, endurance and activity tolerance for improved ADLs, transfers and mobility.     3/22/23  Patient continues to work towards goals in therapy.  Worked on bed mobility training today.  Patient completed transfer supine to sit with minimal assist.      3/24/23 Patient worked on strengthening exercises, transfers and static standing today in therapy.  Transfers supine to sit with CGA, sit to supine with CGA/Min A and sit to stand to walker with Min A.  Per therapist , patient reported some SOB and lightheadedness.    4/4/23  Patient continues working in therapy.  Worked on therapy strengthening exercises to help with transfers and ambulating.  Patient requires max/mod assist for bed mobility.  Able to scoot to edge of bed on his own.      4/5/23 Patient has been working in therapy to improve strength, endurance, and ADLs.  Patient continues to work toward goals.  No new concerns today.  Denies n/v/f/c pain.      4/10/23  Patient declined out of bed activity today in PT.  Was instructed on therapy exercises for strengthening to allow for progression with transfers.  Patient stated he was too tired after OT visit.  Patient worked on bed mobility with OT.  Transferred supine to sit on edge of bed and back with moderate assist.  Performed sit to stand transfer from edge of bed to standing with wheeled walker and max assist.      Objective   Vital signs:  20, 167/81, 98.1, 89, 95%  Physical Exam  Constitutional:       General: He is not in acute distress.  Eyes:      Extraocular Movements: Extraocular movements intact.   Pulmonary:      Effort: Pulmonary effort is normal.   Musculoskeletal:       Cervical back: Neck supple.   Neurological:      Mental Status: He is alert.   Psychiatric:         Mood and Affect: Mood normal.         Behavior: Behavior is cooperative.         Assessment/Plan   Problem List Items Addressed This Visit          Nervous    Seizure (CMS/[facility]) - Primary     Stable, continue Keppra            Circulatory    CHF (congestive heart failure) (CMS/[facility])     Stable continue to monitor          HTN (hypertension)     Continue metoprolol and amlodipine            Infectious/Inflammatory    Methicillin resistant Staphylococcus aureus infection as the cause of diseases classified elsewhere     Continue vancomycin IV continue weekly lab and fax to Dr. Harrison [phone]          Medications, treatments, and labs reviewed  Continue medications and treatments as listed in [facility]    Scribe Attestation  Ana CAMARENA Scribe   attest that this documentation has been prepared under the direction and in the presence of PATRICIA Angulo    Provider Attestation - Scribe documentation  All medical record entries made by the Scribe were at my direction and personally dictated by me. I have reviewed the chart and agree that the record accurately reflects my personal performance of the history, physical exam, discussion and plan.   PATRICIA Angulo

## 2023-04-11 NOTE — PROGRESS NOTES
PROGRESS NOTE    Subjective   Chief complaint: Andrea Ochoa is a 67 y.o. male who is a acute skilled care patient being seen and evaluated for weakness.    HPI:  3/21/2023 Patient continues to actively participate in therapy.  Worked on UE strengthening exercises (2 sets of 10) in therapy today to increase UE and trunk strength, endurance and activity tolerance for improved ADLs, transfers and mobility.     3/22/23  Patient continues to work towards goals in therapy.  Worked on bed mobility training today.  Patient completed transfer supine to sit with minimal assist.      3/24/23 Patient worked on strengthening exercises, transfers and static standing today in therapy.  Transfers supine to sit with CGA, sit to supine with CGA/Min A and sit to stand to walker with Min A.  Per therapist , patient reported some SOB and lightheadedness.    4/4/23  Patient continues working in therapy.  Worked on therapy strengthening exercises to help with transfers and ambulating.  Patient requires max/mod assist for bed mobility.  Able to scoot to edge of bed on his own.      4/5/23 Patient has been working in therapy to improve strength, endurance, and ADLs.  Patient continues to work toward goals.  No new concerns today.  Denies n/v/f/c pain.      4/10/23  Patient declined out of bed activity today in PT.  Was instructed on therapy exercises for strengthening to allow for progression with transfers.  Patient stated he was too tired after OT visit.  Patient worked on bed mobility with OT.  Transferred supine to sit on edge of bed and back with moderate assist.  Performed sit to stand transfer from edge of bed to standing with wheeled walker and max assist.    4/11/23  Patient working on bed mobility with minimal to moderate assist x1.  Able to sit on edge of bed for short timeframes due to dizziness and fatigue per therapy.  Patient remains on IV ATB for infection.  Denies n/v/f/c.        Objective   Vital signs: 20, 135/64, 97.6,  81, 94%  Physical Exam  Constitutional:       General: He is not in acute distress.  Eyes:      Extraocular Movements: Extraocular movements intact.   Pulmonary:      Effort: Pulmonary effort is normal.   Musculoskeletal:      Cervical back: Neck supple.   Neurological:      Mental Status: He is alert.   Psychiatric:         Mood and Affect: Mood normal.         Behavior: Behavior is cooperative.         Assessment/Plan   Problem List Items Addressed This Visit          Nervous    Seizure (CMS/[facility])     Stable, continue Keppra            Circulatory    CHF (congestive heart failure) (CMS/[facility]) - Primary     Stable continue to monitor             Infectious/Inflammatory    Methicillin resistant Staphylococcus aureus infection as the cause of diseases classified elsewhere     Continue vancomycin IV continue weekly lab and fax to Dr. Harrison [phone]            Other    Anxiety     Stable continue duloxetine and BuSpar          Medications, treatments, and labs reviewed  Continue medications and treatments as listed in [facility]    Scribe Attestation  IAna Scribe   attest that this documentation has been prepared under the direction and in the presence of PATRICIA Angulo    Provider Attestation - Scribe documentation  All medical record entries made by the Scribe were at my direction and personally dictated by me. I have reviewed the chart and agree that the record accurately reflects my personal performance of the history, physical exam, discussion and plan.   PATRICIA Angulo

## 2023-04-11 NOTE — LETTER
Patient: Andrea Ochoa  : 1955    Encounter Date: 2023    PROGRESS NOTE    Subjective  Chief complaint: Andrea Ochoa is a 67 y.o. male who is a acute skilled care patient being seen and evaluated for weakness.    HPI:  3/21/2023 Patient continues to actively participate in therapy.  Worked on UE strengthening exercises (2 sets of 10) in therapy today to increase UE and trunk strength, endurance and activity tolerance for improved ADLs, transfers and mobility.     3/22/23  Patient continues to work towards goals in therapy.  Worked on bed mobility training today.  Patient completed transfer supine to sit with minimal assist.      3/24/23 Patient worked on strengthening exercises, transfers and static standing today in therapy.  Transfers supine to sit with CGA, sit to supine with CGA/Min A and sit to stand to walker with Min A.  Per therapist , patient reported some SOB and lightheadedness.    23  Patient continues working in therapy.  Worked on therapy strengthening exercises to help with transfers and ambulating.  Patient requires max/mod assist for bed mobility.  Able to scoot to edge of bed on his own.      23 Patient has been working in therapy to improve strength, endurance, and ADLs.  Patient continues to work toward goals.  No new concerns today.  Denies n/v/f/c pain.      4/10/23  Patient declined out of bed activity today in PT.  Was instructed on therapy exercises for strengthening to allow for progression with transfers.  Patient stated he was too tired after OT visit.  Patient worked on bed mobility with OT.  Transferred supine to sit on edge of bed and back with moderate assist.  Performed sit to stand transfer from edge of bed to standing with wheeled walker and max assist.    23  Patient working on bed mobility with minimal to moderate assist x1.  Able to sit on edge of bed for short timeframes due to dizziness and fatigue per therapy.  Patient remains on IV ATB for  infection.  Denies n/v/f/c.        Objective  Vital signs: 20, 135/64, 97.6, 81, 94%  Physical Exam  Constitutional:       General: He is not in acute distress.  Eyes:      Extraocular Movements: Extraocular movements intact.   Pulmonary:      Effort: Pulmonary effort is normal.   Musculoskeletal:      Cervical back: Neck supple.   Neurological:      Mental Status: He is alert.   Psychiatric:         Mood and Affect: Mood normal.         Behavior: Behavior is cooperative.         Assessment/Plan  Problem List Items Addressed This Visit          Nervous    Seizure (CMS/HCC)     Stable, continue Keppra            Circulatory    CHF (congestive heart failure) (CMS/[facility]) - Primary     Stable continue to monitor             Infectious/Inflammatory    Methicillin resistant Staphylococcus aureus infection as the cause of diseases classified elsewhere     Continue vancomycin IV continue weekly lab and fax to Dr. Harrison [phone]            Other    Anxiety     Stable continue duloxetine and BuSpar          Medications, treatments, and labs reviewed  Continue medications and treatments as listed in [facility]    Scribe Attestation  Ana CAMARENA Scribdwight   attest that this documentation has been prepared under the direction and in the presence of PATRICIA Angulo    Provider Attestation - Scribe documentation  All medical record entries made by the Scribe were at my direction and personally dictated by me. I have reviewed the chart and agree that the record accurately reflects my personal performance of the history, physical exam, discussion and plan.   PATRICIA Angulo        Electronically Signed By: PATRICIA Angulo   5/15/23 10:25 AM

## 2023-04-12 NOTE — LETTER
Patient: Andrea Ochoa  : 1955    Encounter Date: 2023    PROGRESS NOTE    Subjective  Chief complaint: Andrea Ochoa is a 67 y.o. male who is a acute skilled care patient being seen and evaluated for weakness.    HPI:  3/21/2023 Patient continues to actively participate in therapy.  Worked on UE strengthening exercises (2 sets of 10) in therapy today to increase UE and trunk strength, endurance and activity tolerance for improved ADLs, transfers and mobility.     3/22/23  Patient continues to work towards goals in therapy.  Worked on bed mobility training today.  Patient completed transfer supine to sit with minimal assist.      3/24/23 Patient worked on strengthening exercises, transfers and static standing today in therapy.  Transfers supine to sit with CGA, sit to supine with CGA/Min A and sit to stand to walker with Min A.  Per therapist , patient reported some SOB and lightheadedness.    23  Patient continues working in therapy.  Worked on therapy strengthening exercises to help with transfers and ambulating.  Patient requires max/mod assist for bed mobility.  Able to scoot to edge of bed on his own.      23 Patient has been working in therapy to improve strength, endurance, and ADLs.  Patient continues to work toward goals.  No new concerns today.  Denies n/v/f/c pain.      4/10/23  Patient declined out of bed activity today in PT.  Was instructed on therapy exercises for strengthening to allow for progression with transfers.  Patient stated he was too tired after OT visit.  Patient worked on bed mobility with OT.  Transferred supine to sit on edge of bed and back with moderate assist.  Performed sit to stand transfer from edge of bed to standing with wheeled walker and max assist.    23  Patient working on bed mobility with minimal to moderate assist x1.  Able to sit on edge of bed for short timeframes due to dizziness and fatigue per therapy.  Patient remains on IV ATB for  infection.  Denies n/v/f/c.    4/12/23  Patient remains on IV Vanco due to MRSA.  No adverse reaction.   Therapy working with the patient to improve strength and endurance with ADLs, transfers, and mobility.  Patient continues to work toward goals.  Patient is stable and has no new complaints.  Nursing staff voices no new concerns today.      Objective  Vital signs:   20, 139/64, 97.5, 63, 93%  Physical Exam  Constitutional:       General: He is not in acute distress.  Eyes:      Extraocular Movements: Extraocular movements intact.   Pulmonary:      Effort: Pulmonary effort is normal.   Musculoskeletal:      Cervical back: Neck supple.   Neurological:      Mental Status: He is alert.   Psychiatric:         Mood and Affect: Mood normal.         Behavior: Behavior is cooperative.         Assessment/Plan  Problem List Items Addressed This Visit          Nervous    Seizure (CMS/HCC)     Stable, continue Keppra            Circulatory    CHF (congestive heart failure) (CMS/[facility])     Stable continue to monitor             Infectious/Inflammatory    Methicillin resistant Staphylococcus aureus infection as the cause of diseases classified elsewhere     Continue vancomycin IV continue weekly lab and fax to Dr. Harrison [phone]            Other    Anxiety - Primary     Stable continue duloxetine and BuSpar          Medications, treatments, and labs reviewed  Continue medications and treatments as listed in [facility]    Scribe Attestation  IAna Scribe   attest that this documentation has been prepared under the direction and in the presence of FIDEL Angulo-MARSHAL    Provider Attestation - Scribe documentation  All medical record entries made by the Scribe were at my direction and personally dictated by me. I have reviewed the chart and agree that the record accurately reflects my personal performance of the history, physical exam, discussion and plan.   Angel Arroyo  APRN-CNP        Electronically Signed By: PATRICIA Angulo   5/15/23 11:05 AM

## 2023-04-13 PROBLEM — R56.9 SEIZURE (MULTI): Status: ACTIVE | Noted: 2023-01-01

## 2023-04-13 PROBLEM — F41.9 ANXIETY: Status: ACTIVE | Noted: 2023-01-01

## 2023-04-13 PROBLEM — I10 HTN (HYPERTENSION): Status: ACTIVE | Noted: 2023-01-01

## 2023-04-13 PROBLEM — B95.62 METHICILLIN RESISTANT STAPHYLOCOCCUS AUREUS INFECTION AS THE CAUSE OF DISEASES CLASSIFIED ELSEWHERE: Status: ACTIVE | Noted: 2023-01-01

## 2023-04-13 PROBLEM — I50.9 CHF (CONGESTIVE HEART FAILURE) (MULTI): Status: ACTIVE | Noted: 2023-01-01

## 2023-04-13 NOTE — PROGRESS NOTES
PROGRESS NOTE    Subjective   Chief complaint: Andrea Ochoa is a 67 y.o. male who is a acute skilled care patient being seen and evaluated for weakness.    HPI:  3/21/2023 Patient continues to actively participate in therapy.  Worked on UE strengthening exercises (2 sets of 10) in therapy today to increase UE and trunk strength, endurance and activity tolerance for improved ADLs, transfers and mobility.     3/22/23  Patient continues to work towards goals in therapy.  Worked on bed mobility training today.  Patient completed transfer supine to sit with minimal assist.      3/24/23 Patient worked on strengthening exercises, transfers and static standing today in therapy.  Transfers supine to sit with CGA, sit to supine with CGA/Min A and sit to stand to walker with Min A.  Per therapist , patient reported some SOB and lightheadedness.    4/4/23  Patient continues working in therapy.  Worked on therapy strengthening exercises to help with transfers and ambulating.  Patient requires max/mod assist for bed mobility.  Able to scoot to edge of bed on his own.      4/5/23 Patient has been working in therapy to improve strength, endurance, and ADLs.  Patient continues to work toward goals.  No new concerns today.  Denies n/v/f/c pain.      4/10/23  Patient declined out of bed activity today in PT.  Was instructed on therapy exercises for strengthening to allow for progression with transfers.  Patient stated he was too tired after OT visit.  Patient worked on bed mobility with OT.  Transferred supine to sit on edge of bed and back with moderate assist.  Performed sit to stand transfer from edge of bed to standing with wheeled walker and max assist.    4/11/23  Patient working on bed mobility with minimal to moderate assist x1.  Able to sit on edge of bed for short timeframes due to dizziness and fatigue per therapy.  Patient remains on IV ATB for infection.  Denies n/v/f/c.    4/12/23  Patient remains on IV Vanco due to  MRSA.  No adverse reaction.   Therapy working with the patient to improve strength and endurance with ADLs, transfers, and mobility.  Patient continues to work toward goals.  Patient is stable and has no new complaints.  Nursing staff voices no new concerns today.      Objective   Vital signs:   20, 139/64, 97.5, 63, 93%  Physical Exam  Constitutional:       General: He is not in acute distress.  Eyes:      Extraocular Movements: Extraocular movements intact.   Pulmonary:      Effort: Pulmonary effort is normal.   Musculoskeletal:      Cervical back: Neck supple.   Neurological:      Mental Status: He is alert.   Psychiatric:         Mood and Affect: Mood normal.         Behavior: Behavior is cooperative.         Assessment/Plan   Problem List Items Addressed This Visit          Nervous    Seizure (CMS/HCC)     Stable, continue Keppra            Circulatory    CHF (congestive heart failure) (CMS/[facility])     Stable continue to monitor             Infectious/Inflammatory    Methicillin resistant Staphylococcus aureus infection as the cause of diseases classified elsewhere     Continue vancomycin IV continue weekly lab and fax to Dr. Harrison [phone]            Other    Anxiety - Primary     Stable continue duloxetine and BuSpar          Medications, treatments, and labs reviewed  Continue medications and treatments as listed in [facility]    Scribe Attestation  Ana CAMARENA Scribe   attest that this documentation has been prepared under the direction and in the presence of PATRICIA Angulo    Provider Attestation - Scribe documentation  All medical record entries made by the Scribe were at my direction and personally dictated by me. I have reviewed the chart and agree that the record accurately reflects my personal performance of the history, physical exam, discussion and plan.   PATRICIA Angulo

## 2023-04-14 NOTE — PROGRESS NOTES
PROGRESS NOTE    Subjective   Chief complaint: Andrea Ochoa is a 67 y.o. male who is a acute skilled care patient being seen and evaluated for weakness.    HPI:  3/21/2023 Patient continues to actively participate in therapy.  Worked on UE strengthening exercises (2 sets of 10) in therapy today to increase UE and trunk strength, endurance and activity tolerance for improved ADLs, transfers and mobility.     3/22/23  Patient continues to work towards goals in therapy.  Worked on bed mobility training today.  Patient completed transfer supine to sit with minimal assist.      3/24/23 Patient worked on strengthening exercises, transfers and static standing today in therapy.  Transfers supine to sit with CGA, sit to supine with CGA/Min A and sit to stand to walker with Min A.  Per therapist , patient reported some SOB and lightheadedness.    4/4/23  Patient continues working in therapy.  Worked on therapy strengthening exercises to help with transfers and ambulating.  Patient requires max/mod assist for bed mobility.  Able to scoot to edge of bed on his own.      4/5/23 Patient has been working in therapy to improve strength, endurance, and ADLs.  Patient continues to work toward goals.  No new concerns today.  Denies n/v/f/c pain.      4/10/23  Patient declined out of bed activity today in PT.  Was instructed on therapy exercises for strengthening to allow for progression with transfers.  Patient stated he was too tired after OT visit.  Patient worked on bed mobility with OT.  Transferred supine to sit on edge of bed and back with moderate assist.  Performed sit to stand transfer from edge of bed to standing with wheeled walker and max assist.    4/11/23  Patient working on bed mobility with minimal to moderate assist x1.  Able to sit on edge of bed for short timeframes due to dizziness and fatigue per therapy.  Patient remains on IV ATB for infection.  Denies n/v/f/c.    4/12/23  Patient remains on IV Vanco due to  MRSA.  No adverse reaction.   Therapy working with the patient to improve strength and endurance with ADLs, transfers, and mobility.  Patient continues to work toward goals.  Patient is stable and has no new complaints.  Nursing staff voices no new concerns today.    4/14/23 Patient continues IV Vanco due to MRSA.  Denies n/v/f/c.  Therapy working with patient today on supine ankle pumps and heel slides.  Patient reported fatigue and was unable to complete other supine exercises.  Patient was also instructed on bed mobility with sitting on edge of bed.  Patient reported dizziness and was unable to sit longer than 1.5 minutes.  Required assist with upper extremity support with supine to sit and sit to supine with assist of BLE support. Patient required mod a with bed mobility and is dependent for repositioning once in supine.  Requires max assist x2 to reposition for proper spinal alignment.  No new concerns reported by nursing.      Objective   Vital signs:   152/73, 97.5, 67, 18, 94%  Physical Exam  Constitutional:       General: He is not in acute distress.  Eyes:      Extraocular Movements: Extraocular movements intact.   Pulmonary:      Effort: Pulmonary effort is normal.   Musculoskeletal:      Cervical back: Neck supple.   Neurological:      Mental Status: He is alert.   Psychiatric:         Mood and Affect: Mood normal.         Behavior: Behavior is cooperative.         Assessment/Plan   Problem List Items Addressed This Visit          Nervous    Seizure (CMS/HCC)     Stable, continue Keppra            Circulatory    CHF (congestive heart failure) (CMS/HCC) - Primary     Stable continue to monitor          HTN (hypertension)     Continue metoprolol and amlodipine            Infectious/Inflammatory    Methicillin resistant Staphylococcus aureus infection as the cause of diseases classified elsewhere     Continue vancomycin IV continue weekly lab and fax to Dr. Harrison [phone]          Medications,  treatments, and labs reviewed  Continue medications and treatments as listed in [facility]    Scribe Attestation  I, Delmar Aragon   attest that this documentation has been prepared under the direction and in the presence of PATRICIA Angulo    Provider Attestation - Scribe documentation  All medical record entries made by the Scribe were at my direction and personally dictated by me. I have reviewed the chart and agree that the record accurately reflects my personal performance of the history, physical exam, discussion and plan.   PATRICIA Angulo

## 2023-04-14 NOTE — LETTER
Patient: Andrea Ochoa  : 1955    Encounter Date: 2023    PROGRESS NOTE    Subjective  Chief complaint: Andrea Ochoa is a 67 y.o. male who is a acute skilled care patient being seen and evaluated for weakness.    HPI:  3/21/2023 Patient continues to actively participate in therapy.  Worked on UE strengthening exercises (2 sets of 10) in therapy today to increase UE and trunk strength, endurance and activity tolerance for improved ADLs, transfers and mobility.     3/22/23  Patient continues to work towards goals in therapy.  Worked on bed mobility training today.  Patient completed transfer supine to sit with minimal assist.      3/24/23 Patient worked on strengthening exercises, transfers and static standing today in therapy.  Transfers supine to sit with CGA, sit to supine with CGA/Min A and sit to stand to walker with Min A.  Per therapist , patient reported some SOB and lightheadedness.    23  Patient continues working in therapy.  Worked on therapy strengthening exercises to help with transfers and ambulating.  Patient requires max/mod assist for bed mobility.  Able to scoot to edge of bed on his own.      23 Patient has been working in therapy to improve strength, endurance, and ADLs.  Patient continues to work toward goals.  No new concerns today.  Denies n/v/f/c pain.      4/10/23  Patient declined out of bed activity today in PT.  Was instructed on therapy exercises for strengthening to allow for progression with transfers.  Patient stated he was too tired after OT visit.  Patient worked on bed mobility with OT.  Transferred supine to sit on edge of bed and back with moderate assist.  Performed sit to stand transfer from edge of bed to standing with wheeled walker and max assist.    23  Patient working on bed mobility with minimal to moderate assist x1.  Able to sit on edge of bed for short timeframes due to dizziness and fatigue per therapy.  Patient remains on IV ATB for  infection.  Denies n/v/f/c.    4/12/23  Patient remains on IV Vanco due to MRSA.  No adverse reaction.   Therapy working with the patient to improve strength and endurance with ADLs, transfers, and mobility.  Patient continues to work toward goals.  Patient is stable and has no new complaints.  Nursing staff voices no new concerns today.    4/14/23 Patient continues IV Vanco due to MRSA.  Denies n/v/f/c.  Therapy working with patient today on supine ankle pumps and heel slides.  Patient reported fatigue and was unable to complete other supine exercises.  Patient was also instructed on bed mobility with sitting on edge of bed.  Patient reported dizziness and was unable to sit longer than 1.5 minutes.  Required assist with upper extremity support with supine to sit and sit to supine with assist of BLE support. Patient required mod a with bed mobility and is dependent for repositioning once in supine.  Requires max assist x2 to reposition for proper spinal alignment.  No new concerns reported by nursing.      Objective  Vital signs:   152/73, 97.5, 67, 18, 94%  Physical Exam  Constitutional:       General: He is not in acute distress.  Eyes:      Extraocular Movements: Extraocular movements intact.   Pulmonary:      Effort: Pulmonary effort is normal.   Musculoskeletal:      Cervical back: Neck supple.   Neurological:      Mental Status: He is alert.   Psychiatric:         Mood and Affect: Mood normal.         Behavior: Behavior is cooperative.         Assessment/Plan  Problem List Items Addressed This Visit          Nervous    Seizure (CMS/HCC)     Stable, continue Keppra            Circulatory    CHF (congestive heart failure) (CMS/[facility]) - Primary     Stable continue to monitor          HTN (hypertension)     Continue metoprolol and amlodipine            Infectious/Inflammatory    Methicillin resistant Staphylococcus aureus infection as the cause of diseases classified elsewhere     Continue vancomycin IV continue  weekly lab and fax to Dr. Harrison [phone]          Medications, treatments, and labs reviewed  Continue medications and treatments as listed in [facility]    Scribe Attestation  I, Delmar Aragon   attest that this documentation has been prepared under the direction and in the presence of PATRICIA Angulo    Provider Attestation - Scribe documentation  All medical record entries made by the Scribe were at my direction and personally dictated by me. I have reviewed the chart and agree that the record accurately reflects my personal performance of the history, physical exam, discussion and plan.   PATRICIA Angulo        Electronically Signed By: PATRICIA Angulo   5/15/23 12:39 PM

## 2023-04-17 NOTE — LETTER
Patient: Andrea Ochoa  : 1955    Encounter Date: 2023    PROGRESS NOTE    Subjective  Chief complaint: Andrea Ochoa is a 67 y.o. male who is a acute skilled care patient being seen and evaluated for weakness.    HPI:  3/21/2023 Patient continues to actively participate in therapy.  Worked on UE strengthening exercises (2 sets of 10) in therapy today to increase UE and trunk strength, endurance and activity tolerance for improved ADLs, transfers and mobility.     3/22/23  Patient continues to work towards goals in therapy.  Worked on bed mobility training today.  Patient completed transfer supine to sit with minimal assist.      3/24/23 Patient worked on strengthening exercises, transfers and static standing today in therapy.  Transfers supine to sit with CGA, sit to supine with CGA/Min A and sit to stand to walker with Min A.  Per therapist , patient reported some SOB and lightheadedness.    23  Patient continues working in therapy.  Worked on therapy strengthening exercises to help with transfers and ambulating.  Patient requires max/mod assist for bed mobility.  Able to scoot to edge of bed on his own.      23 Patient has been working in therapy to improve strength, endurance, and ADLs.  Patient continues to work toward goals.  No new concerns today.  Denies n/v/f/c pain.      4/10/23  Patient declined out of bed activity today in PT.  Was instructed on therapy exercises for strengthening to allow for progression with transfers.  Patient stated he was too tired after OT visit.  Patient worked on bed mobility with OT.  Transferred supine to sit on edge of bed and back with moderate assist.  Performed sit to stand transfer from edge of bed to standing with wheeled walker and max assist.    23  Patient working on bed mobility with minimal to moderate assist x1.  Able to sit on edge of bed for short timeframes due to dizziness and fatigue per therapy.  Patient remains on IV ATB for  infection.  Denies n/v/f/c.    4/12/23  Patient remains on IV Vanco due to MRSA.  No adverse reaction.   Therapy working with the patient to improve strength and endurance with ADLs, transfers, and mobility.  Patient continues to work toward goals.  Patient is stable and has no new complaints.  Nursing staff voices no new concerns today.    4/14/23 Patient continues IV Vanco due to MRSA.  Denies n/v/f/c.  Therapy working with patient today on supine ankle pumps and heel slides.  Patient reported fatigue and was unable to complete other supine exercises.  Patient was also instructed on bed mobility with sitting on edge of bed.  Patient reported dizziness and was unable to sit longer than 1.5 minutes.  Required assist with upper extremity support with supine to sit and sit to supine with assist of BLE support. Patient required mod a with bed mobility and is dependent for repositioning once in supine.  Requires max assist x2 to reposition for proper spinal alignment.  No new concerns reported by nursing.    4/17/23   Patient remains on IV antibiotics for MRSA.  Tolerating well with no adverse reactions.  Continues working with therapy.  No new concerns.  Denies n/v/f/c.      Objective  Vital signs: 146/81, 98.0, 76, 18, 94%  Physical Exam  Constitutional:       General: He is not in acute distress.  Eyes:      Extraocular Movements: Extraocular movements intact.   Pulmonary:      Effort: Pulmonary effort is normal.   Musculoskeletal:      Cervical back: Neck supple.   Neurological:      Mental Status: He is alert.   Psychiatric:         Mood and Affect: Mood normal.         Behavior: Behavior is cooperative.         Assessment/Plan  Problem List Items Addressed This Visit          Nervous    Seizure (CMS/[facility])     Stable, continue Keppra            Circulatory    CHF (congestive heart failure) (CMS/[facility])     Stable continue to monitor          HTN (hypertension)     Continue metoprolol and amlodipine             Infectious/Inflammatory    Methicillin resistant Staphylococcus aureus infection as the cause of diseases classified elsewhere     Continue vancomycin IV continue weekly lab and fax to Dr. Harrison [phone]            Other    Anxiety - Primary     Stable continue duloxetine and BuSpar          Medications, treatments, and labs reviewed  Continue medications and treatments as listed in [facility]    Scribe Attestation  Ana CAMARENA Scribe   attest that this documentation has been prepared under the direction and in the presence of PATRICIA Angulo    Provider Attestation - Scribe documentation  All medical record entries made by the Scribe were at my direction and personally dictated by me. I have reviewed the chart and agree that the record accurately reflects my personal performance of the history, physical exam, discussion and plan.   PATRICIA Angulo        Electronically Signed By: PATRICIA Angulo   5/15/23  1:36 PM

## 2023-04-18 NOTE — LETTER
Patient: Andrea Ochoa  : 1955    Encounter Date: 2023    PROGRESS NOTE    Subjective  Chief complaint: Andrea Ochoa is a 67 y.o. male who is a acute skilled care patient being seen and evaluated for weakness.    HPI:  3/21/2023 Patient continues to actively participate in therapy.  Worked on UE strengthening exercises (2 sets of 10) in therapy today to increase UE and trunk strength, endurance and activity tolerance for improved ADLs, transfers and mobility.     3/22/23  Patient continues to work towards goals in therapy.  Worked on bed mobility training today.  Patient completed transfer supine to sit with minimal assist.      3/24/23 Patient worked on strengthening exercises, transfers and static standing today in therapy.  Transfers supine to sit with CGA, sit to supine with CGA/Min A and sit to stand to walker with Min A.  Per therapist , patient reported some SOB and lightheadedness.    23  Patient continues working in therapy.  Worked on therapy strengthening exercises to help with transfers and ambulating.  Patient requires max/mod assist for bed mobility.  Able to scoot to edge of bed on his own.      23 Patient has been working in therapy to improve strength, endurance, and ADLs.  Patient continues to work toward goals.  No new concerns today.  Denies n/v/f/c pain.      4/10/23  Patient declined out of bed activity today in PT.  Was instructed on therapy exercises for strengthening to allow for progression with transfers.  Patient stated he was too tired after OT visit.  Patient worked on bed mobility with OT.  Transferred supine to sit on edge of bed and back with moderate assist.  Performed sit to stand transfer from edge of bed to standing with wheeled walker and max assist.    23  Patient working on bed mobility with minimal to moderate assist x1.  Able to sit on edge of bed for short timeframes due to dizziness and fatigue per therapy.  Patient remains on IV ATB for  infection.  Denies n/v/f/c.    4/12/23  Patient remains on IV Vanco due to MRSA.  No adverse reaction.   Therapy working with the patient to improve strength and endurance with ADLs, transfers, and mobility.  Patient continues to work toward goals.  Patient is stable and has no new complaints.  Nursing staff voices no new concerns today.    4/14/23 Patient continues IV Vanco due to MRSA.  Denies n/v/f/c.  Therapy working with patient today on supine ankle pumps and heel slides.  Patient reported fatigue and was unable to complete other supine exercises.  Patient was also instructed on bed mobility with sitting on edge of bed.  Patient reported dizziness and was unable to sit longer than 1.5 minutes.  Required assist with upper extremity support with supine to sit and sit to supine with assist of BLE support. Patient required mod a with bed mobility and is dependent for repositioning once in supine.  Requires max assist x2 to reposition for proper spinal alignment.  No new concerns reported by nursing.    4/17/23   Patient remains on IV antibiotics for MRSA.  Tolerating well with no adverse reactions.  Continues working with therapy.  No new concerns.  Denies n/v/f/c.    4/18/23   Patient remains on IV ATB for MRSA in lumbar spine wound until 4/26/23.  Therapy educating patient on transfers.  Worked on repositioning with cues for BLE elevation and for elevated head of bed to assist with spinal alignment.  Patient also educated on importance of frequent changes in position to increase circulation, ROM and reduce risk of skin breakdown.  Patient refused to sit on edge of bed today.      Objective  Vital signs: 146/81, 98.0, 76, 18, 94%  Physical Exam  Constitutional:       General: He is not in acute distress.  Eyes:      Extraocular Movements: Extraocular movements intact.   Pulmonary:      Effort: Pulmonary effort is normal.   Musculoskeletal:      Cervical back: Neck supple.   Neurological:      Mental Status: He is  alert.   Psychiatric:         Mood and Affect: Mood normal.         Behavior: Behavior is cooperative.         Assessment/Plan  Problem List Items Addressed This Visit          Nervous    Seizure (CMS/HCC)     Stable, continue Keppra            Circulatory    CHF (congestive heart failure) (CMS/[facility]) - Primary     Stable continue to monitor          HTN (hypertension)     Continue metoprolol and amlodipine            Infectious/Inflammatory    Methicillin resistant Staphylococcus aureus infection as the cause of diseases classified elsewhere     Continue vancomycin IV continue weekly lab and fax to Dr. Harrison [phone]          Medications, treatments, and labs reviewed  Continue medications and treatments as listed in [facility]    Scribe Attestation  I, Delmar Aragon   attest that this documentation has been prepared under the direction and in the presence of PATRICIA Angulo    Provider Attestation - Scribe documentation  All medical record entries made by the Scribe were at my direction and personally dictated by me. I have reviewed the chart and agree that the record accurately reflects my personal performance of the history, physical exam, discussion and plan.   PATRICIA Angulo        Electronically Signed By: PATRICIA Angulo   5/16/23 10:11 AM

## 2023-04-18 NOTE — PROGRESS NOTES
PROGRESS NOTE    Subjective   Chief complaint: Andrea Ochoa is a 67 y.o. male who is a acute skilled care patient being seen and evaluated for weakness.    HPI:  3/21/2023 Patient continues to actively participate in therapy.  Worked on UE strengthening exercises (2 sets of 10) in therapy today to increase UE and trunk strength, endurance and activity tolerance for improved ADLs, transfers and mobility.     3/22/23  Patient continues to work towards goals in therapy.  Worked on bed mobility training today.  Patient completed transfer supine to sit with minimal assist.      3/24/23 Patient worked on strengthening exercises, transfers and static standing today in therapy.  Transfers supine to sit with CGA, sit to supine with CGA/Min A and sit to stand to walker with Min A.  Per therapist , patient reported some SOB and lightheadedness.    4/4/23  Patient continues working in therapy.  Worked on therapy strengthening exercises to help with transfers and ambulating.  Patient requires max/mod assist for bed mobility.  Able to scoot to edge of bed on his own.      4/5/23 Patient has been working in therapy to improve strength, endurance, and ADLs.  Patient continues to work toward goals.  No new concerns today.  Denies n/v/f/c pain.      4/10/23  Patient declined out of bed activity today in PT.  Was instructed on therapy exercises for strengthening to allow for progression with transfers.  Patient stated he was too tired after OT visit.  Patient worked on bed mobility with OT.  Transferred supine to sit on edge of bed and back with moderate assist.  Performed sit to stand transfer from edge of bed to standing with wheeled walker and max assist.    4/11/23  Patient working on bed mobility with minimal to moderate assist x1.  Able to sit on edge of bed for short timeframes due to dizziness and fatigue per therapy.  Patient remains on IV ATB for infection.  Denies n/v/f/c.    4/12/23  Patient remains on IV Vanco due to  MRSA.  No adverse reaction.   Therapy working with the patient to improve strength and endurance with ADLs, transfers, and mobility.  Patient continues to work toward goals.  Patient is stable and has no new complaints.  Nursing staff voices no new concerns today.    4/14/23 Patient continues IV Vanco due to MRSA.  Denies n/v/f/c.  Therapy working with patient today on supine ankle pumps and heel slides.  Patient reported fatigue and was unable to complete other supine exercises.  Patient was also instructed on bed mobility with sitting on edge of bed.  Patient reported dizziness and was unable to sit longer than 1.5 minutes.  Required assist with upper extremity support with supine to sit and sit to supine with assist of BLE support. Patient required mod a with bed mobility and is dependent for repositioning once in supine.  Requires max assist x2 to reposition for proper spinal alignment.  No new concerns reported by nursing.    4/17/23   Patient remains on IV antibiotics for MRSA.  Tolerating well with no adverse reactions.  Continues working with therapy.  No new concerns.  Denies n/v/f/c.      Objective   Vital signs: 146/81, 98.0, 76, 18, 94%  Physical Exam  Constitutional:       General: He is not in acute distress.  Eyes:      Extraocular Movements: Extraocular movements intact.   Pulmonary:      Effort: Pulmonary effort is normal.   Musculoskeletal:      Cervical back: Neck supple.   Neurological:      Mental Status: He is alert.   Psychiatric:         Mood and Affect: Mood normal.         Behavior: Behavior is cooperative.         Assessment/Plan   Problem List Items Addressed This Visit          Nervous    Seizure (CMS/HCC)     Stable, continue Keppra            Circulatory    CHF (congestive heart failure) (CMS/[facility])     Stable continue to monitor          HTN (hypertension)     Continue metoprolol and amlodipine            Infectious/Inflammatory    Methicillin resistant Staphylococcus aureus infection  as the cause of diseases classified elsewhere     Continue vancomycin IV continue weekly lab and fax to Dr. Harrison [phone]            Other    Anxiety - Primary     Stable continue duloxetine and BuSpar          Medications, treatments, and labs reviewed  Continue medications and treatments as listed in [facility]    Scribe Attestation  IAna Scribe   attest that this documentation has been prepared under the direction and in the presence of PATRICIA Angulo    Provider Attestation - Scribe documentation  All medical record entries made by the Scribe were at my direction and personally dictated by me. I have reviewed the chart and agree that the record accurately reflects my personal performance of the history, physical exam, discussion and plan.   PATRICIA Angluo

## 2023-04-18 NOTE — PROGRESS NOTES
PROGRESS NOTE    Subjective   Chief complaint: Andrea Ochoa is a 67 y.o. male who is a acute skilled care patient being seen and evaluated for weakness.    HPI:  3/21/2023 Patient continues to actively participate in therapy.  Worked on UE strengthening exercises (2 sets of 10) in therapy today to increase UE and trunk strength, endurance and activity tolerance for improved ADLs, transfers and mobility.     3/22/23  Patient continues to work towards goals in therapy.  Worked on bed mobility training today.  Patient completed transfer supine to sit with minimal assist.      3/24/23 Patient worked on strengthening exercises, transfers and static standing today in therapy.  Transfers supine to sit with CGA, sit to supine with CGA/Min A and sit to stand to walker with Min A.  Per therapist , patient reported some SOB and lightheadedness.    4/4/23  Patient continues working in therapy.  Worked on therapy strengthening exercises to help with transfers and ambulating.  Patient requires max/mod assist for bed mobility.  Able to scoot to edge of bed on his own.      4/5/23 Patient has been working in therapy to improve strength, endurance, and ADLs.  Patient continues to work toward goals.  No new concerns today.  Denies n/v/f/c pain.      4/10/23  Patient declined out of bed activity today in PT.  Was instructed on therapy exercises for strengthening to allow for progression with transfers.  Patient stated he was too tired after OT visit.  Patient worked on bed mobility with OT.  Transferred supine to sit on edge of bed and back with moderate assist.  Performed sit to stand transfer from edge of bed to standing with wheeled walker and max assist.    4/11/23  Patient working on bed mobility with minimal to moderate assist x1.  Able to sit on edge of bed for short timeframes due to dizziness and fatigue per therapy.  Patient remains on IV ATB for infection.  Denies n/v/f/c.    4/12/23  Patient remains on IV Vanco due to  MRSA.  No adverse reaction.   Therapy working with the patient to improve strength and endurance with ADLs, transfers, and mobility.  Patient continues to work toward goals.  Patient is stable and has no new complaints.  Nursing staff voices no new concerns today.    4/14/23 Patient continues IV Vanco due to MRSA.  Denies n/v/f/c.  Therapy working with patient today on supine ankle pumps and heel slides.  Patient reported fatigue and was unable to complete other supine exercises.  Patient was also instructed on bed mobility with sitting on edge of bed.  Patient reported dizziness and was unable to sit longer than 1.5 minutes.  Required assist with upper extremity support with supine to sit and sit to supine with assist of BLE support. Patient required mod a with bed mobility and is dependent for repositioning once in supine.  Requires max assist x2 to reposition for proper spinal alignment.  No new concerns reported by nursing.    4/17/23   Patient remains on IV antibiotics for MRSA.  Tolerating well with no adverse reactions.  Continues working with therapy.  No new concerns.  Denies n/v/f/c.    4/18/23   Patient remains on IV ATB for MRSA in lumbar spine wound until 4/26/23.  Therapy educating patient on transfers.  Worked on repositioning with cues for BLE elevation and for elevated head of bed to assist with spinal alignment.  Patient also educated on importance of frequent changes in position to increase circulation, ROM and reduce risk of skin breakdown.  Patient refused to sit on edge of bed today.      Objective   Vital signs: 146/81, 98.0, 76, 18, 94%  Physical Exam  Constitutional:       General: He is not in acute distress.  Eyes:      Extraocular Movements: Extraocular movements intact.   Pulmonary:      Effort: Pulmonary effort is normal.   Musculoskeletal:      Cervical back: Neck supple.   Neurological:      Mental Status: He is alert.   Psychiatric:         Mood and Affect: Mood normal.          Behavior: Behavior is cooperative.         Assessment/Plan   Problem List Items Addressed This Visit          Nervous    Seizure (CMS/[facility])     Stable, continue Keppra            Circulatory    CHF (congestive heart failure) (CMS/[facility]) - Primary     Stable continue to monitor          HTN (hypertension)     Continue metoprolol and amlodipine            Infectious/Inflammatory    Methicillin resistant Staphylococcus aureus infection as the cause of diseases classified elsewhere     Continue vancomycin IV continue weekly lab and fax to Dr. Harrison [phone]          Medications, treatments, and labs reviewed  Continue medications and treatments as listed in [facility]    Scribe Attestation  IAna Scribe   attest that this documentation has been prepared under the direction and in the presence of PATRICIA Angulo    Provider Attestation - Scribe documentation  All medical record entries made by the Scribe were at my direction and personally dictated by me. I have reviewed the chart and agree that the record accurately reflects my personal performance of the history, physical exam, discussion and plan.   LUH AnguloCNP
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
## 2023-04-21 NOTE — LETTER
Patient: Andrea Ochoa  : 1955    Encounter Date: 2023    PROGRESS NOTE    Subjective  Chief complaint: Andrea Ochoa is a 67 y.o. male who is a acute skilled care patient being seen and evaluated for weakness.    HPI:  3/21/2023 Patient continues to actively participate in therapy.  Worked on UE strengthening exercises (2 sets of 10) in therapy today to increase UE and trunk strength, endurance and activity tolerance for improved ADLs, transfers and mobility.     3/22/23  Patient continues to work towards goals in therapy.  Worked on bed mobility training today.  Patient completed transfer supine to sit with minimal assist.      3/24/23 Patient worked on strengthening exercises, transfers and static standing today in therapy.  Transfers supine to sit with CGA, sit to supine with CGA/Min A and sit to stand to walker with Min A.  Per therapist , patient reported some SOB and lightheadedness.    23  Patient continues working in therapy.  Worked on therapy strengthening exercises to help with transfers and ambulating.  Patient requires max/mod assist for bed mobility.  Able to scoot to edge of bed on his own.      23 Patient has been working in therapy to improve strength, endurance, and ADLs.  Patient continues to work toward goals.  No new concerns today.  Denies n/v/f/c pain.      4/10/23  Patient declined out of bed activity today in PT.  Was instructed on therapy exercises for strengthening to allow for progression with transfers.  Patient stated he was too tired after OT visit.  Patient worked on bed mobility with OT.  Transferred supine to sit on edge of bed and back with moderate assist.  Performed sit to stand transfer from edge of bed to standing with wheeled walker and max assist.    23  Patient working on bed mobility with minimal to moderate assist x1.  Able to sit on edge of bed for short timeframes due to dizziness and fatigue per therapy.  Patient remains on IV ATB for  infection.  Denies n/v/f/c.    4/12/23  Patient remains on IV Vanco due to MRSA.  No adverse reaction.   Therapy working with the patient to improve strength and endurance with ADLs, transfers, and mobility.  Patient continues to work toward goals.  Patient is stable and has no new complaints.  Nursing staff voices no new concerns today.    4/14/23 Patient continues IV Vanco due to MRSA.  Denies n/v/f/c.  Therapy working with patient today on supine ankle pumps and heel slides.  Patient reported fatigue and was unable to complete other supine exercises.  Patient was also instructed on bed mobility with sitting on edge of bed.  Patient reported dizziness and was unable to sit longer than 1.5 minutes.  Required assist with upper extremity support with supine to sit and sit to supine with assist of BLE support. Patient required mod a with bed mobility and is dependent for repositioning once in supine.  Requires max assist x2 to reposition for proper spinal alignment.  No new concerns reported by nursing.    4/17/23   Patient remains on IV antibiotics for MRSA.  Tolerating well with no adverse reactions.  Continues working with therapy.  No new concerns.  Denies n/v/f/c.    4/18/23   Patient remains on IV ATB for MRSA in lumbar spine wound until 4/26/23.  Therapy educating patient on transfers.  Worked on repositioning with cues for BLE elevation and for elevated head of bed to assist with spinal alignment.  Patient also educated on importance of frequent changes in position to increase circulation, ROM and reduce risk of skin breakdown.  Patient refused to sit on edge of bed today.    4/21/23   Patient on IV antibiotics for MRSA.  Denies n/v/f/c.    Therapy continues to work with the patient on bed mobility and transfers.  Patient participation is poor due to reported dizziness.      Objective  Vital signs:  113/67, 97.6, 64, 18, 95%  Physical Exam  Constitutional:       General: He is not in acute distress.  Eyes:       Extraocular Movements: Extraocular movements intact.   Pulmonary:      Effort: Pulmonary effort is normal.   Musculoskeletal:      Cervical back: Neck supple.   Neurological:      Mental Status: He is alert.   Psychiatric:         Mood and Affect: Mood normal.         Behavior: Behavior is cooperative.         Assessment/Plan  Problem List Items Addressed This Visit          Nervous    Seizure (CMS/HCC)     Stable, continue Keppra            Circulatory    CHF (congestive heart failure) (CMS/[facility]) - Primary     Stable continue to monitor          HTN (hypertension)     Continue metoprolol and amlodipine            Infectious/Inflammatory    Methicillin resistant Staphylococcus aureus infection as the cause of diseases classified elsewhere     Continue vancomycin IV continue weekly lab and fax to Dr. Harrison [phone]            Other    Anxiety     Stable continue duloxetine and BuSpar          Medications, treatments, and labs reviewed  Continue medications and treatments as listed in [facility]    Scribe Attestation  IAna Scribe   attest that this documentation has been prepared under the direction and in the presence of PATRICIA Angulo    Provider Attestation - Scribe documentation  All medical record entries made by the Scribe were at my direction and personally dictated by me. I have reviewed the chart and agree that the record accurately reflects my personal performance of the history, physical exam, discussion and plan.   PATRICIA Angulo        Electronically Signed By: PATRICIA Angulo   5/16/23 12:54 PM

## 2023-04-24 NOTE — PROGRESS NOTES
HISTORY & PHYSICAL    Subjective   Chief complaint: Andrea Ochoa is a 67 y.o. male who is a acute skilled care patient being seen and evaluated for multiple medical problems.  Patient presents for weakness.    HPI:  Patient is a 67 year old male with a past medical history of COPD, HTN, CHF, and IBS. Patient was sent to the ED from facility due to abnormal labs. NH reports that pt had an increased CO2 level as well as an elevated sodium causing some concern. Pt also has been experiencing some AMS for the past two days. Pt has MRSA and is currently on antibiotics. Pt was stable and discharged back to facility.         Past Medical History:   Diagnosis Date    Anxiety     CHF (congestive heart failure) (CMS/[facility])     Chronic respiratory failure with hypoxia (CMS/[facility])     COPD (chronic obstructive pulmonary disease) (CMS/[facility])     Dependence on supplemental oxygen     Difficulty walking     Encounter for removal of internal fixation device     Gout     Lack of coordination     Local infection of the skin and subcutaneous tissue, unspecified     Low back pain     Seizures (CMS/[facility])     Transient cerebral ischemic attack     UTI (urinary tract infection)     Weakness        Past Surgical History:   Procedure Laterality Date    OTHER SURGICAL HISTORY  10/10/2019    Lumbar laminectomy    OTHER SURGICAL HISTORY  10/10/2019    Lumbar vertebral fusion    OTHER SURGICAL HISTORY  10/10/2019    Insertion of implantable intrathecal access system       Family History   Problem Relation Name Age of Onset    No Known Problems Mother      No Known Problems Father         Social History     Socioeconomic History    Marital status:      Spouse name: Not on file    Number of children: Not on file    Years of education: Not on file    Highest education level: Not on file   Occupational History    Not on file   Tobacco Use    Smoking status: Not on file    Smokeless tobacco: Not on file   Vaping Use    Vaping status: Not on file    Substance and Sexual Activity    Alcohol use: Not on file    Drug use: Not on file    Sexual activity: Not on file   Other Topics Concern    Not on file   Social History Narrative    Not on file     Social Determinants of Health     Financial Resource Strain: Not on file   Food Insecurity: Not on file   Transportation Needs: Not on file   Physical Activity: Not on file   Stress: Not on file   Social Connections: Not on file   Intimate Partner Violence: Not on file   Housing Stability: Not on file       Vital signs: 158/72, 97.9, 72, 18, 98%    Objective     Constitutional:       General: He is not in acute distress.  Eyes:      Extraocular Movements: Extraocular movements intact.   Pulmonary:      Effort: Pulmonary effort is normal.      Breath sounds: Normal breath sounds.   Abdominal:      General: Bowel sounds are normal.      Palpations: Abdomen is soft.   Musculoskeletal:      Cervical back: Neck supple.      Right lower leg: No edema.      Left lower leg: No edema.   Neurological:      Mental Status: He is alert.   Psychiatric:         Mood and Affect: Mood normal.         Behavior: Behavior is cooperative.     Assessment/Plan   Problem List Items Addressed This Visit          Nervous    Seizure (CMS/HCC)       Circulatory    CHF (congestive heart failure) (CMS/[facility])    HTN (hypertension)       Infectious/Inflammatory    Methicillin resistant Staphylococcus aureus infection as the cause of diseases classified elsewhere       Other    Anxiety - Primary     Medications, treatments, and labs reviewed  Continue medications and treatments as listed in [facility]    Scribe Attestation  I, Delmar Bernal   attest that this documentation has been prepared under the direction and in the presence of Angel Lewis DO.    Provider Attestation - Scribe documentation  All medical record entries made by the Scribe were at my direction and personally dictated by me. I have reviewed the chart and agree that the record  accurately reflects my personal performance of the history, physical exam, discussion and plan.  An interactive audio and/or video telecommunication system which permits real time communications between the patient (at the originating site) and provider (at a distant site) was utilized to provide this telehealth service after obtaining verbal consent.   Angel Lewis, DO

## 2023-04-24 NOTE — PROGRESS NOTES
PROGRESS NOTE    Subjective   Chief complaint: Andrea Ochoa is a 67 y.o. male who is a acute skilled care patient being seen and evaluated for monthly general medical care and follow-up.    HPI:   Patient presents for general medical care and f/u.  Patient seen and examined at bedside.  No issues per nursing.  Patient has no acute complaints.        Objective   Vital signs: 20, 143/76, 98.0, 70, 96%  Physical Exam  Constitutional:       General: He is not in acute distress.  Eyes:      Extraocular Movements: Extraocular movements intact.   Pulmonary:      Effort: Pulmonary effort is normal.      Breath sounds: Normal breath sounds.   Abdominal:      General: Bowel sounds are normal.      Palpations: Abdomen is soft.   Musculoskeletal:      Cervical back: Neck supple.      Right lower leg: No edema.      Left lower leg: No edema.   Neurological:      Mental Status: He is alert.   Psychiatric:         Mood and Affect: Mood normal.         Behavior: Behavior is cooperative.         Assessment/Plan   Problem List Items Addressed This Visit          Nervous    Seizure (CMS/HCC)     Stable, continue Keppra            Circulatory    CHF (congestive heart failure) (CMS/HCC)     Stable continue to monitor          HTN (hypertension)     Continue metoprolol and amlodipine            Infectious/Inflammatory    Methicillin resistant Staphylococcus aureus infection as the cause of diseases classified elsewhere     Continue vancomycin IV continue weekly lab and fax to Dr. Harrison [phone]            Other    Anxiety - Primary     Stable continue duloxetine and BuSpar          Medications, treatments, and labs reviewed  Continue medications and treatments as listed in [facility]    Scribe Attestation  I, Marychuy Aragonibdwight   attest that this documentation has been prepared under the direction and in the presence of FIDEL Angulo-MARSHAL    Provider Attestation - Scribe documentation  All medical record entries made  by the Scribe were at my direction and personally dictated by me. I have reviewed the chart and agree that the record accurately reflects my personal performance of the history, physical exam, discussion and plan.   Angel Arroyo, APRN-CNP

## 2023-04-30 NOTE — LETTER
Patient: Andrea Ochoa  : 1955    Encounter Date: 2023    error    Electronically Signed By: Casper Miranda MD   23 12:01 PM

## 2023-05-02 NOTE — LETTER
Patient: Andrea Ochoa  : 1955    Encounter Date: 2023    PROGRESS NOTE    Subjective  Chief complaint: Andrea Ochoa is a 67 y.o. male who is a acute skilled care patient being seen and evaluated for weakness.    HPI:  3/21/2023 Patient continues to actively participate in therapy.  Worked on UE strengthening exercises (2 sets of 10) in therapy today to increase UE and trunk strength, endurance and activity tolerance for improved ADLs, transfers and mobility.     3/22/23  Patient continues to work towards goals in therapy.  Worked on bed mobility training today.  Patient completed transfer supine to sit with minimal assist.      3/24/23 Patient worked on strengthening exercises, transfers and static standing today in therapy.  Transfers supine to sit with CGA, sit to supine with CGA/Min A and sit to stand to walker with Min A.  Per therapist , patient reported some SOB and lightheadedness.    23  Patient continues working in therapy.  Worked on therapy strengthening exercises to help with transfers and ambulating.  Patient requires max/mod assist for bed mobility.  Able to scoot to edge of bed on his own.      23 Patient has been working in therapy to improve strength, endurance, and ADLs.  Patient continues to work toward goals.  No new concerns today.  Denies n/v/f/c pain.      4/10/23  Patient declined out of bed activity today in PT.  Was instructed on therapy exercises for strengthening to allow for progression with transfers.  Patient stated he was too tired after OT visit.  Patient worked on bed mobility with OT.  Transferred supine to sit on edge of bed and back with moderate assist.  Performed sit to stand transfer from edge of bed to standing with wheeled walker and max assist.    23  Patient working on bed mobility with minimal to moderate assist x1.  Able to sit on edge of bed for short timeframes due to dizziness and fatigue per therapy.  Patient remains on IV ATB for  infection.  Denies n/v/f/c.    4/12/23  Patient remains on IV Vanco due to MRSA.  No adverse reaction.   Therapy working with the patient to improve strength and endurance with ADLs, transfers, and mobility.  Patient continues to work toward goals.  Patient is stable and has no new complaints.  Nursing staff voices no new concerns today.    4/14/23 Patient continues IV Vanco due to MRSA.  Denies n/v/f/c.  Therapy working with patient today on supine ankle pumps and heel slides.  Patient reported fatigue and was unable to complete other supine exercises.  Patient was also instructed on bed mobility with sitting on edge of bed.  Patient reported dizziness and was unable to sit longer than 1.5 minutes.  Required assist with upper extremity support with supine to sit and sit to supine with assist of BLE support. Patient required mod a with bed mobility and is dependent for repositioning once in supine.  Requires max assist x2 to reposition for proper spinal alignment.  No new concerns reported by nursing.    4/17/23   Patient remains on IV antibiotics for MRSA.  Tolerating well with no adverse reactions.  Continues working with therapy.  No new concerns.  Denies n/v/f/c.    4/18/23   Patient remains on IV ATB for MRSA in lumbar spine wound until 4/26/23.  Therapy educating patient on transfers.  Worked on repositioning with cues for BLE elevation and for elevated head of bed to assist with spinal alignment.  Patient also educated on importance of frequent changes in position to increase circulation, ROM and reduce risk of skin breakdown.  Patient refused to sit on edge of bed today.    4/21/23   Patient on IV antibiotics for MRSA.  Denies n/v/f/c.    Therapy continues to work with the patient on bed mobility and transfers.  Patient participation is poor due to reported dizziness.    5/2/23   Patient completed upper extremity strengthening exercises today in therapy.  Completed 2 sets of 10 with a 1 pound weights working  BUE.  Patient requires Max A x 2 for transfers.  Patient is current in IV ATB for UTI and MRSA.  No adverse reactions.  Patient is afebrile.      Objective  Vital signs:  139/64, 97.4, 69, 18, 96%  Physical Exam  Constitutional:       General: He is not in acute distress.  Eyes:      Extraocular Movements: Extraocular movements intact.   Pulmonary:      Effort: Pulmonary effort is normal.   Musculoskeletal:      Cervical back: Neck supple.   Neurological:      Mental Status: He is alert.   Psychiatric:         Mood and Affect: Mood normal.         Behavior: Behavior is cooperative.         Assessment/Plan  Problem List Items Addressed This Visit          Nervous    Seizure (CMS/HCC)     Stable, continue Keppra            Circulatory    CHF (congestive heart failure) (CMS/HCC)     Stable continue to monitor          HTN (hypertension)     Continue metoprolol and amlodipine            Infectious/Inflammatory    Methicillin resistant Staphylococcus aureus infection as the cause of diseases classified elsewhere     Continue vancomycin IV continue weekly lab and fax to Dr. Harrison [phone] vancomycin IV continue weekly lab and fax to Dr. Harrison [phone]            Other    Anxiety - Primary     Stable continue duloxetine and BuSpar         Weakness     Medications, treatments, and labs reviewed  Continue medications and treatments as listed in [facility]    Scribe Attestation  IAna Scribe   attest that this documentation has been prepared under the direction and in the presence of PATRICIA Angulo    Provider Attestation - Scribe documentation  All medical record entries made by the Scribe were at my direction and personally dictated by me. I have reviewed the chart and agree that the record accurately reflects my personal performance of the history, physical exam, discussion and plan.   PATRICIA Angulo        Electronically Signed By: Angel Arroyo  FIDEL-CNP   5/25/23 10:17 AM

## 2023-05-02 NOTE — PROGRESS NOTES
PROGRESS NOTE    Subjective   Chief complaint: Andrea Ochoa is a 67 y.o. male who is a acute skilled care patient being seen and evaluated for weakness.    HPI:  3/21/2023 Patient continues to actively participate in therapy.  Worked on UE strengthening exercises (2 sets of 10) in therapy today to increase UE and trunk strength, endurance and activity tolerance for improved ADLs, transfers and mobility.     3/22/23  Patient continues to work towards goals in therapy.  Worked on bed mobility training today.  Patient completed transfer supine to sit with minimal assist.      3/24/23 Patient worked on strengthening exercises, transfers and static standing today in therapy.  Transfers supine to sit with CGA, sit to supine with CGA/Min A and sit to stand to walker with Min A.  Per therapist , patient reported some SOB and lightheadedness.    4/4/23  Patient continues working in therapy.  Worked on therapy strengthening exercises to help with transfers and ambulating.  Patient requires max/mod assist for bed mobility.  Able to scoot to edge of bed on his own.      4/5/23 Patient has been working in therapy to improve strength, endurance, and ADLs.  Patient continues to work toward goals.  No new concerns today.  Denies n/v/f/c pain.      4/10/23  Patient declined out of bed activity today in PT.  Was instructed on therapy exercises for strengthening to allow for progression with transfers.  Patient stated he was too tired after OT visit.  Patient worked on bed mobility with OT.  Transferred supine to sit on edge of bed and back with moderate assist.  Performed sit to stand transfer from edge of bed to standing with wheeled walker and max assist.    4/11/23  Patient working on bed mobility with minimal to moderate assist x1.  Able to sit on edge of bed for short timeframes due to dizziness and fatigue per therapy.  Patient remains on IV ATB for infection.  Denies n/v/f/c.    4/12/23  Patient remains on IV Vanco due to  MRSA.  No adverse reaction.   Therapy working with the patient to improve strength and endurance with ADLs, transfers, and mobility.  Patient continues to work toward goals.  Patient is stable and has no new complaints.  Nursing staff voices no new concerns today.    4/14/23 Patient continues IV Vanco due to MRSA.  Denies n/v/f/c.  Therapy working with patient today on supine ankle pumps and heel slides.  Patient reported fatigue and was unable to complete other supine exercises.  Patient was also instructed on bed mobility with sitting on edge of bed.  Patient reported dizziness and was unable to sit longer than 1.5 minutes.  Required assist with upper extremity support with supine to sit and sit to supine with assist of BLE support. Patient required mod a with bed mobility and is dependent for repositioning once in supine.  Requires max assist x2 to reposition for proper spinal alignment.  No new concerns reported by nursing.    4/17/23   Patient remains on IV antibiotics for MRSA.  Tolerating well with no adverse reactions.  Continues working with therapy.  No new concerns.  Denies n/v/f/c.    4/18/23   Patient remains on IV ATB for MRSA in lumbar spine wound until 4/26/23.  Therapy educating patient on transfers.  Worked on repositioning with cues for BLE elevation and for elevated head of bed to assist with spinal alignment.  Patient also educated on importance of frequent changes in position to increase circulation, ROM and reduce risk of skin breakdown.  Patient refused to sit on edge of bed today.    4/21/23   Patient on IV antibiotics for MRSA.  Denies n/v/f/c.    Therapy continues to work with the patient on bed mobility and transfers.  Patient participation is poor due to reported dizziness.    5/2/23   Patient completed upper extremity strengthening exercises today in therapy.  Completed 2 sets of 10 with a 1 pound weights working BUE.  Patient requires Max A x 2 for transfers.  Patient is current in IV  ATB for UTI and MRSA.  No adverse reactions.  Patient is afebrile.      Objective   Vital signs:  139/64, 97.4, 69, 18, 96%  Physical Exam  Constitutional:       General: He is not in acute distress.  Eyes:      Extraocular Movements: Extraocular movements intact.   Pulmonary:      Effort: Pulmonary effort is normal.   Musculoskeletal:      Cervical back: Neck supple.   Neurological:      Mental Status: He is alert.   Psychiatric:         Mood and Affect: Mood normal.         Behavior: Behavior is cooperative.         Assessment/Plan   Problem List Items Addressed This Visit          Nervous    Seizure (CMS/HCC)     Stable, continue Keppra            Circulatory    CHF (congestive heart failure) (CMS/HCC)     Stable continue to monitor          HTN (hypertension)     Continue metoprolol and amlodipine            Infectious/Inflammatory    Methicillin resistant Staphylococcus aureus infection as the cause of diseases classified elsewhere     Continue vancomycin IV continue weekly lab and fax to Dr. Harrison [phone] vancomycin IV continue weekly lab and fax to Dr. Harrison [phone]            Other    Anxiety - Primary     Stable continue duloxetine and BuSpar         Weakness     Medications, treatments, and labs reviewed  Continue medications and treatments as listed in [facility]    Scribe Attestation  I, Delmar Aragon   attest that this documentation has been prepared under the direction and in the presence of PATRICIA Angulo    Provider Attestation - Scribe documentation  All medical record entries made by the Scribe were at my direction and personally dictated by me. I have reviewed the chart and agree that the record accurately reflects my personal performance of the history, physical exam, discussion and plan.   PATRICIA Angulo

## 2023-05-03 NOTE — LETTER
Patient: Andrea Ochoa  : 1955    Encounter Date: 2023    PROGRESS NOTE    Subjective  Chief complaint: Andrea Ochoa is a 67 y.o. male who is a acute skilled care patient being seen and evaluated for weakness.    HPI:  3/21/2023 Patient continues to actively participate in therapy.  Worked on UE strengthening exercises (2 sets of 10) in therapy today to increase UE and trunk strength, endurance and activity tolerance for improved ADLs, transfers and mobility.     3/22/23  Patient continues to work towards goals in therapy.  Worked on bed mobility training today.  Patient completed transfer supine to sit with minimal assist.      3/24/23 Patient worked on strengthening exercises, transfers and static standing today in therapy.  Transfers supine to sit with CGA, sit to supine with CGA/Min A and sit to stand to walker with Min A.  Per therapist , patient reported some SOB and lightheadedness.    23  Patient continues working in therapy.  Worked on therapy strengthening exercises to help with transfers and ambulating.  Patient requires max/mod assist for bed mobility.  Able to scoot to edge of bed on his own.      23 Patient has been working in therapy to improve strength, endurance, and ADLs.  Patient continues to work toward goals.  No new concerns today.  Denies n/v/f/c pain.      4/10/23  Patient declined out of bed activity today in PT.  Was instructed on therapy exercises for strengthening to allow for progression with transfers.  Patient stated he was too tired after OT visit.  Patient worked on bed mobility with OT.  Transferred supine to sit on edge of bed and back with moderate assist.  Performed sit to stand transfer from edge of bed to standing with wheeled walker and max assist.    23  Patient working on bed mobility with minimal to moderate assist x1.  Able to sit on edge of bed for short timeframes due to dizziness and fatigue per therapy.  Patient remains on IV ATB for  infection.  Denies n/v/f/c.    4/12/23  Patient remains on IV Vanco due to MRSA.  No adverse reaction.   Therapy working with the patient to improve strength and endurance with ADLs, transfers, and mobility.  Patient continues to work toward goals.  Patient is stable and has no new complaints.  Nursing staff voices no new concerns today.    4/14/23 Patient continues IV Vanco due to MRSA.  Denies n/v/f/c.  Therapy working with patient today on supine ankle pumps and heel slides.  Patient reported fatigue and was unable to complete other supine exercises.  Patient was also instructed on bed mobility with sitting on edge of bed.  Patient reported dizziness and was unable to sit longer than 1.5 minutes.  Required assist with upper extremity support with supine to sit and sit to supine with assist of BLE support. Patient required mod a with bed mobility and is dependent for repositioning once in supine.  Requires max assist x2 to reposition for proper spinal alignment.  No new concerns reported by nursing.    4/17/23   Patient remains on IV antibiotics for MRSA.  Tolerating well with no adverse reactions.  Continues working with therapy.  No new concerns.  Denies n/v/f/c.    4/18/23   Patient remains on IV ATB for MRSA in lumbar spine wound until 4/26/23.  Therapy educating patient on transfers.  Worked on repositioning with cues for BLE elevation and for elevated head of bed to assist with spinal alignment.  Patient also educated on importance of frequent changes in position to increase circulation, ROM and reduce risk of skin breakdown.  Patient refused to sit on edge of bed today.    4/21/23   Patient on IV antibiotics for MRSA.  Denies n/v/f/c.    Therapy continues to work with the patient on bed mobility and transfers.  Patient participation is poor due to reported dizziness.    5/2/23   Patient completed upper extremity strengthening exercises today in therapy.  Completed 2 sets of 10 with a 1 pound weights working  BUE.  Patient requires Max A x 2 for transfers.  Patient is current in IV ATB for UTI and MRSA.  No adverse reactions.  Patient is afebrile.    5/3/23   Patient remains on IV antibiotics for MRSA.  No adverse reactions noted.  Patient continues to work towards goals in therapy.  No new concerns reported by nursing.      Objective  Vital signs:  130/72, 97.2, 72, 18, 97%  Physical Exam  Constitutional:       General: He is not in acute distress.  Eyes:      Extraocular Movements: Extraocular movements intact.   Pulmonary:      Effort: Pulmonary effort is normal.   Musculoskeletal:      Cervical back: Neck supple.   Neurological:      Mental Status: He is alert.   Psychiatric:         Mood and Affect: Mood normal.         Behavior: Behavior is cooperative.         Assessment/Plan  Problem List Items Addressed This Visit          Nervous    Seizure (CMS/HCC)     Stable, continue Keppra            Circulatory    CHF (congestive heart failure) (CMS/[facility])     Stable continue to monitor          HTN (hypertension)     Continue metoprolol and amlodipine            Other    Anxiety - Primary     Stable continue duloxetine and BuSpar         Weakness     Medications, treatments, and labs reviewed  Continue medications and treatments as listed in [facility]    Scribe Attestation  IAna Scribe   attest that this documentation has been prepared under the direction and in the presence of PATRICIA Angulo    Provider Attestation - Scribe documentation  All medical record entries made by the Scribe were at my direction and personally dictated by me. I have reviewed the chart and agree that the record accurately reflects my personal performance of the history, physical exam, discussion and plan.   PATRICIA Angulo        Electronically Signed By: PATRICIA Angulo   5/25/23 10:07 AM

## 2023-05-04 NOTE — PROGRESS NOTES
PROGRESS NOTE    Subjective   Chief complaint: Andrea Ochoa is a 67 y.o. male who is a acute skilled care patient being seen and evaluated for weakness.    HPI:  3/21/2023 Patient continues to actively participate in therapy.  Worked on UE strengthening exercises (2 sets of 10) in therapy today to increase UE and trunk strength, endurance and activity tolerance for improved ADLs, transfers and mobility.     3/22/23  Patient continues to work towards goals in therapy.  Worked on bed mobility training today.  Patient completed transfer supine to sit with minimal assist.      3/24/23 Patient worked on strengthening exercises, transfers and static standing today in therapy.  Transfers supine to sit with CGA, sit to supine with CGA/Min A and sit to stand to walker with Min A.  Per therapist , patient reported some SOB and lightheadedness.    4/4/23  Patient continues working in therapy.  Worked on therapy strengthening exercises to help with transfers and ambulating.  Patient requires max/mod assist for bed mobility.  Able to scoot to edge of bed on his own.      4/5/23 Patient has been working in therapy to improve strength, endurance, and ADLs.  Patient continues to work toward goals.  No new concerns today.  Denies n/v/f/c pain.      4/10/23  Patient declined out of bed activity today in PT.  Was instructed on therapy exercises for strengthening to allow for progression with transfers.  Patient stated he was too tired after OT visit.  Patient worked on bed mobility with OT.  Transferred supine to sit on edge of bed and back with moderate assist.  Performed sit to stand transfer from edge of bed to standing with wheeled walker and max assist.    4/11/23  Patient working on bed mobility with minimal to moderate assist x1.  Able to sit on edge of bed for short timeframes due to dizziness and fatigue per therapy.  Patient remains on IV ATB for infection.  Denies n/v/f/c.    4/12/23  Patient remains on IV Vanco due to  MRSA.  No adverse reaction.   Therapy working with the patient to improve strength and endurance with ADLs, transfers, and mobility.  Patient continues to work toward goals.  Patient is stable and has no new complaints.  Nursing staff voices no new concerns today.    4/14/23 Patient continues IV Vanco due to MRSA.  Denies n/v/f/c.  Therapy working with patient today on supine ankle pumps and heel slides.  Patient reported fatigue and was unable to complete other supine exercises.  Patient was also instructed on bed mobility with sitting on edge of bed.  Patient reported dizziness and was unable to sit longer than 1.5 minutes.  Required assist with upper extremity support with supine to sit and sit to supine with assist of BLE support. Patient required mod a with bed mobility and is dependent for repositioning once in supine.  Requires max assist x2 to reposition for proper spinal alignment.  No new concerns reported by nursing.    4/17/23   Patient remains on IV antibiotics for MRSA.  Tolerating well with no adverse reactions.  Continues working with therapy.  No new concerns.  Denies n/v/f/c.    4/18/23   Patient remains on IV ATB for MRSA in lumbar spine wound until 4/26/23.  Therapy educating patient on transfers.  Worked on repositioning with cues for BLE elevation and for elevated head of bed to assist with spinal alignment.  Patient also educated on importance of frequent changes in position to increase circulation, ROM and reduce risk of skin breakdown.  Patient refused to sit on edge of bed today.    4/21/23   Patient on IV antibiotics for MRSA.  Denies n/v/f/c.    Therapy continues to work with the patient on bed mobility and transfers.  Patient participation is poor due to reported dizziness.    5/2/23   Patient completed upper extremity strengthening exercises today in therapy.  Completed 2 sets of 10 with a 1 pound weights working BUE.  Patient requires Max A x 2 for transfers.  Patient is current in IV  ATB for UTI and MRSA.  No adverse reactions.  Patient is afebrile.    5/3/23   Patient remains on IV antibiotics for MRSA.  No adverse reactions noted.  Patient continues to work towards goals in therapy.  No new concerns reported by nursing.      Objective   Vital signs:  130/72, 97.2, 72, 18, 97%  Physical Exam  Constitutional:       General: He is not in acute distress.  Eyes:      Extraocular Movements: Extraocular movements intact.   Pulmonary:      Effort: Pulmonary effort is normal.   Musculoskeletal:      Cervical back: Neck supple.   Neurological:      Mental Status: He is alert.   Psychiatric:         Mood and Affect: Mood normal.         Behavior: Behavior is cooperative.         Assessment/Plan   Problem List Items Addressed This Visit          Nervous    Seizure (CMS/HCC)     Stable, continue Keppra            Circulatory    CHF (congestive heart failure) (CMS/[facility])     Stable continue to monitor          HTN (hypertension)     Continue metoprolol and amlodipine            Other    Anxiety - Primary     Stable continue duloxetine and BuSpar         Weakness     Medications, treatments, and labs reviewed  Continue medications and treatments as listed in [facility]    Scribe Attestation  Ana CAMARENA Scribe   attest that this documentation has been prepared under the direction and in the presence of PATRICIA Angulo    Provider Attestation - Scribe documentation  All medical record entries made by the Scribe were at my direction and personally dictated by me. I have reviewed the chart and agree that the record accurately reflects my personal performance of the history, physical exam, discussion and plan.   PATRICIA Angulo

## 2023-05-05 NOTE — LETTER
Patient: Andrea Ochoa  : 1955    Encounter Date: 2023    PROGRESS NOTE    Subjective  Chief complaint: Andrea Ochoa is a 67 y.o. male who is a acute skilled care patient being seen and evaluated for weakness.    HPI:  3/21/2023 Patient continues to actively participate in therapy.  Worked on UE strengthening exercises (2 sets of 10) in therapy today to increase UE and trunk strength, endurance and activity tolerance for improved ADLs, transfers and mobility.     3/22/23  Patient continues to work towards goals in therapy.  Worked on bed mobility training today.  Patient completed transfer supine to sit with minimal assist.      3/24/23 Patient worked on strengthening exercises, transfers and static standing today in therapy.  Transfers supine to sit with CGA, sit to supine with CGA/Min A and sit to stand to walker with Min A.  Per therapist , patient reported some SOB and lightheadedness.    23  Patient continues working in therapy.  Worked on therapy strengthening exercises to help with transfers and ambulating.  Patient requires max/mod assist for bed mobility.  Able to scoot to edge of bed on his own.      23 Patient has been working in therapy to improve strength, endurance, and ADLs.  Patient continues to work toward goals.  No new concerns today.  Denies n/v/f/c pain.      4/10/23  Patient declined out of bed activity today in PT.  Was instructed on therapy exercises for strengthening to allow for progression with transfers.  Patient stated he was too tired after OT visit.  Patient worked on bed mobility with OT.  Transferred supine to sit on edge of bed and back with moderate assist.  Performed sit to stand transfer from edge of bed to standing with wheeled walker and max assist.    23  Patient working on bed mobility with minimal to moderate assist x1.  Able to sit on edge of bed for short timeframes due to dizziness and fatigue per therapy.  Patient remains on IV ATB for  infection.  Denies n/v/f/c.    4/12/23  Patient remains on IV Vanco due to MRSA.  No adverse reaction.   Therapy working with the patient to improve strength and endurance with ADLs, transfers, and mobility.  Patient continues to work toward goals.  Patient is stable and has no new complaints.  Nursing staff voices no new concerns today.    4/14/23 Patient continues IV Vanco due to MRSA.  Denies n/v/f/c.  Therapy working with patient today on supine ankle pumps and heel slides.  Patient reported fatigue and was unable to complete other supine exercises.  Patient was also instructed on bed mobility with sitting on edge of bed.  Patient reported dizziness and was unable to sit longer than 1.5 minutes.  Required assist with upper extremity support with supine to sit and sit to supine with assist of BLE support. Patient required mod a with bed mobility and is dependent for repositioning once in supine.  Requires max assist x2 to reposition for proper spinal alignment.  No new concerns reported by nursing.    4/17/23   Patient remains on IV antibiotics for MRSA.  Tolerating well with no adverse reactions.  Continues working with therapy.  No new concerns.  Denies n/v/f/c.    4/18/23   Patient remains on IV ATB for MRSA in lumbar spine wound until 4/26/23.  Therapy educating patient on transfers.  Worked on repositioning with cues for BLE elevation and for elevated head of bed to assist with spinal alignment.  Patient also educated on importance of frequent changes in position to increase circulation, ROM and reduce risk of skin breakdown.  Patient refused to sit on edge of bed today.    4/21/23   Patient on IV antibiotics for MRSA.  Denies n/v/f/c.    Therapy continues to work with the patient on bed mobility and transfers.  Patient participation is poor due to reported dizziness.    5/2/23   Patient completed upper extremity strengthening exercises today in therapy.  Completed 2 sets of 10 with a 1 pound weights working  BUE.  Patient requires Max A x 2 for transfers.  Patient is current in IV ATB for UTI and MRSA.  No adverse reactions.  Patient is afebrile.    5/3/23   Patient remains on IV antibiotics for MRSA.  No adverse reactions noted.  Patient continues to work towards goals in therapy.  No new concerns reported by nursing.    5/5/23  Patient continues IV ATB with no adverse reactions noted.  Patient is working on bed mobility training and transfer training in therapy.  Requires moderate assist of 2 for sit to stand.  Patient stood for 2 minutes.  Was also able to take 4 steps with minimal assist and front wheel walker.  No new concerns reported by nursing.        Objective  Vital signs:  18, 130/72, 97.0, 72, 97%  Physical Exam  Constitutional:       General: He is not in acute distress.  Eyes:      Extraocular Movements: Extraocular movements intact.   Pulmonary:      Effort: Pulmonary effort is normal.   Musculoskeletal:      Cervical back: Neck supple.   Neurological:      Mental Status: He is alert.   Psychiatric:         Mood and Affect: Mood normal.         Behavior: Behavior is cooperative.         Assessment/Plan  Problem List Items Addressed This Visit          Nervous    Seizure (CMS/HCC)     Stable, continue Keppra            Circulatory    CHF (congestive heart failure) (CMS/HCC) - Primary     Stable continue to monitor          HTN (hypertension)     Continue metoprolol and amlodipine            Infectious/Inflammatory    Methicillin resistant Staphylococcus aureus infection as the cause of diseases classified elsewhere     Continue vancomycin IV continue weekly lab and fax to Dr. Harrison [phone] vancomycin IV continue weekly lab and fax to Dr. Harrison [phone]            Other    Anxiety     Stable continue duloxetine and BuSpar          Medications, treatments, and labs reviewed  Continue medications and treatments as listed in [facility]    Scribe Attestation  I, Delmar Aragon   attest that  this documentation has been prepared under the direction and in the presence of PATRICIA Angulo    Provider Attestation - Scribe documentation  All medical record entries made by the Scribe were at my direction and personally dictated by me. I have reviewed the chart and agree that the record accurately reflects my personal performance of the history, physical exam, discussion and plan.   PATRICIA Angulo        Electronically Signed By: PATRICIA Angulo   5/25/23  2:26 PM

## 2023-05-08 NOTE — PROGRESS NOTES
PROGRESS NOTE    Subjective   Chief complaint: Andrea Ochoa is a 67 y.o. male who is a acute skilled care patient being seen and evaluated for weakness.    HPI:  3/21/2023 Patient continues to actively participate in therapy.  Worked on UE strengthening exercises (2 sets of 10) in therapy today to increase UE and trunk strength, endurance and activity tolerance for improved ADLs, transfers and mobility.     3/22/23  Patient continues to work towards goals in therapy.  Worked on bed mobility training today.  Patient completed transfer supine to sit with minimal assist.      3/24/23 Patient worked on strengthening exercises, transfers and static standing today in therapy.  Transfers supine to sit with CGA, sit to supine with CGA/Min A and sit to stand to walker with Min A.  Per therapist , patient reported some SOB and lightheadedness.    4/4/23  Patient continues working in therapy.  Worked on therapy strengthening exercises to help with transfers and ambulating.  Patient requires max/mod assist for bed mobility.  Able to scoot to edge of bed on his own.      4/5/23 Patient has been working in therapy to improve strength, endurance, and ADLs.  Patient continues to work toward goals.  No new concerns today.  Denies n/v/f/c pain.      4/10/23  Patient declined out of bed activity today in PT.  Was instructed on therapy exercises for strengthening to allow for progression with transfers.  Patient stated he was too tired after OT visit.  Patient worked on bed mobility with OT.  Transferred supine to sit on edge of bed and back with moderate assist.  Performed sit to stand transfer from edge of bed to standing with wheeled walker and max assist.    4/11/23  Patient working on bed mobility with minimal to moderate assist x1.  Able to sit on edge of bed for short timeframes due to dizziness and fatigue per therapy.  Patient remains on IV ATB for infection.  Denies n/v/f/c.    4/12/23  Patient remains on IV Vanco due to  MRSA.  No adverse reaction.   Therapy working with the patient to improve strength and endurance with ADLs, transfers, and mobility.  Patient continues to work toward goals.  Patient is stable and has no new complaints.  Nursing staff voices no new concerns today.    4/14/23 Patient continues IV Vanco due to MRSA.  Denies n/v/f/c.  Therapy working with patient today on supine ankle pumps and heel slides.  Patient reported fatigue and was unable to complete other supine exercises.  Patient was also instructed on bed mobility with sitting on edge of bed.  Patient reported dizziness and was unable to sit longer than 1.5 minutes.  Required assist with upper extremity support with supine to sit and sit to supine with assist of BLE support. Patient required mod a with bed mobility and is dependent for repositioning once in supine.  Requires max assist x2 to reposition for proper spinal alignment.  No new concerns reported by nursing.    4/17/23   Patient remains on IV antibiotics for MRSA.  Tolerating well with no adverse reactions.  Continues working with therapy.  No new concerns.  Denies n/v/f/c.    4/18/23   Patient remains on IV ATB for MRSA in lumbar spine wound until 4/26/23.  Therapy educating patient on transfers.  Worked on repositioning with cues for BLE elevation and for elevated head of bed to assist with spinal alignment.  Patient also educated on importance of frequent changes in position to increase circulation, ROM and reduce risk of skin breakdown.  Patient refused to sit on edge of bed today.    4/21/23   Patient on IV antibiotics for MRSA.  Denies n/v/f/c.    Therapy continues to work with the patient on bed mobility and transfers.  Patient participation is poor due to reported dizziness.    5/2/23   Patient completed upper extremity strengthening exercises today in therapy.  Completed 2 sets of 10 with a 1 pound weights working BUE.  Patient requires Max A x 2 for transfers.  Patient is current in IV  ATB for UTI and MRSA.  No adverse reactions.  Patient is afebrile.    5/3/23   Patient remains on IV antibiotics for MRSA.  No adverse reactions noted.  Patient continues to work towards goals in therapy.  No new concerns reported by nursing.    5/5/23  Patient continues IV ATB with no adverse reactions noted.  Patient is working on bed mobility training and transfer training in therapy.  Requires moderate assist of 2 for sit to stand.  Patient stood for 2 minutes.  Was also able to take 4 steps with minimal assist and front wheel walker.  No new concerns reported by nursing.        Objective   Vital signs:  18, 130/72, 97.0, 72, 97%  Physical Exam  Constitutional:       General: He is not in acute distress.  Eyes:      Extraocular Movements: Extraocular movements intact.   Pulmonary:      Effort: Pulmonary effort is normal.   Musculoskeletal:      Cervical back: Neck supple.   Neurological:      Mental Status: He is alert.   Psychiatric:         Mood and Affect: Mood normal.         Behavior: Behavior is cooperative.         Assessment/Plan   Problem List Items Addressed This Visit          Nervous    Seizure (CMS/HCC)     Stable, continue Keppra            Circulatory    CHF (congestive heart failure) (CMS/HCC) - Primary     Stable continue to monitor          HTN (hypertension)     Continue metoprolol and amlodipine            Infectious/Inflammatory    Methicillin resistant Staphylococcus aureus infection as the cause of diseases classified elsewhere     Continue vancomycin IV continue weekly lab and fax to Dr. Harrison [phone] vancomycin IV continue weekly lab and fax to Dr. Harrison [phone]            Other    Anxiety     Stable continue duloxetine and BuSpar          Medications, treatments, and labs reviewed  Continue medications and treatments as listed in [facility]    Scribe Attestation  I, Delmar Aragon   attest that this documentation has been prepared under the direction and in the  presence of PATRICIA Angulo    Provider Attestation - Scribe documentation  All medical record entries made by the Scribe were at my direction and personally dictated by me. I have reviewed the chart and agree that the record accurately reflects my personal performance of the history, physical exam, discussion and plan.   PATRICIA Angulo

## 2023-05-10 NOTE — LETTER
Patient: Andrea Ochoa  : 1955    Encounter Date: 05/10/2023    PROGRESS NOTE    Subjective  Chief complaint: Andrea Ochoa is a 67 y.o. male who is a acute skilled care patient being seen and evaluated for weakness.    HPI:  3/21/2023 Patient continues to actively participate in therapy.  Worked on UE strengthening exercises (2 sets of 10) in therapy today to increase UE and trunk strength, endurance and activity tolerance for improved ADLs, transfers and mobility.     3/22/23  Patient continues to work towards goals in therapy.  Worked on bed mobility training today.  Patient completed transfer supine to sit with minimal assist.      3/24/23 Patient worked on strengthening exercises, transfers and static standing today in therapy.  Transfers supine to sit with CGA, sit to supine with CGA/Min A and sit to stand to walker with Min A.  Per therapist , patient reported some SOB and lightheadedness.    23  Patient continues working in therapy.  Worked on therapy strengthening exercises to help with transfers and ambulating.  Patient requires max/mod assist for bed mobility.  Able to scoot to edge of bed on his own.      23 Patient has been working in therapy to improve strength, endurance, and ADLs.  Patient continues to work toward goals.  No new concerns today.  Denies n/v/f/c pain.      4/10/23  Patient declined out of bed activity today in PT.  Was instructed on therapy exercises for strengthening to allow for progression with transfers.  Patient stated he was too tired after OT visit.  Patient worked on bed mobility with OT.  Transferred supine to sit on edge of bed and back with moderate assist.  Performed sit to stand transfer from edge of bed to standing with wheeled walker and max assist.    23  Patient working on bed mobility with minimal to moderate assist x1.  Able to sit on edge of bed for short timeframes due to dizziness and fatigue per therapy.  Patient remains on IV ATB for  infection.  Denies n/v/f/c.    4/12/23  Patient remains on IV Vanco due to MRSA.  No adverse reaction.   Therapy working with the patient to improve strength and endurance with ADLs, transfers, and mobility.  Patient continues to work toward goals.  Patient is stable and has no new complaints.  Nursing staff voices no new concerns today.    4/14/23 Patient continues IV Vanco due to MRSA.  Denies n/v/f/c.  Therapy working with patient today on supine ankle pumps and heel slides.  Patient reported fatigue and was unable to complete other supine exercises.  Patient was also instructed on bed mobility with sitting on edge of bed.  Patient reported dizziness and was unable to sit longer than 1.5 minutes.  Required assist with upper extremity support with supine to sit and sit to supine with assist of BLE support. Patient required mod a with bed mobility and is dependent for repositioning once in supine.  Requires max assist x2 to reposition for proper spinal alignment.  No new concerns reported by nursing.    4/17/23   Patient remains on IV antibiotics for MRSA.  Tolerating well with no adverse reactions.  Continues working with therapy.  No new concerns.  Denies n/v/f/c.    4/18/23   Patient remains on IV ATB for MRSA in lumbar spine wound until 4/26/23.  Therapy educating patient on transfers.  Worked on repositioning with cues for BLE elevation and for elevated head of bed to assist with spinal alignment.  Patient also educated on importance of frequent changes in position to increase circulation, ROM and reduce risk of skin breakdown.  Patient refused to sit on edge of bed today.    4/21/23   Patient on IV antibiotics for MRSA.  Denies n/v/f/c.    Therapy continues to work with the patient on bed mobility and transfers.  Patient participation is poor due to reported dizziness.    5/2/23   Patient completed upper extremity strengthening exercises today in therapy.  Completed 2 sets of 10 with a 1 pound weights working  BUE.  Patient requires Max A x 2 for transfers.  Patient is current in IV ATB for UTI and MRSA.  No adverse reactions.  Patient is afebrile.    5/3/23   Patient remains on IV antibiotics for MRSA.  No adverse reactions noted.  Patient continues to work towards goals in therapy.  No new concerns reported by nursing.    5/5/23  Patient continues IV ATB with no adverse reactions noted.  Patient is working on bed mobility training and transfer training in therapy.  Requires moderate assist of 2 for sit to stand.  Patient stood for 2 minutes.  Was also able to take 4 steps with minimal assist and front wheel walker.  No new concerns reported by nursing.      5/10/23  Patient remains on IV Vanco and Cefepime.  No adverse reaction noted.  Patient continues working in therapy.  Working on upper extremity strengthening exercises today.  Completed 2 sets of 10 reps.  No new concerns reported.  Patient is stable without complaint      Objective  Vital signs:   18, 130/72, 97.8, 72, 96%  Physical Exam  Constitutional:       General: He is not in acute distress.  Eyes:      Extraocular Movements: Extraocular movements intact.   Pulmonary:      Effort: Pulmonary effort is normal.   Musculoskeletal:      Cervical back: Neck supple.   Neurological:      Mental Status: He is alert.   Psychiatric:         Mood and Affect: Mood normal.         Behavior: Behavior is cooperative.         Assessment/Plan  Problem List Items Addressed This Visit          Circulatory    CHF (congestive heart failure) (CMS/[facility])     Stable continue to monitor          HTN (hypertension)       Infectious/Inflammatory    Methicillin resistant Staphylococcus aureus infection as the cause of diseases classified elsewhere     Continue IV Vanco and cefepime            Other    Anxiety - Primary     Stable continue duloxetine and BuSpar         Weakness     Medications, treatments, and labs reviewed  Continue medications and treatments as listed in [facility]    Scribe  Attestation  I, Delmar Aragon   attest that this documentation has been prepared under the direction and in the presence of PATRICIA Angulo    Provider Attestation - Scribe documentation  All medical record entries made by the Scribe were at my direction and personally dictated by me. I have reviewed the chart and agree that the record accurately reflects my personal performance of the history, physical exam, discussion and plan.   PATRICIA Angulo        Electronically Signed By: PATRICIA Angulo   6/5/23  9:54 AM

## 2023-05-11 NOTE — PROGRESS NOTES
PROGRESS NOTE    Subjective   Chief complaint: Andrea Ochoa is a 67 y.o. male who is a acute skilled care patient being seen and evaluated for weakness.    HPI:  3/21/2023 Patient continues to actively participate in therapy.  Worked on UE strengthening exercises (2 sets of 10) in therapy today to increase UE and trunk strength, endurance and activity tolerance for improved ADLs, transfers and mobility.     3/22/23  Patient continues to work towards goals in therapy.  Worked on bed mobility training today.  Patient completed transfer supine to sit with minimal assist.      3/24/23 Patient worked on strengthening exercises, transfers and static standing today in therapy.  Transfers supine to sit with CGA, sit to supine with CGA/Min A and sit to stand to walker with Min A.  Per therapist , patient reported some SOB and lightheadedness.    4/4/23  Patient continues working in therapy.  Worked on therapy strengthening exercises to help with transfers and ambulating.  Patient requires max/mod assist for bed mobility.  Able to scoot to edge of bed on his own.      4/5/23 Patient has been working in therapy to improve strength, endurance, and ADLs.  Patient continues to work toward goals.  No new concerns today.  Denies n/v/f/c pain.      4/10/23  Patient declined out of bed activity today in PT.  Was instructed on therapy exercises for strengthening to allow for progression with transfers.  Patient stated he was too tired after OT visit.  Patient worked on bed mobility with OT.  Transferred supine to sit on edge of bed and back with moderate assist.  Performed sit to stand transfer from edge of bed to standing with wheeled walker and max assist.    4/11/23  Patient working on bed mobility with minimal to moderate assist x1.  Able to sit on edge of bed for short timeframes due to dizziness and fatigue per therapy.  Patient remains on IV ATB for infection.  Denies n/v/f/c.    4/12/23  Patient remains on IV Vanco due to  MRSA.  No adverse reaction.   Therapy working with the patient to improve strength and endurance with ADLs, transfers, and mobility.  Patient continues to work toward goals.  Patient is stable and has no new complaints.  Nursing staff voices no new concerns today.    4/14/23 Patient continues IV Vanco due to MRSA.  Denies n/v/f/c.  Therapy working with patient today on supine ankle pumps and heel slides.  Patient reported fatigue and was unable to complete other supine exercises.  Patient was also instructed on bed mobility with sitting on edge of bed.  Patient reported dizziness and was unable to sit longer than 1.5 minutes.  Required assist with upper extremity support with supine to sit and sit to supine with assist of BLE support. Patient required mod a with bed mobility and is dependent for repositioning once in supine.  Requires max assist x2 to reposition for proper spinal alignment.  No new concerns reported by nursing.    4/17/23   Patient remains on IV antibiotics for MRSA.  Tolerating well with no adverse reactions.  Continues working with therapy.  No new concerns.  Denies n/v/f/c.    4/18/23   Patient remains on IV ATB for MRSA in lumbar spine wound until 4/26/23.  Therapy educating patient on transfers.  Worked on repositioning with cues for BLE elevation and for elevated head of bed to assist with spinal alignment.  Patient also educated on importance of frequent changes in position to increase circulation, ROM and reduce risk of skin breakdown.  Patient refused to sit on edge of bed today.    4/21/23   Patient on IV antibiotics for MRSA.  Denies n/v/f/c.    Therapy continues to work with the patient on bed mobility and transfers.  Patient participation is poor due to reported dizziness.    5/2/23   Patient completed upper extremity strengthening exercises today in therapy.  Completed 2 sets of 10 with a 1 pound weights working BUE.  Patient requires Max A x 2 for transfers.  Patient is current in IV  ATB for UTI and MRSA.  No adverse reactions.  Patient is afebrile.    5/3/23   Patient remains on IV antibiotics for MRSA.  No adverse reactions noted.  Patient continues to work towards goals in therapy.  No new concerns reported by nursing.    5/5/23  Patient continues IV ATB with no adverse reactions noted.  Patient is working on bed mobility training and transfer training in therapy.  Requires moderate assist of 2 for sit to stand.  Patient stood for 2 minutes.  Was also able to take 4 steps with minimal assist and front wheel walker.  No new concerns reported by nursing.      5/10/23  Patient remains on IV Vanco and Cefepime.  No adverse reaction noted.  Patient continues working in therapy.  Working on upper extremity strengthening exercises today.  Completed 2 sets of 10 reps.  No new concerns reported.  Patient is stable without complaint      Objective   Vital signs:   18, 130/72, 97.8, 72, 96%  Physical Exam  Constitutional:       General: He is not in acute distress.  Eyes:      Extraocular Movements: Extraocular movements intact.   Pulmonary:      Effort: Pulmonary effort is normal.   Musculoskeletal:      Cervical back: Neck supple.   Neurological:      Mental Status: He is alert.   Psychiatric:         Mood and Affect: Mood normal.         Behavior: Behavior is cooperative.         Assessment/Plan   Problem List Items Addressed This Visit          Circulatory    CHF (congestive heart failure) (CMS/[facility])     Stable continue to monitor          HTN (hypertension)       Infectious/Inflammatory    Methicillin resistant Staphylococcus aureus infection as the cause of diseases classified elsewhere     Continue IV Vanco and cefepime            Other    Anxiety - Primary     Stable continue duloxetine and BuSpar         Weakness     Medications, treatments, and labs reviewed  Continue medications and treatments as listed in [facility]    Scribe Attestation  I, Delmar Aragon   attest that this documentation has  been prepared under the direction and in the presence of PATRICIA Angulo    Provider Attestation - Scribe documentation  All medical record entries made by the Scribe were at my direction and personally dictated by me. I have reviewed the chart and agree that the record accurately reflects my personal performance of the history, physical exam, discussion and plan.   PATRICIA Angulo

## 2023-05-12 NOTE — LETTER
Patient: Andrea Ochoa  : 1955    Encounter Date: 2023    PROGRESS NOTE    Subjective  Chief complaint: Andrea Ochoa is a 67 y.o. male who is a acute skilled care patient being seen and evaluated for weakness.    HPI:  4/10/23  Patient declined out of bed activity today in PT.  Was instructed on therapy exercises for strengthening to allow for progression with transfers.  Patient stated he was too tired after OT visit.  Patient worked on bed mobility with OT.  Transferred supine to sit on edge of bed and back with moderate assist.  Performed sit to stand transfer from edge of bed to standing with wheeled walker and max assist.    23  Patient working on bed mobility with minimal to moderate assist x1.  Able to sit on edge of bed for short timeframes due to dizziness and fatigue per therapy.  Patient remains on IV ATB for infection.  Denies n/v/f/c.    23  Patient remains on IV Vanco due to MRSA.  No adverse reaction.   Therapy working with the patient to improve strength and endurance with ADLs, transfers, and mobility.  Patient continues to work toward goals.  Patient is stable and has no new complaints.  Nursing staff voices no new concerns today.    23 Patient continues IV Vanco due to MRSA.  Denies n/v/f/c.  Therapy working with patient today on supine ankle pumps and heel slides.  Patient reported fatigue and was unable to complete other supine exercises.  Patient was also instructed on bed mobility with sitting on edge of bed.  Patient reported dizziness and was unable to sit longer than 1.5 minutes.  Required assist with upper extremity support with supine to sit and sit to supine with assist of BLE support. Patient required mod a with bed mobility and is dependent for repositioning once in supine.  Requires max assist x2 to reposition for proper spinal alignment.  No new concerns reported by nursing.    23   Patient remains on IV antibiotics for MRSA.  Tolerating well  with no adverse reactions.  Continues working with therapy.  No new concerns.  Denies n/v/f/c.    4/18/23   Patient remains on IV ATB for MRSA in lumbar spine wound until 4/26/23.  Therapy educating patient on transfers.  Worked on repositioning with cues for BLE elevation and for elevated head of bed to assist with spinal alignment.  Patient also educated on importance of frequent changes in position to increase circulation, ROM and reduce risk of skin breakdown.  Patient refused to sit on edge of bed today.    4/21/23   Patient on IV antibiotics for MRSA.  Denies n/v/f/c.    Therapy continues to work with the patient on bed mobility and transfers.  Patient participation is poor due to reported dizziness.    5/2/23   Patient completed upper extremity strengthening exercises today in therapy.  Completed 2 sets of 10 with a 1 pound weights working BUE.  Patient requires Max A x 2 for transfers.  Patient is current in IV ATB for UTI and MRSA.  No adverse reactions.  Patient is afebrile.    5/3/23   Patient remains on IV antibiotics for MRSA.  No adverse reactions noted.  Patient continues to work towards goals in therapy.  No new concerns reported by nursing.    5/5/23  Patient continues IV ATB with no adverse reactions noted.  Patient is working on bed mobility training and transfer training in therapy.  Requires moderate assist of 2 for sit to stand.  Patient stood for 2 minutes.  Was also able to take 4 steps with minimal assist and front wheel walker.  No new concerns reported by nursing.      5/10/23  Patient remains on IV Vanco and Cefepime.  No adverse reaction noted.  Patient continues working in therapy.  Working on upper extremity strengthening exercises today.  Completed 2 sets of 10 reps.  No new concerns reported.  Patient is stable without complaint.    5/12/23  Patient remains on IV ATB.  No adverse reaction noted.   therapy worked with patient on bed mobility today.  Patient performed supine to sit  with moderate assist and unsupported sitting at edge of bed.  Also performed unsupported seated reaching activities and seated lower extremity exercises.      Objective  Vital signs:  130/72, 97.2, 72, 18, 96%  Physical Exam  Constitutional:       General: He is not in acute distress.  Eyes:      Extraocular Movements: Extraocular movements intact.   Pulmonary:      Effort: Pulmonary effort is normal.   Musculoskeletal:      Cervical back: Neck supple.   Neurological:      Mental Status: He is alert.   Psychiatric:         Mood and Affect: Mood normal.         Behavior: Behavior is cooperative.         Assessment/Plan  Problem List Items Addressed This Visit       CHF (congestive heart failure) (CMS/HCC)    Anxiety - Primary    Seizure (CMS/HCC)    HTN (hypertension)    Weakness    BPH (benign prostatic hyperplasia)    COPD (chronic obstructive pulmonary disease) (CMS/HCC)    Dependence on renal dialysis (CMS/[facility])    Morbid (severe) obesity with alveolar hypoventilation (CMS/[facility])     Medications, treatments, and labs reviewed  Continue medications and treatments as listed in [facility]    Scribe Attestation  IAna Scribe   attest that this documentation has been prepared under the direction and in the presence of Angel Lewis DO    Provider Attestation - Scribe documentation  All medical record entries made by the Scribe were at my direction and personally dictated by me. I have reviewed the chart and agree that the record accurately reflects my personal performance of the history, physical exam, discussion and plan.   Angel Lewis DO        Electronically Signed By: Angel Lewis DO   6/20/23 12:12 PM

## 2023-05-16 NOTE — ASSESSMENT & PLAN NOTE
Continue metoprolol and amlodipine   Received refill request. Last appointment date verified, medication verified. Refill given per protocol.

## 2023-05-17 NOTE — PROGRESS NOTES
PROGRESS NOTE    Subjective   Chief complaint: Andrea Ochoa is a 67 y.o. male who is a acute skilled care patient being seen and evaluated for weakness.    HPI:  4/10/23  Patient declined out of bed activity today in PT.  Was instructed on therapy exercises for strengthening to allow for progression with transfers.  Patient stated he was too tired after OT visit.  Patient worked on bed mobility with OT.  Transferred supine to sit on edge of bed and back with moderate assist.  Performed sit to stand transfer from edge of bed to standing with wheeled walker and max assist.    4/11/23  Patient working on bed mobility with minimal to moderate assist x1.  Able to sit on edge of bed for short timeframes due to dizziness and fatigue per therapy.  Patient remains on IV ATB for infection.  Denies n/v/f/c.    4/12/23  Patient remains on IV Vanco due to MRSA.  No adverse reaction.   Therapy working with the patient to improve strength and endurance with ADLs, transfers, and mobility.  Patient continues to work toward goals.  Patient is stable and has no new complaints.  Nursing staff voices no new concerns today.    4/14/23 Patient continues IV Vanco due to MRSA.  Denies n/v/f/c.  Therapy working with patient today on supine ankle pumps and heel slides.  Patient reported fatigue and was unable to complete other supine exercises.  Patient was also instructed on bed mobility with sitting on edge of bed.  Patient reported dizziness and was unable to sit longer than 1.5 minutes.  Required assist with upper extremity support with supine to sit and sit to supine with assist of BLE support. Patient required mod a with bed mobility and is dependent for repositioning once in supine.  Requires max assist x2 to reposition for proper spinal alignment.  No new concerns reported by nursing.    4/17/23   Patient remains on IV antibiotics for MRSA.  Tolerating well with no adverse reactions.  Continues working with therapy.  No new  concerns.  Denies n/v/f/c.    4/18/23   Patient remains on IV ATB for MRSA in lumbar spine wound until 4/26/23.  Therapy educating patient on transfers.  Worked on repositioning with cues for BLE elevation and for elevated head of bed to assist with spinal alignment.  Patient also educated on importance of frequent changes in position to increase circulation, ROM and reduce risk of skin breakdown.  Patient refused to sit on edge of bed today.    4/21/23   Patient on IV antibiotics for MRSA.  Denies n/v/f/c.    Therapy continues to work with the patient on bed mobility and transfers.  Patient participation is poor due to reported dizziness.    5/2/23   Patient completed upper extremity strengthening exercises today in therapy.  Completed 2 sets of 10 with a 1 pound weights working BUE.  Patient requires Max A x 2 for transfers.  Patient is current in IV ATB for UTI and MRSA.  No adverse reactions.  Patient is afebrile.    5/3/23   Patient remains on IV antibiotics for MRSA.  No adverse reactions noted.  Patient continues to work towards goals in therapy.  No new concerns reported by nursing.    5/5/23  Patient continues IV ATB with no adverse reactions noted.  Patient is working on bed mobility training and transfer training in therapy.  Requires moderate assist of 2 for sit to stand.  Patient stood for 2 minutes.  Was also able to take 4 steps with minimal assist and front wheel walker.  No new concerns reported by nursing.      5/10/23  Patient remains on IV Vanco and Cefepime.  No adverse reaction noted.  Patient continues working in therapy.  Working on upper extremity strengthening exercises today.  Completed 2 sets of 10 reps.  No new concerns reported.  Patient is stable without complaint.    5/12/23  Patient remains on IV ATB.  No adverse reaction noted.   therapy worked with patient on bed mobility today.  Patient performed supine to sit with moderate assist and unsupported sitting at edge of bed.  Also  performed unsupported seated reaching activities and seated lower extremity exercises.      Objective   Vital signs:  130/72, 97.2, 72, 18, 96%  Physical Exam  Constitutional:       General: He is not in acute distress.  Eyes:      Extraocular Movements: Extraocular movements intact.   Pulmonary:      Effort: Pulmonary effort is normal.   Musculoskeletal:      Cervical back: Neck supple.   Neurological:      Mental Status: He is alert.   Psychiatric:         Mood and Affect: Mood normal.         Behavior: Behavior is cooperative.         Assessment/Plan   Problem List Items Addressed This Visit       CHF (congestive heart failure) (CMS/HCC)    Anxiety - Primary    Seizure (CMS/HCC)    HTN (hypertension)    Weakness    BPH (benign prostatic hyperplasia)    COPD (chronic obstructive pulmonary disease) (CMS/HCC)    Dependence on renal dialysis (CMS/HCC)    Morbid (severe) obesity with alveolar hypoventilation (CMS/[facility])     Medications, treatments, and labs reviewed  Continue medications and treatments as listed in [facility]    Scribe Attestation  Ana CAMARENA Scribe   attest that this documentation has been prepared under the direction and in the presence of Angel Lewis DO    Provider Attestation - Scribe documentation  All medical record entries made by the Scribe were at my direction and personally dictated by me. I have reviewed the chart and agree that the record accurately reflects my personal performance of the history, physical exam, discussion and plan.   Angel Lewis DO

## 2023-05-18 NOTE — TELEPHONE ENCOUNTER
----- Message from Casper Miranda MD sent at 5/18/2023 11:13 AM EDT -----  Hyperglycemia inflammation anemia advised low-carb diet Slow Fe 1 a day follow-up 4 weeks

## 2023-05-19 NOTE — LETTER
Patient: Andrea Ochoa  : 1955    Encounter Date: 2023    PROGRESS NOTE    Subjective  Chief complaint: Andrea Ochoa is a 67 y.o. male who is a acute skilled care patient being seen and evaluated for weakness.    HPI:  23   Patient readmitted to SNF for therapy d/t weakness after recent hospitalization.   Patient requires assist with ADLs and transfers.  Therapy to evaluate and treat.  Patient is also on IV ATB for wound infection.  Denies n/v/f/c, cough and shortness of breath.       Objective  Vital signs:   18, 130/70, 98.0, 78, 96%  Physical Exam  Constitutional:       General: He is not in acute distress.  Eyes:      Extraocular Movements: Extraocular movements intact.   Pulmonary:      Effort: Pulmonary effort is normal.   Musculoskeletal:      Cervical back: Neck supple.   Neurological:      Mental Status: He is alert.   Psychiatric:         Mood and Affect: Mood normal.         Behavior: Behavior is cooperative.         Assessment/Plan  Problem List Items Addressed This Visit          Nervous    Seizure (CMS/HCC)     Stable, continue Keppra            Circulatory    CHF (congestive heart failure) (CMS/HCC) - Primary     Stable continue to monitor             Other    Anxiety     Stable continue duloxetine and BuSpar         Weakness     Continue PT OT          Medications, treatments, and labs reviewed  Continue medications and treatments as listed in [facility]    Scribe Attestation  IAna Scribe   attest that this documentation has been prepared under the direction and in the presence of PATRICIA Angulo    Provider Attestation - Scribe documentation  All medical record entries made by the Scribe were at my direction and personally dictated by me. I have reviewed the chart and agree that the record accurately reflects my personal performance of the history, physical exam, discussion and plan.   PATRICIA Angulo        Electronically Signed By:  Angel Arroyo, APRN-CNP   6/8/23  1:16 PM

## 2023-05-19 NOTE — LETTER
Patient: Andrea Ochoa  : 1955    Encounter Date: 2023    HISTORY & PHYSICAL    Subjective  Chief complaint: Andrea Ochoa is a 67 y.o. male who is a acute skilled care patient being seen and evaluated for multiple medical problems.  Patient presents for weakness.    HPI:  Patient was admitted to the ED for a MRSA infection with a sacral decubitus ulcer. Pt was given antibiotics for infection and UTI. Pt had minor surgery for debridement of skin. Pt was evaluated by PT and OT and discharged to SNF.         Past Medical History:   Diagnosis Date   • Anxiety    • CHF (congestive heart failure) (CMS/[facility])    • Chronic respiratory failure with hypoxia (CMS/[facility])    • COPD (chronic obstructive pulmonary disease) (CMS/[facility])    • Dependence on supplemental oxygen    • Difficulty walking    • Encounter for removal of internal fixation device    • Gout    • Lack of coordination    • Local infection of the skin and subcutaneous tissue, unspecified    • Low back pain    • Seizures (CMS/[facility])    • Transient cerebral ischemic attack    • UTI (urinary tract infection)    • Weakness        Past Surgical History:   Procedure Laterality Date   • OTHER SURGICAL HISTORY  10/10/2019    Lumbar laminectomy   • OTHER SURGICAL HISTORY  10/10/2019    Lumbar vertebral fusion   • OTHER SURGICAL HISTORY  10/10/2019    Insertion of implantable intrathecal access system       Family History   Problem Relation Name Age of Onset   • No Known Problems Mother     • No Known Problems Father         Social History     Socioeconomic History   • Marital status:      Spouse name: Not on file   • Number of children: Not on file   • Years of education: Not on file   • Highest education level: Not on file   Occupational History   • Not on file   Tobacco Use   • Smoking status: Not on file   • Smokeless tobacco: Not on file   Substance and Sexual Activity   • Alcohol use: Not on file   • Drug use: Not on file   • Sexual activity: Not on  file   Other Topics Concern   • Not on file   Social History Narrative   • Not on file     Social Determinants of Health     Financial Resource Strain: Not on file   Food Insecurity: Not on file   Transportation Needs: Not on file   Physical Activity: Not on file   Stress: Not on file   Social Connections: Not on file   Intimate Partner Violence: Not on file   Housing Stability: Not on file       Vital signs: 136/70, 97.2, 68, 18, 97%    Objective  Physical Exam  HENT:      Head: Normocephalic and atraumatic.      Nose: Nose normal.      Mouth/Throat:      Mouth: Mucous membranes are moist.      Pharynx: Oropharynx is clear.   Eyes:      Extraocular Movements: Extraocular movements intact.      Pupils: Pupils are equal, round, and reactive to light.   Cardiovascular:      Rate and Rhythm: Normal rate and regular rhythm.   Pulmonary:      Effort: No respiratory distress.      Breath sounds: Normal breath sounds. No wheezing, rhonchi or rales.   Abdominal:      General: Bowel sounds are normal. There is no distension.      Palpations: Abdomen is soft.      Tenderness: There is no abdominal tenderness. There is no guarding.   Skin:     General: Skin is warm and dry.   Neurological:      Mental Status: He is alert. Mental status is at baseline.   Psychiatric:         Mood and Affect: Mood normal.         Behavior: Behavior normal.         Assessment/Plan  Problem List Items Addressed This Visit       CHF (congestive heart failure) (CMS/[facility])    Anxiety    Methicillin resistant Staphylococcus aureus infection as the cause of diseases classified elsewhere    Seizure (CMS/HCC)    HTN (hypertension)    Weakness    BPH (benign prostatic hyperplasia)    COPD (chronic obstructive pulmonary disease) (CMS/HCC)    Unspecified severe protein-calorie malnutrition (CMS/HCC) - Primary    Dependence on renal dialysis (CMS/HCC)    Morbid (severe) obesity with alveolar hypoventilation (CMS/HCC)    Thrombocytopenia, unspecified (CMS/HCC)      Medications, treatments, and labs reviewed  Continue medications and treatments as listed in PCC    Scribe Attestation  I, Delmar Bernal   attest that this documentation has been prepared under the direction and in the presence of Angel Lewis DO.    An interactive audio and/or video telecommunication system which permits real time communications between the patient (at the originating site) and provider (at a distant site) was utilized to provide this telehealth service after obtaining verbal consent.    Provider Attestation - Scribe documentation  All medical record entries made by the Scribe were at my direction and personally dictated by me. I have reviewed the chart and agree that the record accurately reflects my personal performance of the history, physical exam, discussion and plan.    Angel Lewis DO          Electronically Signed By: Angel Lewis DO   6/19/23  4:42 PM

## 2023-05-22 NOTE — PROGRESS NOTES
PROGRESS NOTE    Subjective   Chief complaint: Andrea Ochoa is a 67 y.o. male who is a acute skilled care patient being seen and evaluated for weakness.    HPI:  5/19/23   Patient readmitted to SNF for therapy d/t weakness after recent hospitalization.   Patient requires assist with ADLs and transfers.  Therapy to evaluate and treat.  Patient is also on IV ATB for wound infection.  Denies n/v/f/c, cough and shortness of breath.       Objective   Vital signs:   18, 130/70, 98.0, 78, 96%  Physical Exam  Constitutional:       General: He is not in acute distress.  Eyes:      Extraocular Movements: Extraocular movements intact.   Pulmonary:      Effort: Pulmonary effort is normal.   Musculoskeletal:      Cervical back: Neck supple.   Neurological:      Mental Status: He is alert.   Psychiatric:         Mood and Affect: Mood normal.         Behavior: Behavior is cooperative.         Assessment/Plan   Problem List Items Addressed This Visit          Nervous    Seizure (CMS/HCC)     Stable, continue Keppra            Circulatory    CHF (congestive heart failure) (CMS/HCC) - Primary     Stable continue to monitor             Other    Anxiety     Stable continue duloxetine and BuSpar         Weakness     Continue PT OT          Medications, treatments, and labs reviewed  Continue medications and treatments as listed in [facility]    Scribe Attestation  IAna Scribe   attest that this documentation has been prepared under the direction and in the presence of PATRICIA Angulo    Provider Attestation - Scribe documentation  All medical record entries made by the Scribe were at my direction and personally dictated by me. I have reviewed the chart and agree that the record accurately reflects my personal performance of the history, physical exam, discussion and plan.   PATRICIA Angulo

## 2023-05-23 NOTE — LETTER
Patient: Andrea Ochoa  : 1955    Encounter Date: 2023    PROGRESS NOTE    Subjective  Chief complaint: Andrea Ochoa is a 67 y.o. male who is a acute skilled care patient being seen and evaluated for weakness.    HPI:  23   Patient readmitted to SNF for therapy d/t weakness after recent hospitalization.   Patient requires assist with ADLs and transfers.  Therapy to evaluate and treat.  Patient is also on IV ATB for wound infection.  Denies n/v/f/c, cough and shortness of breath.     23        Objective  Vital signs:   19, 147/81, 97.7, 58, 96%  Physical Exam  Constitutional:       General: He is not in acute distress.  Eyes:      Extraocular Movements: Extraocular movements intact.   Pulmonary:      Effort: Pulmonary effort is normal.   Musculoskeletal:      Cervical back: Neck supple.   Neurological:      Mental Status: He is alert.   Psychiatric:         Mood and Affect: Mood normal.         Behavior: Behavior is cooperative.         Assessment/Plan  Problem List Items Addressed This Visit          Circulatory    CHF (congestive heart failure) (CMS/[facility])     Stable continue to monitor             Infectious/Inflammatory    Methicillin resistant Staphylococcus aureus infection as the cause of diseases classified elsewhere - Primary     Continue IV Vanco and cefepime          Medications, treatments, and labs reviewed  Continue medications and treatments as listed in [facility]    Scribe Attestation  IAna Scribe   attest that this documentation has been prepared under the direction and in the presence of PATRICIA Angulo    Provider Attestation - Scribe documentation  All medical record entries made by the Scribe were at my direction and personally dictated by me. I have reviewed the chart and agree that the record accurately reflects my personal performance of the history, physical exam, discussion and plan.   PATRICIA Angulo        Electronically  Signed By: Angel Arroyo, FIDEL-CNP   6/12/23 10:50 AM

## 2023-05-25 PROBLEM — R53.1 WEAKNESS: Status: ACTIVE | Noted: 2023-01-01

## 2023-05-25 NOTE — ASSESSMENT & PLAN NOTE
Continue vancomycin IV continue weekly lab and fax to Dr. Harrison [phone] vancomycin IV continue weekly lab and fax to Dr. Harrison [phone]

## 2023-05-25 NOTE — ASSESSMENT & PLAN NOTE
Continue vancomycin IV continue weekly lab and fax to Dr. Harrison [phone] vancomycin IV continue weekly lab and fax to Dr. Harrison [phone]

## 2023-05-25 NOTE — ASSESSMENT & PLAN NOTE
Continue vancomycin IV continue weekly lab and fax to Dr. Harrison [phone] vancomycin IV continue weekly lab and fax to Dr. Harrison [phone]

## 2023-05-30 NOTE — PROGRESS NOTES
PROGRESS NOTE    Subjective   Chief complaint: Andrea Ochoa is a 67 y.o. male who is a acute skilled care patient being seen and evaluated for weakness.    HPI:  5/19/23   Patient readmitted to SNF for therapy d/t weakness after recent hospitalization.   Patient requires assist with ADLs and transfers.  Therapy to evaluate and treat.  Patient is also on IV ATB for wound infection.  Denies n/v/f/c, cough and shortness of breath.     5/23/23        Objective   Vital signs:   19, 147/81, 97.7, 58, 96%  Physical Exam  Constitutional:       General: He is not in acute distress.  Eyes:      Extraocular Movements: Extraocular movements intact.   Pulmonary:      Effort: Pulmonary effort is normal.   Musculoskeletal:      Cervical back: Neck supple.   Neurological:      Mental Status: He is alert.   Psychiatric:         Mood and Affect: Mood normal.         Behavior: Behavior is cooperative.         Assessment/Plan   Problem List Items Addressed This Visit          Circulatory    CHF (congestive heart failure) (CMS/HCC)     Stable continue to monitor             Infectious/Inflammatory    Methicillin resistant Staphylococcus aureus infection as the cause of diseases classified elsewhere - Primary     Continue IV Vanco and cefepime          Medications, treatments, and labs reviewed  Continue medications and treatments as listed in [facility]    Scribe Attestation  IAna Scribe   attest that this documentation has been prepared under the direction and in the presence of PATRICIA Angulo    Provider Attestation - Scribe documentation  All medical record entries made by the Scribe were at my direction and personally dictated by me. I have reviewed the chart and agree that the record accurately reflects my personal performance of the history, physical exam, discussion and plan.   PATRICIA Angulo

## 2023-05-30 NOTE — LETTER
Patient: Andrea Ochoa  : 1955    Encounter Date: 2023    PROGRESS NOTE    Subjective  Chief complaint: Andrea Ochoa is a 67 y.o. male who is a acute skilled care patient being seen and evaluated for weakness.    HPI:  23   Patient working on transfers and seated therapy exercises.  Able to perform supine to sit and sit to supine with moderate assist, sit/stand with moderate assist x2 to walker x3 trials and lateral steps alongside bed with minimal assist.      Objective  Vital signs:   19, 147/81, 97.4, 58, 95%  Physical Exam  Constitutional:       General: He is not in acute distress.  Eyes:      Extraocular Movements: Extraocular movements intact.   Pulmonary:      Effort: Pulmonary effort is normal.   Musculoskeletal:      Cervical back: Neck supple.   Neurological:      Mental Status: He is alert.   Psychiatric:         Mood and Affect: Mood normal.         Behavior: Behavior is cooperative.         Assessment/Plan  Problem List Items Addressed This Visit          Nervous    Seizure (CMS/HCC)     Stable, continue Keppra            Circulatory    CHF (congestive heart failure) (CMS/HCC)     Stable continue to monitor          HTN (hypertension)     Continue metoprolol and amlodipine            Other    Weakness - Primary     Continue PT OT          Medications, treatments, and labs reviewed  Continue medications and treatments as listed in [facility]    Scribe Attestation  Ana CAMARENA Scribe   attest that this documentation has been prepared under the direction and in the presence of PATRICIA Angulo    Provider Attestation - Scribe documentation  All medical record entries made by the Scribe were at my direction and personally dictated by me. I have reviewed the chart and agree that the record accurately reflects my personal performance of the history, physical exam, discussion and plan.   PATRICIA Angulo        Electronically Signed By: Angel CANALES  FIDEL Arroyo-CNP   6/22/23 11:01 AM

## 2023-05-31 NOTE — LETTER
Patient: Andrea Ochoa  : 1955    Encounter Date: 2023    PROGRESS NOTE    Subjective  Chief complaint: Andrea Ochoa is a 67 y.o. male who is a acute skilled care patient being seen and evaluated for monthly general medical care and follow-up.    HPI:  HPI Pt seen denies complaints   Objective  Vital signs:  18, 136/70, 97.2, 68, 97%  Physical Exam  Constitutional:       General: He is not in acute distress.  Eyes:      Extraocular Movements: Extraocular movements intact.   Cardiovascular:      Rate and Rhythm: Normal rate and regular rhythm.   Pulmonary:      Effort: Pulmonary effort is normal.      Breath sounds: Normal breath sounds.   Abdominal:      General: Bowel sounds are normal.      Palpations: Abdomen is soft.   Musculoskeletal:      Cervical back: Neck supple.      Right lower leg: No edema.      Left lower leg: No edema.   Neurological:      Mental Status: He is alert.   Psychiatric:         Mood and Affect: Mood normal.         Behavior: Behavior is cooperative.         Assessment/Plan  Problem List Items Addressed This Visit          Nervous    Seizure (CMS/[facility])     Stable, continue Keppra            Respiratory    COPD (chronic obstructive pulmonary disease) (CMS/[facility])       Circulatory    CHF (congestive heart failure) (CMS/[facility])     Stable continue to monitor             Other    Anxiety - Primary     Stable continue duloxetine and BuSpar          Medications, treatments, and labs reviewed  Continue medications and treatments as listed in [facility]    Scribe Attestation  IAna Scribe   attest that this documentation has been prepared under the direction and in the presence of FIDEL Angulo-MARSHAL    Provider Attestation - Scribe documentation  All medical record entries made by the Scribe were at my direction and personally dictated by me. I have reviewed the chart and agree that the record accurately reflects my personal performance of the history, physical exam,  discussion and plan.   PATRICIA Angulo        Electronically Signed By: PATRICIA Angulo   6/26/23  2:28 PM

## 2023-06-02 NOTE — LETTER
Patient: Andrea Ochoa  : 1955    Encounter Date: 2023    PROGRESS NOTE    Subjective  Chief complaint: Andrea Ochoa is a 67 y.o. male who is a acute skilled care patient being seen and evaluated for weakness.    HPI:  23   Patient readmitted to SNF for therapy d/t weakness after recent hospitalization.   Patient requires assist with ADLs and transfers.  Therapy to evaluate and treat.  Patient is also on IV ATB for wound infection.  Denies n/v/f/c, cough and shortness of breath.     23   Patient working on transfers and seated therapy exercises.  Able to perform supine to sit and sit to supine with moderate assist, sit/stand with moderate assist x2 to walker x3 trials and lateral steps alongside bed with minimal assist.    23  atient continues to work towards goals in therapy.  Working on gait training with focus on directional changes and stride length.  Ambulated with rollator and minimal assist [id number] two 3 feet x 2.  Ambulated with standard walker with minimal assist of 1 and standby assist of another for safety 8 steps.  Also working on bed mobility.  Training in supine/side-lying to and from sitting edge of bed with moderate assist of 1.      Objective  Vital signs:   18, 128/64, 97.2, 70, 98%  Physical Exam  Constitutional:       General: He is not in acute distress.  Eyes:      Extraocular Movements: Extraocular movements intact.   Pulmonary:      Effort: Pulmonary effort is normal.   Musculoskeletal:      Cervical back: Neck supple.   Neurological:      Mental Status: He is alert.   Psychiatric:         Mood and Affect: Mood normal.         Behavior: Behavior is cooperative.         Assessment/Plan  Problem List Items Addressed This Visit          Respiratory    COPD (chronic obstructive pulmonary disease) (CMS/[facility])       Circulatory    CHF (congestive heart failure) (CMS/[facility])     Stable continue to monitor             Infectious/Inflammatory    Methicillin resistant  Staphylococcus aureus infection as the cause of diseases classified elsewhere     Cont ATB            Other    Anxiety - Primary     Stable continue duloxetine and BuSpar         Weakness     Continue PT OT          Medications, treatments, and labs reviewed  Continue medications and treatments as listed in [facility]    Scribe Attestation  IAna Scribe   attest that this documentation has been prepared under the direction and in the presence of PATRICIA Angulo    Provider Attestation - Scribe documentation  All medical record entries made by the Scribe were at my direction and personally dictated by me. I have reviewed the chart and agree that the record accurately reflects my personal performance of the history, physical exam, discussion and plan.   PATRICIA Angulo        Electronically Signed By: PATRICIA Angulo   6/19/23 12:49 PM

## 2023-06-06 NOTE — LETTER
Patient: Andrea Ochoa  : 1955    Encounter Date: 2023    PROGRESS NOTE    Subjective  Chief complaint: Andrea Ochoa is a 67 y.o. male who is a acute skilled care patient being seen and evaluated for weakness.    HPI:  23   Patient readmitted to SNF for therapy d/t weakness after recent hospitalization.   Patient requires assist with ADLs and transfers.  Therapy to evaluate and treat.  Patient is also on IV ATB for wound infection.  Denies n/v/f/c, cough and shortness of breath.     23   Patient working on transfers and seated therapy exercises.  Able to perform supine to sit and sit to supine with moderate assist, sit/stand with moderate assist x2 to walker x3 trials and lateral steps alongside bed with minimal assist.    23  atient continues to work towards goals in therapy.  Working on gait training with focus on directional changes and stride length.  Ambulated with rollator and minimal assist [id number] two 3 feet x 2.  Ambulated with standard walker with minimal assist of 1 and standby assist of another for safety 8 steps.  Also working on bed mobility.  Training in supine/side-lying to and from sitting edge of bed with moderate assist of 1.    23   Patient working on gait training in therapy.  Able to ambulate with standard walker and minimal/contact-guard assist for feet x2 with seated rest  and between.  Completed sit to stand transfers from bed with contact-guard/minimal assist and sit to stand transfers from wheelchair with max assist of 2.  Patient   Is doing well and has no complaints.  No new concerns reported by nursing.    Patient requesting Tylenol to be routine due to increased pain on coxxyx    Objective  Vital signs:  18, 143/79, 97.4, 85, 95%  Physical Exam  Constitutional:       General: He is not in acute distress.  Eyes:      Extraocular Movements: Extraocular movements intact.   Pulmonary:      Effort: Pulmonary effort is normal.   Musculoskeletal:       Cervical back: Neck supple.   Neurological:      Mental Status: He is alert.   Psychiatric:         Mood and Affect: Mood normal.         Behavior: Behavior is cooperative.         Assessment/Plan  Problem List Items Addressed This Visit    None  Visit Diagnoses       Chronic pain syndrome    -  Primary    Schedule Tylenol 650 Q6          Medications, treatments, and labs reviewed  Continue medications and treatments as listed in EMR    Scribe Attestation  Ana CAMARENA Scribe   attest that this documentation has been prepared under the direction and in the presence of PATRICIA Angulo    Provider Attestation - Scribe documentation  All medical record entries made by the Scribe were at my direction and personally dictated by me. I have reviewed the chart and agree that the record accurately reflects my personal performance of the history, physical exam, discussion and plan.   PATRICIA Angulo        Electronically Signed By: PATRICIA Angulo   6/29/23  2:29 PM

## 2023-06-07 NOTE — LETTER
Patient: Andrea Ochoa  : 1955    Encounter Date: 2023    PROGRESS NOTE    Subjective  Chief complaint: Andrea Ochoa is a 67 y.o. male who is a acute skilled care patient being seen and evaluated for weakness.    HPI:  23   Patient readmitted to SNF for therapy d/t weakness after recent hospitalization.   Patient requires assist with ADLs and transfers.  Therapy to evaluate and treat.  Patient is also on IV ATB for wound infection.  Denies n/v/f/c, cough and shortness of breath.     23   Patient working on transfers and seated therapy exercises.  Able to perform supine to sit and sit to supine with moderate assist, sit/stand with moderate assist x2 to walker x3 trials and lateral steps alongside bed with minimal assist.    23  atient continues to work towards goals in therapy.  Working on gait training with focus on directional changes and stride length.  Ambulated with rollator and minimal assist [id number] two 3 feet x 2.  Ambulated with standard walker with minimal assist of 1 and standby assist of another for safety 8 steps.  Also working on bed mobility.  Training in supine/side-lying to and from sitting edge of bed with moderate assist of 1.    23   Patient working on gait training in therapy.  Able to ambulate with standard walker and minimal/contact-guard assist for feet x2 with seated rest  and between.  Completed sit to stand transfers from bed with contact-guard/minimal assist and sit to stand transfers from wheelchair with max assist of 2.  Patient   Is doing well and has no complaints.  No new concerns reported by nursing.    2023    Patient is in therapy d/t generalized weakness.  Patient presents for f/u.  Continues to work toward goals in therapy.  Patient has no complaints.  No new concerns reported by staff.        Objective  Vital signs:  18, 143/79, 97.4, 85, 98%  Physical Exam  Constitutional:       General: He is not in acute distress.  Eyes:       Extraocular Movements: Extraocular movements intact.   Pulmonary:      Effort: Pulmonary effort is normal.   Musculoskeletal:      Cervical back: Neck supple.      Comments: Generalized weakness   Neurological:      Mental Status: He is alert.   Psychiatric:         Mood and Affect: Mood normal.         Behavior: Behavior is cooperative.         Assessment/Plan  Problem List Items Addressed This Visit       CHF (congestive heart failure) (CMS/[facility])     Stable continue to monitor          HTN (hypertension)     Stable  Monitor BP  Continue antihypertensives         Weakness - Primary     Continue therapy         COPD (chronic obstructive pulmonary disease) (CMS/[facility])     Stable  Aerosol tx  O2            Medications, treatments, and labs reviewed  Continue medications and treatments as listed in EMR    Scribe Attestation  MIGUE, Delmar Aragon   attest that this documentation has been prepared under the direction and in the presence of PATRICIA Angulo    Provider Attestation - Scribe documentation  All medical record entries made by the Scribe were at my direction and personally dictated by me. I have reviewed the chart and agree that the record accurately reflects my personal performance of the history, physical exam, discussion and plan.   PATRICIA Angulo        Electronically Signed By: PATRICIA Angulo   7/6/23 12:11 PM

## 2023-06-07 NOTE — PROGRESS NOTES
PROGRESS NOTE    Subjective   Chief complaint: Andrea Ochoa is a 67 y.o. male who is a acute skilled care patient being seen and evaluated for weakness.    HPI:  5/30/23   Patient working on transfers and seated therapy exercises.  Able to perform supine to sit and sit to supine with moderate assist, sit/stand with moderate assist x2 to walker x3 trials and lateral steps alongside bed with minimal assist.      Objective   Vital signs:   19, 147/81, 97.4, 58, 95%  Physical Exam  Constitutional:       General: He is not in acute distress.  Eyes:      Extraocular Movements: Extraocular movements intact.   Pulmonary:      Effort: Pulmonary effort is normal.   Musculoskeletal:      Cervical back: Neck supple.   Neurological:      Mental Status: He is alert.   Psychiatric:         Mood and Affect: Mood normal.         Behavior: Behavior is cooperative.         Assessment/Plan   Problem List Items Addressed This Visit          Nervous    Seizure (CMS/HCC)     Stable, continue Keppra            Circulatory    CHF (congestive heart failure) (CMS/HCC)     Stable continue to monitor          HTN (hypertension)     Continue metoprolol and amlodipine            Other    Weakness - Primary     Continue PT OT          Medications, treatments, and labs reviewed  Continue medications and treatments as listed in [facility]    Scribe Attestation  IAna Scribe   attest that this documentation has been prepared under the direction and in the presence of PATRICIA Angulo    Provider Attestation - Scribe documentation  All medical record entries made by the Scribe were at my direction and personally dictated by me. I have reviewed the chart and agree that the record accurately reflects my personal performance of the history, physical exam, discussion and plan.   PATRICIA Angulo

## 2023-06-08 NOTE — PROGRESS NOTES
PROGRESS NOTE    Subjective   Chief complaint: Andrea Ochoa is a 67 y.o. male who is a acute skilled care patient being seen and evaluated for monthly general medical care and follow-up.    HPI:  HPI Pt seen denies complaints   Objective   Vital signs:  18, 136/70, 97.2, 68, 97%  Physical Exam  Constitutional:       General: He is not in acute distress.  Eyes:      Extraocular Movements: Extraocular movements intact.   Cardiovascular:      Rate and Rhythm: Normal rate and regular rhythm.   Pulmonary:      Effort: Pulmonary effort is normal.      Breath sounds: Normal breath sounds.   Abdominal:      General: Bowel sounds are normal.      Palpations: Abdomen is soft.   Musculoskeletal:      Cervical back: Neck supple.      Right lower leg: No edema.      Left lower leg: No edema.   Neurological:      Mental Status: He is alert.   Psychiatric:         Mood and Affect: Mood normal.         Behavior: Behavior is cooperative.         Assessment/Plan   Problem List Items Addressed This Visit          Nervous    Seizure (CMS/[facility])     Stable, continue Keppra            Respiratory    COPD (chronic obstructive pulmonary disease) (CMS/[facility])       Circulatory    CHF (congestive heart failure) (CMS/[facility])     Stable continue to monitor             Other    Anxiety - Primary     Stable continue duloxetine and BuSpar          Medications, treatments, and labs reviewed  Continue medications and treatments as listed in [facility]    Scribe Attestation  Ana CAMARENA Scribe   attest that this documentation has been prepared under the direction and in the presence of PATRICIA Angulo    Provider Attestation - Scribe documentation  All medical record entries made by the Scribe were at my direction and personally dictated by me. I have reviewed the chart and agree that the record accurately reflects my personal performance of the history, physical exam, discussion and plan.   PATRICIA Angulo

## 2023-06-09 NOTE — LETTER
Patient: Andrea Ochoa  : 1955    Encounter Date: 2023    PROGRESS NOTE    Subjective  Chief complaint: Andrea Ochoa is a 67 y.o. male who is a acute skilled care patient being seen and evaluated for weakness.    HPI:  23   Patient readmitted to SNF for therapy d/t weakness after recent hospitalization.   Patient requires assist with ADLs and transfers.  Therapy to evaluate and treat.  Patient is also on IV ATB for wound infection.  Denies n/v/f/c, cough and shortness of breath.     23   Patient working on transfers and seated therapy exercises.  Able to perform supine to sit and sit to supine with moderate assist, sit/stand with moderate assist x2 to walker x3 trials and lateral steps alongside bed with minimal assist.    23  atient continues to work towards goals in therapy.  Working on gait training with focus on directional changes and stride length.  Ambulated with rollator and minimal assist [id number] two 3 feet x 2.  Ambulated with standard walker with minimal assist of 1 and standby assist of another for safety 8 steps.  Also working on bed mobility.  Training in supine/side-lying to and from sitting edge of bed with moderate assist of 1.    23   Patient working on gait training in therapy.  Able to ambulate with standard walker and minimal/contact-guard assist for feet x2 with seated rest  and between.  Completed sit to stand transfers from bed with contact-guard/minimal assist and sit to stand transfers from wheelchair with max assist of 2.  Patient   Is doing well and has no complaints.  No new concerns reported by nursing.    2023    Patient is in therapy d/t generalized weakness.  Patient presents for f/u.  Continues to work toward goals in therapy.  Patient has no complaints.  No new concerns reported by staff.    23   Patient working on gait training focusing on directional changes, stride length and adjustment of center of mass over base of support.   Ambulated with front wheel walker and  contact-guard/minimal assist 6 feet x 2 and 2 feet x 1.  Completed transfer training with minimal/contact-guard assist for sit to stand and moderate assist of 1 with cushion in wheelchair for  sit to stand from wheelchair.        Objective  Vital signs:   17, 128/60, 98.5, 82, 97%  Physical Exam  Constitutional:       General: He is not in acute distress.  Eyes:      Extraocular Movements: Extraocular movements intact.   Pulmonary:      Effort: Pulmonary effort is normal.   Musculoskeletal:      Cervical back: Neck supple.      Comments: Generalized weakness   Neurological:      Mental Status: He is alert.   Psychiatric:         Mood and Affect: Mood normal.         Behavior: Behavior is cooperative.         Assessment/Plan  Problem List Items Addressed This Visit       CHF (congestive heart failure) (CMS/[facility])     Stable   Monitor weight          Anxiety    HTN (hypertension)     Stable  Monitor BP  Continue antihypertensives         Weakness - Primary     Continue therapyContinue therapy         BPH (benign prostatic hyperplasia)    COPD (chronic obstructive pulmonary disease) (CMS/[facility])    Dependence on renal dialysis (CMS/[facility])    Morbid (severe) obesity with alveolar hypoventilation (CMS/[facility])     Medications, treatments, and labs reviewed  Continue medications and treatments as listed in EMR    Scribe Attestation  IAna Scribe   attest that this documentation has been prepared under the direction and in the presence of Angel Lewis DO    Provider Attestation - Scribe documentation  All medical record entries made by the Scribe were at my direction and personally dictated by me. I have reviewed the chart and agree that the record accurately reflects my personal performance of the history, physical exam, discussion and plan.   Angel Lewis DO        Electronically Signed By: Angel Lewis DO   7/10/23 11:11 PM

## 2023-06-09 NOTE — PROGRESS NOTES
PROGRESS NOTE    Subjective   Chief complaint: Andrea Ochoa is a 67 y.o. male who is a acute skilled care patient being seen and evaluated for weakness.    HPI:  5/19/23   Patient readmitted to SNF for therapy d/t weakness after recent hospitalization.   Patient requires assist with ADLs and transfers.  Therapy to evaluate and treat.  Patient is also on IV ATB for wound infection.  Denies n/v/f/c, cough and shortness of breath.     5/30/23   Patient working on transfers and seated therapy exercises.  Able to perform supine to sit and sit to supine with moderate assist, sit/stand with moderate assist x2 to walker x3 trials and lateral steps alongside bed with minimal assist.    6/2/23  atient continues to work towards goals in therapy.  Working on gait training with focus on directional changes and stride length.  Ambulated with rollator and minimal assist [id number] two 3 feet x 2.  Ambulated with standard walker with minimal assist of 1 and standby assist of another for safety 8 steps.  Also working on bed mobility.  Training in supine/side-lying to and from sitting edge of bed with moderate assist of 1.      Objective   Vital signs:   18, 128/64, 97.2, 70, 98%  Physical Exam  Constitutional:       General: He is not in acute distress.  Eyes:      Extraocular Movements: Extraocular movements intact.   Pulmonary:      Effort: Pulmonary effort is normal.   Musculoskeletal:      Cervical back: Neck supple.   Neurological:      Mental Status: He is alert.   Psychiatric:         Mood and Affect: Mood normal.         Behavior: Behavior is cooperative.         Assessment/Plan   Problem List Items Addressed This Visit          Respiratory    COPD (chronic obstructive pulmonary disease) (CMS/[facility])       Circulatory    CHF (congestive heart failure) (CMS/[facility])     Stable continue to monitor             Infectious/Inflammatory    Methicillin resistant Staphylococcus aureus infection as the cause of diseases classified elsewhere      Cont ATB            Other    Anxiety - Primary     Stable continue duloxetine and BuSpar         Weakness     Continue PT OT          Medications, treatments, and labs reviewed  Continue medications and treatments as listed in [facility]    Scribe Attestation  IAna Scribe   attest that this documentation has been prepared under the direction and in the presence of PATRICIA Angulo    Provider Attestation - Scribe documentation  All medical record entries made by the Scribe were at my direction and personally dictated by me. I have reviewed the chart and agree that the record accurately reflects my personal performance of the history, physical exam, discussion and plan.   PATRICIA Angulo

## 2023-06-13 NOTE — LETTER
Patient: Andrea Ochoa  : 1955    Encounter Date: 2023    PROGRESS NOTE    Subjective  Chief complaint: Andrea Ochoa is a 67 y.o. male who is a acute skilled care patient being seen and evaluated for weakness.    HPI:  23   Patient readmitted to SNF for therapy d/t weakness after recent hospitalization.   Patient requires assist with ADLs and transfers.  Therapy to evaluate and treat.  Patient is also on IV ATB for wound infection.  Denies n/v/f/c, cough and shortness of breath.     23   Patient working on transfers and seated therapy exercises.  Able to perform supine to sit and sit to supine with moderate assist, sit/stand with moderate assist x2 to walker x3 trials and lateral steps alongside bed with minimal assist.    23  atient continues to work towards goals in therapy.  Working on gait training with focus on directional changes and stride length.  Ambulated with rollator and minimal assist [id number] two 3 feet x 2.  Ambulated with standard walker with minimal assist of 1 and standby assist of another for safety 8 steps.  Also working on bed mobility.  Training in supine/side-lying to and from sitting edge of bed with moderate assist of 1.    23   Patient working on gait training in therapy.  Able to ambulate with standard walker and minimal/contact-guard assist for feet x2 with seated rest  and between.  Completed sit to stand transfers from bed with contact-guard/minimal assist and sit to stand transfers from wheelchair with max assist of 2.  Patient   Is doing well and has no complaints.  No new concerns reported by nursing.    2023    Patient is in therapy d/t generalized weakness.  Patient presents for f/u.  Continues to work toward goals in therapy.  Patient has no complaints.  No new concerns reported by staff.    23   Patient working on gait training focusing on directional changes, stride length and adjustment of center of mass over base of support.   Ambulated with front wheel walker and  contact-guard/minimal assist 6 feet x 2 and 2 feet x 1.  Completed transfer training with minimal/contact-guard assist for sit to stand and moderate assist of 1 with cushion in wheelchair for  sit to stand from wheelchair.    6/13/23   patient presents for f/u therapy and general medical care.  Patient seen and examined at [facility].  Therapy has been working with the patient to improve strength, endurance, ADLs, and transfers d/t generalized weakness.  Patient has no acute concerns today.      Objective  Vital signs: 16, 122/67, 98.4, 84, 96%  Physical Exam  Constitutional:       General: He is not in acute distress.  Eyes:      Extraocular Movements: Extraocular movements intact.   Pulmonary:      Effort: Pulmonary effort is normal.   Musculoskeletal:      Cervical back: Neck supple.      Comments: Generalized weakness   Neurological:      Mental Status: He is alert.   Psychiatric:         Mood and Affect: Mood normal.         Behavior: Behavior is cooperative.         Assessment/Plan  Problem List Items Addressed This Visit       CHF (congestive heart failure) (CMS/[facility])     Stable continue to monitor          Weakness     Continue PT OT         COPD (chronic obstructive pulmonary disease) (CMS/[facility]) - Primary     Stable           Medications, treatments, and labs reviewed  Continue medications and treatments as listed in EMR    Scribe Attestation  Ana CAMARENA Scribe   attest that this documentation has been prepared under the direction and in the presence of PATRICIA Angulo    Provider Attestation - Scribe documentation  All medical record entries made by the Scribe were at my direction and personally dictated by me. I have reviewed the chart and agree that the record accurately reflects my personal performance of the history, physical exam, discussion and plan.   PATRICIA Angulo        Electronically Signed By: Angel Arroyo  APRN-CNP   7/6/23  1:50 PM

## 2023-06-14 NOTE — LETTER
Patient: Andrea Ochoa  : 1955    Encounter Date: 2023    PROGRESS NOTE  Subjective  Chief complaint: Andrea Ochoa is a 67 y.o. male who is a acute skilled care patient being seen and evaluated for constipation    HPI:  HPI patient having abdominal discomfort and hard stools has been on lactulose before.  Patient takes routine narcotic pain medication and is not ambulating much  Objective  Vital signs:   Physical Exam  Constitutional:       General: He is not in acute distress.  Eyes:      Extraocular Movements: Extraocular movements intact.   Pulmonary:      Effort: Pulmonary effort is normal.   Musculoskeletal:      Cervical back: Neck supple.   Neurological:      Mental Status: He is alert.   Psychiatric:         Mood and Affect: Mood normal.         Behavior: Behavior is cooperative.         Assessment/Plan  Problem List Items Addressed This Visit    None  Visit Diagnoses       Constipation, unspecified constipation type    -  Primary    Lactulose continue other bowel regiment medications          Medications, treatments, and labs reviewed  Continue medications and treatments as listed in EMR    PATRICIA Angulo      Electronically Signed By: PATRICIA Angulo   23  2:33 PM

## 2023-06-15 NOTE — Clinical Note
Patient: Andrea Ochoa  : 1955    Encounter Date: 06/15/2023    PROGRESS NOTE    Subjective  Chief complaint: Andrea Ochoa is a 67 y.o. male who is an acute skilled patient being seen and evaluated for weakness    HPI:  23   Patient readmitted to SNF for therapy d/t weakness after recent hospitalization.   Patient requires assist with ADLs and transfers.  Therapy to evaluate and treat.  Patient is also on IV ATB for wound infection.  Denies n/v/f/c, cough and shortness of breath.     23   Patient working on transfers and seated therapy exercises.  Able to perform supine to sit and sit to supine with moderate assist, sit/stand with moderate assist x2 to walker x3 trials and lateral steps alongside bed with minimal assist.    23  atient continues to work towards goals in therapy.  Working on gait training with focus on directional changes and stride length.  Ambulated with rollator and minimal assist [id number] two 3 feet x 2.  Ambulated with standard walker with minimal assist of 1 and standby assist of another for safety 8 steps.  Also working on bed mobility.  Training in supine/side-lying to and from sitting edge of bed with moderate assist of 1.    6/15/2023 Patient working with SW to get reinstated for PT. States his insurance ran out and he is now applying for Medicaid. States BLE weakness continues. Wound care is seeing him for daily treatment of extensive surgical wound on lower T-LS Spine. Denies numbness, tingling, f/c/n/v/d. No concerns per nursing.       Objective  Vital signs: 122/67-98.4-84-16-96%    Physical Exam  Constitutional:       Appearance: Normal appearance.   HENT:      Head: Normocephalic.      Mouth/Throat:      Mouth: Mucous membranes are moist.   Eyes:      Extraocular Movements: Extraocular movements intact.   Cardiovascular:      Rate and Rhythm: Normal rate and regular rhythm.      Pulses: Normal pulses.      Heart sounds: Normal heart sounds.   Pulmonary:       Effort: Pulmonary effort is normal.      Breath sounds: Normal breath sounds.   Abdominal:      General: Abdomen is flat. Bowel sounds are normal.      Palpations: Abdomen is soft.   Musculoskeletal:         General: Normal range of motion.      Cervical back: Normal range of motion and neck supple.      Comments: BLE weakness   Skin:     General: Skin is warm and dry.      Capillary Refill: Capillary refill takes less than 2 seconds.   Neurological:      Mental Status: He is alert and oriented to person, place, and time. Mental status is at baseline.   Psychiatric:         Mood and Affect: Mood normal.         Behavior: Behavior normal.         Assessment/Plan  Problem List Items Addressed This Visit       CHF (congestive heart failure) (CMS/[facility]) - Primary     No sob, chest pain, edema  Continue monitoring  Control BP         Seizure (CMS/[facility])     No recent seizure activity  Continue keppra  Keppra level         HTN (hypertension)     BP controlled  Continue to monitor  Lopressor norvasc          BPH (benign prostatic hyperplasia)    COPD (chronic obstructive pulmonary disease) (CMS/[facility])     No sob, cough  Combivent  O2 0-2L PRN titr pox >90%            Medications, treatments, and labs reviewed  Continue medications and treatments as listed in EMR    PATRICIA Daniels        Electronically Signed By: PATRICIA Daniels   6/19/23 11:31 AM

## 2023-06-16 PROBLEM — N40.0 BPH (BENIGN PROSTATIC HYPERPLASIA): Status: ACTIVE | Noted: 2023-01-01

## 2023-06-16 PROBLEM — J44.9 COPD (CHRONIC OBSTRUCTIVE PULMONARY DISEASE) (MULTI): Status: ACTIVE | Noted: 2023-01-01

## 2023-06-16 NOTE — PROGRESS NOTES
PROGRESS NOTE    Subjective   Chief complaint: Andrea Ochoa is a 67 y.o. male who is an acute skilled patient being seen and evaluated for weakness    HPI:  5/19/23   Patient readmitted to SNF for therapy d/t weakness after recent hospitalization.   Patient requires assist with ADLs and transfers.  Therapy to evaluate and treat.  Patient is also on IV ATB for wound infection.  Denies n/v/f/c, cough and shortness of breath.     5/30/23   Patient working on transfers and seated therapy exercises.  Able to perform supine to sit and sit to supine with moderate assist, sit/stand with moderate assist x2 to walker x3 trials and lateral steps alongside bed with minimal assist.    6/2/23  atient continues to work towards goals in therapy.  Working on gait training with focus on directional changes and stride length.  Ambulated with rollator and minimal assist [id number] two 3 feet x 2.  Ambulated with standard walker with minimal assist of 1 and standby assist of another for safety 8 steps.  Also working on bed mobility.  Training in supine/side-lying to and from sitting edge of bed with moderate assist of 1.    6/15/2023 Patient working with SW to get reinstated for PT. States his insurance ran out and he is now applying for Medicaid. States BLE weakness continues. Wound care is seeing him for daily treatment of extensive surgical wound on lower T-LS Spine. Denies numbness, tingling, f/c/n/v/d. No concerns per nursing.       Objective   Vital signs: 122/67-98.4-84-16-96%    Physical Exam  Constitutional:       Appearance: Normal appearance.   HENT:      Head: Normocephalic.      Mouth/Throat:      Mouth: Mucous membranes are moist.   Eyes:      Extraocular Movements: Extraocular movements intact.   Cardiovascular:      Rate and Rhythm: Normal rate and regular rhythm.      Pulses: Normal pulses.      Heart sounds: Normal heart sounds.   Pulmonary:      Effort: Pulmonary effort is normal.      Breath sounds: Normal breath  sounds.   Abdominal:      General: Abdomen is flat. Bowel sounds are normal.      Palpations: Abdomen is soft.   Musculoskeletal:         General: Normal range of motion.      Cervical back: Normal range of motion and neck supple.      Comments: BLE weakness   Skin:     General: Skin is warm and dry.      Capillary Refill: Capillary refill takes less than 2 seconds.   Neurological:      Mental Status: He is alert and oriented to person, place, and time. Mental status is at baseline.   Psychiatric:         Mood and Affect: Mood normal.         Behavior: Behavior normal.         Assessment/Plan   Problem List Items Addressed This Visit       CHF (congestive heart failure) (CMS/[facility]) - Primary     No sob, chest pain, edema  Continue monitoring  Control BP         Seizure (CMS/HCC)     No recent seizure activity  Continue keppra  Keppra level         HTN (hypertension)     BP controlled  Continue to monitor  Lopressor norvasc          COPD (chronic obstructive pulmonary disease) (CMS/HCC)     No sob, cough  Combivent  O2 0-2L PRN titr pox >90%            Medications, treatments, and labs reviewed  Continue medications and treatments as listed in EMR    Lynn Vieyra, APRN-CNP

## 2023-06-16 NOTE — PROGRESS NOTES
HISTORY & PHYSICAL    Subjective   Chief complaint: Andrea Ochoa is a 67 y.o. male who is a acute skilled care patient being seen and evaluated for multiple medical problems.  Patient presents for weakness.    HPI:  Patient was admitted to the ED for a MRSA infection with a sacral decubitus ulcer. Pt was given antibiotics for infection and UTI. Pt had minor surgery for debridement of skin. Pt was evaluated by PT and OT and discharged to SNF.         Past Medical History:   Diagnosis Date    Anxiety     CHF (congestive heart failure) (CMS/[facility])     Chronic respiratory failure with hypoxia (CMS/[facility])     COPD (chronic obstructive pulmonary disease) (CMS/[facility])     Dependence on supplemental oxygen     Difficulty walking     Encounter for removal of internal fixation device     Gout     Lack of coordination     Local infection of the skin and subcutaneous tissue, unspecified     Low back pain     Seizures (CMS/[facility])     Transient cerebral ischemic attack     UTI (urinary tract infection)     Weakness        Past Surgical History:   Procedure Laterality Date    OTHER SURGICAL HISTORY  10/10/2019    Lumbar laminectomy    OTHER SURGICAL HISTORY  10/10/2019    Lumbar vertebral fusion    OTHER SURGICAL HISTORY  10/10/2019    Insertion of implantable intrathecal access system       Family History   Problem Relation Name Age of Onset    No Known Problems Mother      No Known Problems Father         Social History     Socioeconomic History    Marital status:      Spouse name: Not on file    Number of children: Not on file    Years of education: Not on file    Highest education level: Not on file   Occupational History    Not on file   Tobacco Use    Smoking status: Not on file    Smokeless tobacco: Not on file   Substance and Sexual Activity    Alcohol use: Not on file    Drug use: Not on file    Sexual activity: Not on file   Other Topics Concern    Not on file   Social History Narrative    Not on file     Social  Determinants of Health     Financial Resource Strain: Not on file   Food Insecurity: Not on file   Transportation Needs: Not on file   Physical Activity: Not on file   Stress: Not on file   Social Connections: Not on file   Intimate Partner Violence: Not on file   Housing Stability: Not on file       Vital signs: 136/70, 97.2, 68, 18, 97%    Objective   Physical Exam  HENT:      Head: Normocephalic and atraumatic.      Nose: Nose normal.      Mouth/Throat:      Mouth: Mucous membranes are moist.      Pharynx: Oropharynx is clear.   Eyes:      Extraocular Movements: Extraocular movements intact.      Pupils: Pupils are equal, round, and reactive to light.   Cardiovascular:      Rate and Rhythm: Normal rate and regular rhythm.   Pulmonary:      Effort: No respiratory distress.      Breath sounds: Normal breath sounds. No wheezing, rhonchi or rales.   Abdominal:      General: Bowel sounds are normal. There is no distension.      Palpations: Abdomen is soft.      Tenderness: There is no abdominal tenderness. There is no guarding.   Skin:     General: Skin is warm and dry.   Neurological:      Mental Status: He is alert. Mental status is at baseline.   Psychiatric:         Mood and Affect: Mood normal.         Behavior: Behavior normal.         Assessment/Plan   Problem List Items Addressed This Visit       CHF (congestive heart failure) (CMS/HCC)    Anxiety    Methicillin resistant Staphylococcus aureus infection as the cause of diseases classified elsewhere    Seizure (CMS/HCC)    HTN (hypertension)    Weakness    BPH (benign prostatic hyperplasia)    COPD (chronic obstructive pulmonary disease) (CMS/HCC)    Unspecified severe protein-calorie malnutrition (CMS/HCC) - Primary    Dependence on renal dialysis (CMS/HCC)    Morbid (severe) obesity with alveolar hypoventilation (CMS/HCC)    Thrombocytopenia, unspecified (CMS/HCC)     Medications, treatments, and labs reviewed  Continue medications and treatments as listed in  [facility]    Scribe Attestation  I, Delmar Bernal   attest that this documentation has been prepared under the direction and in the presence of Angel Lewis DO.    An interactive audio and/or video telecommunication system which permits real time communications between the patient (at the originating site) and provider (at a distant site) was utilized to provide this telehealth service after obtaining verbal consent.    Provider Attestation - Scribe documentation  All medical record entries made by the Scribe were at my direction and personally dictated by me. I have reviewed the chart and agree that the record accurately reflects my personal performance of the history, physical exam, discussion and plan.    Angel Lewis DO

## 2023-06-19 PROBLEM — E66.2 MORBID (SEVERE) OBESITY WITH ALVEOLAR HYPOVENTILATION (MULTI): Status: ACTIVE | Noted: 2023-01-01

## 2023-06-19 PROBLEM — D69.6 THROMBOCYTOPENIA, UNSPECIFIED (CMS-HCC): Status: ACTIVE | Noted: 2023-01-01

## 2023-06-19 PROBLEM — E43 UNSPECIFIED SEVERE PROTEIN-CALORIE MALNUTRITION (MULTI): Status: ACTIVE | Noted: 2023-01-01

## 2023-06-19 PROBLEM — Z99.2 DEPENDENCE ON RENAL DIALYSIS (CMS-HCC): Status: ACTIVE | Noted: 2023-01-01

## 2023-06-20 NOTE — LETTER
Patient: Andrea Ochoa  : 1955    Encounter Date: 2023    PROGRESS NOTE    Subjective  Chief complaint: Andrea Ochoa is a 67 y.o. male who is a acute skilled care patient being seen and evaluated for weakness.    HPI:  23   Patient readmitted to SNF for therapy d/t weakness after recent hospitalization.   Patient requires assist with ADLs and transfers.  Therapy to evaluate and treat.  Patient is also on IV ATB for wound infection.  Denies n/v/f/c, cough and shortness of breath.     23   Patient working on transfers and seated therapy exercises.  Able to perform supine to sit and sit to supine with moderate assist, sit/stand with moderate assist x2 to walker x3 trials and lateral steps alongside bed with minimal assist.    23  atient continues to work towards goals in therapy.  Working on gait training with focus on directional changes and stride length.  Ambulated with rollator and minimal assist [id number] two 3 feet x 2.  Ambulated with standard walker with minimal assist of 1 and standby assist of another for safety 8 steps.  Also working on bed mobility.  Training in supine/side-lying to and from sitting edge of bed with moderate assist of 1.    23   Patient working on gait training in therapy.  Able to ambulate with standard walker and minimal/contact-guard assist for feet x2 with seated rest  and between.  Completed sit to stand transfers from bed with contact-guard/minimal assist and sit to stand transfers from wheelchair with max assist of 2.  Patient   Is doing well and has no complaints.  No new concerns reported by nursing.    2023    Patient is in therapy d/t generalized weakness.  Patient presents for f/u.  Continues to work toward goals in therapy.  Patient has no complaints.  No new concerns reported by staff.    23   Patient working on gait training focusing on directional changes, stride length and adjustment of center of mass over base of support.   Ambulated with front wheel walker and  contact-guard/minimal assist 6 feet x 2 and 2 feet x 1.  Completed transfer training with minimal/contact-guard assist for sit to stand and moderate assist of 1 with cushion in wheelchair for  sit to stand from wheelchair.    6/13/23   patient presents for f/u therapy and general medical care.  Patient seen and examined at beside.  Therapy has been working with the patient to improve strength, endurance, ADLs, and transfers d/t generalized weakness.  Patient has no acute concerns today.    6/15/2023 Patient working with SW to get reinstated for PT. States his insurance ran out and he is now applying for Medicaid. States BLE weakness continues. Wound care is seeing him for daily treatment of extensive surgical wound on lower T-LS Spine. Denies numbness, tingling, f/c/n/v/d. No concerns per nursing.     6/20/23   Patient performed unsupported seated reaching and weight shifting at edge of bed.  PT recertification documentation completed today with assessment of patient's current functional status and progression.  Bed mobility: Curt/ModA, transfers: Sit to stand x2 from bed with ModA x2 from elevated surface to standard walker, standing tolerance up to 1 minute. Per therapist, patient is motivated to participate in therapy, demos decreased activity tolerance.  No new concerns reported by nursing.      Objective  Vital signs:   16, 122/67, 98.4, 84, 95%  Physical Exam  Constitutional:       General: He is not in acute distress.  Eyes:      Extraocular Movements: Extraocular movements intact.   Pulmonary:      Effort: Pulmonary effort is normal.   Musculoskeletal:      Cervical back: Neck supple.      Comments: Generalized weakness   Neurological:      Mental Status: He is alert.   Psychiatric:         Mood and Affect: Mood normal.         Behavior: Behavior is cooperative.         Assessment/Plan  Problem List Items Addressed This Visit       Weakness     Continue PT OT          BPH (benign prostatic hyperplasia)     Increase Flomax bid           Other Visit Diagnoses       Abdominal bloating    -  Primary    simethicone, d/c lactulose          Medications, treatments, and labs reviewed  Continue medications and treatments as listed in EMR    Scribe Attestation  IAna Scribe   attest that this documentation has been prepared under the direction and in the presence of PATRICIA Angulo    Provider Attestation - Scribe documentation  All medical record entries made by the Scribe were at my direction and personally dictated by me. I have reviewed the chart and agree that the record accurately reflects my personal performance of the history, physical exam, discussion and plan.   PATRICIA Angulo        Electronically Signed By: PATRICIA Angulo   7/17/23  1:26 PM

## 2023-06-22 NOTE — LETTER
Patient: Andrea Ochoa  : 1955    Encounter Date: 2023    PROGRESS NOTE    Subjective  Chief complaint: Andrea Ochoa is a 67 y.o. male who is an acute skilled patient being seen and evaluated for weakness    HPI:  23   Patient readmitted to SNF for therapy d/t weakness after recent hospitalization.   Patient requires assist with ADLs and transfers.  Therapy to evaluate and treat.  Patient is also on IV ATB for wound infection.  Denies n/v/f/c, cough and shortness of breath.     23   Patient working on transfers and seated therapy exercises.  Able to perform supine to sit and sit to supine with moderate assist, sit/stand with moderate assist x2 to walker x3 trials and lateral steps alongside bed with minimal assist.    23  atient continues to work towards goals in therapy.  Working on gait training with focus on directional changes and stride length.  Ambulated with rollator and minimal assist [id number] two 3 feet x 2.  Ambulated with standard walker with minimal assist of 1 and standby assist of another for safety 8 steps.  Also working on bed mobility.  Training in supine/side-lying to and from sitting edge of bed with moderate assist of 1.    6/15/2023 Patient working with SW to get reinstated for PT. States his insurance ran out and he is now applying for Medicaid. States BLE weakness continues. Wound care is seeing him for daily treatment of extensive surgical wound on lower T-LS Spine. Denies numbness, tingling, f/c/n/v/d. No concerns per nursing.     23 Patient was recert for PT. Working with PT for mobility at bedside and performing ADLs.  Continues to have gen weakness.  Extensive lower back wound  dressing intact.  Daily wound care with normal saline collagen powder alginate silver and absorbent dressing.  Patient denies f/c/n/v/d/cough sob.  Appetite is good.  No concerns per nursing      Objective  Vital signs: 122/67-98.4-84-16-95%    Physical Exam  HENT:      Head:  Normocephalic.      Mouth/Throat:      Mouth: Mucous membranes are moist.   Eyes:      Extraocular Movements: Extraocular movements intact.   Cardiovascular:      Rate and Rhythm: Normal rate and regular rhythm.      Pulses: Normal pulses.      Heart sounds: Normal heart sounds.   Pulmonary:      Effort: Pulmonary effort is normal.      Breath sounds: Normal breath sounds.   Abdominal:      General: Abdomen is flat. Bowel sounds are normal.      Palpations: Abdomen is soft.   Musculoskeletal:         General: Normal range of motion.      Cervical back: Normal range of motion.      Comments: Gen weakness   Skin:     General: Skin is warm and dry.      Capillary Refill: Capillary refill takes 2 to 3 seconds.      Comments: Extensive lower back wound   Neurological:      Mental Status: He is alert. Mental status is at baseline. He is disoriented.   Psychiatric:         Mood and Affect: Mood normal.         Behavior: Behavior normal.         Assessment/Plan  Problem List Items Addressed This Visit       CHF (congestive heart failure) (CMS/[facility]) - Primary      Stable   no SOB, chest pain, fatigue, edema   continue to monitor   Weights  Control BP   continue current medications         Anxiety      no anxiety at time of assessment   continue to monitor   manage pain   Dilaudid Tylenol fentanyl         Seizure (CMS/[facility])      no seizure activity   seizure precautions   continue to monitor   Keppra   Keppra level routine         HTN (hypertension)      BP at goal   continue to my   [facility] routine   Norvasc Lopressor         Weakness      PT OT         COPD (chronic obstructive pulmonary disease) (CMS/[facility])     No sob, cough  Combivent  O2 0-2L PRN titr pox >90%             Medications, treatments, and labs reviewed  Continue medications and treatments as listed in EMR    PATRICIA Daniels      Electronically Signed By: PATRICIA Daniels   6/22/23  8:04 PM

## 2023-06-22 NOTE — PROGRESS NOTES
PROGRESS NOTE    Subjective   Chief complaint: Andrea Ochoa is a 67 y.o. male who is a acute skilled care patient being seen and evaluated for weakness.    HPI:  5/19/23   Patient readmitted to SNF for therapy d/t weakness after recent hospitalization.   Patient requires assist with ADLs and transfers.  Therapy to evaluate and treat.  Patient is also on IV ATB for wound infection.  Denies n/v/f/c, cough and shortness of breath.     5/30/23   Patient working on transfers and seated therapy exercises.  Able to perform supine to sit and sit to supine with moderate assist, sit/stand with moderate assist x2 to walker x3 trials and lateral steps alongside bed with minimal assist.    6/2/23  atient continues to work towards goals in therapy.  Working on gait training with focus on directional changes and stride length.  Ambulated with rollator and minimal assist [id number] two 3 feet x 2.  Ambulated with standard walker with minimal assist of 1 and standby assist of another for safety 8 steps.  Also working on bed mobility.  Training in supine/side-lying to and from sitting edge of bed with moderate assist of 1.    6/6/23   Patient working on gait training in therapy.  Able to ambulate with standard walker and minimal/contact-guard assist for feet x2 with seated rest  and between.  Completed sit to stand transfers from bed with contact-guard/minimal assist and sit to stand transfers from wheelchair with max assist of 2.  Patient   Is doing well and has no complaints.  No new concerns reported by nursing.    Patient requesting Tylenol to be routine due to increased pain on coxxyx    Objective   Vital signs:  18, 143/79, 97.4, 85, 95%  Physical Exam  Constitutional:       General: He is not in acute distress.  Eyes:      Extraocular Movements: Extraocular movements intact.   Pulmonary:      Effort: Pulmonary effort is normal.   Musculoskeletal:      Cervical back: Neck supple.   Neurological:      Mental Status: He is alert.    Psychiatric:         Mood and Affect: Mood normal.         Behavior: Behavior is cooperative.         Assessment/Plan   Problem List Items Addressed This Visit    None  Visit Diagnoses       Chronic pain syndrome    -  Primary    Schedule Tylenol 650 Q6          Medications, treatments, and labs reviewed  Continue medications and treatments as listed in EMR    Scribe Attestation  Ana CAMARENA Scribe   attest that this documentation has been prepared under the direction and in the presence of PATRICIA Angulo    Provider Attestation - Scribe documentation  All medical record entries made by the Scribe were at my direction and personally dictated by me. I have reviewed the chart and agree that the record accurately reflects my personal performance of the history, physical exam, discussion and plan.   PATRICIA Angulo

## 2023-06-22 NOTE — PROGRESS NOTES
PROGRESS NOTE    Subjective   Chief complaint: Andrea Ochoa is a 67 y.o. male who is an acute skilled patient being seen and evaluated for weakness    HPI:  5/19/23   Patient readmitted to SNF for therapy d/t weakness after recent hospitalization.   Patient requires assist with ADLs and transfers.  Therapy to evaluate and treat.  Patient is also on IV ATB for wound infection.  Denies n/v/f/c, cough and shortness of breath.     5/30/23   Patient working on transfers and seated therapy exercises.  Able to perform supine to sit and sit to supine with moderate assist, sit/stand with moderate assist x2 to walker x3 trials and lateral steps alongside bed with minimal assist.    6/2/23  atient continues to work towards goals in therapy.  Working on gait training with focus on directional changes and stride length.  Ambulated with rollator and minimal assist [id number] two 3 feet x 2.  Ambulated with standard walker with minimal assist of 1 and standby assist of another for safety 8 steps.  Also working on bed mobility.  Training in supine/side-lying to and from sitting edge of bed with moderate assist of 1.    6/15/2023 Patient working with SW to get reinstated for PT. States his insurance ran out and he is now applying for Medicaid. States BLE weakness continues. Wound care is seeing him for daily treatment of extensive surgical wound on lower T-LS Spine. Denies numbness, tingling, f/c/n/v/d. No concerns per nursing.     6/22/23 Patient was recert for PT. Working with PT for mobility at bedside and performing ADLs.  Continues to have gen weakness.  Extensive lower back wound  dressing intact.  Daily wound care with normal saline collagen powder alginate silver and absorbent dressing.  Patient denies f/c/n/v/d/cough sob.  Appetite is good.  No concerns per nursing      Objective   Vital signs: 122/67-98.4-84-16-95%    Physical Exam  HENT:      Head: Normocephalic.      Mouth/Throat:      Mouth: Mucous membranes are moist.    Eyes:      Extraocular Movements: Extraocular movements intact.   Cardiovascular:      Rate and Rhythm: Normal rate and regular rhythm.      Pulses: Normal pulses.      Heart sounds: Normal heart sounds.   Pulmonary:      Effort: Pulmonary effort is normal.      Breath sounds: Normal breath sounds.   Abdominal:      General: Abdomen is flat. Bowel sounds are normal.      Palpations: Abdomen is soft.   Musculoskeletal:         General: Normal range of motion.      Cervical back: Normal range of motion.      Comments: Gen weakness   Skin:     General: Skin is warm and dry.      Capillary Refill: Capillary refill takes 2 to 3 seconds.      Comments: Extensive lower back wound   Neurological:      Mental Status: He is alert. Mental status is at baseline. He is disoriented.   Psychiatric:         Mood and Affect: Mood normal.         Behavior: Behavior normal.         Assessment/Plan   Problem List Items Addressed This Visit       CHF (congestive heart failure) (CMS/[facility]) - Primary      Stable   no SOB, chest pain, fatigue, edema   continue to monitor   Weights  Control BP   continue current medications         Anxiety      no anxiety at time of assessment   continue to monitor   manage pain   Dilaudid Tylenol fentanyl         Seizure (CMS/[facility])      no seizure activity   seizure precautions   continue to monitor   Keppra   Keppra level routine         HTN (hypertension)      BP at goal   continue to my   [facility] routine   Norvasc Lopressor         Weakness      PT OT         COPD (chronic obstructive pulmonary disease) (CMS/[facility])     No sob, cough  Combivent  O2 0-2L PRN titr pox >90%             Medications, treatments, and labs reviewed  Continue medications and treatments as listed in EMR    Lynn Vieyra, APRN-CNP

## 2023-06-23 NOTE — LETTER
Patient: Andrea Ochoa  : 1955    Encounter Date: 2023    PROGRESS NOTE    Subjective  Chief complaint: Andrea Ochoa is a 67 y.o. male who is a acute skilled care patient being seen and evaluated for weakness.    HPI:  23   Patient readmitted to SNF for therapy d/t weakness after recent hospitalization.   Patient requires assist with ADLs and transfers.  Therapy to evaluate and treat.  Patient is also on IV ATB for wound infection.  Denies n/v/f/c, cough and shortness of breath.     23   Patient working on transfers and seated therapy exercises.  Able to perform supine to sit and sit to supine with moderate assist, sit/stand with moderate assist x2 to walker x3 trials and lateral steps alongside bed with minimal assist.    23  atient continues to work towards goals in therapy.  Working on gait training with focus on directional changes and stride length.  Ambulated with rollator and minimal assist [id number] two 3 feet x 2.  Ambulated with standard walker with minimal assist of 1 and standby assist of another for safety 8 steps.  Also working on bed mobility.  Training in supine/side-lying to and from sitting edge of bed with moderate assist of 1.    23   Patient working on gait training in therapy.  Able to ambulate with standard walker and minimal/contact-guard assist for feet x2 with seated rest  and between.  Completed sit to stand transfers from bed with contact-guard/minimal assist and sit to stand transfers from wheelchair with max assist of 2.  Patient   Is doing well and has no complaints.  No new concerns reported by nursing.    2023    Patient is in therapy d/t generalized weakness.  Patient presents for f/u.  Continues to work toward goals in therapy.  Patient has no complaints.  No new concerns reported by staff.    23   Patient working on gait training focusing on directional changes, stride length and adjustment of center of mass over base of support.   Ambulated with front wheel walker and  contact-guard/minimal assist 6 feet x 2 and 2 feet x 1.  Completed transfer training with minimal/contact-guard assist for sit to stand and moderate assist of 1 with cushion in wheelchair for  sit to stand from wheelchair.    6/13/23   patient presents for f/u therapy and general medical care.  Patient seen and examined at beside.  Therapy has been working with the patient to improve strength, endurance, ADLs, and transfers d/t generalized weakness.  Patient has no acute concerns today.    6/15/2023 Patient working with SW to get reinstated for PT. States his insurance ran out and he is now applying for Medicaid. States BLE weakness continues. Wound care is seeing him for daily treatment of extensive surgical wound on lower T-LS Spine. Denies numbness, tingling, f/c/n/v/d. No concerns per nursing.     6/20/23   Patient performed unsupported seated reaching and weight shifting at edge of bed.  PT recertification documentation completed today with assessment of patient's current functional status and progression.  Bed mobility: Curt/ModA, transfers: Sit to stand x2 from bed with ModA x2 from elevated surface to standard walker, standing tolerance up to 1 minute. Per therapist, patient is motivated to participate in therapy, demos decreased activity tolerance.  No new concerns reported by nursing.    6/22/23 Patient was recert for PT. Working with PT for mobility at bedside and performing ADLs.  Continues to have gen weakness.  Extensive lower back wound  dressing intact.  Daily wound care with normal saline collagen powder alginate silver and absorbent dressing.  Patient denies f/c/n/v/d/cough sob.  Appetite is good.  No concerns per nursing    6/23/23   Patient continues to actively participate in therapy.  Working on bed mobility, transfer and gait training.    Patient ambulated 8 steps [id number] three 4 feet x 1 and 2 steps x1 with seated rest periods in between.  Patient is  stable without complaint.  No new concerns reported by nursing.      Objective  Vital signs:   16, 122/67, 98.4, 84, 96%  Physical Exam  Constitutional:       General: He is not in acute distress.  Eyes:      Extraocular Movements: Extraocular movements intact.   Pulmonary:      Effort: Pulmonary effort is normal.      Comments: O2 via nc  Musculoskeletal:      Cervical back: Neck supple.      Comments: Generalized weakness   Neurological:      Mental Status: He is alert.   Psychiatric:         Mood and Affect: Mood normal.         Behavior: Behavior is cooperative.         Assessment/Plan  Problem List Items Addressed This Visit       CHF (congestive heart failure) (CMS/[facility])     Stable continue to monitor          Seizure (CMS/[facility])     Stable, continue Keppra         Weakness - Primary     Continue PT OT         COPD (chronic obstructive pulmonary disease) (CMS/[facility])     Stable           Medications, treatments, and labs reviewed  Continue medications and treatments as listed in EMR    Scribe Attestation  Ana CAMARENA Scribe   attest that this documentation has been prepared under the direction and in the presence of PATRICIA Angulo    Provider Attestation - Scribe documentation  All medical record entries made by the Scribe were at my direction and personally dictated by me. I have reviewed the chart and agree that the record accurately reflects my personal performance of the history, physical exam, discussion and plan.   PATRICIA Angulo        Electronically Signed By: PATRICIA Angulo   7/20/23 11:16 AM

## 2023-06-23 NOTE — ASSESSMENT & PLAN NOTE
Stable   no SOB, chest pain, fatigue, edema   continue to monitor   Weights  Control BP   continue current medications

## 2023-06-28 NOTE — PROGRESS NOTES
PROGRESS NOTE    Subjective   Chief complaint: Andrea Ochoa is a 67 y.o. male who is a acute skilled care patient being seen and evaluated for weakness.    HPI:  5/19/23   Patient readmitted to SNF for therapy d/t weakness after recent hospitalization.   Patient requires assist with ADLs and transfers.  Therapy to evaluate and treat.  Patient is also on IV ATB for wound infection.  Denies n/v/f/c, cough and shortness of breath.     5/30/23   Patient working on transfers and seated therapy exercises.  Able to perform supine to sit and sit to supine with moderate assist, sit/stand with moderate assist x2 to walker x3 trials and lateral steps alongside bed with minimal assist.    6/2/23  atient continues to work towards goals in therapy.  Working on gait training with focus on directional changes and stride length.  Ambulated with rollator and minimal assist [id number] two 3 feet x 2.  Ambulated with standard walker with minimal assist of 1 and standby assist of another for safety 8 steps.  Also working on bed mobility.  Training in supine/side-lying to and from sitting edge of bed with moderate assist of 1.    6/6/23   Patient working on gait training in therapy.  Able to ambulate with standard walker and minimal/contact-guard assist for feet x2 with seated rest  and between.  Completed sit to stand transfers from bed with contact-guard/minimal assist and sit to stand transfers from wheelchair with max assist of 2.  Patient   Is doing well and has no complaints.  No new concerns reported by nursing.    6/7/2023    Patient is in therapy d/t generalized weakness.  Patient presents for f/u.  Continues to work toward goals in therapy.  Patient has no complaints.  No new concerns reported by staff.        Objective   Vital signs:  18, 143/79, 97.4, 85, 98%  Physical Exam  Constitutional:       General: He is not in acute distress.  Eyes:      Extraocular Movements: Extraocular movements intact.   Pulmonary:      Effort:  Pulmonary effort is normal.   Musculoskeletal:      Cervical back: Neck supple.      Comments: Generalized weakness   Neurological:      Mental Status: He is alert.   Psychiatric:         Mood and Affect: Mood normal.         Behavior: Behavior is cooperative.         Assessment/Plan   Problem List Items Addressed This Visit       CHF (congestive heart failure) (CMS/[facility])     Stable continue to monitor          HTN (hypertension)     Stable  Monitor BP  Continue antihypertensives         Weakness - Primary     Continue therapy         COPD (chronic obstructive pulmonary disease) (CMS/[facility])     Stable  Aerosol tx  O2            Medications, treatments, and labs reviewed  Continue medications and treatments as listed in EMR    Scribe Attestation  IAna Scribe   attest that this documentation has been prepared under the direction and in the presence of PATRICIA Angulo    Provider Attestation - Scribe documentation  All medical record entries made by the Scribe were at my direction and personally dictated by me. I have reviewed the chart and agree that the record accurately reflects my personal performance of the history, physical exam, discussion and plan.   PATRICIA Angulo

## 2023-06-29 PROBLEM — R52 TINGLING PAIN: Status: ACTIVE | Noted: 2023-01-01

## 2023-06-29 NOTE — LETTER
Patient: Andrea Ochoa  : 1955    Encounter Date: 2023    PROGRESS NOTE    Subjective  Chief complaint: Andrea Ochoa is a 67 y.o. male who is an acute skilled patient being seen and evaluated for weakness    HPI:  23   Patient readmitted to SNF for therapy d/t weakness after recent hospitalization.   Patient requires assist with ADLs and transfers.  Therapy to evaluate and treat.  Patient is also on IV ATB for wound infection.  Denies n/v/f/c, cough and shortness of breath.     23   Patient working on transfers and seated therapy exercises.  Able to perform supine to sit and sit to supine with moderate assist, sit/stand with moderate assist x2 to walker x3 trials and lateral steps alongside bed with minimal assist.    23  atient continues to work towards goals in therapy.  Working on gait training with focus on directional changes and stride length.  Ambulated with rollator and minimal assist [id number] two 3 feet x 2.  Ambulated with standard walker with minimal assist of 1 and standby assist of another for safety 8 steps.  Also working on bed mobility.  Training in supine/side-lying to and from sitting edge of bed with moderate assist of 1.    6/15/2023 Patient working with SW to get reinstated for PT. States his insurance ran out and he is now applying for Medicaid. States BLE weakness continues. Wound care is seeing him for daily treatment of extensive surgical wound on lower T-LS Spine. Denies numbness, tingling, f/c/n/v/d. No concerns per nursing.     23 Patient was recert for PT. Working with PT for mobility at bedside and performing ADLs.  Continues to have gen weakness.  Extensive lower back wound  dressing intact.  Daily wound care with normal saline collagen powder alginate silver and absorbent dressing.  Patient denies f/c/n/v/d/cough sob.  Appetite is good.  No concerns per nursing    23 Patient continues to work with PT for mobility, ambulation and strengthening.  Wound care involved for extensive surgical wound on back. Appetite good. Denies f/c/n/v/d cough sob. Patient c/o neuropathy pain in BLE. No concerns per nursing.      Objective  Vital signs: 122/67-98.4-84-16-96%    Physical Exam  Constitutional:       Appearance: Normal appearance.   HENT:      Head: Normocephalic.      Mouth/Throat:      Mouth: Mucous membranes are moist.   Eyes:      Extraocular Movements: Extraocular movements intact.   Cardiovascular:      Rate and Rhythm: Regular rhythm.      Pulses: Normal pulses.      Heart sounds: Normal heart sounds.   Pulmonary:      Effort: Pulmonary effort is normal.      Breath sounds: Normal breath sounds.   Abdominal:      General: Abdomen is flat. Bowel sounds are normal.      Palpations: Abdomen is soft.   Musculoskeletal:         General: Normal range of motion.      Cervical back: Normal range of motion and neck supple.      Comments: Generalized weakness   Skin:     General: Skin is warm and dry.      Capillary Refill: Capillary refill takes 2 to 3 seconds.   Neurological:      Mental Status: He is alert. Mental status is at baseline.   Psychiatric:         Mood and Affect: Mood normal.         Behavior: Behavior normal.         Assessment/Plan  Problem List Items Addressed This Visit       CHF (congestive heart failure) (CMS/[facility]) - Primary     Stable  No sob cough rales edema  Continue to monitor  Continuous O2         HTN (hypertension)     Stable  Monitor  Norvasc lopressor  Routine BMP         Weakness     therapy         COPD (chronic obstructive pulmonary disease) (CMS/[facility])     Stable  Continue to monitor  Sats 96% w/ O2  No sob leone wheezing cough  Continuous O2  combivent          Medications, treatments, and labs reviewed  Continue medications and treatments as listed in EMR    PATRICIA Daniels      Electronically Signed By: PATRICIA Daniels   6/29/23  9:01 PM

## 2023-06-30 NOTE — PROGRESS NOTES
PROGRESS NOTE    Subjective   Chief complaint: Andrea Ochoa is a 67 y.o. male who is an acute skilled patient being seen and evaluated for weakness    HPI:  5/19/23   Patient readmitted to SNF for therapy d/t weakness after recent hospitalization.   Patient requires assist with ADLs and transfers.  Therapy to evaluate and treat.  Patient is also on IV ATB for wound infection.  Denies n/v/f/c, cough and shortness of breath.     5/30/23   Patient working on transfers and seated therapy exercises.  Able to perform supine to sit and sit to supine with moderate assist, sit/stand with moderate assist x2 to walker x3 trials and lateral steps alongside bed with minimal assist.    6/2/23  atient continues to work towards goals in therapy.  Working on gait training with focus on directional changes and stride length.  Ambulated with rollator and minimal assist [id number] two 3 feet x 2.  Ambulated with standard walker with minimal assist of 1 and standby assist of another for safety 8 steps.  Also working on bed mobility.  Training in supine/side-lying to and from sitting edge of bed with moderate assist of 1.    6/15/2023 Patient working with SW to get reinstated for PT. States his insurance ran out and he is now applying for Medicaid. States BLE weakness continues. Wound care is seeing him for daily treatment of extensive surgical wound on lower T-LS Spine. Denies numbness, tingling, f/c/n/v/d. No concerns per nursing.     6/22/23 Patient was recert for PT. Working with PT for mobility at bedside and performing ADLs.  Continues to have gen weakness.  Extensive lower back wound  dressing intact.  Daily wound care with normal saline collagen powder alginate silver and absorbent dressing.  Patient denies f/c/n/v/d/cough sob.  Appetite is good.  No concerns per nursing    6/29/23 Patient continues to work with PT for mobility, ambulation and strengthening. Wound care involved for extensive surgical wound on back. Appetite good.  Denies f/c/n/v/d cough sob. Patient c/o neuropathy pain in BLE. No concerns per nursing.      Objective   Vital signs: 122/67-98.4-84-16-96%    Physical Exam  Constitutional:       Appearance: Normal appearance.   HENT:      Head: Normocephalic.      Mouth/Throat:      Mouth: Mucous membranes are moist.   Eyes:      Extraocular Movements: Extraocular movements intact.   Cardiovascular:      Rate and Rhythm: Regular rhythm.      Pulses: Normal pulses.      Heart sounds: Normal heart sounds.   Pulmonary:      Effort: Pulmonary effort is normal.      Breath sounds: Normal breath sounds.   Abdominal:      General: Abdomen is flat. Bowel sounds are normal.      Palpations: Abdomen is soft.   Musculoskeletal:         General: Normal range of motion.      Cervical back: Normal range of motion and neck supple.      Comments: Generalized weakness   Skin:     General: Skin is warm and dry.      Capillary Refill: Capillary refill takes 2 to 3 seconds.   Neurological:      Mental Status: He is alert. Mental status is at baseline.   Psychiatric:         Mood and Affect: Mood normal.         Behavior: Behavior normal.         Assessment/Plan   Problem List Items Addressed This Visit       CHF (congestive heart failure) (CMS/[facility]) - Primary     Stable  No sob cough rales edema  Continue to monitor  Continuous O2         HTN (hypertension)     Stable  Monitor  Norvasc lopressor  Routine BMP         Weakness     therapy         COPD (chronic obstructive pulmonary disease) (CMS/[facility])     Stable  Continue to monitor  Sats 96% w/ O2  No sob leone wheezing cough  Continuous O2  combivent          Medications, treatments, and labs reviewed  Continue medications and treatments as listed in EMR    FIDEL Daniels-CNP

## 2023-06-30 NOTE — ASSESSMENT & PLAN NOTE
Stable  Continue to monitor  Sats 96% w/ O2  No sob leone wheezing cough  Continuous O2  combivent

## 2023-07-05 NOTE — PROGRESS NOTES
PROGRESS NOTE    Subjective   Chief complaint: Andrea Ochoa is a 67 y.o. male who is a acute skilled care patient being seen and evaluated for weakness.    HPI:  5/19/23   Patient readmitted to SNF for therapy d/t weakness after recent hospitalization.   Patient requires assist with ADLs and transfers.  Therapy to evaluate and treat.  Patient is also on IV ATB for wound infection.  Denies n/v/f/c, cough and shortness of breath.     5/30/23   Patient working on transfers and seated therapy exercises.  Able to perform supine to sit and sit to supine with moderate assist, sit/stand with moderate assist x2 to walker x3 trials and lateral steps alongside bed with minimal assist.    6/2/23  atient continues to work towards goals in therapy.  Working on gait training with focus on directional changes and stride length.  Ambulated with rollator and minimal assist [id number] two 3 feet x 2.  Ambulated with standard walker with minimal assist of 1 and standby assist of another for safety 8 steps.  Also working on bed mobility.  Training in supine/side-lying to and from sitting edge of bed with moderate assist of 1.    6/6/23   Patient working on gait training in therapy.  Able to ambulate with standard walker and minimal/contact-guard assist for feet x2 with seated rest  and between.  Completed sit to stand transfers from bed with contact-guard/minimal assist and sit to stand transfers from wheelchair with max assist of 2.  Patient   Is doing well and has no complaints.  No new concerns reported by nursing.    6/7/2023    Patient is in therapy d/t generalized weakness.  Patient presents for f/u.  Continues to work toward goals in therapy.  Patient has no complaints.  No new concerns reported by staff.    6/9/23   Patient working on gait training focusing on directional changes, stride length and adjustment of center of mass over base of support.  Ambulated with front wheel walker and  contact-guard/minimal assist 6 feet x 2  and 2 feet x 1.  Completed transfer training with minimal/contact-guard assist for sit to stand and moderate assist of 1 with cushion in wheelchair for  sit to stand from wheelchair.        Objective   Vital signs:   17, 128/60, 98.5, 82, 97%  Physical Exam  Constitutional:       General: He is not in acute distress.  Eyes:      Extraocular Movements: Extraocular movements intact.   Pulmonary:      Effort: Pulmonary effort is normal.   Musculoskeletal:      Cervical back: Neck supple.      Comments: Generalized weakness   Neurological:      Mental Status: He is alert.   Psychiatric:         Mood and Affect: Mood normal.         Behavior: Behavior is cooperative.         Assessment/Plan   Problem List Items Addressed This Visit       CHF (congestive heart failure) (CMS/[facility])     Stable   Monitor weight          Anxiety    HTN (hypertension)     Stable  Monitor BP  Continue antihypertensives         Weakness - Primary     Continue therapyContinue therapy         BPH (benign prostatic hyperplasia)    COPD (chronic obstructive pulmonary disease) (CMS/[facility])    Dependence on renal dialysis (CMS/[facility])    Morbid (severe) obesity with alveolar hypoventilation (CMS/[facility])     Medications, treatments, and labs reviewed  Continue medications and treatments as listed in EMR    Scribe Attestation  IAna Scribe   attest that this documentation has been prepared under the direction and in the presence of Angel Lewis DO    Provider Attestation - Scribe documentation  All medical record entries made by the Scribe were at my direction and personally dictated by me. I have reviewed the chart and agree that the record accurately reflects my personal performance of the history, physical exam, discussion and plan.   Angel Lewis DO

## 2023-07-05 NOTE — PROGRESS NOTES
PROGRESS NOTE    Subjective   Chief complaint: Andrea Ochoa is a 67 y.o. male who is a acute skilled care patient being seen and evaluated for weakness.    HPI:  5/19/23   Patient readmitted to SNF for therapy d/t weakness after recent hospitalization.   Patient requires assist with ADLs and transfers.  Therapy to evaluate and treat.  Patient is also on IV ATB for wound infection.  Denies n/v/f/c, cough and shortness of breath.     5/30/23   Patient working on transfers and seated therapy exercises.  Able to perform supine to sit and sit to supine with moderate assist, sit/stand with moderate assist x2 to walker x3 trials and lateral steps alongside bed with minimal assist.    6/2/23  atient continues to work towards goals in therapy.  Working on gait training with focus on directional changes and stride length.  Ambulated with rollator and minimal assist [id number] two 3 feet x 2.  Ambulated with standard walker with minimal assist of 1 and standby assist of another for safety 8 steps.  Also working on bed mobility.  Training in supine/side-lying to and from sitting edge of bed with moderate assist of 1.    6/6/23   Patient working on gait training in therapy.  Able to ambulate with standard walker and minimal/contact-guard assist for feet x2 with seated rest  and between.  Completed sit to stand transfers from bed with contact-guard/minimal assist and sit to stand transfers from wheelchair with max assist of 2.  Patient   Is doing well and has no complaints.  No new concerns reported by nursing.    6/7/2023    Patient is in therapy d/t generalized weakness.  Patient presents for f/u.  Continues to work toward goals in therapy.  Patient has no complaints.  No new concerns reported by staff.    6/9/23   Patient working on gait training focusing on directional changes, stride length and adjustment of center of mass over base of support.  Ambulated with front wheel walker and  contact-guard/minimal assist 6 feet x 2  and 2 feet x 1.  Completed transfer training with minimal/contact-guard assist for sit to stand and moderate assist of 1 with cushion in wheelchair for  sit to stand from wheelchair.    6/13/23   patient presents for f/u therapy and general medical care.  Patient seen and examined at [facility].  Therapy has been working with the patient to improve strength, endurance, ADLs, and transfers d/t generalized weakness.  Patient has no acute concerns today.      Objective   Vital signs: 16, 122/67, 98.4, 84, 96%  Physical Exam  Constitutional:       General: He is not in acute distress.  Eyes:      Extraocular Movements: Extraocular movements intact.   Pulmonary:      Effort: Pulmonary effort is normal.   Musculoskeletal:      Cervical back: Neck supple.      Comments: Generalized weakness   Neurological:      Mental Status: He is alert.   Psychiatric:         Mood and Affect: Mood normal.         Behavior: Behavior is cooperative.         Assessment/Plan   Problem List Items Addressed This Visit       CHF (congestive heart failure) (CMS/[facility])     Stable continue to monitor          Weakness     Continue PT OT         COPD (chronic obstructive pulmonary disease) (CMS/[facility]) - Primary     Stable           Medications, treatments, and labs reviewed  Continue medications and treatments as listed in EMR    Scribe Attestation  Ana CAMARENA Scribe   attest that this documentation has been prepared under the direction and in the presence of PATRICIA Angulo    Provider Attestation - Scribe documentation  All medical record entries made by the Scribe were at my direction and personally dictated by me. I have reviewed the chart and agree that the record accurately reflects my personal performance of the history, physical exam, discussion and plan.   PATRICIA Angulo

## 2023-07-14 NOTE — LETTER
Patient: Andrea Ochoa  : 1955    Encounter Date: 2023    PROGRESS NOTE    Subjective  Chief complaint: Andrea Ochoa is a 67 y.o. male who is a long term care patient being seen and evaluated for UTI.    HPI:  HPI patient complaining of dysuria started a couple days ago days ago urine is dark concentrated start patient on prophylactic antibiotic   Objective  Vital signs: 155/87  Physical Exam  Constitutional:       General: He is not in acute distress.  Eyes:      Extraocular Movements: Extraocular movements intact.   Pulmonary:      Effort: Pulmonary effort is normal.   Musculoskeletal:      Cervical back: Neck supple.   Neurological:      Mental Status: He is alert.   Psychiatric:         Mood and Affect: Mood normal.         Behavior: Behavior is cooperative.         Assessment/Plan  Problem List Items Addressed This Visit       CHF (congestive heart failure) (CMS/[facility]) - Primary     Stable continue to monitor weight gain does not appear to be edema and no JVD distention         Dysuria     Keflex prophylactically UA C&S           Medications, treatments, and labs reviewed  Continue medications and treatments as listed in EMR    Scribe Attestation  I, Delmar Aragon   attest that this documentation has been prepared under the direction and in the presence of PATRICIA Angulo    Provider Attestation - Scribe documentation  All medical record entries made by the Scribe were at my direction and personally dictated by me. I have reviewed the chart and agree that the record accurately reflects my personal performance of the history, physical exam, discussion and plan.   PATRICIA Angulo        Electronically Signed By: PATRICIA Angulo   23  6:49 PM

## 2023-07-14 NOTE — PROGRESS NOTES
PROGRESS NOTE    Subjective   Chief complaint: Andrea Ochoa is a 67 y.o. male who is a acute skilled care patient being seen and evaluated for weakness.    HPI:  5/19/23   Patient readmitted to SNF for therapy d/t weakness after recent hospitalization.   Patient requires assist with ADLs and transfers.  Therapy to evaluate and treat.  Patient is also on IV ATB for wound infection.  Denies n/v/f/c, cough and shortness of breath.     5/30/23   Patient working on transfers and seated therapy exercises.  Able to perform supine to sit and sit to supine with moderate assist, sit/stand with moderate assist x2 to walker x3 trials and lateral steps alongside bed with minimal assist.    6/2/23  atient continues to work towards goals in therapy.  Working on gait training with focus on directional changes and stride length.  Ambulated with rollator and minimal assist [id number] two 3 feet x 2.  Ambulated with standard walker with minimal assist of 1 and standby assist of another for safety 8 steps.  Also working on bed mobility.  Training in supine/side-lying to and from sitting edge of bed with moderate assist of 1.    6/6/23   Patient working on gait training in therapy.  Able to ambulate with standard walker and minimal/contact-guard assist for feet x2 with seated rest  and between.  Completed sit to stand transfers from bed with contact-guard/minimal assist and sit to stand transfers from wheelchair with max assist of 2.  Patient   Is doing well and has no complaints.  No new concerns reported by nursing.    6/7/2023    Patient is in therapy d/t generalized weakness.  Patient presents for f/u.  Continues to work toward goals in therapy.  Patient has no complaints.  No new concerns reported by staff.    6/9/23   Patient working on gait training focusing on directional changes, stride length and adjustment of center of mass over base of support.  Ambulated with front wheel walker and  contact-guard/minimal assist 6 feet x 2  and 2 feet x 1.  Completed transfer training with minimal/contact-guard assist for sit to stand and moderate assist of 1 with cushion in wheelchair for  sit to stand from wheelchair.    6/13/23   patient presents for f/u therapy and general medical care.  Patient seen and examined at beside.  Therapy has been working with the patient to improve strength, endurance, ADLs, and transfers d/t generalized weakness.  Patient has no acute concerns today.    6/15/2023 Patient working with SW to get reinstated for PT. States his insurance ran out and he is now applying for Medicaid. States BLE weakness continues. Wound care is seeing him for daily treatment of extensive surgical wound on lower T-LS Spine. Denies numbness, tingling, f/c/n/v/d. No concerns per nursing.     6/20/23   Patient performed unsupported seated reaching and weight shifting at edge of bed.  PT recertification documentation completed today with assessment of patient's current functional status and progression.  Bed mobility: Curt/ModA, transfers: Sit to stand x2 from bed with ModA x2 from elevated surface to standard walker, standing tolerance up to 1 minute. Per therapist, patient is motivated to participate in therapy, demos decreased activity tolerance.  No new concerns reported by nursing.      Objective   Vital signs:   16, 122/67, 98.4, 84, 95%  Physical Exam  Constitutional:       General: He is not in acute distress.  Eyes:      Extraocular Movements: Extraocular movements intact.   Pulmonary:      Effort: Pulmonary effort is normal.   Musculoskeletal:      Cervical back: Neck supple.      Comments: Generalized weakness   Neurological:      Mental Status: He is alert.   Psychiatric:         Mood and Affect: Mood normal.         Behavior: Behavior is cooperative.         Assessment/Plan   Problem List Items Addressed This Visit       Weakness     Continue PT OT         BPH (benign prostatic hyperplasia)     Increase Flomax bid           Other  Visit Diagnoses       Abdominal bloating    -  Primary    simethicone, d/c lactulose          Medications, treatments, and labs reviewed  Continue medications and treatments as listed in EMR    Scribe Attestation  IAna Scribe   attest that this documentation has been prepared under the direction and in the presence of PATRICIA Angulo    Provider Attestation - Scribe documentation  All medical record entries made by the Scribe were at my direction and personally dictated by me. I have reviewed the chart and agree that the record accurately reflects my personal performance of the history, physical exam, discussion and plan.   PATRICIA Angulo

## 2023-07-17 NOTE — PROGRESS NOTES
PROGRESS NOTE    Subjective   Chief complaint: Andrea Ochoa is a 67 y.o. male who is a acute skilled care patient being seen and evaluated for weakness.    HPI:  5/19/23   Patient readmitted to SNF for therapy d/t weakness after recent hospitalization.   Patient requires assist with ADLs and transfers.  Therapy to evaluate and treat.  Patient is also on IV ATB for wound infection.  Denies n/v/f/c, cough and shortness of breath.     5/30/23   Patient working on transfers and seated therapy exercises.  Able to perform supine to sit and sit to supine with moderate assist, sit/stand with moderate assist x2 to walker x3 trials and lateral steps alongside bed with minimal assist.    6/2/23  atient continues to work towards goals in therapy.  Working on gait training with focus on directional changes and stride length.  Ambulated with rollator and minimal assist [id number] two 3 feet x 2.  Ambulated with standard walker with minimal assist of 1 and standby assist of another for safety 8 steps.  Also working on bed mobility.  Training in supine/side-lying to and from sitting edge of bed with moderate assist of 1.    6/6/23   Patient working on gait training in therapy.  Able to ambulate with standard walker and minimal/contact-guard assist for feet x2 with seated rest  and between.  Completed sit to stand transfers from bed with contact-guard/minimal assist and sit to stand transfers from wheelchair with max assist of 2.  Patient   Is doing well and has no complaints.  No new concerns reported by nursing.    6/7/2023    Patient is in therapy d/t generalized weakness.  Patient presents for f/u.  Continues to work toward goals in therapy.  Patient has no complaints.  No new concerns reported by staff.    6/9/23   Patient working on gait training focusing on directional changes, stride length and adjustment of center of mass over base of support.  Ambulated with front wheel walker and  contact-guard/minimal assist 6 feet x 2  and 2 feet x 1.  Completed transfer training with minimal/contact-guard assist for sit to stand and moderate assist of 1 with cushion in wheelchair for  sit to stand from wheelchair.    6/13/23   patient presents for f/u therapy and general medical care.  Patient seen and examined at beside.  Therapy has been working with the patient to improve strength, endurance, ADLs, and transfers d/t generalized weakness.  Patient has no acute concerns today.    6/15/2023 Patient working with SW to get reinstated for PT. States his insurance ran out and he is now applying for Medicaid. States BLE weakness continues. Wound care is seeing him for daily treatment of extensive surgical wound on lower T-LS Spine. Denies numbness, tingling, f/c/n/v/d. No concerns per nursing.     6/20/23   Patient performed unsupported seated reaching and weight shifting at edge of bed.  PT recertification documentation completed today with assessment of patient's current functional status and progression.  Bed mobility: Curt/ModA, transfers: Sit to stand x2 from bed with ModA x2 from elevated surface to standard walker, standing tolerance up to 1 minute. Per therapist, patient is motivated to participate in therapy, demos decreased activity tolerance.  No new concerns reported by nursing.    6/22/23 Patient was recert for PT. Working with PT for mobility at bedside and performing ADLs.  Continues to have gen weakness.  Extensive lower back wound  dressing intact.  Daily wound care with normal saline collagen powder alginate silver and absorbent dressing.  Patient denies f/c/n/v/d/cough sob.  Appetite is good.  No concerns per nursing    6/23/23   Patient continues to actively participate in therapy.  Working on bed mobility, transfer and gait training.    Patient ambulated 8 steps [id number] three 4 feet x 1 and 2 steps x1 with seated rest periods in between.  Patient is stable without complaint.  No new concerns reported by nursing.      Objective    Vital signs:   16, 122/67, 98.4, 84, 96%  Physical Exam  Constitutional:       General: He is not in acute distress.  Eyes:      Extraocular Movements: Extraocular movements intact.   Pulmonary:      Effort: Pulmonary effort is normal.      Comments: O2 via nc  Musculoskeletal:      Cervical back: Neck supple.      Comments: Generalized weakness   Neurological:      Mental Status: He is alert.   Psychiatric:         Mood and Affect: Mood normal.         Behavior: Behavior is cooperative.         Assessment/Plan   Problem List Items Addressed This Visit       CHF (congestive heart failure) (CMS/[facility])     Stable continue to monitor          Seizure (CMS/[facility])     Stable, continue Keppra         Weakness - Primary     Continue PT OT         COPD (chronic obstructive pulmonary disease) (CMS/[facility])     Stable           Medications, treatments, and labs reviewed  Continue medications and treatments as listed in EMR    Scribe Attestation  IAna Scribe   attest that this documentation has been prepared under the direction and in the presence of PATRICIA Angulo    Provider Attestation - Scribe documentation  All medical record entries made by the Scribe were at my direction and personally dictated by me. I have reviewed the chart and agree that the record accurately reflects my personal performance of the history, physical exam, discussion and plan.   PATRICIA Angulo

## 2023-07-17 NOTE — LETTER
Patient: Andrea Ochoa  : 1955    Encounter Date: 2023    PROGRESS NOTE    Subjective  Chief complaint: Andrea Ochoa is a 67 y.o. male who is a long term care patient being seen and evaluated for weight gain and follow-up of dysuria.    HPI:  HPI patient complaining of burning with urination which started a couple days ago now has abdominal discomfort patient urine dark odorous denies back pain denies fever chills  Objective  Vital signs:  18, 195/87, 98.6, 93, 95%  Physical Exam  Constitutional:       General: He is not in acute distress.  Eyes:      Extraocular Movements: Extraocular movements intact.   Pulmonary:      Effort: Pulmonary effort is normal.   Musculoskeletal:      Cervical back: Neck supple.   Neurological:      Mental Status: He is alert.   Psychiatric:         Mood and Affect: Mood normal.         Behavior: Behavior is cooperative.         Assessment/Plan  Problem List Items Addressed This Visit       Dysuria - Primary     Keflex prophylactically UA C&S           Medications, treatments, and labs reviewed  Continue medications and treatments as listed in EMR    Scribe Attestation  I, Delmar Aragon   attest that this documentation has been prepared under the direction and in the presence of PATRICIA Angulo    Provider Attestation - Scribe documentation  All medical record entries made by the Scribe were at my direction and personally dictated by me. I have reviewed the chart and agree that the record accurately reflects my personal performance of the history, physical exam, discussion and plan.   PATRICIA Angulo        Electronically Signed By: PATRICIA Angulo   23  6:44 PM

## 2023-07-20 NOTE — PROGRESS NOTES
PROGRESS NOTE  Subjective   Chief complaint: Andrea Ochoa is a 67 y.o. male who is a acute skilled care patient being seen and evaluated for constipation    HPI:  HPI patient having abdominal discomfort and hard stools has been on lactulose before.  Patient takes routine narcotic pain medication and is not ambulating much  Objective   Vital signs:   Physical Exam  Constitutional:       General: He is not in acute distress.  Eyes:      Extraocular Movements: Extraocular movements intact.   Pulmonary:      Effort: Pulmonary effort is normal.   Musculoskeletal:      Cervical back: Neck supple.   Neurological:      Mental Status: He is alert.   Psychiatric:         Mood and Affect: Mood normal.         Behavior: Behavior is cooperative.         Assessment/Plan   Problem List Items Addressed This Visit    None  Visit Diagnoses       Constipation, unspecified constipation type    -  Primary    Lactulose continue other bowel regiment medications          Medications, treatments, and labs reviewed  Continue medications and treatments as listed in EMR    FIDEL Angulo-CNP

## 2023-07-21 NOTE — LETTER
Patient: Andrea Ochoa  : 1955    Encounter Date: 2023    PROGRESS NOTE    Subjective  Chief complaint: Andrea Ochoa is a 67 y.o. male who is an acute skilled patient being seen and evaluated for weakness    HPI:  23 Patient has no new complaints or concerns today.  Continues working towards goals in therapy.  Denies constitutional symptoms. He is anxious about the death of his estranged daughter he just found out about.      Objective  Vital signs: 195/87, 98.6, 93, 97%    Physical Exam  Constitutional:       General: He is not in acute distress.  Eyes:      Extraocular Movements: Extraocular movements intact.   Pulmonary:      Effort: Pulmonary effort is normal.   Musculoskeletal:      Cervical back: Neck supple.   Neurological:      Mental Status: He is alert.   Psychiatric:         Mood and Affect: Mood normal.         Behavior: Behavior is cooperative.         Assessment/Plan  Problem List Items Addressed This Visit       CHF (congestive heart failure) (CMS/[facility])    Anxiety - Primary    Seizure (CMS/[facility])    HTN (hypertension)    BPH (benign prostatic hyperplasia)    COPD (chronic obstructive pulmonary disease) (CMS/[facility])    Morbid (severe) obesity with alveolar hypoventilation (CMS/[facility])     Medications, treatments, and labs reviewed  Continue medications and treatments as listed in PCC  Start buspar and atarax for anxiety    Scribe Attestation  IAni Scribe   attest that this documentation has been prepared under the direction and in the presence of Angel Lewis DO.    Provider Attestation - Scribe documentation  All medical record entries made by the Scribe were at my direction and personally dictated by me. I have reviewed the chart and agree that the record accurately reflects my personal performance of the history, physical exam, discussion and plan.    Angel Lewis DO        Electronically Signed By: Angel Lewis DO   10/1/23  9:46 PM

## 2023-07-24 NOTE — LETTER
Patient: Andrea Ochoa  : 1955    Encounter Date: 2023    PROGRESS NOTE  Subjective  Chief complaint: Andrea Ochoa is a 67 y.o. male who is a long term resident patient being seen and evaluated for medication review    HPI:  HPI patient recently started Lexapro and patient states that he is having headache caused by medication and would like it DC'd  Objective  Vital signs: 127/73  Physical Exam  Constitutional:       General: He is not in acute distress.  Eyes:      Extraocular Movements: Extraocular movements intact.   Pulmonary:      Effort: Pulmonary effort is normal.   Musculoskeletal:      Cervical back: Neck supple.   Neurological:      Mental Status: He is alert.   Psychiatric:         Mood and Affect: Mood normal.         Behavior: Behavior is cooperative.         Assessment/Plan  Problem List Items Addressed This Visit       Anxiety - Primary     discontinue lexapro           Medications, treatments, and labs reviewed  Continue medications and treatments as listed in EMR    PATRICIA Angulo      Electronically Signed By: PATRICIA Angulo   23  4:37 PM

## 2023-08-28 PROBLEM — R30.0 DYSURIA: Status: ACTIVE | Noted: 2023-01-01

## 2023-08-28 NOTE — PROGRESS NOTES
PROGRESS NOTE  Subjective   Chief complaint: Andrea Ochoa is a 67 y.o. male who is a long term resident patient being seen and evaluated for medication review    HPI:  HPI patient recently started Lexapro and patient states that he is having headache caused by medication and would like it DC'd  Objective   Vital signs: 127/73  Physical Exam  Constitutional:       General: He is not in acute distress.  Eyes:      Extraocular Movements: Extraocular movements intact.   Pulmonary:      Effort: Pulmonary effort is normal.   Musculoskeletal:      Cervical back: Neck supple.   Neurological:      Mental Status: He is alert.   Psychiatric:         Mood and Affect: Mood normal.         Behavior: Behavior is cooperative.         Assessment/Plan   Problem List Items Addressed This Visit       Anxiety - Primary     discontinue lexapro           Medications, treatments, and labs reviewed  Continue medications and treatments as listed in EMR    FIDEL Angulo-CNP    
Implemented All Universal Safety Interventions:  [place] to call system. Call bell, personal items and telephone within reach. Instruct patient to call for assistance. Room bathroom lighting operational. Non-slip footwear when patient is off stretcher. Physically safe environment: no spills, clutter or unnecessary equipment. Stretcher in lowest position, wheels locked, appropriate side rails in place.

## 2023-08-28 NOTE — PROGRESS NOTES
PROGRESS NOTE    Subjective   Chief complaint: Andrea Ochoa is a 67 y.o. male who is a long term care patient being seen and evaluated for weight gain and follow-up of dysuria.    HPI:  HPI patient complaining of burning with urination which started a couple days ago now has abdominal discomfort patient urine dark odorous has been on Keflex for approximately 3 days  Objective   Vital signs:  18, 195/87, 98.6, 93, 95%  Physical Exam  Constitutional:       General: He is not in acute distress.  Eyes:      Extraocular Movements: Extraocular movements intact.   Pulmonary:      Effort: Pulmonary effort is normal.   Musculoskeletal:      Cervical back: Neck supple.   Neurological:      Mental Status: He is alert.   Psychiatric:         Mood and Affect: Mood normal.         Behavior: Behavior is cooperative.         Assessment/Plan   Problem List Items Addressed This Visit       CHF (congestive heart failure) (CMS/[facility])     Stable continue to monitor weight gain does not appear to be edema and no JVD distention         Dysuria - Primary     Keflex prophylactically UA C&S           Medications, treatments, and labs reviewed  Continue medications and treatments as listed in EMR    Scribe Attestation  I, Delmar Aragon   attest that this documentation has been prepared under the direction and in the presence of PATRICIA Angulo    Provider Attestation - Scribe documentation  All medical record entries made by the Scribe were at my direction and personally dictated by me. I have reviewed the chart and agree that the record accurately reflects my personal performance of the history, physical exam, discussion and plan.   PATRICIA Angulo

## 2023-08-31 NOTE — LETTER
Patient: Andrea Ochoa  : 1955    Encounter Date: 2023    PROGRESS NOTE    Subjective  Chief complaint: Andrea Ochoa is a 67 y.o. male who is a long term care patient being seen and evaluated for  general medical care and monthly follow-up    HPI:  Patient seen and evaluated for general medical care and monthly follow-up. Patient at baseline mentation, calm, cooperative. Offers no complaints at time. No recent seizure activity, headaches or dizziness.  COPD stable- no SOB, cough, wheeze.  Patient denies F/C/N/V/D.  No concerns per nursing.          Objective  Vital signs: 143/71-97.9-98-18-95%    Physical Exam  Constitutional:       Appearance: Normal appearance.   HENT:      Head: Normocephalic.      Mouth/Throat:      Mouth: Mucous membranes are moist.   Eyes:      Extraocular Movements: Extraocular movements intact.   Cardiovascular:      Rate and Rhythm: Normal rate and regular rhythm.      Pulses: Normal pulses.   Pulmonary:      Effort: Pulmonary effort is normal.      Breath sounds: Normal breath sounds.   Abdominal:      General: Bowel sounds are normal.      Palpations: Abdomen is soft.   Musculoskeletal:         General: Normal range of motion.      Cervical back: Normal range of motion and neck supple.      Comments: General weakness   Skin:     General: Skin is warm and dry.      Capillary Refill: Capillary refill takes 2 to 3 seconds.   Neurological:      Mental Status: He is alert. Mental status is at baseline.   Psychiatric:         Mood and Affect: Mood normal.         Behavior: Behavior normal.         Assessment/Plan  Problem List Items Addressed This Visit       CHF (congestive heart failure) (CMS/HCC) - Primary     Stable  No sob, rales, edema  Monitor weights  monitor         Seizure (CMS/HCC)     Stable  No recent seizure activity  Seizure precautions  Keppra  monitor         HTN (hypertension)     Stable  Norvasc lopressor  monitor         COPD (chronic obstructive pulmonary  disease) (CMS/[facility])     Stable  No sob, cough, wheeze  Combivent  monitor          Medications, treatments, and labs reviewed  Continue medications and treatments as listed in EMR    PATRICIA Daniels      Electronically Signed By: PATRICIA Daniels   9/10/23  1:28 PM

## 2023-09-01 NOTE — LETTER
Patient: Andrea Ochoa  : 1955    Encounter Date: 2023    PROGRESS NOTE    Subjective  Chief complaint: Andrea Ochoa is a 67 y.o. male who is a long term care patient being seen and evaluated for wound.     HPI:  23 Patient seen and evaluated for general medical care and monthly follow-up. Patient at baseline mentation, calm, cooperative. Offers no complaints at time. No recent seizure activity, headaches or dizziness.  COPD stable- no SOB, cough, wheeze.  Patient denies F/C/N/V/D.  No concerns per nursing.    23 Patient was seen and evaluated at bedside. Patient has a wound with yellow drainage.       Objective  Vital signs: 143/71, 97.9, 101, 95%    Physical Exam  Constitutional:       General: He is not in acute distress.  Eyes:      Extraocular Movements: Extraocular movements intact.   Pulmonary:      Effort: Pulmonary effort is normal.   Musculoskeletal:      Cervical back: Neck supple.   Neurological:      Mental Status: He is alert.   Psychiatric:         Mood and Affect: Mood normal.         Behavior: Behavior is cooperative.         Assessment/Plan  Problem List Items Addressed This Visit       HTN (hypertension)     Continue metoprolol and amlodipine         Weakness     Continue to encourage strength exercises          COPD (chronic obstructive pulmonary disease) (CMS/[facility])     Stable  No sob, cough, wheeze  Combivent  monitor          Other Visit Diagnoses       Wound infection    -  Primary    start augmentin cx wound is followed by wound team at facility          Medications, treatments, and labs reviewed  Continue medications and treatments as listed in PCC    Scribe Attestation  IAni Scribe   attest that this documentation has been prepared under the direction and in the presence of PATRICIA Angulo.    Provider Attestation - Scribe documentation  All medical record entries made by the Scribe were at my direction and personally dictated by me. I  have reviewed the chart and agree that the record accurately reflects my personal performance of the history, physical exam, discussion and plan.    PATRICIA Angulo          Electronically Signed By: PATRICIA Angulo   10/5/23  9:02 AM

## 2023-09-10 NOTE — PROGRESS NOTES
PROGRESS NOTE    Subjective   Chief complaint: Andrea Ochoa is a 67 y.o. male who is a long term care patient being seen and evaluated for  general medical care and monthly follow-up    HPI:  Patient seen and evaluated for general medical care and monthly follow-up. Patient at baseline mentation, calm, cooperative. Offers no complaints at time. No recent seizure activity, headaches or dizziness.  COPD stable- no SOB, cough, wheeze.  Patient denies F/C/N/V/D.  No concerns per nursing.          Objective   Vital signs: 143/71-97.9-98-18-95%    Physical Exam  Constitutional:       Appearance: Normal appearance.   HENT:      Head: Normocephalic.      Mouth/Throat:      Mouth: Mucous membranes are moist.   Eyes:      Extraocular Movements: Extraocular movements intact.   Cardiovascular:      Rate and Rhythm: Normal rate and regular rhythm.      Pulses: Normal pulses.   Pulmonary:      Effort: Pulmonary effort is normal.      Breath sounds: Normal breath sounds.   Abdominal:      General: Bowel sounds are normal.      Palpations: Abdomen is soft.   Musculoskeletal:         General: Normal range of motion.      Cervical back: Normal range of motion and neck supple.      Comments: General weakness   Skin:     General: Skin is warm and dry.      Capillary Refill: Capillary refill takes 2 to 3 seconds.   Neurological:      Mental Status: He is alert. Mental status is at baseline.   Psychiatric:         Mood and Affect: Mood normal.         Behavior: Behavior normal.         Assessment/Plan   Problem List Items Addressed This Visit       CHF (congestive heart failure) (CMS/[facility]) - Primary     Stable  No sob, rales, edema  Monitor weights  monitor         Seizure (CMS/[facility])     Stable  No recent seizure activity  Seizure precautions  Keppra  monitor         HTN (hypertension)     Stable  Norvasc lopressor  monitor         COPD (chronic obstructive pulmonary disease) (CMS/[facility])     Stable  No sob, cough,  wheeze  Combivent  monitor          Medications, treatments, and labs reviewed  Continue medications and treatments as listed in EMR    Lynn Vieyra, APRN-CNP

## 2023-09-14 NOTE — PROGRESS NOTES
Service: Infectious Disease     Subjective Data:   RAJ HARMON is a 67 year old Male who is Hospital Day # 25 and POD #3 for Debridement washout, vac placement.     NAEO. Pt reports some discomfort at surgical site.    Objective Data:     Objective Information:      T   P  R  BP   MAP  SpO2   Value  36.5  79  18  156/76   103  98%  Date/Time 3/10 5:42 3/10 5:42 3/10 5:42 3/10 5:42  3/10 5:42 3/10 5:42  Range  (36.2C - 36.5C )  (77 - 85 )  (18 - 20 )  (127 - 164 )/ (55 - 76 )  (103 - 104 )  (97% - 99% )   As of 09-Mar-2023 20:35:00, patient is on 4 L/min of oxygen via nasal cannula.      Pain reported at 3/10 7:59: 6 = Moderate    Physical Exam Narrative:  ·  Physical Exam:    Gen: well-appearing, NAD  Head and neck: NCAT  HEENT: MMM, poor dentition  CV: RRR no mrg  Pulm: CTAB, decreased airflow, no wheezing or crackles, normal WOB on 4L  Abd: obese, soft, non-tender, non-distended  Ext: WWP, mild peripheral edema  Neuro: alert and oriented x3, no gross FND  Back: Wound vac placed and secured, sanguineous drainage in wound vac      Medication:    Medications:          Continuous Medications       --------------------------------  No continuous medications are active       Scheduled Medications       --------------------------------    1. Acetaminophen:  650  mg  Oral  Every 6 Hours    2. Allopurinol:  300  mg  Oral  Every 24 Hours    3. amLODIPine (NORVASC):  10  mg  Oral  Daily    4. Ascorbic Acid:  1000  mg  Oral  Daily    5. Atorvastatin:  20  mg  Oral  Daily    6. busPIRone (BUSPAR):  5  mg  Oral  Every 12 Hours    7. Docusate 50 mg - Senna 8.6 m  tablet(s)  Oral  2 Times a Day    8. Enoxaparin SubCutaneous:  40  mg  SubCutaneous  Every 24 Hours    9. fentaNYL 50 micrograms/ hour TransDermal:  1  patch  TransDermal  Every 72 Hours    10. Hydrocortisone 1% Topical:  1  application(s)  Topical  2 Times a Day    11. levETIRAcetam (KEPPRA):  500  mg  Oral  2 Times a Day    12. Metoprolol Tartrate:   12.5  mg  Oral  2 Times a Day    13. Polyethylene Glycol:  17  gram(s)  Oral  Daily    14. Sodium Hypochlorite 0.125% (Dakins Quarter):  1  application(s)  Topical  3 Times a Day    15. Sodium Zirconium Cyclosilicate:  10  gram(s)  Oral  Daily    16. Tamsulosin:  0.4  mg  Oral  Daily    17. Vancomycin - RPh to Dose - IV Piggy Back:  1  each  As Specified  Variable    18. Vancomycin 750 mg/ D5W IVPB Premixed Soln 150 mL:  750  mg  IntraVenous Piggyback  Every 12 Hours         PRN Medications       --------------------------------    1. Albuterol 2.5 mg - Ipratropium 0.5 mg/ 3 mL Neb Soln:  3  mL  Inhalation  Every 6 Hours    2. diphenhydrAMINE:  25  mg  Oral  Every 24 Hours    3. Heparin Flush 10 unit/ mL Injectable PRN:  5  mL  IntraVenous Flush  According to Flush Policy    4. HYDROmorphone Injectable:  2  mg  IntraVenous Push  Every 3 Hours    5. Naloxone Injectable:  0.2  mg  IntraVenous Push  Once    6. Sodium Chloride 0.9% Injectable Flush:  10  mL  IntraVenous Flush  Every 8 Hours and as Needed        Recent Lab Results:    Results:    CBC: 3/8/2023 06:02              \     Hgb     /                              \     7.5 L    /  WBC  ----------------  Plt               6.4       ----------------    315              /     Hct     \                              /     25.7 L    \            RBC: 2.31 L    MCV: 111 H    Neutrophil  %: 70.0      RFP: 3/8/2023 06:02  NA+        Cl-     BUN  /                         141    96 L   25 H  /  --------------------------------  Glucose                ---------------------------  103 H    K+     HCO3-   Creat \                         4.6    41 HH   0.69  \  Calcium : 9.2Anion Gap : 9 L         Albumin : 2.7 L     Phos : 4.1      Assessment and Plan:   Daily Risk Screen:  ·  Does patient have a central line? yes   ·  Central Line Type PICC   ·  Plan for PICC removal today? no   ·  The patient continues to require a PICC for parenteral medication     Code  Status:  ·  Code Status Full Code     Assessment:    67 year old Male with CHF, COPD on 4L at home, seizure disorder and a history of multiple lumbar spine surgeries complicated  by MRSA wound infection with a known chronic open lumbar wound status post multiple debridements by plastic surgery, who presented to [place] with worsening low back pain as well as generalized fatigue and weakness, found to have MRSA infection of sacral  wound with exposed hardware, and elevated inflammatory markers (ESR >130,  CRP elevated at 5.26). He presented to [facility] as a transfer for surgical evaluation by spine team. He was started on vancomcyin and zosyn  on 2/10 in [place], which were continued on admission. BCx from 2/10 with NGTD. Nsgy was consulted, and he underwent removal of lumbar spinal hardware and irrigation and debridement of lumbar wound on 2/15. Plastic surgery was consulted and recommended  leaving wound open with TID dressing changes and plan for return to OR on 2/22 for lumbar spinal wound I&D and potential wound vac. Intraoperative wound cultures had no growth aerobically or anaerobically. Pt has a hx of chronic pain, and pain was managed  after surgery with fentanyl patch (per home regimen), Tylenol (per home regimen), and IV dilaudid 1mg IV q3h for moderate pain and 2mg IV q3h for severe pain. Pain regimen can be down-titrated to home regimen after plastic surgery intervention. Zosyn  was discontinued on 2/19 given no evidence of pseudomonas. Pt continues to be on vancomycin. Patient was having loose bowel movements multiple times daily but was negative for C.diff and stool softeners were stopped. Plastic surgery recommended wound  vac and it was placed on 2/22, replaced on 2/24. Patient had hyperkalemic episode on 2/34 of potassium 6.0 (repeat from 6.1) with EKG showing  peaked T-waves, ordered insulin & D5 & Ca gluconate & Lokelma 10 mg TID, held Spironolactone . Hyperkalemia resolved with these interventions.  Patient had another wound vac replaced on 2/27. Went to OR with plastic surgery on 2/28 for debridement. Patient went for another debridement on 3/7 with plastic surgery; noted to have improved since previous  procedure but still required continued debridement with next procedure planned for 3/14.    Updates 3/10:  -Plan for OR on 3/14  -C/w vancomycin  -Labs twice a week  -Add pt/ot eval today    Cx history:     ·  2/10 OSH bedside wound cx - MRSA (final)      ·  2/15 OR deep wound cs - NGTD (final 2/17)    Abx hx:  1) Vancomcyin 2/10-   2) Zosyn 2/10- 2/19 (d/c given no culture evidence of pseudomonas)      #MRSA sacral wound infection  Micro:   -BCx 2/10 x2 NGTD  -[place] Wound cx 2/10/23: MRSA (S: clinda, vanc; R: TMP-SMX, tetracyclines)  -Intraoperative wound cx 2/15/23: no growth aerobically or anaerobically   - S/p below on 2/15:   1. exploration of lumbar wound dehiscence  2. removal of lumbar spinal hardware (set caps, rods, and screws)  3. irrigation and debridement of lumbar wound  - Plastic surgery recs  2/15:   1. WTD Kerlix dressings with Dakin's/Saline solution packed to the entire depth of the wound three times daily and PRN if soiled   -Lumbar CT without contrast, obtained on 2/18: soft tissue defect overlying lumbar and lower thoracic spine, soft tissue thickening, scattered soft tissue emphysema; multilevel degenerative changes of lumbar spine  - Plastics decided to place wound vac on 2/22 instead of I&D, reassessed and replaced wound vac on 2/24 and on 2/27. Went to OR on 2/28 for lumbar wound excision and debridement and wound vac placement.  plan to go back to OR on 3/7.  -Patient underwent debridement on 3/7, will need additional debridement on 3/14 going forward  Abx:   Plan:   1) Vancomcyin 2/10 - 4/12 ( need to extend due to debridement on 3/7)  2) Zosyn 2/10- 2/19 (d/c given no culture evidence of pseudomonas)    #Chronic pain - lower back  -home pain regimen: scheduled acetaminophen,  fentanyl patch 50mcg q72h, dilaudid 4mg PO TID prn breakthrough  -bowel regimen  -follows with pain medicine Dr. 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 Recommend follow up outpatient. Currently on Lokelma.   -6.0 on 2/24/2023 with EKG of peaked T-waves, 5.8 on 2/23 resolved peaked T-waves  -hx of hyperkalemic episodes  on review  -no CKD  -gave insulin, d50, calcium gluconate, and lokelma TID  for 48 hrs  -held maxime  -conitnue to monitor  -continue lokelma    #COPD, 4L at baseline  -SpO2 goal 88-92%  -Started on proventill (takes at home per wife)     #HFpEF   #HTN  -TTE Feb 2022: EF 65-70%, normal LV diastolic function, normal RV  -Hold ASA for possible procedure  -C/w home atorva, metop 12.5 BID, maxime 50 BID, losartan 50 qd, amlodipine 10  -Held maxime for hyperkalemia on 2/24    #Seizure disorder - continue Keppra  #Anxiety - continue Buspar   #BPH - continue tamsulosin   #Gout - continue allopurinol     #CHILANGO  -Not compliant with CPAP at home  -Elevated bicarb inpatient, likely chronic    DVT ppx: lovenox   Code status: FULL (confirmed on admission)  NOK: Wife Mike [phone]      Attestation:   Note Completion:  I am a:  Resident/Fellow   Attending Attestation I saw and evaluated the patient.  I personally obtained the key and critical portions of the history and physical exam or was physically present for key and  critical portions performed by the resident/fellow. I reviewed the resident/fellow?s documentation and discussed the patient with the resident/fellow.  I agree with the resident/fellow?s medical decision making as documented in the note.     I personally evaluated the patient on 10-Mar-2023          Electronic Signatures:  Cande Guillermo (Resident))  (Signed 10-Mar-2023 11:06)   Authored: Service, Subjective Data, Objective Data, Assessment  and Plan, Note Completion  Darrian Galvez)  (Signed 10-Mar-2023 14:31)   Authored: Note Completion   Co-Signer: Service, Subjective Data, Objective Data, Assessment and Plan, Note Completion      Last Updated: 10-Mar-2023 14:31 by Darrian Galvez)

## 2023-09-14 NOTE — PROGRESS NOTES
Service: Plastic Surgery     Subjective Data:   RAJ HARMON is a 67 year old Male who is Hospital Day # 33 and POD #4 for 1. Excision and debridement down to and including spinous process;2. Wound vac exchange (15 x 11 cm);3. ;4. ;5.     Pt states that he is doing well. He denies any issues with vac in the PM interval.     Denies any fever, chills, night sweats, CP, SOB, palpitations, nausea, vomiting, diarrhea, abdominal pain.    Objective Data:     Objective Information:      T   P  R  BP   MAP  SpO2   Value  36.4  64  16  153/57   94  99%  Date/Time 3/18 12:00 3/18 12:00 3/18 12:00 3/18 12:00  3/18 6:05 3/18 12:00  Range  (36.4C - 36.6C )  (64 - 79 )  (16 - 24 )  (125 - 156 )/ (57 - 79 )  (81 - 94 )  (97% - 100% )   As of 18-Mar-2023 08:43:00, patient is on 3 L/min of oxygen via nasal cannula.      Pain reported at 3/18 8:43: 6 = Moderate    Physical Exam by System:    Constitutional: Alert and cooperative, NAD, sitting  upright in bed.   Eyes: EOMI, clear sclera.   ENMT: MMM.   Head/Neck: NC/AT.   Respiratory/Thorax: Unlabored respirations on 4L  NC, symmetrical chest rise.   Cardiovascular: Extremities W/WP.   Gastrointestinal: Protuberant, soft ND/NT.   Genitourinary: Voiding independently via urinal.   Musculoskeletal: Wound vac dressing intact with good  seal. Periwound tissue pink, irritated appearing with maceration- stable from prior assessment, minimally TTP.   Extremities: ROJO x4, generalized deconditioned strength.   Neurological: A&O x3, no gross neuro deficits.   Psychological: Appropriate behavior and mood.   Skin: Warm and dry, see MSK exam.     Recent Lab Results:    Results:        RFP: 3/18/2023 08:37  NA+        Cl-     BUN  /                         141    94 L   13  /  --------------------------------  Glucose                ---------------------------  85    K+     HCO3-   Creat \                         3.5    42 HH   0.48 L  \  Calcium : 9.3Anion Gap : 9 L          Albumin : 2.7 L     Phos : 2.7      Assessment and Plan:   Daily Risk Screen:  ·  Does patient have an indwelling urinary catheter? n/a consulting service   ·  Does patient have a central line? n/a consulting service     Code Status:  ·  Code Status Full Code     Assessment:    RAJ HARMON is a 67 year old Male known to the plastic surgery team with a h/o multiple lumbar spinal surgeries which have been c/b recurrent MRSA infections  requiring multiple extensive plastic surgery interventions including multiple debridements and attempted closures. Admitted to [facility] as transfer on 2/14 for concern of MRSA infection of chronically open lumbar wound with exposed hardware. Patient now  s/p lumbar spinal wound I&D and removal of spinal hardware per NSGY on 2/15. Plastic Surgery consulted for spinal wound debridement and coverage.     OR course (this admission):   2/15 - Lumbar spinal wound exploration, I&D and removal of spinal hardware (per NSGY)  2/28 - Lumbar wound excision and debridement down to and including subcutaneous tissue, partial adjacent tissue transfer, wound vac application (per plastic surgery)   3/7 - Lumbar spinal wound irrigation and debridement, application of wound vac (per plastic surgery)  3/14 - Lumbar spinal wound I&D down to and including spinous process, application of wound vac (per plastics)     A: Primary team notified plastics that pt was able to get a placement and transportation arranged for SNF today. SNF will continue vac changes until pt follows up with plastics.     Plan/Recommendations:   - No additional acute surgical intervention planned from plastic surgery standpoint this admission  - Continue vac therapy to lumbar wound to promote healing and granulation of wound     -> Anticipate 3-4 weeks of vac therapy with re-eval at that time to determine plans/timing of         any additional reconstructive surgical intervention      ·  M/W/F vac dressing changes - Last change 3/17  bedside     ·  Settings: 125 mmHg low continuous suction      ·  Please keep dressing C/D/I, reinforce PRN      ·  Record vac output quality and quantity per nursing q8h      ·  Please alert plastics team with any concerns regarding vac   - > SNF to continue wound vac changes.   - Diligent pressure offloading to lumbar spine/wound site with q2h turns and frequent repositioning in bed  - Nutrition optimization to promote wound healing     ·  Consider addition of daily MV and nutritional supplements/shakes as able   - Continue ABX per ID/primary (IV Vanc currently)   - OK for DC from plastic surgery perspective, final disposition per primary when medically clear       ·  Outpt f/u with plastics arranged for 4/3 at 0930 with Dr. Jaime at the [facility]         (may need to be rescheduled if patient remains in house)   - Appreciate remaining supportive care per primary service     Patient and plan discussed with Dr. Jaime.    Shirley Romero PA-C   Plastic and Reconstructive Surgery    Available via [employer], pager: [phone] or Team phones: [phone]     Time spent on the assessment of patient, gathering and interpreting data, review of medical record/patient history, personally reviewing radiographic imaging and formulation of this note 30 minutes. With greater than 50% spent in personal discussion with  patient.        Electronic Signatures:  Shirley Romero (PA (PHYSICIAN))  (Signed 18-Mar-2023 15:37)   Authored: Service, Subjective Data, Objective Data, Assessment  and Plan, Note Completion      Last Updated: 18-Mar-2023 15:37 by Shirley Romero (PA (PHYSICIAN))

## 2023-09-14 NOTE — DISCHARGE SUMMARY
Send Summary:   Discharge Summary Providers:  Provider Role Provider Name   · Referring Sravan Jaime   · Attending Angel Ham   · Primary Esme Moser       Note Recipients: Angel Ham MD Staraitis, Suzanne M, DO - [phone] []  Sravan Jaime MD ARMITAGE, GINNY  LIZETGEEARNOLD MILVIA       Discharge:    Summary:   Admission Date: .14-Feb-2023 03:14:00   Discharge Date: 18-Mar-2023   Attending Physician at Discharge: Angel Ham   Admission Reason: Lower back pain, osteomyelitis   Final Discharge Diagnoses: Vertebral osteomyelitis,  chronic   Procedures: Date: 15-Feb-2023 14:00:00  Procedure Name: 1. exploration of lumbar wound dehiscence  2. removal of lumbar spinal hardware (set caps, rods, and screws)  3. irrigation and debridement of lumbar wound    Date: 21-Feb-2023 18:11:00  Procedure Name: Vac place,emt    Date: 28-Feb-2023 16:57:00  Procedure Name: 1. Excision and debridement down to and including subcutaneous tissue  2. Partial adjacent tissue transfer  3.   4.   5.    Date: 07-Mar-2023 10:15:00  Procedure Name: Debridement washout, vac placement    Date: 10-Mar-2023 15:33:00  Procedure Name: wound vac change    Date: 14-Mar-2023 09:00:00  Procedure Name: 1. Excision and debridement down to and including spinous process  2. Wound vac exchange (15 x 11 cm)  3.   4.   5.    Date: 17-Mar-2023 13:35:00  Procedure Name: Wound vac change   Condition at Discharge: Satisfactory   Disposition at Discharge: Skilled Nursing Facility  (SNF)   Vital Signs:        T   P  R  BP   MAP  SpO2   Value  36.4  64  16  153/57   94  99%  Date/Time 3/18 12:00 3/18 12:00 3/18 12:00 3/18 12:00  3/18 6:05 3/18 12:00  Range  (36.4C - 36.6C )  (64 - 79 )  (16 - 24 )  (125 - 156 )/ (57 - 79 )  (81 - 94 )  (97% - 100% )   As of 18-Mar-2023 08:43:00, patient is on 3 L/min of oxygen via nasal cannula.  Physical Exam:    Gen: well-appearing, NAD  Head and neck: NCAT  HEENT: MMM, poor dentition  CV: RRR no  mrg  Pulm: CTAB, decreased airflow, no wheezing or crackles, normal WOB on 4L  Abd: obese, soft, non-tender, non-distended  Ext: WWP, mild peripheral edema  Neuro: alert and oriented x3, no gross FND  Back: Wound vac placed and secured, serosanguineous drainage in wound vac  Hospital Course:    Andrea Ochoa is a 67 year old Male with CHF, COPD on 4L at home, seizure disorder and a history of multiple lumbar spine surgeries complicated by MRSA wound infection  with a known chronic open lumbar wound status post multiple debridements by plastic surgery, who presented to [place] with worsening low back pain as well as generalized fatigue and weakness, found to have MRSA infection of sacral wound with exposed hardware,  and elevated inflammatory markers (ESR >130, CRP elevated at 5.26). He presented to [facility] as a transfer for surgical evaluation by spine team. He was started on vancomcyin and zosyn on 2/10 in [place], which were continued on admission. BCx from 2/10 with  NGTD. Nsgy was consulted, and he underwent removal of lumbar spinal hardware and irrigation and debridement of lumbar wound on 2/15. Plastic surgery was consulted and recommended leaving wound open with TID dressing changes and plan for return to OR on  2/22 for lumbar spinal wound I&D and potential wound vac. Intraoperative wound cultures had no growth aerobically or anaerobically. Pt has a hx of chronic pain, and pain was managed after surgery with fentanyl patch (per home regimen), Tylenol (per home  regimen), and IV dilaudid 1mg IV q3h for moderate pain and 2mg IV q3h for severe pain. Pain regimen can be down-titrated to home regimen after plastic surgery intervention. Zosyn was discontinued on 2/19 given no evidence of pseudomonas. Pt continues  to be on vancomycin. Patient was having loose bowel movements multiple times daily but was negative for C.diff and stool softeners were stopped. Plastic surgery recommended wound vac and it was placed on  2/22, replaced on 2/24. Patient had hyperkalemic  episode on 2/34 of potassium 6.0 (repeat from 6.1) with EKG showing peaked T-waves, ordered insulin & D5 & Ca gluconate & Lokelma 10 mg TID, held Spironolactone. Hyperkalemia resolved with these interventions. Patient had another wound vac replaced on  2/27. Went to OR with plastic surgery on 2/28 for debridement. Patient additionally taken to OR on 3/7 and 3/14 for more debridements. Patient noted to not be candidate for flap due to lack of proper donor graft site and adequate healing with wound vac.  He was continued on vancomycin during this time. Patients pain regimen was desescalated from IV dilaudid 2mg q3h and fentaynl 50 mcg patch q72 to PO dilaudid 4 mg QID as needed and a 75 mcg fentanyl patch. He additionally was started on duloxetine 30  mg for additional pain control. Pt to be discharged to snf with F wound vac change with plastic surgery follow up, ID follow up, chronic pain follow up, and podiatry follow up.     Immunizations:    Immunizations:  11-Feb-2023   Pneumonia- Pneumococcal polysaccharide vaccine: Immunizations,  11-Feb-2023 11-Feb-2023   .Influenza- Influenza Virus: Immunizations, 11-Feb-2023      Discharge Information:    and Continuing Care:   Lab Results - Pending:    Blood Gas, Venous Drawn at 15-Mar-2023 05:58:00  Radiology Results - Pending: None   Discharge Instructions:    Activity:           activity as tolerated.          May not shower.  Do not allow wound vac dressing to become wet          Continue to ensure diligent pressure offloading to lumbar spine/wound site with frequent repositioning in bed and turning side to side and minimizing pressure to site at least every 2 hours. Elevate heels from bed.    Nutrition/Diet:           regular    Labs:           Lab Test(s):   CBC with dif,  Comprehensive Metabolic Panel,  CRP,  ESR,  Vancomycin trough levels          Date To Be Drawn:   Weekly          Fax Results To:     Christy          Comments:   [phone] is Fax number for Dr. Harrison appointment    Wound Care:           Wound Site:   Lumbar (low back) spinal wound          Wound Type:   chronic wound          Cleanse With:   soap and water,  Cleanse with soap and water during vac dressing changes then dry site thoroughly and have wound vac dressing replaced per wound care          Instructions:   no lotions, creams, or tub soaks          Other Instructions:   Refer to wound vac instructions section for further details.          Vac Site:   Lumbar (low back) spinal wound          Change Dressing:   3 times a week,  Monday/Wednesday/Friday          Dressing Type:   VAC granu foam dressing          Target Pressure:   125          VAC Cycle:   continuous          Wound Measurements:   15 cm x 12 cm x 5 cm          Date of Wound Measurements:   3/14/23    Respiratory:           Oxygen:   Administer oxygen at 4 via to maintain SpO2% of >88%     Additional Orders:           Additional Instructions:   Dear Mr. Ochoa,     You were admitted to the hospital because of a chronic non-healing open wound in your back, which now exposed the hardware in your spine. Our neurosurgeons removed the hardware and removed any infected tissue they could. Our plastic surgeons did multiple  debridements of your wound until it was clean. You will continue to take IV vancomycin until further indicated and will discuss a stop date with Dr. Harrison on 4/25/23 at the infectious disease appointment.     Medication changes:   -Spironolactone discontinued  -Lokelma started  -Duloxetine started for pain control  -New pain regimen: 4 mg dilaudid 4 times a day as needed by mouth, 75 mcg fentanyl patch every 3 days, duloxetine    F/u appointments:   ID: April 25th, 2023 with Dr. Harrison  Plastic surgery: April 3rd, 2023 at 930 am  Podiatry: Jameel will call you to schedule  Chronic pain: Jameel will call you to schedule   PCP: Jameel will call you to  schedule    If no one calls to schedule please call: [phone]      We wish you the best on your recovery.     Regards,   [facility]     Rehab Services:           Occupational Therapy Orders:   [facility]          Physical Therapy Orders:   [facility] ([facility])    Care Recommendation:           I recommend that INPATIENT care is required at::   Skilled          Estimated Stay:   30 - 180 days    Follow Up Appointments:    Follow-Up Appointment 01:           Physician/Dept/Service:   Pain Management - Dr. Nikita Almazan          Reason for Referral:   Chronic pain          Location:    [facility]          Phone Number:   [phone]    Follow-Up Appointment 02:           Physician/Dept/Service:   Neurosurgery - Dr. Janes Frye          Reason for Referral:   Back surgery `          Scheduled Date/Time:   13-Mar-2023 13:00          Location:   [facility]          Phone Number:   [phone]    Follow-Up Appointment 03:           Physician/Dept/Service:   Infectious Diseases          Call to Schedule in:   Office requests to call you directly to schedule appointment          Phone Number:   [phone]    Follow-Up Appointment 04:           Physician/Dept/Service:   Dr. Sravan Jaime - Plastic and Reconstructive Surgery          Reason for Referral:   Postoperative Follow-up Appointment, Wound check          Call to Schedule in:   2-4 weeks follow discharge from the hospital          Scheduled Date/Time:   03-Apr-2023 09:30          Location:   [facility] [address] [address]          Phone Number:   [phone]    Follow-Up Appointment 05:           Physician/Dept/Service:   PCP          Phone Number:   [phone]    Discharge Medications: Home Medication   Osteo Bi-Flex 250 mg-200 mg oral tablet - 1 tab(s) orally 2 times a day  aspirin 81 mg oral delayed release tablet - 1 tab(s)  orally once a day  tamsulosin 0.4 mg oral capsule - 1 cap(s) orally once a day  busPIRone 5 mg oral tablet - 1 tab(s) orally every 12 hours  ascorbic acid 1000 mg oral tablet - 1 tab(s) orally once a day  atorvastatin 20 mg oral tablet - 1 tab(s) orally once a day  docusate sodium 100 mg oral capsule - 1 cap(s) orally once a day (at bedtime)  metoprolol tartrate 25 mg oral tablet - 0.5 tab(s) orally 2 times a day  amLODIPine 10 mg oral tablet - 1 tab(s) orally once a day  allopurinol 300 mg oral tablet - 1 tab(s) orally every 24 hours  sodium hypochlorite topical - 1 application topically 3 times a day - to Back    sodium zirconium cyclosilicate 10 g oral powder for reconstitution - 1 packet(s) orally once a day  hydrocortisone 1% topical cream - 1 application topically 2 times a day - to Back    acetaminophen 325 mg oral tablet - 3 tab(s) orally every 8 hours  fentaNYL 75 mcg/hr transdermal film, extended release - 1 patch transdermal every 72 hours  heparin flush 10 units/mL intravenous solution -  intravenous   levETIRAcetam 500 mg oral tablet - 1 tab(s) orally 2 times a day  DULoxetine 30 mg oral delayed release capsule - 1 cap(s) orally once (at bedtime)  nystatin - 1 application topically 3 times a day - to Groin    lidocaine 4% topical film -  topically   vancomycin 750 mg/150 mL-D5% intravenous solution - 750 milligram(s) intravenous 2 times a day TENTATIVE END DATE 4/25/23  fentaNYL 75 mcg/hr transdermal film, extended release - Apply topically to affected area every 72 hours   Dilaudid 4 mg oral tablet - 1 tab(s) orally 4 times a day      PRN Medication   diphenhydrAMINE 25 mg oral capsule - 1 cap(s) orally every 24 hours, As needed, Itching  ipratropium-albuterol 0.5 mg-2.5 mg/3 mL inhalation solution - 3 milliliter(s) inhaled every 6 hours, As needed, Shortness of Breath  Dilaudid 4 mg oral tablet - 1 tab(s) orally 4 times a day, As Needed  naloxone - 0.2 milligram(s)  once, As needed, RR < 8 bpm and  unresponsive - to IntraVenous Push  lactulose 10 g/15 mL oral syrup - 30 milliliter(s) orally once a day, As Needed   Issues to Discuss at Follow-up / Goals for Continuing Care:    PCP -- Follow up post hospital discharge; discuss when to start back spironolactone and lokelma  Infectious disease -- Discuss vancomycin stop date for osteomyelitis (MRSA + culture)  Plastic surgery -- Management of lower back chronic wound (wound vac in place with changes mw)  Podiatry -- Pt requested for maintenance of lower extremities  Chronic pain -- Follow up pain regimen and adjust (discharged on fentanyl 75 mcg, dilaudid 4mg four times a day as needed, duloxetine 30 mg)     DNR Status:   ·  Code Status Code Status order at time of discharge: Full Code     Attestation:   Note Completion:  I am a:  Resident/Fellow   Attending Attestation I saw and evaluated the patient.  I personally obtained the key and critical portions of the history and physical exam or was physically present for key and  critical portions performed by the resident/fellow. I reviewed the resident/fellow?s documentation and discussed the patient with the resident/fellow.  I agree with the resident/fellow?s medical decision making as documented in the note.     I personally evaluated the patient on 18-Mar-2023         Electronic Signatures:  Cande Guillermo (Resident))  (Signed 18-Mar-2023 15:49)   Authored: Send Summary, Summary Content, Immunizations,  Ongoing Care, DNR Status, Note Completion  Angel Ham)  (Signed 18-Mar-2023 17:44)   Authored: Ongoing Care, Note Completion   Co-Signer: Send Summary, Summary Content, Immunizations, Ongoing Care, DNR Status, Note Completion      Last Updated: 18-Mar-2023 17:44 by Angel Ham)

## 2023-09-14 NOTE — PROGRESS NOTES
Service: Plastic Surgery     Subjective Data:   RAJ HARMON is a 67 year old Male who is Hospital Day #6 and POD #4 for 1. exploration of lumbar wound dehiscence; 2. removal of lumbar spinal hardware (set caps, rods, and screws); 3. irrigation  and debridement of lumbar wound.     Patient found resting comfortably in bed on AM assessment. Aware of plan for return to OR with plastic surgery on Wednesday 2/22 for additional lumbar spinal wound I&D, possible application of wound  vac. Having interim dressing changes completed per nursing. Patient OOB with assistance for voiding in bedside commode. Denies headache, dizziness, fever, chills, chest pain, dyspnea, abdominal pain, nausea, vomiting, diarrhea or constipation.    Overnight Events: Patient had an uneventful night.     Objective Data:     Objective Information:      T   P  R  BP   MAP  SpO2   Value  36.6  78  18  152/86      99%  Date/Time 2/19 12:54 2/19 12:54 2/19 12:54 2/19 12:54    2/19 12:54  Range  (35.8C - 36.6C )  (71 - 90 )  (17 - 18 )  (119 - 152 )/ (69 - 86 )    (96% - 100% )    As of 19-Feb-2023 07:30:00, patient is on 4 L/min of oxygen via nasal cannula.    Pain reported at 2/19 14:35: 10 = Severe    Physical Exam by System:    Constitutional: Alert and cooperative, NAD, nontoxic  in appearance.   Eyes: EOMI, PERRL.   ENMT: MMM, poor dentition, no ulceration/lesions.   Head/Neck: NC/AT.   Respiratory/Thorax: Unlabored respirations on 4L  NC.   Cardiovascular: Regular rate, extremities W/WP.   Gastrointestinal: Soft, large panus, nt/nd.   Genitourinary: Voiding per urinal.   Musculoskeletal: Generalized deconditioning.   Neurological: A&O x3.   Psychological: Appropriate behavior and mood.   Skin: Stage 1 ulcer on L heal. Large, 10-15 cm, full  thickness wound to the midline lumbosacral region. Wound bed with mixed fibroglandular appearance and slight slough. Surrounding tissue intact, mild periwound erythema.      Medication:    Medications:        Continuous Medications       --------------------------------  No continuous medications are active       Scheduled Medications       --------------------------------  1. Acetaminophen:  650  mg  Oral  Every 6 Hours    2. Allopurinol:  300  mg  Oral  Every 24 Hours    3. amLODIPine (NORVASC):  10  mg  Oral  Daily    4. Ascorbic Acid:  1000  mg  Oral  Daily    5. Atorvastatin:  20  mg  Oral  Daily    6. busPIRone (BUSPAR):  5  mg  Oral  Every 12 Hours    7. Docusate 50 mg - Senna 8.6 m  tablet(s)  Oral  2 Times a Day    8. Enoxaparin SubCutaneous:  40  mg  SubCutaneous  Every 24 Hours    9. fentaNYL 50 micrograms/ hour TransDermal:  1  patch  TransDermal  Every 72 Hours    10. levETIRAcetam (KEPPRA):  500  mg  Oral  2 Times a Day    11. Metoprolol Tartrate:  12.5  mg  Oral  2 Times a Day    12. Polyethylene Glycol:  17  gram(s)  Oral  Daily    13. Sodium Hypochlorite 0.125% (Dakins Quarter):  1  application(s)  Topical  3 Times a Day    14. Spironolactone:  50  mg  Oral  2 Times a Day    15. Tamsulosin:  0.4  mg  Oral  Daily    16. Vancomycin - h to Dose - IV Piggy Back:  1  each  As Specified  Variable    17. Vancomycin IV Piggy Back:  1250  mg  IntraVenous Piggyback  Every 12 Hours         PRN Medications       --------------------------------  1. Acetaminophen:  650  mg  Oral  Once    2. Albuterol 90 micrograms/ Inhalation MDI:  2  inhalation  Inhalation  Every 6 Hours    3. HYDROmorphone Injectable:  1  mg  IntraVenous Push  Every 3 Hours    4. HYDROmorphone Injectable:  2  mg  IntraVenous Push  Every 3 Hours    5. Melatonin:  3  mg  Oral  At Bedtime    6. Naloxone Injectable:  0.2  mg  IntraVenous Push  Once    7. Sodium Chloride 0.9% Injectable Flush:  10  mL  IntraVenous Flush  Every 8 Hours and as Needed        Recent Lab Results:    Results:    I have reviewed these laboratory results:    Complete Blood Count + Differential  2023 05:24:00      Result Value     White Blood Cell Count  8.4    Nucleated Erythrocyte Count  0.0    Red Blood Cell Count  2.42   L   HGB  7.8   L   HCT  26.8   L   MCV  111   H   MCHC  29.1   L   PLT  333    RDW-CV  12.1    Neutrophil %  65.9    Immature Granulocytes %  3.2   H   Lymphocyte %  16.0    Monocyte %  8.3    Eosinophil %  5.3    Basophil %  1.3    Neutrophil Count  5.51    Lymphocyte Count  1.34    Monocyte Count  0.69    Eosinophil Count  0.44    Basophil Count  0.11   H     Renal Function Panel  19-Feb-2023 05:24:00      Result Value    Glucose, Serum  99    NA  138    K  4.8    CL  97   L   Bicarbonate, Serum  38   H   Anion Gap, Serum  8   L   BUN  20    CREAT  1.42   H   GFR Male  53   A   Calcium, Serum  9.2    Phosphorus, Serum  3.3    ALB  2.9   L     Magnesium, Serum  19-Feb-2023 05:24:00      Result Value    Magnesium, Serum  1.84        Radiology Results:    Results:    Impression:  1. There has been interval removal of the posterior fusion hardware  from the lumbar spine. There is again a large soft tissue defect with  marked thinning of the skin and subcutaneous soft tissues and  probable dehiscence overlying the lumbar and lower thoracic spine  with nonspecific soft tissue thickening and loss of fat soft tissue  planes as well as scattered soft tissue emphysema along the periphery  of the defect. The possibility of osteomyelitis cannot be excluded on  the basis of this examination.  2. Marked, multilevel degenerative changes of the lumbar spine with  multilevel postoperative changes from laminectomies and  posterolateral fusion masses.    CT L Spine without Contrast [Feb 18 2023  2:16PM]      Assessment and Plan:   Daily Risk Screen:  ·  Does patient have a central line? n/a consulting service     Comorbidities:  ·  Comorbidity Other     Code Status:  ·  Code Status Full Code     Assessment:    HARMON RAJ CALI is a 67 year old Male known to the plastic surgery team with a h/o multiple lumbar spinal surgeries which  have been c/b recurrent MRSA infections  requiring multiple extensive plastic surgery interventions including multiple debridements and attempted closures. Admitted to [facility] as transfer on 2/14 for concern of MRSA infection of chronically open lumbar wound with exposed hardware. Patient now  s/p lumbar spinal wound I&D and removal of spinal hardware per NSGY on 2/15. Plastic Surgery consulted for spinal wound debridement and coverage.     OR course (this admission):   2/15 - Lumbar spinal wound exploration, I&D and removal of spinal hardware (per NSGY)    Plan/Recommendations:   - Plan for OR with plastics Wednesday 2/22 for lumbar spinal wound I&D, possible application wound vac       · Please ensure COVID and T&S are updated for return to OR     · NPO at [facility] on 2/22  - Continue interim TID and PRN local wound care/dressing changes (nursing orders placed)     ·  WTD kerlix dressings with 1/4 strength Dankins solution packed to the entire depth of the wound  - CT L-spine w/o contrast obtained for surgical planning, will f/u results with plastics attending 2/20  - Ensure diligent pressure offloading to lumbar spine/wound site with frequent repositioning, q2h turns   - Nutrition optimization to promote wound healing      ·  Can consider addition of daily MV and nutritional supplements/shakes as able   - Cx history:     ·  2/10 OSH bedside wound cx - MRSA (final)      ·  2/15 OR deep wound cs - NGTD (final 2/17)  - Continue IV ABX per ID recs (currently IV Vanc/Zosyn)   - Appreciate remaining supportive care per primary service   - Plastics will continue to follow    Patient and plan discussed with Dr. Jaime.     Kiki Lin PA-C  Plastic and Reconstructive Surgery   Doc Halo  Pager #[phone]  Team phones: [phone]    Time spent on the assessment of patient, gathering and interpreting data, review of medical record/patient history, personally reviewing radiographic imaging and formulation of this note  40 minutes. With greater than 50% spent in personal discussion with  patient.       Electronic Signatures:  Kiki Lin (PAC)  (Signed 19-Feb-2023 17:19)   Authored: Service, Subjective Data, Objective Data, Assessment  and Plan, Note Completion      Last Updated: 19-Feb-2023 17:19 by Kiki Lin (PAC)

## 2023-09-14 NOTE — PROGRESS NOTES
Service: Plastic Surgery     Subjective Data:   RAJ HARMON is a 67 year old Male who is Hospital Day #8 and POD #6 for 1. exploration of lumbar wound dehiscence; 2. removal of lumbar spinal hardware (set caps, rods, and screws);3. irrigation  and debridement of lumbar wound.     No acute concerns. Aware of plan for return to OR with plastic surgery on Wednesday 2/22 for additional lumbar spinal wound I&D, possible application of wound vac. Having interim dressing changes completed  per nursing. Patient OOB with assistance for voiding in bedside commode. Denies headache, dizziness, fever, chills, chest pain, dyspnea, abdominal pain, nausea, vomiting, diarrhea or constipation.    Overnight Events: Patient had an uneventful night.     Objective Data:     Objective Information:      T   P  R  BP   MAP  SpO2   Value  36.4  100  18  142/73      96%  Date/Time 2/21 9:42 2/21 9:42 2/21 9:42 2/21 9:42    2/21 9:42  Range  (36C - 37C )  (74 - 100 )  (18 - 19 )  (126 - 152 )/ (67 - 73 )    (96% - 99% )    As of 21-Feb-2023 09:13:00, patient is on 4 L/min of oxygen via nasal cannula.    Highest temp of 37 C was recorded at 2/21 5:23    Pain reported at 2/21 9:42: 8 = Severe    Physical Exam by System:    Constitutional: Alert and cooperative, NAD, nontoxic  in appearance.   Eyes: EOMI, PERRL.   ENMT: MMM, poor dentition, no ulceration/lesions.   Head/Neck: NC/AT.   Respiratory/Thorax: Unlabored respirations on 4L  NC.   Cardiovascular: Regular rate, extremities W/WP.   Gastrointestinal: Soft, large panus, nt/nd.   Genitourinary: Voiding per urinal.   Musculoskeletal: Generalized deconditioning.   Neurological: A&O x3.   Psychological: Appropriate behavior and mood.   Skin: Stage 1 ulcer on L heal. Large, 10-15 cm, full  thickness wound to the midline lumbosacral region. Wound bed with mixed fibroglandular appearance and slight slough. Surrounding tissue intact, mild periwound erythema.      Medication:    Medications:        Continuous Medications       --------------------------------  No continuous medications are active       Scheduled Medications       --------------------------------  1. Acetaminophen:  650  mg  Oral  Every 6 Hours    2. Allopurinol:  300  mg  Oral  Every 24 Hours    3. amLODIPine (NORVASC):  10  mg  Oral  Daily    4. Ascorbic Acid:  1000  mg  Oral  Daily    5. Atorvastatin:  20  mg  Oral  Daily    6. busPIRone (BUSPAR):  5  mg  Oral  Every 12 Hours    7. Docusate 50 mg - Senna 8.6 m  tablet(s)  Oral  2 Times a Day    8. Enoxaparin SubCutaneous:  40  mg  SubCutaneous  Every 24 Hours    9. fentaNYL 50 micrograms/ hour TransDermal:  1  patch  TransDermal  Every 72 Hours    10. levETIRAcetam (KEPPRA):  500  mg  Oral  2 Times a Day    11. Metoprolol Tartrate:  12.5  mg  Oral  2 Times a Day    12. Polyethylene Glycol:  17  gram(s)  Oral  Daily    13. Sodium Hypochlorite 0.125% (Dakins Quarter):  1  application(s)  Topical  3 Times a Day    14. Spironolactone:  50  mg  Oral  2 Times a Day    15. Tamsulosin:  0.4  mg  Oral  Daily    16. Vancomycin - RPh to Dose - IV Piggy Back:  1  each  As Specified  Variable    17. Vancomycin 1 gram IVPB/ Premixed Soln 200 mL:  1  gram(s)  IntraVenous Piggyback  Every 12 Hours         PRN Medications       --------------------------------  1. Acetaminophen:  650  mg  Oral  Once    2. Albuterol 90 micrograms/ Inhalation MDI:  2  inhalation  Inhalation  Every 6 Hours    3. HYDROmorphone Injectable:  1  mg  IntraVenous Push  Every 3 Hours    4. HYDROmorphone Injectable:  2  mg  IntraVenous Push  Every 3 Hours    5. Melatonin:  3  mg  Oral  At Bedtime    6. Naloxone Injectable:  0.2  mg  IntraVenous Push  Once    7. Sodium Chloride 0.9% Injectable Flush:  10  mL  IntraVenous Flush  Every 8 Hours and as Needed        Recent Lab Results:    Results:    I have reviewed these laboratory results:    Complete Blood Count + Differential  21-Feb-2023  05:45:00      Result Value    White Blood Cell Count  7.6    Nucleated Erythrocyte Count  0.0    Red Blood Cell Count  2.58   L   HGB  8.3   L   HCT  28.6   L   MCV  111   H   MCHC  29.0   L   PLT  317    RDW-CV  12.5    Neutrophil %  58.5    Immature Granulocytes %  3.0   H   Lymphocyte %  24.3    Monocyte %  8.1    Eosinophil %  4.9    Basophil %  1.2    Neutrophil Count  4.46    Lymphocyte Count  1.85    Monocyte Count  0.62    Eosinophil Count  0.37    Basophil Count  0.09      Renal Function Panel  21-Feb-2023 05:45:00      Result Value    Glucose, Serum  96    NA  137    K  5.2    CL  98    Bicarbonate, Serum  38   H   Anion Gap, Serum  6   L   BUN  24   H   CREAT  0.98    GFR Male  83    Calcium, Serum  9.9    Phosphorus, Serum  3.2    ALB  3.2   L     Magnesium, Serum  21-Feb-2023 05:45:00      Result Value    Magnesium, Serum  1.85        Radiology Results:    Results:    Impression:  1. There has been interval removal of the posterior fusion hardware  from the lumbar spine. There is again a large soft tissue defect with  marked thinning of the skin and subcutaneous soft tissues and  probable dehiscence overlying the lumbar and lower thoracic spine  with nonspecific soft tissue thickening and loss of fat soft tissue  planes as well as scattered soft tissue emphysema along the periphery  of the defect. The possibility of osteomyelitis cannot be excluded on  the basis of this examination.  2. Marked, multilevel degenerative changes of the lumbar spine with  multilevel postoperative changes from laminectomies and  posterolateral fusion masses.    CT L Spine without Contrast [Feb 18 2023  2:16PM]      Assessment and Plan:   Daily Risk Screen:  ·  Does patient have a central line? n/a consulting service     Comorbidities:  ·  Comorbidity Other     Code Status:  ·  Code Status Full Code     Assessment:    HARMON RAJ CALI is a 67 year old Male known to the plastic surgery team with a h/o multiple lumbar spinal  surgeries which have been c/b recurrent MRSA infections  requiring multiple extensive plastic surgery interventions including multiple debridements and attempted closures. Admitted to [facility] as transfer on 2/14 for concern of MRSA infection of chronically open lumbar wound with exposed hardware. Patient now  s/p lumbar spinal wound I&D and removal of spinal hardware per NSGY on 2/15. Plastic Surgery consulted for spinal wound debridement and coverage.     OR course (this admission):   2/15 - Lumbar spinal wound exploration, I&D and removal of spinal hardware (per NSGY)    Plan/Recommendations:   - Plan for OR with plastics Wednesday 2/22 for lumbar spinal wound I&D, possible application wound vac       · Please ensure COVID and T&S are updated for return to OR     · NPO at [facility] on 2/22  - Continue interim TID and PRN local wound care/dressing changes (nursing orders placed)     ·  WTD kerlix dressings with 1/4 strength Dankins solution packed to the entire depth of the wound  - CT L-spine w/o contrast obtained for surgical planning, results reviewed   - Ensure diligent pressure offloading to lumbar spine/wound site with frequent repositioning, q2h turns   - Nutrition optimization to promote wound healing      ·  Can consider addition of daily MV and nutritional supplements/shakes as able   - Cx history:     ·  2/10 OSH bedside wound cx - MRSA (final)      ·  2/15 OR deep wound cs - NGTD (final 2/17)  - Continue IV ABX per ID recs (currently IV Vanc)   - Appreciate remaining supportive care per primary service   - Plastics will continue to follow    Patient and plan discussed with Dr. Jaime.     Kiki Lin PA-C  Plastic and Reconstructive Surgery   Doc Halo  Pager #[phone]  Team phones: [phone]    Time spent on the assessment of patient, gathering and interpreting data, review of medical record/patient history, personally reviewing radiographic imaging and formulation of this note 30 minutes. With  greater than 50% spent in personal discussion with  patient.       Electronic Signatures:  Kiki Lin (PAC)  (Signed 21-Feb-2023 10:44)   Authored: Service, Subjective Data, Objective Data, Assessment  and Plan, Note Completion      Last Updated: 21-Feb-2023 10:44 by Kiki Lin (PAC)

## 2023-09-14 NOTE — PROGRESS NOTES
Service: Infectious Disease     Subjective Data:   RAJ HARMON is a 67 year old Male who is Hospital Day # 6 and POD #4 for 1. exploration of lumbar wound dehiscence;2. removal of lumbar spinal hardware (set caps, rods, and screws);3. irrigation  and debridement of lumbar wound.    Additional Information:    This AM, pt continued to endorse soft, mushy stools, and persistent pain which is well controlled with his current pain regimen.  He just wants to speak w/ plastics  about the results of his CT. We have notified plastics of the results and they have reassured they will review the imaging. and get back to him. We informed the patient the imaging was ordered for surgical planning.    Objective Data:     Objective Information:      T   P  R  BP   MAP  SpO2   Value  36.1  71  18  121/69      100%  Date/Time 2/19 5:01 2/19 5:01 2/19 5:01 2/19 5:01    2/19 5:01  Range  (35.8C - 36.6C )  (71 - 90 )  (17 - 18 )  (119 - 144 )/ (69 - 72 )    (96% - 100% )   As of 19-Feb-2023 07:30:00, patient is on 4 L/min of oxygen via nasal cannula.      Pain reported at 2/19 7:30: 8 = Severe    Physical Exam Narrative:  ·  Physical Exam:    Gen: well-appearing, NAD  Head and neck: NCAT, neck supple without LAD, severe kyphoscoliosis   HEENT: MMM, normal nose without congestion, poor dentition   CV: RRR no mrg  Pulm: CTAB, prolonged exp phase, no wheezing or crackles, normal WOB on 4L  Abd: obese, soft, non-tender, non-distended  Ext: no cyanosis or clubbing, trace LE edema, thenar wasting bilat; 2-3cm diameter stage 1 ulcer on L heel  Neuro: alert and oriented x3, normal tone, face symmetric, moves all extremities spontaneously     Medication:    Medications:          Continuous Medications       --------------------------------  No continuous medications are active       Scheduled Medications       --------------------------------    1. Acetaminophen:  650  mg  Oral  Every 6 Hours    2. Allopurinol:  300  mg  Oral  Every  24 Hours    3. amLODIPine (NORVASC):  10  mg  Oral  Daily    4. Ascorbic Acid:  1000  mg  Oral  Daily    5. Atorvastatin:  20  mg  Oral  Daily    6. busPIRone (BUSPAR):  5  mg  Oral  Every 12 Hours    7. Docusate 50 mg - Senna 8.6 m  tablet(s)  Oral  2 Times a Day    8. Enoxaparin SubCutaneous:  40  mg  SubCutaneous  Every 24 Hours    9. fentaNYL 50 micrograms/ hour TransDermal:  1  patch  TransDermal  Every 72 Hours    10. levETIRAcetam (KEPPRA):  500  mg  Oral  2 Times a Day    11. Metoprolol Tartrate:  12.5  mg  Oral  2 Times a Day    12. Polyethylene Glycol:  17  gram(s)  Oral  Daily    13. Sodium Hypochlorite 0.125% (Dakins Quarter):  1  application(s)  Topical  3 Times a Day    14. Spironolactone:  50  mg  Oral  2 Times a Day    15. Tamsulosin:  0.4  mg  Oral  Daily    16. Vancomycin - RPh to Dose - IV Piggy Back:  1  each  As Specified  Variable    17. Vancomycin IV Piggy Back:  1250  mg  IntraVenous Piggyback  Every 12 Hours         PRN Medications       --------------------------------    1. Acetaminophen:  650  mg  Oral  Once    2. Albuterol 90 micrograms/ Inhalation MDI:  2  inhalation  Inhalation  Every 6 Hours    3. HYDROmorphone Injectable:  1  mg  IntraVenous Push  Every 3 Hours    4. HYDROmorphone Injectable:  2  mg  IntraVenous Push  Every 3 Hours    5. Melatonin:  3  mg  Oral  At Bedtime    6. Naloxone Injectable:  0.2  mg  IntraVenous Push  Once    7. Sodium Chloride 0.9% Injectable Flush:  10  mL  IntraVenous Flush  Every 8 Hours and as Needed        Recent Lab Results:    Results:    CBC: 2023 05:24              \     Hgb     /                              \     7.8 L    /  WBC  ----------------  Plt               8.4       ----------------    333              /     Hct     \                              /     26.8 L    \            RBC: 2.42 L    MCV: 111 H    Neutrophil  %: 65.9      RFP: 2023 05:24  NA+        Cl-     BUN  /                         138    97 L   20   /  --------------------------------  Glucose                ---------------------------  99    K+     HCO3-   Creat \                         4.8    38 H   1.42 H  \  Calcium : 9.2Anion Gap : 8 L         Albumin : 2.9 L     Phos : 3.3      Radiology Results:    Results:        Impression:    1. There has been interval removal of the posterior fusion hardware  from the lumbar spine. There is again a large soft tissue defect with  marked thinning of the skin and subcutaneous soft tissues and  probable dehiscence overlying the lumbar and lower thoracic spine  with nonspecific soft tissue thickening and loss of fat soft tissue  planes as well as scattered soft tissue emphysema along the periphery  of the defect. The possibility of osteomyelitis cannot be excluded on  the basis of this examination.  2. Marked, multilevel degenerative changes of the lumbar spine with  multilevel postoperative changes from laminectomies and  posterolateral fusion masses.     CT L Spine without Contrast [Feb 18 2023  2:16PM]      Assessment and Plan:   Daily Risk Screen:  ·  Does patient have a central line? yes   ·  Central Line Type PICC   ·  Plan for PICC removal today? no   ·  The patient continues to require a PICC for parenteral medication     Comorbidities:  ·  Comorbidity Other     Code Status:  ·  Code Status Full Code     Assessment:    67 year old Male with CHF, COPD on 4L at home, seizure disorder and a history of multiple lumbar spine surgeries complicated  by MRSA wound infection with a known chronic open lumbar wound status post multiple debridements by plastic surgery, who presented to [facility] with worsening low back pain as well as generalized fatigue and weakness, found to have MRSA infection of sacral  wound with exposed hardware, and elevated inflammatory markers. He presented to [facility]  as a transfer for surgical evaluation by spine team. He was started on vancomcyin and zosyn on 2/10 in [facility], which were continued on  admission.  BCx from 2/10 with NGTD. Nsgy was consulted, and he underwent removal of lumbar spinal hardware and irrigation and debridement of lumbar wound on 2/15. Plastic surgery was consulted and recommended leaving wound open with TID dressing changes and plan  for return to OR on 2/22 for lumbar spinal wound I&D and potential wound vac. Intraoperative wound cultures had no growth aerobically or anaerobically. Pt has a hx of chronic pain, and pain was managed after surgery with fentanyl patch (per home regimen),  Tylenol (per home regimen), and IV dilaudid 1mg IV q3h for moderate pain and 2mg IV q3h for severe pain. Pain regimen can be down-titrated to home regimen after plastic surgery intervention. Pt continues to be on vanc/zosyn.     Updates 2/19:  - C/w vanc  - D/c Zosyn (2/19) given no culture evidence of pseudomonas  - C. diff negative   - 2//18 CT LSpine demonstrates:  interval removal of the posterior fusion hardware from the lumbar spine. There is again a large soft tissue defect with marked thinning  of the skin and subcutaneous soft tissues and probable dehiscence overlying the lumbar and lower thoracic spine with nonspecific soft tissue thickening and loss of fat soft tissue planes as well as scattered soft tissue emphysema along the periphery of  the defect. The possibility of osteomyelitis cannot be excluded on the basis of this examination. 2. Marked, multilevel degenerative changes of the lumbar spine with multilevel postoperative changes from laminectomies and posterolateral fusion masses.  [ ] f/u Plastic Surg recs    #MRSA sacral wound infection  Micro:   -BCx 2/10 x2 NGTD  -[place] Wound cx 2/10/23: MRSA (S: clinda, vanc; R: TMP-SMX, tetracyclines)  -ESR elevated at >130  -CRP elevated at 5.26  -Intraoperative wound cx 2/15/23: no growth aerobically or anaerobically   - S/p below on 2/15:   1. exploration of lumbar wound dehiscence  2. removal of lumbar spinal hardware (set caps, rods, and  screws)  3. irrigation and debridement of lumbar wound  - Plastic surgery recs  2/15:   1. WTD Kerlix dressings with Dakin's/Saline solution packed to the entire depth of the wound three times daily and PRN if soiled   2. return to OR with plastic surgery on 2/22 for lumbar spinal wound I&D and possible application of wound vac  3. Lumbar CT without contrast, obtained on 2/18: soft tissue defect overlying lumbar and lower thoracic spine, soft tissue thickening, scattered soft tissue emphysema; multilevel degenerative changes of lumbar spine  Abx:   Vancomycin dosing: Pt received vancomycin 2/10 - 2/13, supratherapeutic (20.4) and not resumed on admission on 2/14, 1x dose 1.5g IV intra-operatively on 2/15, and resumed on 2/17 with 1500mg q12h dosing   Plan:   1) Vancomcyin 2/10-   2) Zosyn 2/10-   3) Appreciate plastic surgery recs  4) F/u intraoperative cultures     #Chronic pain - lower back  -home pain regimen: scheduled acetaminophen, fentanyl patch 50mcg q72h, dilaudid 4mg PO TID prn breakthrough  -bowel regimen  -follows with pain medicine Dr. Almazan at [facility]   -post-op pain regimen: Tylenol 650mg q6h, fentanyl patch 50mcg q72h, 1mg IV dilaudid q3h for moderate pain, 2mg IV dilaudid q3h for severe  pain    #COPD, 4L at baseline  -SpO2 goal 88-92%  -Started on proventill (takes at home per wife)     #HFpEF   #HTN  -TTE Feb 2022: EF 65-70%, normal LV diastolic function, normal RV  -Hold ASA for possible procedure  -C/w home atorva, metop 12.5 BID, maxime 50 BID, losartan 50 qd, amlodipine 10    #Seizure disorder - continue Keppra  #Anxiety - continue Buspar   #BPH - continue tamsulosin   #Gout - continue allopurinol     DVT ppx: lovenox   Code status: FULL (confirmed on admission)  NOK: Giulia Mckeon [phone]      Attestation:   Note Completion:  I am a:  Resident/Fellow   Attending Attestation I saw and evaluated the patient.  I personally obtained the key and critical portions of the history and physical exam  or was physically present for key and  critical portions performed by the resident/fellow. I reviewed the resident/fellow?s documentation and discussed the patient with the resident/fellow.  I agree with the resident/fellow?s medical decision making as documented in the note.     I personally evaluated the patient on 19-Feb-2023         Electronic Signatures:  Myriam Estrada (Resident))  (Signed 19-Feb-2023 11:59)   Authored: Service, Subjective Data, Objective Data, Assessment  and Plan, Note Completion  Darrian Galvez)  (Signed 20-Feb-2023 14:49)   Authored: Note Completion   Co-Signer: Service, Subjective Data, Objective Data, Assessment and Plan, Note Completion      Last Updated: 20-Feb-2023 14:49 by Darrian Galvez)

## 2023-09-14 NOTE — PROGRESS NOTES
Service: Infectious Disease     Subjective Data:   RAJ HARMON is a 67 year old Male who is Hospital Day # 11 and POD #9 for 1. exploration of lumbar wound dehiscence;2. removal of lumbar spinal hardware (set caps, rods, and screws);3. irrigation  and debridement of lumbar wound.    Objective Data:     Objective Information:      T   P  R  BP   MAP  SpO2   Value  36.2  77  18  126/59   90  97%  Date/Time  5:25  5:25  5:25  5:25   4:43  5:25  Range  (36C - 36.9C )  (69 - 84 )  (18 - 18 )  (117 - 128 )/ (59 - 73 )  (90 - 90 )  (96% - 97% )  Highest temp of 36.9 C was recorded at  9:21      Pain reported at  4:05: 9 = Severe    Physical Exam Narrative:  ·  Physical Exam:    Gen: well-appearing, NAD  Head and neck: NCAT, neck supple without LAD, severe kyphoscoliosis   HEENT: MMM, normal nose without congestion, poor dentition   CV: RRR no mrg  Pulm: CTAB, prolonged exp phase, no wheezing or crackles, normal WOB on 4L  Abd: obese, soft, non-tender, non-distended  Ext: no cyanosis or clubbing, trace LE edema, thenar wasting bilat; 2-3cm diameter stage 1 ulcer on L heel  Neuro: alert and oriented x3, normal tone, face symmetric, moves all extremities spontaneously   Back: Wound vac placed and secured. No blood oozing or purulence.      Medication:    Medications:          Continuous Medications       --------------------------------  No continuous medications are active       Scheduled Medications       --------------------------------    1. Acetaminophen:  650  mg  Oral  Every 6 Hours    2. Allopurinol:  300  mg  Oral  Every 24 Hours    3. amLODIPine (NORVASC):  10  mg  Oral  Daily    4. Ascorbic Acid:  1000  mg  Oral  Daily    5. Atorvastatin:  20  mg  Oral  Daily    6. busPIRone (BUSPAR):  5  mg  Oral  Every 12 Hours    7. Dextrose 50% in Water Injectable:  25  gram(s)  IntraVenous Push  Once    8. Docusate 50 mg - Senna 8.6 m  tablet(s)  Oral  2 Times a Day    9.  Enoxaparin SubCutaneous:  40  mg  SubCutaneous  Every 24 Hours    10. fentaNYL 50 micrograms/ hour TransDermal:  1  patch  TransDermal  Every 72 Hours    11. Hydrocortisone 1% Topical:  1  application(s)  Topical  2 Times a Day    12. Insulin Regular Injectable:  10  unit(s)  IntraVenous Push  Once    13. levETIRAcetam (KEPPRA):  500  mg  Oral  2 Times a Day    14. Metoprolol Tartrate:  12.5  mg  Oral  2 Times a Day    15. Polyethylene Glycol:  17  gram(s)  Oral  Daily    16. Sodium Hypochlorite 0.125% (Dakins Quarter):  1  application(s)  Topical  3 Times a Day    17. Tamsulosin:  0.4  mg  Oral  Daily    18. Vancomycin - RPh to Dose - IV Piggy Back:  1  each  As Specified  Variable    19. Vancomycin 500 mg IVPB/ Premixed Soln 100 mL:  500  mg  IntraVenous Piggyback  Every 12 Hours         PRN Medications       --------------------------------    1. Acetaminophen:  650  mg  Oral  Once    2. Albuterol 90 micrograms/ Inhalation MDI:  2  inhalation  Inhalation  Every 6 Hours    3. diphenhydrAMINE:  25  mg  Oral  Every 24 Hours    4. HYDROmorphone Injectable:  1  mg  IntraVenous Push  Every 3 Hours    5. Melatonin:  3  mg  Oral  At Bedtime    6. Naloxone Injectable:  0.2  mg  IntraVenous Push  Once    7. Sodium Chloride 0.9% Injectable Flush:  10  mL  IntraVenous Flush  Every 8 Hours and as Needed        Recent Lab Results:    Results:        RFP: 2/23/2023 09:02  NA+        Cl-     BUN  /                         138    97 L   25 H  /  --------------------------------  Glucose                ---------------------------  92    K+     HCO3-   Creat \                         5.2    37 H   0.90  \  Calcium : 9.6Anion Gap : 9 L         Albumin : 3.2 L     Phos : 4.0        I have reviewed these laboratory results:    Renal Function Panel  [facility]ing [facility]      Result 24-Feb-2023 10:58:00  24-Feb-2023 08:31:00    Lab Comment: BIC CALLED RB TO ROBBIE HUGHES , 02/24/2023 11:53   K CALLED RB TO SANDEEP SOLER ,  02/24/2023 10:41    Glucose, Serum 120   H   104   H       136    K 6.0   H   6.1   HH    CL 94   L   95   L    Bicarbonate, Serum 40   HH   37   H    Anion Gap, Serum 9   L   10    BUN 26   H   25   H    CREAT 1.03   0.98    GFR Male 78   83    Calcium, Serum 9.8   9.8    Phosphorus, Serum 3.9   3.7    ALB 3.4   3.5        Complete Blood Count  24-Feb-2023 08:32:00      Result Value    White Blood Cell Count  10.2    Nucleated Erythrocyte Count  0.0    Red Blood Cell Count  2.66   L   HGB  8.6   L   HCT  28.5   L   MCV  107   H   MCHC  30.2   L   PLT  334    RDW-CV  12.5      Magnesium, Serum  24-Feb-2023 08:31:00      Result Value    Magnesium, Serum  1.98        Assessment and Plan:   Daily Risk Screen:  ·  Does patient have a central line? yes   ·  Central Line Type PICC   ·  Plan for PICC removal today? no   ·  The patient continues to require a PICC for parenteral medication     Comorbidities:  ·  Comorbidity Other     Code Status:  ·  Code Status Full Code     Assessment:    67 year old Male with CHF, COPD on 4L at home, seizure disorder and a history of multiple lumbar spine surgeries complicated  by MRSA wound infection with a known chronic open lumbar wound status post multiple debridements by plastic surgery, who presented to [place] with worsening low back pain as well as generalized fatigue and weakness, found to have MRSA infection of sacral  wound with exposed hardware, and elevated inflammatory markers. He presented to [facility]  as a transfer for surgical evaluation by spine team. He was started on vancomcyin and zosyn on 2/10 in [place], which were continued on admission.  BCx from 2/10 with NGTD. Nsgy was consulted, and he underwent removal of lumbar spinal hardware and irrigation and debridement of lumbar wound on 2/15. Plastic surgery was consulted and recommended leaving wound open with TID dressing changes and plan  for return to OR on 2/22 for lumbar spinal wound I&D and potential wound vac.  Intraoperative wound cultures had no growth aerobically or anaerobically. Pt has a hx of chronic pain, and pain was managed after surgery with fentanyl patch (per home regimen),  Tylenol (per home regimen), and IV dilaudid 1mg IV q3h for moderate pain and 2mg IV q3h for severe pain. Pain regimen can be down-titrated to home regimen after plastic surgery intervention. Zosyn was discontinued on 2/19 given no evidence of pseudomonas.  Pt continues to be on vancomycin. Patient was having loose bowel movements multiple times daily but was negative for C.diff and stool softeners were stopped. Plastic surgery recommended wound vac and it was placed on 2/22.    Updates 2/24:  -  -hydrocortisone for itching on lower back   -No I&D to be done with plastic surgery tomorrow given wounds look better on imaging now from plastics standpoint, to reassess wound vac on Friday and evaluated for wound closure next week.  -Vanc level  25 yesterday, continue to monitor  -EDILIA resolved   -Vanc to continue for 8 weeks until 4/12  -Closely monitor vancomycin levels as patient had a break in medication and needs to be ensured to continue to receive   -Pain regimen currently  at 2mg q3h as needed  -Outpatient f/u with Dr. Harrison    Cx history:     ·  2/10 OSH bedside wound cx - MRSA (final)      ·  2/15 OR deep wound cs - NGTD (final 2/17)    Abx hx:67 year old Male with CHF, COPD on 4L at home, seizure disorder and a history of multiple lumbar spine surgeries complicated by MRSA wound infection with a known chronic open lumbar  wound status post multiple debridements by plastic surgery, who presented to [place] with worsening low back pain as well as generalized fatigue and weakness, found to have MRSA infection of sacral wound with exposed hardware, and elevated inflammatory  markers. He presented to [facility] as a transfer for surgical evaluation by spine team. He was started on vancomcyin and zosyn on 2/10 in [place], which were continued on  admission. BCx from 2/10 with NGTD. Nsgy was consulted, and he underwent removal of lumbar  spinal hardware and irrigation and debridement of lumbar wound on 2/15. Plastic surgery was consulted and recommended leaving wound open with TID dressing changes and plan for return to OR on 2/22 for lumbar spinal wound I&D and potential wound vac. Intraoperative  wound cultures had no growth aerobically or anaerobically. Pt has a hx of chronic pain, and pain was managed after surgery with fentanyl patch (per home regimen), Tylenol (per home regimen), and IV dilaudid 1mg IV q3h for moderate pain and 2mg IV q3h  for severe pain. Pain regimen can be down-titrated to home regimen after plastic surgery intervention. Zosyn was discontinued on 2/19 given no evidence of pseudomonas. Pt continues to be on vancomycin. Patient was having loose bowel movements multiple  times daily but was negative for C.diff and stool softeners were stopped. Plastic surgery recommended wound vac and it was placed on 2/22, replaced on 2/24.    Updates 2/24:  -Potassium 6.0 (repeat from 6.1), ordered insulin & D5 & Ca gluconate & Lokelma, held Spironolactone  -hydrocortisone for itching on lower back   -Wound vac replaced by plastics today, will reassess for surgery next week   -Vanc level 21.8 yesterday, Vanc was decreased to 500 q12h  -Vanc to continue for 8 weeks until 2/15 - 4/12  -Closely monitor vancomycin levels as patient had a break in medication and needs to be ensured to continue to receive   -Pain regimen  currently at 2mg q3h as needed  -Tremor to be discussed with patient and wife and chart review   -Outpatient f/u with Dr. Harrison    Cx history:     ·  2/10 OSH bedside wound cx - MRSA (final)      ·  2/15 OR deep wound cs - NGTD (final 2/17)    Abx hx:  1) Vancomcyin 2/10-   2) Zosyn 2/10- 2/19 (d/c given no culture evidence of pseudomonas)      #MRSA sacral wound infection  Micro:   -BCx 2/10 x2 NGTD  -[place] Wound cx 2/10/23: MRSA (S:  clinda, vanc; R: TMP-SMX, tetracyclines)  -ESR elevated at >130  -CRP elevated at 5.26  -Intraoperative wound cx 2/15/23: no growth aerobically or anaerobically   - S/p below on 2/15:   1. exploration of lumbar wound dehiscence  2. removal of lumbar spinal hardware (set caps, rods, and screws)  3. irrigation and debridement of lumbar wound  - Plastic surgery recs  2/15:   1. WTD Kerlix dressings with Dakin's/Saline solution packed to the entire depth of the wound three times daily and PRN if soiled   2. return to OR with plastic surgery on 2/22 for lumbar spinal wound I&D and possible application of wound vac  3. Lumbar CT without contrast, obtained on 2/18: soft tissue defect overlying lumbar and lower thoracic spine, soft tissue thickening, scattered soft tissue emphysema; multilevel degenerative changes of lumbar spine  - Plastics decided to place wound vac on 2/22 instead of I&D and to reassess wound vac on Friday and evaluated for wound closure next week.  Abx:   Vancomycin dosing: Pt received vancomycin 2/10 - 2/13, supratherapeutic (20.4) and not resumed on admission on 2/14, 1x dose 1.5g IV intra-operatively on 2/15, and resumed on 2/17 with 1500mg q12h dosing, redosed to 1250mg q12h on 2/19  Plan:   1) Vancomcyin 2/10-   2) Zosyn 2/10- 2/19 (d/c given no culture evidence of pseudomonas)  3) Appreciate plastic surgery recs  4) F/u intraoperative cultures     #Chronic pain - lower back  -home pain regimen: scheduled acetaminophen, fentanyl patch 50mcg q72h, dilaudid 4mg PO TID prn breakthrough  -bowel regimen  -follows with pain medicine Dr. Almazan at [facility]   -post-op pain regimen: Tylenol 650mg q6h, fentanyl patch 50mcg q72h, 1mg IV dilaudid q3h for moderate pain, 2mg IV dilaudid q3h for severe pain    #Hyperkalemia  -6.0 on 2/24/2023  -hx of hyperkalemic episodes on review  -no CKD  -given insulin, d50, calcium gluconate, and lokelma   -will hold maxime   -conitnue to monitor    #COPD, 4L at baseline  -SpO2  goal 88-92%  -Started on proventill (takes at home per wife)     #HFpEF   #HTN  -TTE Feb 2022: EF 65-70%, normal LV diastolic function, normal RV  -Hold ASA for possible procedure  -C/w home atorva, metop 12.5 BID, maxime 50 BID, losartan 50 qd, amlodipine 10  -Hold maxime for hyperkalemia on 2/24    #Seizure disorder - continue Keppra  #Anxiety - continue Buspar   #BPH - continue tamsulosin   #Gout - continue allopurinol     DVT ppx: lovenox   Code status: FULL (confirmed on admission)  NOK: Wife Mike [phone]              1) Vancomcyin 2/10-   2) Zosyn 2/10- 2/19 (d/c given no culture evidence of pseudomonas)      #MRSA sacral wound infection  Micro:   -BCx 2/10 x2 NGTD  -[place] Wound cx 2/10/23: MRSA (S: clinda, vanc; R: TMP-SMX, tetracyclines)  -ESR elevated at >130  -CRP elevated at 5.26  -Intraoperative wound cx 2/15/23: no growth aerobically or anaerobically   - S/p below on 2/15:   1. exploration of lumbar wound dehiscence  2. removal of lumbar spinal hardware (set caps, rods, and screws)  3. irrigation and debridement of lumbar wound  - Plastic surgery recs  2/15:   1. WTD Kerlix dressings with Dakin's/Saline solution packed to the entire depth of the wound three times daily and PRN if soiled   2. return to OR with plastic surgery on 2/22 for lumbar spinal wound I&D and possible application of wound vac  3. Lumbar CT without contrast, obtained on 2/18: soft tissue defect overlying lumbar and lower thoracic spine, soft tissue thickening, scattered soft tissue emphysema; multilevel degenerative changes of lumbar spine  - Plastics decided to place wound vac on 2/22 instead of I&D and to reassess wound vac on Friday and evaluated for wound closure next week.  Abx:   Vancomycin dosing: Pt received vancomycin 2/10 - 2/13, supratherapeutic (20.4) and not resumed on admission on 2/14, 1x dose 1.5g IV intra-operatively on 2/15, and resumed on 2/17 with 1500mg q12h dosing, redosed to 1250mg q12h on 2/19  Plan:    1) Vancomcyin 2/10-   2) Zosyn 2/10- 2/19 (d/c given no culture evidence of pseudomonas)  3) Appreciate plastic surgery recs  4) F/u intraoperative cultures     #Chronic pain - lower back  -home pain regimen: scheduled acetaminophen, fentanyl patch 50mcg q72h, dilaudid 4mg PO TID prn breakthrough  -bowel regimen  -follows with pain medicine Dr. Almazan at [facility]   -post-op pain regimen: Tylenol 650mg q6h, fentanyl patch 50mcg q72h, 1mg IV dilaudid q3h for moderate pain, 2mg IV dilaudid q3h for severe  pain    #COPD, 4L at baseline  -SpO2 goal 88-92%  -Started on proventill (takes at home per wife)     #HFpEF   #HTN  -TTE Feb 2022: EF 65-70%, normal LV diastolic function, normal RV  -Hold ASA for possible procedure  -C/w home atorva, metop 12.5 BID, maxime 50 BID, losartan 50 qd, amlodipine 10    #Seizure disorder - continue Keppra  #Anxiety - continue Buspar   #BPH - continue tamsulosin   #Gout - continue allopurinol     DVT ppx: lovenox   Code status: FULL (confirmed on admission)  NOK: Wife Mike [phone]      Attestation:   Note Completion:  I am a:  Resident/Fellow   Attending Attestation I saw and evaluated the patient.  I personally obtained the key and critical portions of the history and physical exam or was physically present for key and  critical portions performed by the resident/fellow. I reviewed the resident/fellow?s documentation and discussed the patient with the resident/fellow.  I agree with the resident/fellow?s medical decision making as documented in the note.     I personally evaluated the patient on 24-Feb-2023         Electronic Signatures:  Mame Kent)  (Signed 24-Feb-2023 20:17)   Authored: Note Completion   Co-Signer: Service, Subjective Data, Objective Data, Assessment and Plan, Note Completion  Freddie Caraballo (Resident))  (Signed 24-Feb-2023 15:27)   Authored: Service, Subjective Data, Objective Data, Assessment  and Plan, Note Completion      Last Updated: 24-Feb-2023  20:17 by Mame Kent)

## 2023-09-14 NOTE — PROGRESS NOTES
Service: Plastic Surgery     Subjective Data:   RAJ HARMON is a 67 year old Male who is Hospital Day # 18 and POD #3 for 1. Excision and debridement down to and including subcutaneous tissue;2. Partial adjacent tissue transfer;3. ;4. ;5.     Pt POD 3 from debridement of lumbar spinal wound. Has positional pain but overall pain is well controlled. No additional complaints. No issues with his wound vac overnight.     Denies any fever, chills, night sweats, CP, SOB, palpitations, nausea, vomiting, or abdominal pain/discomfort.    Overnight Events: Patient had an uneventful night.     Objective Data:     Objective Information:      T   P  R  BP   MAP  SpO2   Value  36.5  83  18  133/67      96%  Date/Time 3/3 3:45 3/3 3:45 3/3 3:45 3/3 3:45    3/3 3:45  Range  (36.1C - 36.5C )  (56 - 102 )  (18 - 19 )  (122 - 135 )/ (58 - 67 )    (96% - 98% )   As of 02-Mar-2023 14:21:00, patient is on 4 L/min of oxygen via nasal cannula.      Pain reported at 3/3 6:52: 10 = Severe    Physical Exam by System:    Constitutional: Alert and cooperative, NAD.   Eyes: EOMI, clear sclera.   ENMT: MMM.   Head/Neck: NC/AT.   Respiratory/Thorax: Unlabored respirations on 4L  NC, symmetrical chest rise   Gastrointestinal: soft nt/nd   Genitourinary: Voiding per urinal/commode.   Musculoskeletal: Wound vac intact to lumbar spine.   Canister with moderate amount of dark bloody drainage. Appropriately TTP throughout wound   Extremities: ROJO   Neurological: A&O x3.   Psychological: Appropriate behavior and mood.   Skin: Warm and dry, no lesions, no rashes.     Medication:    Medications:      1. Vancomycin - RPh to Dose - IV Piggy Back:  1  each  As Specified  Variable      ALTERNATIVE MEDICINES:    1. Melatonin:  3  mg  Oral  At Bedtime   PRN         ANTI-INFECTIVES:    1. Vancomycin 750 mg/ D5W IVPB Premixed Soln 150 mL:  750  mg  IntraVenous Piggyback  Every 12 Hours      CARDIOVASCULAR AGENTS:    1. Tamsulosin:  0.4  mg  Oral   Daily    2. Metoprolol Tartrate:  12.5  mg  Oral  2 Times a Day    3. amLODIPine (NORVASC):  10  mg  Oral  Daily      CENTRAL NERVOUS SYSTEM AGENTS:    1. Acetaminophen:  650  mg  Oral  Every 6 Hours    2. fentaNYL 50 micrograms/ hour TransDermal:  1  patch  TransDermal  Every 72 Hours    3. HYDROmorphone Injectable:  2  mg  IntraVenous Push  Every 3 Hours   PRN       4. levETIRAcetam (KEPPRA):  500  mg  Oral  2 Times a Day    5. busPIRone (BUSPAR):  5  mg  Oral  Every 12 Hours      COAGULATION MODIFIERS:    1. Enoxaparin SubCutaneous:  40  mg  SubCutaneous  Every 24 Hours    2. Heparin Flush 10 unit/ mL Injectable PRN:  5  mL  IntraVenous Flush  According to Flush Policy   PRN         GASTROINTESTINAL AGENTS:    1. Calcium Carbonate Chewable:  500  mg  Oral  Every 2 Hours   PRN       2. Docusate 50 mg - Senna 8.6 m  tablet(s)  Oral  2 Times a Day    3. Polyethylene Glycol:  17  gram(s)  Oral  Daily      METABOLIC AGENTS:    1. Allopurinol:  300  mg  Oral  Every 24 Hours    2. Atorvastatin:  20  mg  Oral  Daily      MISCELLANEOUS AGENTS:    1. Naloxone Injectable:  0.2  mg  IntraVenous Push  Once   PRN       2. Sodium Zirconium Cyclosilicate:  10  gram(s)  Oral  Daily      NUTRITIONAL PRODUCTS:    1. Sodium Chloride 0.9% Injectable Flush:  10  mL  IntraVenous Flush  Every 8 Hours and as Needed   PRN       2. Ascorbic Acid:  1000  mg  Oral  Daily      RESPIRATORY AGENTS:    1. diphenhydrAMINE:  25  mg  Oral  Every 24 Hours   PRN       2. Albuterol 90 micrograms/ Inhalation MDI:  2  inhalation  Inhalation  Every 6 Hours   PRN         TOPICAL AGENTS:    1. Sodium Hypochlorite 0.125% (Dakins Quarter):  1  application(s)  Topical  3 Times a Day    2. Hydrocortisone 1% Topical:  1  application(s)  Topical  2 Times a Day      Recent Lab Results:    Results:    CBC: 3/3/2023 04:36              \     Hgb     /                              \     7.5 L    /  WBC  ----------------  Plt               6.4        ----------------    323              /     Hct     \                              /     25.7 L    \            RBC: 2.32 L    MCV: 111 H    Neutrophil  %: 67.6      RFP: 3/3/2023 04:36  NA+        Cl-     BUN  /                         135 L   91 L    37 H /  --------------------------------  Glucose                ---------------------------  111 H    K+     HCO3-   Creat \                         4.8    40 H   0.86  \  Calcium : 9.4Anion Gap : 9 L         Albumin : 2.8 L     Phos : 3.1      Assessment and Plan:   Daily Risk Screen:  ·  Does patient have an indwelling urinary catheter? n/a consulting service   ·  Does patient have a central line? n/a consulting service     Comorbidities:  ·  Comorbidity Other     Code Status:  ·  Code Status Full Code     Assessment:    RAJ HARMON is a 67 year old Male known to the plastic surgery team with a h/o multiple lumbar spinal surgeries which have been c/b recurrent MRSA infections  requiring multiple extensive plastic surgery interventions including multiple debridements and attempted closures. Admitted to [facility] as transfer on 2/14 for concern of MRSA infection of chronically open lumbar wound with exposed hardware. Patient now  s/p lumbar spinal wound I&D and removal of spinal hardware per NSGY on 2/15. Plastic Surgery consulted for spinal wound debridement and coverage.     OR course (this admission):   2/15 - Lumbar spinal wound exploration, I&D and removal of spinal hardware (per NSGY)  2/28 - Lumbar wound excision and debridement down to and including subcutaneous tissue, partial adjacent tissue transfer, wound vac application (per plastic surgery)     Plan/Recommendations:   S/p lumbar wound I&D, partial adjacent tissue transfer, wound vac placement   - Plan to return to OR Tuesday 3/7 for additional debridement of lumbar spine wound, potential flap vs wound vac application, patient agreeable  with this plan       · NPO at [facility] 3/7       · Update T&S  and COVID 3/6   - Maintain wound vac to lumbar spine at -125mmHg low continuous suction,       · No interim bedside changes necessary prior to return to OR 3/7        · Nursing to monitor/record wound vac canister output       · Please notify plastic surgery with any concerns regarding wound vac leak or malfunction   - Continue to ensure diligent pressure offloading to lumbar spine/wound site with frequent repositioning, Q2h turns, elevate heels from bed  - Nutrition optimization to promote wound healing, consider addition of daily MV and nutritional supplements/shakes as able   - Culture history       · 2/10 OSH bedside withound cx - MRSA (final)        · 2/15 OR deep withound cs - NGTD (final 2/17)       · Urine cultures obtained intraop 2/28 due to concerns of dark/sedimentary urine to more, cx with no growth. UA positive for large leukocyte esterase  - Continue IV ABX per ID recs (currently IV Vanc)   - Appreciate remaining supportive care per primary service   - Plastics will continue to follow    PATRICIA Ace  Plastic and Reconstructive Surgery  Doc Halo, Pager #[phone], Team phones: [phone]    Time spent on the assessment of patient, gathering and interpreting data, review of medical record/patient history, personally reviewing radiographic imaging and formulation of this note 30 minutes. With greater than 50% spent in personal discussion with  patient.            Electronic Signatures:  Angel Johnson (APRN-CNP)  (Signed 03-Mar-2023 10:08)   Authored: Service, Subjective Data, Objective Data, Assessment  and Plan, Note Completion      Last Updated: 03-Mar-2023 10:08 by Angel Johnson (APRN-CNP)

## 2023-09-14 NOTE — PROGRESS NOTES
Service: Infectious Disease     Subjective Data:   RAJ HARMON is a 67 year old Male who is Hospital Day # 31 and POD #2 for 1. Excision and debridement down to and including spinous process;2. Wound vac exchange (15 x 11 cm);3. ;4. ;5.     Patient denies any complaints today. Wants to see podiatrist on outpatient basis. Reports pain is well controlled. Denies any other complaints.    Objective Data:     Objective Information:      T   P  R  BP   MAP  SpO2   Value  36.4  74  18  132/55   81  97%  Date/Time 3/16 4:48 3/16 4:48 3/16 4:48 3/16 4:48  3/16 4:48 3/16 4:48  Range  (36.4C - 36.9C )  (65 - 83 )  (16 - 18 )  (124 - 139 )/ (50 - 66 )  (77 - 81 )  (97% - 98% )  Highest temp of 36.9 C was recorded at 3/15 5:48      Pain reported at 3/16 9:14: 8 = Severe    Physical Exam Narrative:  ·  Physical Exam:    Gen: well-appearing, NAD  Head and neck: NCAT  HEENT: MMM, poor dentition  CV: RRR no mrg  Pulm: CTAB, decreased airflow, no wheezing or crackles, normal WOB on 4L  Abd: obese, soft, non-tender, non-distended  Ext: WWP, mild peripheral edema  Neuro: alert and oriented x3, no gross FND  Back: Wound vac placed and secured, sanguineous drainage in wound vac      Medication:    Medications:          Continuous Medications       --------------------------------  No continuous medications are active       Scheduled Medications       --------------------------------    1. Acetaminophen:  975  mg  Oral  Every 8 Hours    2. Allopurinol:  300  mg  Oral  Every 24 Hours    3. amLODIPine (NORVASC):  10  mg  Oral  Daily    4. Ascorbic Acid:  1000  mg  Oral  Daily    5. Atorvastatin:  20  mg  Oral  Daily    6. busPIRone (BUSPAR):  5  mg  Oral  Every 12 Hours    7. Docusate 50 mg - Senna 8.6 m  tablet(s)  Oral  2 Times a Day    8. DULoxetine:  30  mg  Oral  At Bedtime    9. Enoxaparin SubCutaneous:  40  mg  SubCutaneous  Every 24 Hours    10. fentaNYL 75 micrograms/ hour TransDermal:  1  patch  TransDermal  Every  72 Hours    11. Hydrocortisone 1% Topical:  1  application(s)  Topical  2 Times a Day    12. levETIRAcetam (KEPPRA):  500  mg  Oral  2 Times a Day    13. Lidocaine 4% TransDermal:  1  patch  TransDermal  Every 24 Hours    14. Metoprolol Tartrate:  12.5  mg  Oral  2 Times a Day    15. Polyethylene Glycol:  17  gram(s)  Oral  Daily    16. Sodium Hypochlorite 0.125% (Dakins Quarter):  1  application(s)  Topical  3 Times a Day    17. Sodium Zirconium Cyclosilicate:  10  gram(s)  Oral  Daily    18. Tamsulosin:  0.8  mg  Oral  Daily    19. Vancomycin - RPh to Dose - IV Piggy Back:  1  each  As Specified  Variable    20. Vancomycin 750 mg/ D5W IVPB Premixed Soln 150 mL:  750  mg  IntraVenous Piggyback  Every 12 Hours         PRN Medications       --------------------------------    1. Albuterol 2.5 mg - Ipratropium 0.5 mg/ 3 mL Neb Soln:  3  mL  Inhalation  Every 6 Hours    2. diphenhydrAMINE:  25  mg  Oral  Every 24 Hours    3. Heparin Flush 10 unit/ mL Injectable PRN:  5  mL  IntraVenous Flush  According to Flush Policy    4. HYDROmorphone:  4  mg  Oral  Every 6 Hours    5. HYDROmorphone Injectable:  0.2  mg  IntraVenous Push  Every 8 Hours    6. Naloxone Injectable:  0.2  mg  IntraVenous Push  Once    7. Sodium Chloride 0.9% Injectable Flush:  10  mL  IntraVenous Flush  Every 8 Hours and as Needed        Recent Lab Results:    Results:    CBC: 3/16/2023 07:05              \     Hgb     /                              \     7.1 L    /  WBC  ----------------  Plt               6.9       ----------------    272              /     Hct     \                              /     25.1 L    \            RBC: 2.24 L    MCV: 112 H    Neutrophil  %: 72.3      RFP: 3/16/2023 07:05  NA+        Cl-     BUN  /                         141    96 L   19  /  --------------------------------  Glucose                ---------------------------  109 H    K+     HCO3-   Creat \                         4.1    43 HH   0.60  \  Calcium :  9.1Anion Gap : 6 L         Albumin : 2.7 L     Phos : 2.6      Assessment and Plan:   Daily Risk Screen:  ·  Does patient have a central line? yes   ·  Central Line Type PICC   ·  Plan for PICC removal today? no   ·  The patient continues to require a PICC for parenteral medication     Code Status:  ·  Code Status Full Code     Assessment:    67 year old Male with CHF, COPD on 4L at home, seizure disorder and a history of multiple lumbar spine surgeries complicated  by MRSA wound infection with a known chronic open lumbar wound status post multiple debridements by plastic surgery, who presented to [place] with worsening low back pain as well as generalized fatigue and weakness, found to have MRSA infection of sacral  wound with exposed hardware, and elevated inflammatory markers (ESR >130,  CRP elevated at 5.26). He presented to [facility] as a transfer for surgical evaluation by spine team. He was started on vancomcyin and zosyn  on 2/10 in [place], which were continued on admission. BCx from 2/10 with NGTD. Nsgy was consulted, and he underwent removal of lumbar spinal hardware and irrigation and debridement of lumbar wound on 2/15. Plastic surgery was consulted and recommended  leaving wound open with TID dressing changes and plan for return to OR on 2/22 for lumbar spinal wound I&D and potential wound vac. Intraoperative wound cultures had no growth aerobically or anaerobically. Pt has a hx of chronic pain, and pain was managed  after surgery with fentanyl patch (per home regimen), Tylenol (per home regimen), and IV dilaudid 1mg IV q3h for moderate pain and 2mg IV q3h for severe pain. Pain regimen can be down-titrated to home regimen after plastic surgery intervention. Zosyn  was discontinued on 2/19 given no evidence of pseudomonas. Pt continues to be on vancomycin. Patient was having loose bowel movements multiple times daily but was negative for C.diff and stool softeners were stopped. Plastic surgery recommended wound   vac and it was placed on 2/22, replaced on 2/24. Patient had hyperkalemic episode on 2/34 of potassium 6.0 (repeat from 6.1) with EKG showing  peaked T-waves, ordered insulin & D5 & Ca gluconate & Lokelma 10 mg TID, held Spironolactone . Hyperkalemia resolved with these interventions. Patient had another wound vac replaced on 2/27. Went to OR with plastic surgery on 2/28 for debridement. Patient went for another debridement on 3/7 with plastic surgery; noted to have improved since previous  procedure but still required continued debridement with next procedure planned for 3/14.    Updates 3/16:  -OR yesterday --> no more debridements, wound vac changes MWF going forward  -C/w Vancomycin  -ID appointment set for april 25th, also needs ortho spine, pcp, and podiatry follow up  -Pending SNF placement  -RT eval ordered -- Hypercapnia and more acidodic yesterday; refusing bipap at night    Cx history:     ·  2/10 OSH bedside wound cx - MRSA (final)      ·  2/15 OR deep wound cs - NGTD (final 2/17)    Abx hx:  1) Vancomcyin 2/10-   2) Zosyn 2/10- 2/19 (d/c given no culture evidence of pseudomonas)      #MRSA sacral wound infection  Micro:   -BCx 2/10 x2 NGTD  -[place] Wound cx 2/10/23: MRSA (S: clinda, vanc; R: TMP-SMX, tetracyclines)  -Intraoperative wound cx 2/15/23: no growth aerobically or anaerobically   - S/p below on 2/15:   1. exploration of lumbar wound dehiscence  2. removal of lumbar spinal hardware (set caps, rods, and screws)  3. irrigation and debridement of lumbar wound  - Plastic surgery recs  2/15:   1. WTD Kerlix dressings with Dakin's/Saline solution packed to the entire depth of the wound three times daily and PRN if soiled   -Lumbar CT without contrast, obtained on 2/18: soft tissue defect overlying lumbar and lower thoracic spine, soft tissue thickening, scattered soft tissue emphysema; multilevel degenerative changes of lumbar spine  - Plastics decided to place wound vac on 2/22 instead of I&D,  reassessed and replaced wound vac on 2/24 and on 2/27. Went to OR on 2/28 for lumbar wound excision and debridement and wound vac placement.  plan to go back to OR on 3/7.  -Patient underwent debridement on 3/7, additional debridement on 3/14 going forward  Abx:   Plan:   1) Vancomcyin 2/10 - 4/12 ( need to extend due to debridement on 3/14)  2) Zosyn 2/10- 2/19 (d/c given no culture evidence of pseudomonas)  3) No need for further debridement patient will be discharged to SNF with MWF wound vac changes  4) Follow up with Infectious disease on april 25th, 2023    #Chronic pain - lower back  -home pain regimen: scheduled acetaminophen, fentanyl patch 50mcg q72h, dilaudid 4mg PO TID prn breakthrough  -bowel regimen  -follows with pain medicine Dr. Al[facility]zan 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 Recommend follow up outpatient. Currently on Lokelma.   -6.0 on 2/24/2023 with EKG of peaked T-waves, 5.8 on 2/23 resolved peaked T-waves  -hx of hyperkalemic episodes  on review  -no CKD  -gave insulin, d50, calcium gluconate, and lokelma TID  for 48 hrs  -held maxime  -conitnue to monitor  -continue lokelma    #COPD, 4L at baseline  -SpO2 goal 88-92%  -Started on proventill (takes at home per wife)     #HFpEF   #HTN  -TTE Feb 2022: EF 65-70%, normal LV diastolic function, normal RV  -Hold ASA for possible procedure  -C/w home atorva, metop 12.5 BID, maxime 50 BID, losartan 50 qd, amlodipine 10  -Held maxime for hyperkalemia on 2/24    #Seizure disorder - continue Keppra  #Anxiety - continue Buspar   #BPH - continue tamsulosin   #Gout - continue allopurinol     #CHILANGO  -Not compliant with CPAP at home  -Elevated bicarb inpatient, likely  chronic    DVT ppx: lovenox   Code status: FULL (confirmed on admission)  NOK: Wife Mike [phone]      Attestation:   Note Completion:  I am a:  Resident/Fellow   Attending Attestation I saw and evaluated the patient.  I personally obtained the key and critical portions of the history and physical exam or was physically present for key and  critical portions performed by the resident/fellow. I reviewed the resident/fellow?s documentation and discussed the patient with the resident/fellow.  I agree with the resident/fellow?s medical decision making as documented in the note.     I personally evaluated the patient on 16-Mar-2023         Electronic Signatures:  Cande Guillermo (Resident))  (Signed 16-Mar-2023 11:14)   Authored: Service, Subjective Data, Objective Data, Assessment  and Plan, Note Completion  Darrian Galvez)  (Signed 16-Mar-2023 14:43)   Authored: Note Completion   Co-Signer: Service, Subjective Data, Objective Data, Assessment and Plan, Note Completion      Last Updated: 16-Mar-2023 14:43 by Darrian Galvez)

## 2023-09-14 NOTE — PROGRESS NOTES
Service: Infectious Disease     Subjective Data:   RAJ HARMON is a 67 year old Male who is Hospital Day # 23 and POD #1 for Debridement washout, vac placement.     Patient denies any complaints overnight. Reports sleeping well overnight. Denies any new complaints this morning. Patient underwent debridement yesterday and endorses pain at wound site.    Objective Data:     Objective Information:      T   P  R  BP   MAP  SpO2   Value  36.2  56  19  164/58   78  96%  Date/Time 3/8 0:15 3/8 0:15 3/8 0:15 3/8 0:15  3/7 13:00 3/8 0:15  Range  (36.2C - 36.6C )  (56 - 107 )  (18 - 19 )  (119 - 164 )/ (53 - 65 )  (78 - 83 )  (94% - 98% )   As of 07-Mar-2023 19:50:00, patient is on 4 L/min of oxygen via nasal cannula.      Pain reported at 3/7 23:40: 8 = Severe    Physical Exam Narrative:  ·  Physical Exam:    Gen: well-appearing, NAD  Head and neck: NCAT  HEENT: MMM, poor dentition  CV: RRR no mrg  Pulm: CTAB, decreased airflow, no wheezing or crackles, normal WOB on 4L  Abd: obese, soft, non-tender, non-distended  Ext: WWP, mild peripheral edema  Neuro: alert and oriented x3, no gross FND  Back: Wound vac placed and secured, sanguineous drainage in wound vac      Medication:    Medications:          Continuous Medications       --------------------------------  No continuous medications are active       Scheduled Medications       --------------------------------    1. Acetaminophen:  650  mg  Oral  Every 6 Hours    2. Allopurinol:  300  mg  Oral  Every 24 Hours    3. amLODIPine (NORVASC):  10  mg  Oral  Daily    4. Ascorbic Acid:  1000  mg  Oral  Daily    5. Atorvastatin:  20  mg  Oral  Daily    6. busPIRone (BUSPAR):  5  mg  Oral  Every 12 Hours    7. Docusate 50 mg - Senna 8.6 m  tablet(s)  Oral  2 Times a Day    8. fentaNYL 50 micrograms/ hour TransDermal:  1  patch  TransDermal  Every 72 Hours    9. Hydrocortisone 1% Topical:  1  application(s)  Topical  2 Times a Day    10. levETIRAcetam (KEPPRA):   500  mg  Oral  2 Times a Day    11. Metoprolol Tartrate:  12.5  mg  Oral  2 Times a Day    12. Polyethylene Glycol:  17  gram(s)  Oral  Daily    13. Sodium Hypochlorite 0.125% (Dakins Quarter):  1  application(s)  Topical  3 Times a Day    14. Sodium Zirconium Cyclosilicate:  10  gram(s)  Oral  Daily    15. Tamsulosin:  0.4  mg  Oral  Daily    16. Vancomycin - RPh to Dose - IV Piggy Back:  1  each  As Specified  Variable    17. Vancomycin 750 mg/ D5W IVPB Premixed Soln 150 mL:  750  mg  IntraVenous Piggyback  Every 12 Hours         PRN Medications       --------------------------------    1. Albuterol 2.5 mg - Ipratropium 0.5 mg/ 3 mL Neb Soln:  3  mL  Inhalation  Every 6 Hours    2. diphenhydrAMINE:  25  mg  Oral  Every 24 Hours    3. Heparin Flush 10 unit/ mL Injectable PRN:  5  mL  IntraVenous Flush  According to Flush Policy    4. HYDROmorphone Injectable:  2  mg  IntraVenous Push  Every 3 Hours    5. Naloxone Injectable:  0.2  mg  IntraVenous Push  Once    6. Sodium Chloride 0.9% Injectable Flush:  10  mL  IntraVenous Flush  Every 8 Hours and as Needed        Recent Lab Results:    Results:    CBC: 3/8/2023 06:02              \     Hgb     /                              \     7.5 L    /  WBC  ----------------  Plt               6.4       ----------------    315              /     Hct     \                              /     25.7 L    \            RBC: 2.31 L    MCV: 111 H    Neutrophil  %: 70.0      RFP: 3/8/2023 06:02  NA+        Cl-     BUN  /                         141    96 L   25 H  /  --------------------------------  Glucose                ---------------------------  103 H    K+     HCO3-   Creat \                         4.6    41 HH   0.69  \  Calcium : 9.2Anion Gap : 9 L         Albumin : 2.7 L     Phos : 4.1      Assessment and Plan:   Daily Risk Screen:  ·  Does patient have a central line? yes   ·  Central Line Type PICC   ·  Plan for PICC removal today? no    ·  The patient continues to  require a PICC for parenteral medication     Code Status:  ·  Code Status Full Code     Assessment:    67 year old Male with CHF, COPD on 4L at home, seizure disorder and a history of multiple lumbar spine surgeries complicated  by MRSA wound infection with a known chronic open lumbar wound status post multiple debridements by plastic surgery, who presented to [place] with worsening low back pain as well as generalized fatigue and weakness, found to have MRSA infection of sacral  wound with exposed hardware, and elevated inflammatory markers (ESR >130,  CRP elevated at 5.26). He presented to [facility] as a transfer for surgical evaluation by spine team. He was started on vancomcyin and zosyn  on 2/10 in [place], which were continued on admission. BCx from 2/10 with NGTD. Nsgy was consulted, and he underwent removal of lumbar spinal hardware and irrigation and debridement of lumbar wound on 2/15. Plastic surgery was consulted and recommended  leaving wound open with TID dressing changes and plan for return to OR on 2/22 for lumbar spinal wound I&D and potential wound vac. Intraoperative wound cultures had no growth aerobically or anaerobically. Pt has a hx of chronic pain, and pain was managed  after surgery with fentanyl patch (per home regimen), Tylenol (per home regimen), and IV dilaudid 1mg IV q3h for moderate pain and 2mg IV q3h for severe pain. Pain regimen can be down-titrated to home regimen after plastic surgery intervention. Zosyn  was discontinued on 2/19 given no evidence of pseudomonas. Pt continues to be on vancomycin. Patient was having loose bowel movements multiple times daily but was negative for C.diff and stool softeners were stopped. Plastic surgery recommended wound  vac and it was placed on 2/22, replaced on 2/24. Patient had hyperkalemic episode on 2/34 of potassium 6.0 (repeat from 6.1) with EKG showing  peaked T-waves, ordered insulin & D5 & Ca gluconate & Lokelma 10 mg TID, held Spironolactone .  Hyperkalemia resolved with these interventions. Patient had another wound vac replaced on 2/27. Went to OR with plastic surgery on 2/28 for debridement. Patient went for another debridement on 3/7 with plastic surgery; noted to have improved since previous  procedure but still required continued debridement with next procedure planned for 3/14.    Updates 3/8:  -Patient underwent wound debridement on 3/7  -Plan for additional debridment on 3/14  -Will need to likely extend vancomycin regimen    Cx history:     ·  2/10 OSH bedside wound cx - MRSA (final)      ·  2/15 OR deep wound cs - NGTD (final 2/17)    Abx hx:  1) Vancomcyin 2/10-   2) Zosyn 2/10- 2/19 (d/c given no culture evidence of pseudomonas)      #MRSA sacral wound infection  Micro:   -BCx 2/10 x2 NGTD  -[place] Wound cx 2/10/23: MRSA (S: clinda, vanc; R: TMP-SMX, tetracyclines)  -Intraoperative wound cx 2/15/23: no growth aerobically or anaerobically   - S/p below on 2/15:   1. exploration of lumbar wound dehiscence  2. removal of lumbar spinal hardware (set caps, rods, and screws)  3. irrigation and debridement of lumbar wound  - Plastic surgery recs  2/15:   1. WTD Kerlix dressings with Dakin's/Saline solution packed to the entire depth of the wound three times daily and PRN if soiled   -Lumbar CT without contrast, obtained on 2/18: soft tissue defect overlying lumbar and lower thoracic spine, soft tissue thickening, scattered soft tissue emphysema; multilevel degenerative changes of lumbar spine  - Plastics decided to place wound vac on 2/22 instead of I&D, reassessed and replaced wound vac on 2/24 and on 2/27. Went to OR on 2/28 for lumbar wound excision and debridement and wound vac placement.  plan to go back to OR on 3/7.  -Patient underwent debridement on 3/7, will need additional debridement on 3/14 going forward  Abx:   Plan:   1) Vancomcyin 2/10 - 4/12 ( need to extend due to debridement on 3/7)  2) Zosyn 2/10- 2/19 (d/c given no culture evidence  of pseudomonas)    #Chronic pain - lower back  -home pain regimen: scheduled acetaminophen, fentanyl patch 50mcg q72h, dilaudid 4mg PO TID prn breakthrough  -bowel regimen  -follows with pain medicine Dr. 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 Recommend follow up outpatient. Currently on Lokelma.   -6.0 on 2/24/2023 with EKG of peaked T-waves, 5.8 on 2/23 resolved peaked T-waves  -hx of hyperkalemic episodes  on review  -no CKD  -gave insulin, d50, calcium gluconate, and lokelma TID  for 48 hrs  -held maxime  -conitnue to monitor  -continue lokelma    #COPD, 4L at baseline  -SpO2 goal 88-92%  -Started on proventill (takes at home per wife)     #HFpEF   #HTN  -TTE Feb 2022: EF 65-70%, normal LV diastolic function, normal RV  -Hold ASA for possible procedure  -C/w home atorva, metop 12.5 BID, maxime 50 BID, losartan 50 qd, amlodipine 10  -Held maxime for hyperkalemia on 2/24    #Seizure disorder - continue Keppra  #Anxiety - continue Buspar   #BPH - continue tamsulosin   #Gout - continue allopurinol     #CHILANGO  -Not compliant with CPAP at home  -Elevated bicarb inpatient, likely chronic    DVT ppx: lovenox   Code status: FULL (confirmed on admission)  NOK: Wife Mike [phone]      Attestation:   Note Completion:  I am a:  Resident/Fellow   Attending Attestation I saw and evaluated the patient.  I personally obtained the key and critical portions of the history and physical exam or was physically present for key and  critical portions performed by the resident/fellow. I reviewed the resident/fellow?s documentation and discussed the patient with the resident/fellow.  I agree with the resident/fellow?s medical decision making as  documented in the note.     I personally evaluated the patient on 08-Mar-2023         Electronic Signatures:  Cande Guillermo (Resident))  (Signed 08-Mar-2023 10:34)   Authored: Service, Subjective Data, Objective Data, Assessment  and Plan, Note Completion  Mame Kent)  (Signed 09-Mar-2023 08:08)   Authored: Assessment and Plan, Note Completion   Co-Signer: Service, Subjective Data, Objective Data, Assessment and Plan, Note Completion      Last Updated: 09-Mar-2023 08:08 by Mame Kent)

## 2023-09-14 NOTE — PROGRESS NOTES
Service: Infectious Disease     Subjective Data:   RAJ HARMON is a 67 year old Male who is Hospital Day # 4 and POD #2 for 1. exploration of lumbar wound dehiscence;2. removal of lumbar spinal hardware (set caps, rods, and screws);3. irrigation  and debridement of lumbar wound.    Additional Information:    This AM, pt endorsed soft, mushy stools 3x/yesterday. Endorsed some pain prior to receiving his next dose of narcotics.     Objective Data:     Objective Information:      T   P  R  BP   MAP  SpO2   Value  36.4  79  20  129/56      96%  Date/Time 2/17 14:03 2/17 14:03 2/17 14:03 2/17 14:03    2/17 14:03  Range  (36.4C - 36.8C )  (75 - 97 )  (18 - 20 )  (109 - 129 )/ (56 - 72 )    (96% - 99% )   As of 17-Feb-2023 09:35:00, patient is on 4 L/min of oxygen via nasal cannula.      Pain reported at 2/17 5:34: 9 = Severe    Physical Exam Narrative:  ·  Physical Exam:    Gen: well-appearing, NAD  Head and neck: NCAT, neck supple without LAD, severe kyphoscoliosis   HEENT: MMM, normal nose without congestion, poor dentition   CV: RRR no mrg  Pulm: CTAB, prolonged exp phase, no wheezing or crackles, normal WOB on 4L  Abd: obese, soft, non-tender, non-distended  Ext: no cyanosis or clubbing, trace LE edema, thenar wasting bilat; 2-3cm diameter stage 1 ulcer on L heel  Neuro: alert and oriented x3, normal tone, face symmetric, moves all extremities spontaneously     Recent Lab Results:    Results:    CBC: 2/17/2023 08:03              \     Hgb     /                              \     8.3 L    /  WBC  ----------------  Plt               7.9       ----------------    332              /     Hct     \                              /     28.6 L    \            RBC: 2.56 L    MCV: 112 H    Neutrophil  %: 67.3      RFP: 2/17/2023 08:03  NA+        Cl-     BUN  /                         141    103    18  /  --------------------------------  Glucose                ---------------------------  97    K+     HCO3-    Creat \                         4.9    35 H   1.05  \  Calcium : 8.9Anion Gap : 8 L         Albumin : 2.8 L     Phos : 3.3      Assessment and Plan:   Daily Risk Screen:  ·  Does patient have a central line? yes   ·  Central Line Type PICC   ·  Plan for PICC removal today? no   ·  The patient continues to require a PICC for parenteral medication     Comorbidities:  ·  Comorbidity Other     Code Status:  ·  Code Status Full Code     Assessment:    67 year old Male with CHF, COPD on 4L at home, seizure disorder and a history of multiple lumbar spine surgeries complicated  by MRSA wound infection with a known chronic open lumbar wound status post multiple debridements by plastic surgery, who presented to [place] with worsening low back pain as well as generalized fatigue and weakness, found to have MRSA infection of sacral  wound with exposed hardware. He presents to [facility] as a transfer for surgical evaluation by spine team. High suspicion for osteomyelitis in this host with ESR > 130; OM has not been ruled out. Patient unable to tolerate MRI d/t kyphoscoliosis. Will continue  vanc for MRSA, and zosyn because of wound contamination with diarrhea.     Updates 2/17  - Pt received vancomycin 2/10 - 2/13, supratherapeutic (20.4) and not resumed on admission, 1x dose 1.5g IV intra-operatively on 2/15, and resumed on 2/17   - C/w zosyn today, will dc based on intra-operative wound cx  - Plastic surgery requesting CT L spine for operative planning, however pt endorses claustrophobia and incr. back pain with CT scans, requesting medications prior to CT scan vs. anesthesia  - Micro: deep wound cx with no growth aerobically or anaerobically   - DVT ppx resumed today     #MRSA sacral wound infection  -BCx 2/10 x2 NGTD  -[place] Wound cx 2/10/23: MRSA (S: clinda, vanc; R: TMP-SMX, tetracyclines)  -ESR elevated at >130  -CRP elevated at 5.26  - S/p below on 2/15:   1. exploration of lumbar wound dehiscence  2. removal of lumbar  spinal hardware (set caps, rods, and screws)  3. irrigation and debridement of lumbar wound  - Plastic surgery recs  2/15:   WTD Kerlix dressings with Dakin's/Saline solution packed to the entire depth of the wound three times daily and PRN if soiled   return to OR with plastic surgery on 2/22 for lumbar spinal wound I&D and possible application of wound vac  Lumbar CT without contrast  Abx:   Vancomycin dosing: Pt received vancomycin 2/10 - 2/13, supratherapeutic (20.4) and not resumed on admission on 2/14, 1x dose 1.5g IV intra-operatively on 2/15, and resumed on 2/17   Plan:   1) Vancomcyin 2/10-   2) Zosyn 2/10-; will plan to DC zosyn based on intra-operative wound cultures   3) Appreciate plastic surgery recs    #Chronic pain - lower back  -home pain regimen: scheduled acetaminophen, fentanyl patch 50mcg q72h, dilaudid 4mg PO TID prn breakthrough  -bowel regimen  -follows with pain medicine Dr. Almazan at [facility]   -post-op pain regimen: Tylenol 650mg q6h, fentanyl patch 50mcg q72h, 1mg IV dilaudid q3h for moderate pain, 2mg IV dilaudid q3h for severe  pain    #COPD, 4L at baseline  -SpO2 goal 88-92%  -Started on proventill (takes at home per wife)     #HFpEF   #HTN  -TTE Feb 2022: EF 65-70%, normal LV diastolic function, normal RV  -Hold ASA for possible procedure  -C/w home atorva, metop 12.5 BID, maxime 50 BID, losartan 50 qd, amlodipine 10    #Seizure disorder - continue Keppra  #Anxiety - continue Buspar   #BPH - continue tamsulosin   #Gout - continue allopurinol     DVT ppx: lovenox   Code status: FULL (confirmed on admission)  NOK: Wife Mike [phone]      Attestation:   Note Completion:  I am a:  Resident/Fellow   Attending Attestation I saw and evaluated the patient.  I personally obtained the key and critical portions of the history and physical exam or was physically present for key and  critical portions performed by the resident/fellow. I reviewed the resident/fellow?s documentation and discussed  the patient with the resident/fellow.  I agree with the resident/fellow?s medical decision making as documented in the note.     I personally evaluated the patient on 17-Feb-2023         Electronic Signatures:  Christa Funk (Resident))  (Signed 17-Feb-2023 19:20)   Authored: Service, Subjective Data, Objective Data, Assessment  and Plan, Note Completion  Darrian Galvez)  (Signed 18-Feb-2023 08:22)   Authored: Note Completion   Co-Signer: Service, Subjective Data, Objective Data, Assessment and Plan, Note Completion      Last Updated: 18-Feb-2023 08:22 by Darrian Galvez)

## 2023-09-14 NOTE — PROGRESS NOTES
Service: Plastic Surgery     Subjective Data:   RAJ HARMON is a 67 year old Male who is Hospital Day # 13 and POD #11 for 1. exploration of lumbar wound dehiscence;2. removal of lumbar spinal hardware (set caps, rods, and screws);3. irrigation  and debridement of lumbar wound.     NAD. Notes chronic numbness to hands and feet and states he has been feeling more cold lately. Denies fever, chest pain, SOB, abd pain, n/v/d.    Objective Data:     Objective Information:      T   P  R  BP   MAP  SpO2   Value  36.4  87  18  133/67      97%  Date/Time 2/26 13:46 2/26 13:46 2/26 13:46 2/26 13:46    2/26 13:46  Range  (36.1C - 37C )  (78 - 107 )  (18 - 28 )  (126 - 136 )/ (56 - 68 )    (95% - 98% )   As of 25-Feb-2023 19:50:00, patient is on 5 L/min of oxygen via nasal cannula.  Highest temp of 37 C was recorded at 2/25 18:38      Pain reported at 2/26 5:12: 8 = Severe    Physical Exam by System:    Constitutional: Alert and cooperative, NAD   Eyes: EOMI, clear sclera   ENMT: MMM   Head/Neck: NC/AT   Respiratory/Thorax: Unlabored respirations on 4L  NC   Genitourinary: Voiding per urinal/commode   Musculoskeletal: Wound vac intact to lumbar spine  without leak or obstruction. +tenderness surrounding vac site   Neurological: A&O x3   Psychological: Appropriate behavior and mood   Skin: Warm and dry, no lesions, no rashes     Recent Lab Results:    Results:    CBC: 2/26/2023 04:57              \     Hgb     /                              \     8.0 L    /  WBC  ----------------  Plt               8.8       ----------------    301              /     Hct     \                              /     27.4 L    \            RBC: 2.48 L    MCV: 110 H    Neutrophil  %: 69.1      RFP: 2/26/2023 04:57  NA+        Cl-     BUN  /                         137    94 L   29 H  /  --------------------------------  Glucose                ---------------------------  94    K+     HCO3-   Creat \                         5.0    39  H   0.90  \  Calcium : 10.0Anion Gap : 9 L         Albumin : 3.3 L     Phos : 3.0      Assessment and Plan:   Daily Risk Screen:  ·  Does patient have an indwelling urinary catheter? n/a consulting service   ·  Does patient have a central line? n/a consulting service     Comorbidities:  ·  Comorbidity Other     Code Status:  ·  Code Status Full Code     Assessment:    RAJ HARMON is a 67 year old Male known to the plastic surgery team with a h/o multiple lumbar spinal surgeries which have been c/b recurrent MRSA infections  requiring multiple extensive plastic surgery interventions including multiple debridements and attempted closures. Admitted to [facility] as transfer on 2/14 for concern of MRSA infection of chronically open lumbar wound with exposed hardware. Patient now  s/p lumbar spinal wound I&D and removal of spinal hardware per NSGY on 2/15. Plastic Surgery consulted for spinal wound debridement and coverage.     OR course (this admission):   2/15 - Lumbar spinal wound exploration, I&D and removal of spinal hardware (per NSGY)    Plan/Recommendations:   - Plan to return to OR Tuesday 2/28 for debridement of lumbar spine wound, potential flap vs. wound vac application   -> NPO @mn, ensure covid and T&S are updated   - Ensure diligent pressure offloading to lumbar spine/wound site with frequent repositioning, q2h turns   - Nutrition optimization to promote wound healing, consider addition of daily MV and nutritional supplements/shakes as able   - Cx history:     ·  2/10 OSH bedside wound cx - MRSA (final)      ·  2/15 OR deep wound cs - NGTD (final 2/17)  - Continue IV ABX per ID recs (currently IV Vanc)   - Appreciate remaining supportive care per primary service   - Plastics will continue to follow    Patient and plan discussed with NIGEL KayC  Plastic and Reconstructive Surgery  Doc Halo, Pager [phone], Team phones: [phone]    Time spent on the assessment of patient,  gathering and interpreting data, review of medical record/patient history, personally reviewing radiographic imaging and formulation of this note 30 minutes. With greater than 50% spent in personal discussion with  patient.          Electronic Signatures:  Aisha Shannon (PAC)  (Signed 26-Feb-2023 14:21)   Authored: Service, Subjective Data, Objective Data, Assessment  and Plan, Note Completion      Last Updated: 26-Feb-2023 14:21 by Aisha Shannon (PAC)

## 2023-09-14 NOTE — PROGRESS NOTES
Service: Infectious Disease     Subjective Data:   RAJ HARMON is a 67 year old Male who is Hospital Day # 2 and POD #0 for 1. exploration of lumbar wound dehiscence;2. removal of lumbar spinal hardware (set caps, rods, and screws);3. irrigation  and debridement of lumbar wound.    Additional Information:    This AM, pt c/o headache. Recently received dilaudid. No other concerns.     Objective Data:     Objective Information:      T   P  R  BP   MAP  SpO2   Value  35.6  79  18  136/61      98%  Date/Time 2/15 5:37 2/15 8:50 2/15 8:50 2/15 8:50    2/15 5:37  Range  (35.6C - 36.9C )  (73 - 101 )  (16 - 18 )  (118 - 136 )/ (61 - 74 )    (98% - 99% )   As of 15-Feb-2023 08:41:00, patient is on 4 L/min of oxygen via nasal cannula.  Highest temp of 36.9 C was recorded at 2/14 12:51      Pain reported at 2/15 15:44: talking on phone with wife.    Physical Exam Narrative:  ·  Physical Exam:    Gen: well-appearing, NAD  Head and neck: NCAT, neck supple without LAD, severe kyphoscoliosis   HEENT: MMM, normal nose without congestion, poor dentition   CV: RRR no mrg  Pulm: CTAB, prolonged exp phase, no wheezing or crackles, normal WOB on 4L  Abd: obese, soft, non-tender, non-distended  Back: 10-15cm stage IV ulcer in the lumbosacral region, midline, exposed hardware, minimal surrounding erythema (see images)  Ext: no cyanosis or clubbing, trace LE edema, thenar wasting bilat; 2-3cm diameter stage 1 ulcer on L heel  Neuro: alert and oriented x3, normal tone, face symmetric, moves all extremities spontaneously     Recent Lab Results:    Results:    CBC: 2/15/2023 06:31              \     Hgb     /                              \     8.7 L    /  WBC  ----------------  Plt               7.5       ----------------    396              /     Hct     \                              /     29.5 L    \            RBC: 2.70 L    MCV: 109 H    Neutrophil  %: 64.0      RFP: 2/15/2023 06:31  NA+        Cl-     BUN  /                          140    101    19  /  --------------------------------  Glucose                ---------------------------  94    K+     HCO3-   Creat \                         4.6    32    0.92  \  Calcium : 9.5Anion Gap : 12          Albumin : 2.7 L    Phos : 3.7      Coagulation: 2/15/2023 06:31  PT  /                    11.6  /  -------<    INR          ----------<      1.0  PTT\                              \                       ---------- Recent Arterial Blood Gas Results----------     2/15/2023 13:40  pO2 202  pH 7.41  pCO2 49  SO2 100  Base Excess 5.7null    Assessment and Plan:   Daily Risk Screen:  ·  Does patient have a central line? yes   ·  Central Line Type PICC   ·  Plan for PICC removal today? no   ·  The patient continues to require a PICC for parenteral medication     Comorbidities:  ·  Comorbidity Other     Code Status:  ·  Code Status Full Code     Assessment:    67 year old Male with CHF, COPD on 4L at home, seizure disorder and a history of multiple lumbar spine surgeries complicated  by MRSA wound infection with a known chronic open lumbar wound status post multiple debridements by plastic surgery, who presented to [place] with worsening low back pain as well as generalized fatigue and weakness, found to have MRSA infection of sacral  wound with exposed hardware. He presents to [facility] as a transfer for surgical evaluation by spine team. High suspicion for osteomyelitis in this host with ESR > 130; OM has not been ruled out. Patient unable to tolerate MRI d/t kyphoscoliosis. Will continue  vanc for MRSA, and zosyn because of wound contamination with diarrhea.     Updates 2/15  - Pt is s/p exploration of lumbar wound dehiscence, removal of lumbar spinal hardware, irrigation and debridement of lumbar wound with NSGY and plastic surgery  - Will f/u plastic surgery recs and nsgy recs, per nsgy post-operative note, current recs are wet-to-dry dakins dressings with eventual plan for washout + wound vac  placement     #MRSA sacral wound infection  -BCx 2/10 x2 NGTD  -[place] Wound cx 2/10/23: MRSA (S: clinda, vanc; R: TMP-SMX, tetracyclines)  -ESR elevated at >130  -CRP elevated at 5.26  - S/p below on 2/15:   1. exploration of lumbar wound dehiscence  2. removal of lumbar spinal hardware (set caps, rods, and screws)  3. irrigation and debridement of lumbar wound  Plan:   1) Vancomcyin 2/10-   2) Zosyn 2/10-  3) F/u plastic surgery recs  4) F/u nsgy recs     #COPD, 4L at baseline  -SpO2 goal 88-92%  -Started on proventill (takes at home per wife)     #HFpEF   #HTN  -TTE Feb 2022: EF 65-70%, normal LV diastolic function, normal RV  -Hold ASA for possible procedure  -C/w home atorva, metop 12.5 BID, maxime 50 BID, losartan 50 qd, amlodipine 10    #Chronic pain - lower back  -home pain regimen: scheduled acetaminophen, fentanyl patch 50mcg q72h, dilaudid 4mg PO TID prn breakthrough  -will follow above pain regimen, but will increase to dilaudid 4mg PO q4h as needed breakthrough   -bowel regimen  -follows with pain medicine Dr[facility] Almazan [facility]     #Seizure disorder - continue Keppra  #Anxiety - continue Buspar   #BPH - continue tamsulosin   #Gout - continue allopurinol     DVT ppx: SQL (will hold at midnight in case of procedure tomorrow)   Code status: FULL (confirmed on admission)  NOK: Wife Mike [phone]      Attestation:   Note Completion:  I am a:  Resident/Fellow   Attending Attestation I saw and evaluated the patient.  I personally obtained the key and critical portions of the history and physical exam or was physically present for key and  critical portions performed by the resident/fellow. I reviewed the resident/fellow?s documentation and discussed the patient with the resident/fellow.  I agree with the resident/fellow?s medical decision making as documented in the note.     I personally evaluated the patient on 15-Feb-2023         Electronic Signatures:  Ankur Harrison)  (Signed 15-Feb-2023  16:59)   Authored: Note Completion   Co-Signer: Service, Subjective Data, Objective Data, Assessment and Plan, Note Completion  Christa Funk (Resident))  (Signed 15-Feb-2023 16:56)   Authored: Service, Subjective Data, Objective Data, Assessment  and Plan, Note Completion      Last Updated: 15-Feb-2023 16:59 by Ankur Harrison)

## 2023-09-14 NOTE — PROGRESS NOTES
Service: Plastic Surgery     Subjective Data:   RAJ HARMON is a 67 year old Male who is Hospital Day # 14 and POD #12 for 1. exploration of lumbar wound dehiscence;2. removal of lumbar spinal hardware (set caps, rods, and screws);3. irrigation  and debridement of lumbar wound.     No acute concerns. Notes some abdominal cramping he attributes to having to have a BM. Wound vac changed at bedside 2/2 leak. OR tomorrow as an add on for flap vs. wound vac application. Denies fever,  chest pain, SOB, abd pain, n/v/d.    Objective Data:     Objective Information:      T   P  R  BP   MAP  SpO2   Value  36.4  62  19  175/78      98%  Date/Time 2/27 5:00 2/27 5:00 2/27 5:00 2/27 5:00    2/27 5:00  Range  (36.4C - 36.8C )  (62 - 87 )  (18 - 20 )  (133 - 175 )/ (60 - 78 )    (96% - 98% )   As of 27-Feb-2023 08:50:00, patient is on 4 L/min of oxygen via nasal cannula.      Pain reported at 2/27 8:50: 9 = Severe    Physical Exam by System:    Constitutional: Alert and cooperative, NAD   Eyes: EOMI, clear sclera   ENMT: MMM   Head/Neck: NC/AT   Respiratory/Thorax: Unlabored respirations on 4L  NC   Genitourinary: Voiding per urinal/commode   Musculoskeletal: Wound vac intact to lumbar spine  without leak or obstruction. +tenderness surrounding wound. Skin tear noted to distal edge and pinpoint bleeding 2/2 to dry/fragile skin.   Neurological: A&O x3   Psychological: Appropriate behavior and mood   Skin: Warm and dry, no lesions, no rashes     Recent Lab Results:    Results:    CBC: 2/27/2023 04:43              \     Hgb     /                              \     12.1 L    /  WBC  ----------------  Plt               5.8       ----------------    204              /     Hct     \                              /     40.0 L    \            RBC: 3.75 L    MCV: 107 H    Neutrophil  %: 70.1      RFP: 2/27/2023 09:52  NA+        Cl-     BUN  /                         Canceled    Canceled    Canceled   /  --------------------------------  Glucose                ---------------------------  Canceled    K+     HCO3-   Creat \                         Canceled    Canceled    Canceled  \  Calcium : CanceledAnion Gap : Canceled          Albumin : Canceled     Phos : Canceled The performance characteristics of phosphorus testing in   heparinized plasma have been validated by the individual     laboratory site where testing is performed. Testing    on heparinized plasma is not approved by the FDA;    however, suc      Assessment and Plan:   Daily Risk Screen:  ·  Does patient have an indwelling urinary catheter? n/a consulting service   ·  Does patient have a central line? n/a consulting service     Comorbidities:  ·  Comorbidity Other     Code Status:  ·  Code Status Full Code     Assessment:    RAJ HARMON is a 67 year old Male known to the plastic surgery team with a h/o multiple lumbar spinal surgeries which have been c/b recurrent MRSA infections  requiring multiple extensive plastic surgery interventions including multiple debridements and attempted closures. Admitted to [facility] as transfer on 2/14 for concern of MRSA infection of chronically open lumbar wound with exposed hardware. Patient now  s/p lumbar spinal wound I&D and removal of spinal hardware per NSGY on 2/15. Plastic Surgery consulted for spinal wound debridement and coverage.     OR course (this admission):   2/15 - Lumbar spinal wound exploration, I&D and removal of spinal hardware (per NSGY)    Plan/Recommendations:   - Wound vac changed at bedside 2/27, cavilon skin barrier film applied 2/2 skin tear at the distal aspect of wound  - Plan to return to OR Tuesday 2/28 for debridement of lumbar spine wound, potential flap vs. wound vac application (add on)   -> NPO @mn, covid and T&S pending   - Ensure diligent pressure offloading to lumbar spine/wound site with frequent repositioning, q2h turns   - Nutrition optimization to promote wound  healing, consider addition of daily MV and nutritional supplements/shakes as able   - Cx history:     ·  2/10 OSH bedside wound cx - MRSA (final)      ·  2/15 OR deep wound cs - NGTD (final 2/17)  - Continue IV ABX per ID recs (currently IV Vanc)   - Appreciate remaining supportive care per primary service   - Plastics will continue to follow    Patient and plan discussed with Dr. Yeni Shannon, PA-C  Plastic and Reconstructive Surgery  Doc Halo, Pager [phone], Team phones: [phone]    Time spent on the assessment of patient, gathering and interpreting data, review of medical record/patient history, personally reviewing radiographic imaging and formulation of this note 30 minutes. With greater than 50% spent in personal discussion with  patient.          Electronic Signatures:  Aisha Shannon (PAC)  (Signed 27-Feb-2023 12:10)   Authored: Service, Subjective Data, Objective Data, Assessment  and Plan, Note Completion      Last Updated: 27-Feb-2023 12:10 by Aisha Shannon (PAC)

## 2023-09-14 NOTE — PROGRESS NOTES
Service: Plastic Surgery     Subjective Data:   RAJ HARMON is a 67 year old Male who is Hospital Day # 3 and POD #1 for 1. exploration of lumbar wound dehiscence;2. removal of lumbar spinal hardware (set caps, rods, and screws);3. irrigation  and debridement of lumbar wound.     Sitting up resting in bed this AM. States pain is under control. Denies any acute concerns at this time.    Denies any fever, chills, night sweats, CP, SOB, palpitations, nausea, vomiting, or abdominal pain/discomfort.    Additional Information:    OR yesterday with neurosurgery for debridement and removal of infected hardware    Objective Data:     Objective Information:      T   P  R  BP   MAP  SpO2   Value  36.7  97  18  127/70      99%  Date/Time 2/16 9:35 2/16 9:35 2/16 9:35 2/16 9:35    2/16 9:35  Range  (35.6C - 36.7C )  (74 - 99 )  (18 - 18 )  (110 - 136 )/ (61 - 70 )    (96% - 99% )   As of 15-Feb-2023 21:15:00, patient is on 4 L/min of oxygen via nasal cannula.      Pain reported at 2/16 10:07: 10 = Severe    Physical Exam by System:    Constitutional: Alert and cooperative, well appearing,  NAD   Eyes: EOMI, PERRL   ENMT: MMM, poor dentition, no ulceration/lesions   Head/Neck: NC/AT   Respiratory/Thorax: Unlabored respirations on 4L  NC   Gastrointestinal: Soft, large panus, nt/nd   Genitourinary: Voiding per urinal   Neurological: A&O x3   Psychological: Appropriate behavior and mood   Skin: Stage 1 ulcer on L heal. Large 10-15 cm stage  IV decubitus ulcer in the lumbosacral region, midline. Wound bed with fibrinous slough/pink. Surrounding tissue intact, some erythema     Medication:    Medications:      ALTERNATIVE MEDICINES:    1. Melatonin:  3  mg  Oral  At Bedtime   PRN         ANTI-INFECTIVES:    1. Piperacillin - Tazobactam 4.5 gram/Iso-osmotic 100 mL Premix IVPB:  100  mL  IntraVenous Piggyback  Every 6 Hours      CARDIOVASCULAR AGENTS:    1. Spironolactone:  50  mg  Oral  2 Times a Day    2. Tamsulosin:  0.4   mg  Oral  Daily    3. Metoprolol Tartrate:  12.5  mg  Oral  2 Times a Day    4. amLODIPine (NORVASC):  10  mg  Oral  Daily      CENTRAL NERVOUS SYSTEM AGENTS:    1. Acetaminophen:  650  mg  Oral  Once   PRN       2. Acetaminophen:  650  mg  Oral  Every 6 Hours    3. fentaNYL 50 micrograms/ hour TransDermal:  1  patch  TransDermal  Every 72 Hours    4. HYDROmorphone Injectable:  1  mg  IntraVenous Push  Every 3 Hours   PRN       5. HYDROmorphone Injectable:  2  mg  IntraVenous Push  Every 3 Hours   PRN       6. levETIRAcetam (KEPPRA):  500  mg  Oral  2 Times a Day    7. busPIRone (BUSPAR):  5  mg  Oral  Every 12 Hours      GASTROINTESTINAL AGENTS:    1. Docusate 50 mg - Senna 8.6 m  tablet(s)  Oral  2 Times a Day    2. Polyethylene Glycol:  17  gram(s)  Oral  Daily      METABOLIC AGENTS:    1. Allopurinol:  300  mg  Oral  Every 24 Hours    2. Atorvastatin:  20  mg  Oral  Daily      MISCELLANEOUS AGENTS:    1. Naloxone Injectable:  0.2  mg  IntraVenous Push  Once   PRN         NUTRITIONAL PRODUCTS:    1. Sodium Chloride 0.9% Injectable Flush:  10  mL  IntraVenous Flush  Every 8 Hours and as Needed   PRN       2. Ascorbic Acid:  1000  mg  Oral  Daily      RESPIRATORY AGENTS:    1. Albuterol 90 micrograms/ Inhalation MDI:  2  inhalation  Inhalation  Every 6 Hours   PRN         TOPICAL AGENTS:    1. Sodium Hypochlorite 0.125% (Dakins Quarter):  1  application(s)  Topical  3 Times a Day      Recent Lab Results:    Results:    CBC: 2023 06:18              \     Hgb     /                              \     8.2 L    /  WBC  ----------------  Plt               9.2       ----------------    354              /     Hct     \                              /     27.9 L    \            RBC: 2.54 L    MCV: 110 H    Neutrophil  %: 70.3      RFP: 2023 06:18  NA+        Cl-     BUN  /                         140    102    20  /  --------------------------------  Glucose                ---------------------------  100  H    K+     HCO3-   Creat \                         5.1    35 H   1.11  \  Calcium : 9.2Anion Gap : 8 L         Albumin : 2.6 L     Phos : 3.9        ---------- Recent Arterial Blood Gas Results----------     2/15/2023 13:40  pO2 202  pH 7.41  pCO2 49  SO2 100  Base Excess 5.7null    Assessment and Plan:   Daily Risk Screen:  ·  Does patient have an indwelling urinary catheter? n/a consulting service   ·  Does patient have a central line? n/a consulting service     Comorbidities:  ·  Comorbidity Other     Code Status:  ·  Code Status Full Code     Assessment:    Inpatient surgical course:  2/15/23: [facility] neurosurgery: S/P exploration of lumbar wound dehiscence, removal of lumbar spinal hardware, I&D of lumbar wound    Plan:   - WTD kerlix dressings with quarter strength Dankins solution packed to the entire depth of the wound TID and PRN.  - Plan for OR 2/22 with plastic surgery for lumbar spinal wound I&D and possible vac.      · Please ensure COVID and T&S are up to date     · NPO at [facility] on 2/22  - Will continue to follow in the interim    Discussed with FIDEL Jones-CNP  Plastic and Reconstructive Surgery  Doc Halo, Pager #[phone], Team phones: [phone]      Time spent on the assessment of patient, gathering and interpreting data, review of medical record/patient history, personally reviewing radiographic imaging and formulation of this note 45 minutes. With greater than 50% spent in personal discussion with  patient.        Electronic Signatures:  Angel Johnson (FIDEL-CNP)  (Signed 16-Feb-2023 11:04)   Authored: Service, Subjective Data, Objective Data, Assessment  and Plan, Note Completion      Last Updated: 16-Feb-2023 11:04 by Angel Johnson (APRN-CNP)

## 2023-09-14 NOTE — H&P
"    History of Present Illness:   HPI:    HPI:  67 year old Male with CHF, COPD on 4L at home, seizure disorder and a history of multiple lumbar spine surgeries complicated by MRSA wound infection with a known chronic open lumbar wound status post multiple debridements by plastic surgery, who presented  to [place] with worsening low back pain as well as generalized fatigue and weakness, found to have MRSA infection of sacral wound with exposed hardware. He presents to [facility] as a transfer for surgical evaluation by spine team.     He states over the past few weeks he has had worsening of his lower back pain. His pain regimen is no longer able to keep up with the pain, and it finally became too much so he presented to the ED. He also has been feeling \"out of it\" and more fatigued  during this time. He has always had wounds in that area, but states it got much worse when he was in the SNF after his Nov admission at [facility]. He was not able to get into see a spine surgeon until March. No fevers, chills, weight loss, n/v. Has been having  diarrhea since being hospitalized at [place].      [place] Course:  He was admitted on 2/9. He was afebrile, HDS, and on his home 4L. Initial labs showed no leukocytosis, stable hgb at 9 (baseline). Initial K was 8.4, but it normalized with cocktails and kayexalate.  EKG showed NSR with no changes per report. CXR with RLL consolidation and mild pulmonary vascular congestion. UA >182 WBC, no nitrites, no UCx. He was stared on CTX/azthro for CAP and UTI. On 2/10, his ESR was >130 and CRP 20, and wound culture on 2/10  grew MRSA (S: clinda, vanc; R: TMP-SMX, tetracyclines), for which he was broadened to vanc/zosyn on 2/10. BCx 2/10 x2 NGTD. CT L spine showed new wound overlying thoracolumbar spine with protruding hardware, and OM could not be excluded. PICC was placed  on 2/13 and placement confirmed by ECG. Throughout the course, he had no fevers, hypotension or hypoxia beyond his baseline. His " pain was initially treated with dilaudid, but switched to fentanyl patch and tramadol.     OSH studies:   -Wound culture on 2/10 MRSA (S: clinda, vanc; R: TMP-SMX, tetracyclines),  -CT L spine:    1. Newly seen large high-grade wound overlying the thoracolumbar spine  with protruding hardware.  2. Wound extends near or to the margin of posterior elements including  increased heterotopic ossifications since remote CT scan.   Subtle osteomyelitis not reliably excluded  3 Unchanged pelvic calcification which is inseparable from the  posteroinferior bladder wall.      Surgical/Wound History (from chart review):  - He underwent multiple lumbar spine surgeries by Dr. Troy at  [facility] beginning around ~2000, culminating in a L2-L4 decompression and fusion. Per report, these were complicated by multiple MRSA infections.  - Dr. Troy placed an intrathecal pain pump ~2013. The pain pump was then replaced and managed by Dr. Almazan At [facility] beginning  in 2020. It has since been removed.   - He developed multiple decubitus ulcers beginning in December 2021, and underwent 2 debridements of multiple ulcers in February 2022 at [facility].  - Presented to [facility] in March 2022 with a nonhealing lumbar wound with exposure of the spine instrumentation and intrathecal catheter. He was taken to surgery at that time for removal of the entire pump system by Dr. Shafer and multiple debridements of  the wound by Dr. Jaime. Dr. Jaime then closed the wound with rotational flaps on March 29, 2022. Unfortunately, due to the prominence of the spine instrumentation at L2 and L3, as well as the patient's body habitus and sedentary lifestyle, the flap  repair did not heal and the patient was taken back to surgery by Dr. Jaime on April 27, 2022 for excisional debridement, at which time it was noted that the spine instrumentation was exposed. Dr. Jaime performed another excisional debridement on May  3, 2022, then a complex wound  "closure on May 7, 2022. This also did not heal, and Dr. Jaime performed further excisional debridements on May 17th, , and . During one of these debridements,  his intrathecal pain pump was removed.   - Plastics FUV with Dr. Jaime in 23: pt had increasing pain, and concern that hardware may be loose or fractured     Microbiology History:  -[place] Wound cx 2/10/23: MRSA (S: clinda, vanc; R: TMP-SMX, tetracyclines)  -Wound culture \"Deep lower wound\" 3/19/22: MRSA (S: vanc; I: clinda; R: TMP-SMX, tetracycline)  -Intraop wound cx at [facility] 2/3/22: E faecalis (mod growth, pan sens), Cornebacterium (heavy growth), Providencia (few, R: ceftaz, Cipro, Bactrim, levofloxacin; I: unasyn), Acinetobacter (one colony; R: barry, Cipro, piperacillin, tetracycline, tobra, Bactrim,  levofloxacin; S: gent, ceftaz), MRSA (S: clinda, vanc; R: TMP-SMX, tetracyclines)      Medical History:  PMH: above  Medications: reviewed from pharmacy med rec  done with wife who has med list and takes care of patient, see below  OARRS:  -dilaudid 4mg tab, #90, 30d supply   -Fentanyl patch 50mcg/hr, #10, 30d supply   -chronic opioid use for years, followed by pain medicine Dr. Almazan at [facility]  Allergies: NKDA  PSH: above    FamHx:   -Mother:  of COPD   -Siblings: lung disease and heart disease    SocHx:  Home: lives at home with wife  ADLs: feeds self, wife has to cook and clean and take care of his medical needs, walks with rolator and even has trouble then  Education/work: retired   Substance use:  -Alcohol: remote use  -Tobacco: denies  -Recreational drugs: remote use THC    ROS: 10-point ROS negative unless otherwise mentioned in HPI      Comorbidities:   Comorbidites:  ·  Comorbid Conditions chronic obstructive pulmonary disease, congestive heart failure   ·  Type of Congestive Heart Failure diastolic   ·  CHF Additional Specificity with hypertension   ·  COPD with oxygen dependence   ·  Chronic Respiratory " Failure yes   ·  Oxygen Delivery at Home nasal cannula   ·  Oxygen Frequency continuous            Intolerances:  ·  codeine : GI Upset    Medications Prior to Admission:     Osteo Bi-Flex 250 mg-200 mg oral tablet: 1 tab(s) orally 2 times a day  fentaNYL 50 mcg/hr transdermal film, extended release: 1 patch transdermal every 72 hours  aspirin 81 mg oral delayed release tablet: 1 tab(s) orally once a day  tamsulosin 0.4 mg oral capsule: 1 cap(s) orally once a day  levETIRAcetam 500 mg oral tablet: 1 tab(s) orally 2 times a day  busPIRone 5 mg oral tablet: 1 tab(s) orally every 12 hours  lactulose 10 g/15 mL oral syrup: 30 milliliter(s) orally once a day, As Needed  ascorbic acid 1000 mg oral tablet: 1 tab(s) orally once a day  atorvastatin 20 mg oral tablet: 1 tab(s) orally once a day  docusate sodium 100 mg oral capsule: 1 cap(s) orally once a day (at bedtime)  metoprolol tartrate 25 mg oral tablet: 0.5 tab(s) orally 2 times a day  amLODIPine 10 mg oral tablet: 1 tab(s) orally once a day  allopurinol 300 mg oral tablet: 1 tab(s) orally every 24 hours  losartan 50 mg oral tablet: 1 tab(s) orally once a day  spironolactone 50 mg oral tablet: 1 tab(s) orally 2 times a day  Dilaudid 4 mg oral tablet: 1 tab(s) orally 3 times a day, As Needed.    Objective:     Objective Information:        T   P  R  BP   MAP  SpO2   Value  36.3  101  17  120/74      98%  Date/Time 2/14 3:42 2/14 3:42 2/14 3:42 2/14 3:42    2/14 3:42  Range  (36.3C - 36.3C )  (101 - 101 )  (17 - 17 )  (120 - 120 )/ (74 - 74 )    (98% - 98% )    Physical Exam Narrative:  ·  Physical Exam:    Gen: well-appearing, NAD  Head and neck: NCAT, neck supple without LAD, severe kyphoscoliosis   HEENT: MMM, normal nose without congestion, poor dentition   CV: RRR no mrg  Pulm: CTAB, prolonged exp phase, no wheezing or crackles, normal WOB on 4L  Abd: obese, soft, non-tender, non-distended  Back: 10-15cm stage IV ulcer in the lumbosacral region, midline, exposed  hardware, minimal surrounding erythema (see images)  Ext: no cyanosis or clubbing, trace LE edema, thenar wasting bilat; 2-3cm diameter stage 1 ulcer on L heel  Neuro: alert and oriented x3, normal tone, face symmetric, moves all extremities spontaneously     Medications:    Medications:          Continuous Medications       --------------------------------  No continuous medications are active       Scheduled Medications       --------------------------------    1. Allopurinol:  300  mg  Oral  Every 24 Hours    2. amLODIPine (NORVASC):  10  mg  Oral  Daily    3. Ascorbic Acid:  1000  mg  Oral  Daily    4. Atorvastatin:  20  mg  Oral  Daily    5. busPIRone (BUSPAR):  5  mg  Oral  Every 12 Hours    6. Docusate 50 mg - Senna 8.6 m  tablet(s)  Oral  2 Times a Day    7. fentaNYL 50 micrograms/ hour TransDermal:  1  patch  TransDermal  Every 72 Hours    8. Heparin SubCutaneous:  5000  unit(s)  SubCutaneous  Every 8 Hours    9. levETIRAcetam (KEPPRA):  500  mg  Oral  2 Times a Day    10. Losartan:  50  mg  Oral  Daily    11. Metoprolol Tartrate:  12.5  mg  Oral  2 Times a Day    12. Multivitamin with Minerals:  1  tablet(s)  Oral  Daily    13. Polyethylene Glycol:  17  gram(s)  Oral  Daily    14. Spironolactone:  50  mg  Oral  2 Times a Day    15. Tamsulosin:  0.4  mg  Oral  Daily         PRN Medications       --------------------------------    1. HYDROmorphone:  4  mg  Oral  Every 8 Hours    2. Melatonin:  3  mg  Oral  At Bedtime    3. Naloxone Injectable:  0.2  mg  IntraVenous Push  Once    4. Sodium Chloride 0.9% Injectable Flush:  10  mL  IntraVenous Flush  Every 8 Hours and as Needed        Recent Lab Results:    Results:        I have reviewed these laboratory results:    Basic Metabolic Panel  Trending View      Result 2023 09:32:00  2023 11:10:00  2023 05:20:00    Glucose, Serum 125   H   104   H   96       137   138    K 4.4   4.8   5.7   H       105   108   H     Bicarbonate, Serum 32   29   25    Anion Gap, Serum 7   L   8   L   11    BUN 16   19   25   H    CREAT 0.80   0.89   1.11    GFR Male >90   >90   72    Calcium, Serum 9.4   9.4   9.6        Vancomycin Level, Trough  13-Feb-2023 01:25:00      Result Value    Lab Comment:  Called- RB to MARQUITA ANNE , 02/13/2023 02:04    Vancomycin Level, Trough  20.4   HH     Vancomycin Level, Random  11-Feb-2023 12:53:00      Result Value    Vancomycin Level, Random  18.5      Complete Blood Count  11-Feb-2023 05:20:00      Result Value    White Blood Cell Count  8.8    Nucleated Erythrocyte Count  0.0    Red Blood Cell Count  2.79   L   HGB  9.0   L   HCT  30.6   L   MCV  110   H   MCHC  29.4   L   PLT  386    RDW-CV  13.2      Sedimentation Rate, Erythrocyte  11-Feb-2023 05:20:00      Result Value    Sedimentation Rate, Erythrocyte  >130   H       Radiology Results:    Results:        Impression:    Newly seen large high-grade wound overlying the thoracolumbar spine  with protruding hardware.     Wound extends near or to the margin of posterior elements including  increased heterotopic ossifications since remote CT scan. Subtle  osteomyelitis not reliably excluded due to the complexity of  underlying abnormality although no well-delineated evident discrete  bony destructive tract identified. Further evaluation with nuclear  medicine imaging, and or MRI could also be considered.     Unchanged pelvic calcification which is inseparable from the  posteroinferior bladder wall. The calcification may be within the  wall within the lumen or along the margin of the wall. If there is  clinical concern, bladder ultrasound correlation could be considered.     Additional similar pre-existing findings as reported.     CT L Spine without Contrast [Feb 10 2023  6:27PM]      Assessment and Plan:   Assessment:    67 year old Male with CHF, COPD on 4L at home, seizure disorder and a history of multiple lumbar spine surgeries complicated  by MRSA  wound infection with a known chronic open lumbar wound status post multiple debridements by plastic surgery, who presented to [place] with worsening low back pain as well as generalized fatigue and weakness, found to have MRSA infection of sacral  wound with exposed hardware. He presents to [facility] as a transfer for surgical evaluation by spine team. High suspicion for osteomyelitis in this host with ESR > 130; OM has not been ruled out. Patient unable to tolerate MRI d/t kyphoscoliosis. Will continue  vanc for MRSA, and zosyn because of wound contamination with diarrhea.     #MRSA sacral wound infection  -BCx 2/10 x2 NGTD  -[place] Wound cx 2/10/23: MRSA (S: clinda, vanc; R: TMP-SMX, tetracyclines)  -c/w vanc/zosyn   -with culture history and continued diarrhea from kayexalate, concern for multiple organisms and anaerobes    -consider MRI L spine for OM   [ ] spine c/s in AM    #COPD, 4L at baseline  -SpO2 goal 88-92%  [ ] no inhalers on med rec from wife, but will need to fu because was on symbicort  and albuterol in the past    #HFpEF   #HTN  -TTE Feb 2022: EF 65-70%, normal LV diastolic function, normal RV  -Hold ASA for possible procedure  -c/w home atorva, metop 12.5 BID, maxime 50 BID, losartan 50 qd, amlodipine 10    #Chronic pain - lower back  -c/w home pain regimen: scheduled acetaminophen, fentanyl patch 50mcg q72h, dilaudid 4mg PO TID prn breakthrough  -bowel regimen  -follows with pain medicine Dr. Almazan at [facility]     #Seizure disorder - continue keppra  #Anxiety - continue Buspar   #BPH - continue tamsulosin   #Gout - continue allopurinol     DVT ppx: SQL  Code status: FULL (confirmed on admission)  NOK: Wife Mike [phone]      Daljit Rashid MD  Med-Peds PGY-2  [place]   [place] Team [id number]    Attestation:   Note Completion:  I am a:  Resident/Fellow   Attending Attestation I saw and evaluated the patient.  I personally obtained the key and critical portions of the history and physical exam  or was physically present for key and  critical portions performed by the resident/fellow. I reviewed the resident/fellow?s documentation and discussed the patient with the resident/fellow.  I agree with the resident/fellow?s medical decision making as documented in the note.     I personally evaluated the patient on 14-Feb-2023         Electronic Signatures:  Ankur Harrison)  (Signed 14-Feb-2023 06:21)   Authored: Note Completion   Co-Signer: History of Present Illness, Comorbidities, Allergies, Medications Prior to Admission, Objective, Assessment and Plan, Note Completion  Daljit Rashid (Resident))  (Signed 14-Feb-2023 05:10)   Authored: History of Present Illness, Comorbidities,  Allergies, Medications Prior to Admission, Objective, Assessment and Plan, Note Completion      Last Updated: 14-Feb-2023 06:21 by Ankur Harrison)

## 2023-09-14 NOTE — PROGRESS NOTES
Service: Infectious Disease     Subjective Data:   RAJ HARMON is a 67 year old Male who is Hospital Day # 26 and POD #4 for Debridement washout, vac placement.     No acute events overnight. Wound vac changed yesterday by plastic surgery. Endorsing some back pain this AM. Also endorsing LUTS, endorses increased frequency over the past several weeks. Wakes up multiple  times at night to urinate with frequent interruptions in stream. Denies dysuria, or hematuria.    Objective Data:     Objective Information:      T   P  R  BP   MAP  SpO2   Value  36.5  62  20  132/57   82  98%  Date/Time 3/11 5:32 3/11 5:32 3/11 5:32 3/11 5:32  3/11 5:32 3/11 5:32  Range  (36.4C - 36.5C )  (62 - 86 )  (18 - 27 )  (132 - 156 )/ (57 - 76 )  (82 - 103 )  (97% - 98% )   As of 11-Mar-2023 09:25:00, patient is on 4 L/min of oxygen via nasal cannula.      Pain reported at 3/11 4:20: 9 = Severe    Physical Exam Narrative:  ·  Physical Exam:    Gen: well-appearing, NAD  Head and neck: NCAT  HEENT: MMM, poor dentition  CV: RRR no mrg  Pulm: CTAB, decreased airflow, no wheezing or crackles, normal WOB on 4L  Abd: obese, soft, non-tender, non-distended  Ext: WWP, mild peripheral edema  Neuro: alert and oriented x3, no gross FND  Back: Wound vac placed and secured, sanguineous drainage in wound vac      Medication:    Medications:          Continuous Medications       --------------------------------  No continuous medications are active       Scheduled Medications       --------------------------------    1. Acetaminophen:  650  mg  Oral  Every 6 Hours    2. Allopurinol:  300  mg  Oral  Every 24 Hours    3. amLODIPine (NORVASC):  10  mg  Oral  Daily    4. Ascorbic Acid:  1000  mg  Oral  Daily    5. Atorvastatin:  20  mg  Oral  Daily    6. busPIRone (BUSPAR):  5  mg  Oral  Every 12 Hours    7. Docusate 50 mg - Senna 8.6 m  tablet(s)  Oral  2 Times a Day    8. Enoxaparin SubCutaneous:  40  mg  SubCutaneous  Every 24 Hours    9.  fentaNYL 50 micrograms/ hour TransDermal:  1  patch  TransDermal  Every 72 Hours    10. Hydrocortisone 1% Topical:  1  application(s)  Topical  2 Times a Day    11. levETIRAcetam (KEPPRA):  500  mg  Oral  2 Times a Day    12. Metoprolol Tartrate:  12.5  mg  Oral  2 Times a Day    13. Polyethylene Glycol:  17  gram(s)  Oral  Daily    14. Sodium Hypochlorite 0.125% (Dakins Quarter):  1  application(s)  Topical  3 Times a Day    15. Sodium Zirconium Cyclosilicate:  10  gram(s)  Oral  Daily    16. Tamsulosin:  0.8  mg  Oral  Daily    17. Vancomycin - RPh to Dose - IV Piggy Back:  1  each  As Specified  Variable    18. Vancomycin 750 mg/ D5W IVPB Premixed Soln 150 mL:  750  mg  IntraVenous Piggyback  Every 12 Hours         PRN Medications       --------------------------------    1. Albuterol 2.5 mg - Ipratropium 0.5 mg/ 3 mL Neb Soln:  3  mL  Inhalation  Every 6 Hours    2. diphenhydrAMINE:  25  mg  Oral  Every 24 Hours    3. Heparin Flush 10 unit/ mL Injectable PRN:  5  mL  IntraVenous Flush  According to Flush Policy    4. HYDROmorphone Injectable:  2  mg  IntraVenous Push  Every 3 Hours    5. Naloxone Injectable:  0.2  mg  IntraVenous Push  Once    6. Sodium Chloride 0.9% Injectable Flush:  10  mL  IntraVenous Flush  Every 8 Hours and as Needed        Recent Lab Results:    Results:    CBC: 3/11/2023 06:12              \     Hgb     /                              \     7.2 L    /  WBC  ----------------  Plt               6.5       ----------------    320              /     Hct     \                              /     23.8 L    \            RBC: 2.16 L    MCV: 110 H    Neutrophil  %: 68.2      RFP: 3/11/2023 06:12  NA+        Cl-     BUN  /                         142    95 L   19  /  --------------------------------  Glucose                ---------------------------  86    K+     HCO3-   Creat \                         4.1    44 HH   0.53  \  Calcium : 9.3Anion Gap : 7 L         Albumin : 2.6 L     Phos :  2.9      Assessment and Plan:   Daily Risk Screen:  ·  Does patient have a central line? yes   ·  Central Line Type PICC   ·  Plan for PICC removal today? no   ·  The patient continues to require a PICC for parenteral medication     Code Status:  ·  Code Status Full Code     Assessment:    67 year old Male with CHF, COPD on 4L at home, seizure disorder and a history of multiple lumbar spine surgeries complicated  by MRSA wound infection with a known chronic open lumbar wound status post multiple debridements by plastic surgery, who presented to [place] with worsening low back pain as well as generalized fatigue and weakness, found to have MRSA infection of sacral  wound with exposed hardware, and elevated inflammatory markers (ESR >130,  CRP elevated at 5.26). He presented to [facility] as a transfer for surgical evaluation by spine team. He was started on vancomcyin and zosyn  on 2/10 in [place], which were continued on admission. BCx from 2/10 with NGTD. Nsgy was consulted, and he underwent removal of lumbar spinal hardware and irrigation and debridement of lumbar wound on 2/15. Plastic surgery was consulted and recommended  leaving wound open with TID dressing changes and plan for return to OR on 2/22 for lumbar spinal wound I&D and potential wound vac. Intraoperative wound cultures had no growth aerobically or anaerobically. Pt has a hx of chronic pain, and pain was managed  after surgery with fentanyl patch (per home regimen), Tylenol (per home regimen), and IV dilaudid 1mg IV q3h for moderate pain and 2mg IV q3h for severe pain. Pain regimen can be down-titrated to home regimen after plastic surgery intervention. Zosyn  was discontinued on 2/19 given no evidence of pseudomonas. Pt continues to be on vancomycin. Patient was having loose bowel movements multiple times daily but was negative for C.diff and stool softeners were stopped. Plastic surgery recommended wound  vac and it was placed on 2/22, replaced on 2/24.  Patient had hyperkalemic episode on 2/34 of potassium 6.0 (repeat from 6.1) with EKG showing  peaked T-waves, ordered insulin & D5 & Ca gluconate & Lokelma 10 mg TID, held Spironolactone . Hyperkalemia resolved with these interventions. Patient had another wound vac replaced on 2/27. Went to OR with plastic surgery on 2/28 for debridement. Patient went for another debridement on 3/7 with plastic surgery; noted to have improved since previous  procedure but still required continued debridement with next procedure planned for 3/14.    Updates 3/11:  -Plan for OR on 3/14  -C/w vancomycin  -Labs twice a week  -increase flomax dose to 0.8 mg for LUTS     Cx history:     ·  2/10 OSH bedside wound cx - MRSA (final)      ·  2/15 OR deep wound cs - NGTD (final 2/17)    Abx hx:  1) Vancomcyin 2/10-   2) Zosyn 2/10- 2/19 (d/c given no culture evidence of pseudomonas)      #MRSA sacral wound infection  Micro:   -BCx 2/10 x2 NGTD  -[place] Wound cx 2/10/23: MRSA (S: clinda, vanc; R: TMP-SMX, tetracyclines)  -Intraoperative wound cx 2/15/23: no growth aerobically or anaerobically   - S/p below on 2/15:   1. exploration of lumbar wound dehiscence  2. removal of lumbar spinal hardware (set caps, rods, and screws)  3. irrigation and debridement of lumbar wound  - Plastic surgery recs  2/15:   1. WTD Kerlix dressings with Dakin's/Saline solution packed to the entire depth of the wound three times daily and PRN if soiled   -Lumbar CT without contrast, obtained on 2/18: soft tissue defect overlying lumbar and lower thoracic spine, soft tissue thickening, scattered soft tissue emphysema; multilevel degenerative changes of lumbar spine  - Plastics decided to place wound vac on 2/22 instead of I&D, reassessed and replaced wound vac on 2/24 and on 2/27. Went to OR on 2/28 for lumbar wound excision and debridement and wound vac placement.  plan to go back to OR on 3/7.  -Patient underwent debridement on 3/7, will need additional debridement  on 3/14 going forward  Abx:   Plan:   1) Vancomcyin 2/10 - 4/12 ( need to extend due to debridement on 3/7)  2) Zosyn 2/10- 2/19 (d/c given no culture evidence of pseudomonas)    #Chronic pain - lower back  -home pain regimen: scheduled acetaminophen, fentanyl patch 50mcg q72h, dilaudid 4mg PO TID prn breakthrough  -bowel regimen  -follows with pain medicine Dr. 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 Recommend follow up outpatient. Currently on Lokelma.   -6.0 on 2/24/2023 with EKG of peaked T-waves, 5.8 on 2/23 resolved peaked T-waves  -hx of hyperkalemic episodes  on review  -no CKD  -gave insulin, d50, calcium gluconate, and lokelma TID  for 48 hrs  -held maxime  -conitnue to monitor  -continue lokelma    #COPD, 4L at baseline  -SpO2 goal 88-92%  -Started on proventill (takes at home per wife)     #HFpEF   #HTN  -TTE Feb 2022: EF 65-70%, normal LV diastolic function, normal RV  -Hold ASA for possible procedure  -C/w home atorva, metop 12.5 BID, maxime 50 BID, losartan 50 qd, amlodipine 10  -Held maxime for hyperkalemia on 2/24    #Seizure disorder - continue Keppra  #Anxiety - continue Buspar   #BPH - continue tamsulosin   #Gout - continue allopurinol     #CHILANGO  -Not compliant with CPAP at home  -Elevated bicarb inpatient, likely chronic    DVT ppx: lovenox   Code status: FULL (confirmed on admission)  NOK: Wife Mike [phone]      Attestation:   Note Completion:  I am a:  Resident/Fellow   Attending Attestation I saw and evaluated the patient.  I personally obtained the key and critical portions of the history and physical exam or was physically present for key and  critical portions performed by the resident/fellow. I  reviewed the resident/fellow?s documentation and discussed the patient with the resident/fellow.  I agree with the resident/fellow?s medical decision making as documented in the note.     I personally evaluated the patient on 11-Mar-2023         Electronic Signatures:  Carlo Speras (MD (Resident))  (Signed 11-Mar-2023 10:30)   Authored: Service, Subjective Data, Objective Data, Assessment  and Plan, Note Completion  Darrian Galvez)  (Signed 12-Mar-2023 10:23)   Authored: Note Completion   Co-Signer: Service, Subjective Data, Objective Data, Assessment and Plan, Note Completion      Last Updated: 12-Mar-2023 10:23 by Darrian Galvez)

## 2023-09-14 NOTE — PROGRESS NOTES
Service: Infectious Disease     Subjective Data:   RAJ HARMON is a 67 year old Male who is Hospital Day # 15 and POD #13 for 1. exploration of lumbar wound dehiscence;2. removal of lumbar spinal hardware (set caps, rods, and screws);3. irrigation  and debridement of lumbar wound.     Evaluated at bedside today. ready to go to surgery with plastics for debridement. Denies fevers, chills, SOB, CP, abdominal pain.    Objective Data:     Objective Information:      T   P  R  BP   MAP  SpO2   Value  36.3  78  18  117/65      95%  Date/Time 2/28 7:52 2/28 7:52 2/28 7:52 2/28 7:52    2/28 7:52  Range  (36.2C - 36.4C )  (62 - 78 )  (18 - 19 )  (117 - 175 )/ (62 - 78 )    (95% - 98% )   As of 27-Feb-2023 21:47:00, patient is on 4 L/min of oxygen via nasal cannula.      Pain reported at 2/28 8:54: 8 = Severe      T   P  R  BP   MAP  SpO2   Value  36.3  78  18  117/65      95%  Date/Time 2/28 7:52 2/28 7:52 2/28 7:52 2/28 7:52    2/28 7:52  Range  (36.2C - 36.4C )  (62 - 78 )  (18 - 19 )  (117 - 175 )/ (62 - 78 )    (95% - 98% )   As of 27-Feb-2023 21:47:00, patient is on 4 L/min of oxygen via nasal cannula.    Physical Exam Narrative:  ·  Physical Exam:    Gen: well-appearing, NAD  Head and neck: NCAT, neck supple without LAD, severe kyphoscoliosis   HEENT: MMM, normal nose without congestion, poor dentition   CV: RRR no mrg  Pulm: CTAB, prolonged exp phase, no wheezing or crackles, normal WOB on 4L  Abd: obese, soft, non-tender, non-distended  Ext: no cyanosis or clubbing, trace LE edema, thenar wasting bilat; 2-3cm diameter stage 1 ulcer on L heel  Neuro: alert and oriented x3, normal tone, face symmetric, moves all extremities spontaneously. mild tremor in hands bilaterally  Back: Wound vac placed and secured. No blood oozing or purulence. skin irritation under the wound vac tape      Medication:    Medications:          Continuous Medications       --------------------------------  No continuous medications  are active       Scheduled Medications       --------------------------------    1. Acetaminophen:  650  mg  Oral  Every 6 Hours    2. Allopurinol:  300  mg  Oral  Every 24 Hours    3. amLODIPine (NORVASC):  10  mg  Oral  Daily    4. Ascorbic Acid:  1000  mg  Oral  Daily    5. Atorvastatin:  20  mg  Oral  Daily    6. busPIRone (BUSPAR):  5  mg  Oral  Every 12 Hours    7. Docusate 50 mg - Senna 8.6 m  tablet(s)  Oral  2 Times a Day    8. Enoxaparin SubCutaneous:  40  mg  SubCutaneous  Every 24 Hours    9. fentaNYL 50 micrograms/ hour TransDermal:  1  patch  TransDermal  Every 72 Hours    10. Hydrocortisone 1% Topical:  1  application(s)  Topical  2 Times a Day    11. levETIRAcetam (KEPPRA):  500  mg  Oral  2 Times a Day    12. Metoprolol Tartrate:  12.5  mg  Oral  2 Times a Day    13. Polyethylene Glycol:  17  gram(s)  Oral  Daily    14. Sodium Hypochlorite 0.125% (Dakins Quarter):  1  application(s)  Topical  3 Times a Day    15. Tamsulosin:  0.4  mg  Oral  Daily    16. Vancomycin - RPh to Dose - IV Piggy Back:  1  each  As Specified  Variable    17. Vancomycin 750 mg/ D5W IVPB Premixed Soln 150 mL:  750  mg  IntraVenous Piggyback  Every 12 Hours         PRN Medications       --------------------------------    1. Albuterol 90 micrograms/ Inhalation MDI:  2  inhalation  Inhalation  Every 6 Hours    2. Calcium Carbonate Chewable:  500  mg  Oral  Every 2 Hours    3. diphenhydrAMINE:  25  mg  Oral  Every 24 Hours    4. Heparin Flush 10 unit/ mL Injectable PRN:  5  mL  IntraVenous Flush  According to Flush Policy    5. HYDROmorphone Injectable:  1  mg  IntraVenous Push  Every 3 Hours    6. Melatonin:  3  mg  Oral  At Bedtime    7. Naloxone Injectable:  0.2  mg  IntraVenous Push  Once    8. Sodium Chloride 0.9% Injectable Flush:  10  mL  IntraVenous Flush  Every 8 Hours and as Needed        Recent Lab Results:    Results:    CBC: 2023 06:02              \     Hgb     /                              \     7.5 L     /  WBC  ----------------  Plt               7.1       ----------------    286              /     Hct     \                              /     24.9 L    \            RBC: 2.27 L    MCV: 110 H    Neutrophil  %: 67.2      RFP: 2/28/2023 06:02  NA+        Cl-     BUN  /                         138    94 L   26 H  /  --------------------------------  Glucose                ---------------------------  107 H    K+     HCO3-   Creat \                         4.5    40 HH   0.81  \  Calcium : 9.4Anion Gap : 9 L         Albumin : 2.9 L     Phos : 3.6        I have reviewed these laboratory results:    Renal Function Panel  28-Feb-2023 06:02:00      Result Value    Lab Comment:  BIC CALLED RB TO JHOAN DANIELS , 02/28/2023 07:26    Glucose, Serum  107   H   NA  138    K  4.5    CL  94   L   Bicarbonate, Serum  40   HH   Anion Gap, Serum  9   L   BUN  26   H   CREAT  0.81    GFR Male  >90    Calcium, Serum  9.4    Phosphorus, Serum  3.6    ALB  2.9   L     Complete Blood Count + Differential  28-Feb-2023 06:02:00      Result Value    White Blood Cell Count  7.1    Nucleated Erythrocyte Count  0.0    Red Blood Cell Count  2.27   L   HGB  7.5   L   HCT  24.9   L   MCV  110   H   MCHC  30.1   L   PLT  286    RDW-CV  12.5    Neutrophil %  67.2    Immature Granulocytes %  0.8    Lymphocyte %  15.8    Monocyte %  10.0    Eosinophil %  4.9    Basophil %  1.3    Neutrophil Count  4.78    Lymphocyte Count  1.12   L   Monocyte Count  0.71    Eosinophil Count  0.35    Basophil Count  0.09      Magnesium, Serum  28-Feb-2023 06:02:00      Result Value    Magnesium, Serum  1.86        Assessment and Plan:   Daily Risk Screen:  ·  Does patient have a central line? yes   ·  Central Line Type PICC   ·  Plan for PICC removal today? no   ·  The patient continues to require a PICC for parenteral medication     Comorbidities:  ·  Comorbidity Other     Code Status:  ·  Code Status Full Code     Assessment:    67 year old Male with CHF,  COPD on 4L at home, seizure disorder and a history of multiple lumbar spine surgeries complicated  by MRSA wound infection with a known chronic open lumbar wound status post multiple debridements by plastic surgery, who presented to [place] with worsening low back pain as well as generalized fatigue and weakness, found to have MRSA infection of sacral  wound with exposed hardware, and elevated inflammatory markers (ESR >130,  CRP elevated at 5.26). He presented to [facility] as a transfer for surgical evaluation by spine team. He was started on vancomcyin and zosyn  on 2/10 in [place], which were continued on admission. BCx from 2/10 with NGTD. Nsgy was consulted, and he underwent removal of lumbar spinal hardware and irrigation and debridement of lumbar wound on 2/15. Plastic surgery was consulted and recommended  leaving wound open with TID dressing changes and plan for return to OR on 2/22 for lumbar spinal wound I&D and potential wound vac. Intraoperative wound cultures had no growth aerobically or anaerobically. Pt has a hx of chronic pain, and pain was managed  after surgery with fentanyl patch (per home regimen), Tylenol (per home regimen), and IV dilaudid 1mg IV q3h for moderate pain and 2mg IV q3h for severe pain. Pain regimen can be down-titrated to home regimen after plastic surgery intervention. Zosyn  was discontinued on 2/19 given no evidence of pseudomonas. Pt continues to be on vancomycin. Patient was having loose bowel movements multiple times daily but was negative for C.diff and stool softeners were stopped. Plastic surgery recommended wound  vac and it was placed on 2/22, replaced on 2/24. Patient had hyperkalemic episode on 2/34 of potassium 6.0 (repeat from 6.1) with EKG showing  peaked T-waves, ordered insulin & D5 & Ca gluconate & Lokelma 10 mg TID, held Spironolactone . Hyperkalemia resolved with these interventions. Patient had another wound vac replaced on 2/27. Went to OR with plastic surgery on  2/28 for debridement.     Uppdates 2/28:  -OR today with plastics for debridement  -Continue to monitor bicarb (underlying CHILANGO)    Updates 2/26:  -Potassium 6.0 yesterday (repeat from 6.1) with EKG showing peaked T-waves, ordered insulin & D5 & Ca gluconate & Lokelma,  held Spironolactone.   -K 5.0 today, c/w Lokelma TID  -hydrocortisone for itching on lower back   -Plan to evaluate patient in OR next week Tues  -Wound vac replaced by plastics on 2/24  -Vanc level 15.2 on 2/25 , c/w Vanc 500 q12h  -Vanc to continue for 8 weeks until 2/15 - 4/12  -Closely monitor vancomycin levels as patient had a break  in medication and needs to be ensured to continue to receive   -Pain regimen currently at 2mg q3h as needed  -Tremor to be discussed with patient and wife and chart review   -Outpatient f/u with Dr. Harrison    Cx history:     ·  2/10 OSH bedside wound cx - MRSA (final)      ·  2/15 OR deep wound cs - NGTD (final 2/17)    Abx hx:  1) Vancomcyin 2/10-   2) Zosyn 2/10- 2/19 (d/c given no culture evidence of pseudomonas)      #MRSA sacral wound infection  Micro:   -BCx 2/10 x2 NGTD  -[place] Wound cx 2/10/23: MRSA (S: clinda, vanc; R: TMP-SMX, tetracyclines)    -Intraoperative wound cx 2/15/23: no growth aerobically or anaerobically   - S/p below on 2/15:   1. exploration of lumbar wound dehiscence  2. removal of lumbar spinal hardware (set caps, rods, and screws)  3. irrigation and debridement of lumbar wound  - Plastic surgery recs  2/15:   1. WTD Kerlix dressings with Dakin's/Saline solution packed to the entire depth of the wound three times daily and PRN if soiled   -Lumbar CT without contrast, obtained on 2/18: soft tissue defect overlying lumbar and lower thoracic spine, soft tissue thickening, scattered soft tissue emphysema; multilevel degenerative changes of lumbar spine  - Plastics decided to place wound vac on 2/22 instead of I&D, reassessed and replaced wound vac on 2/24 and on 2/27. Went to OR on 2/28  Abx:    Plan:   1) Vancomcyin 2/10 - 4/12   2) Zosyn 2/10- 2/19 (d/c given no culture evidence of pseudomonas)    #Chronic pain - lower back  -home pain regimen: scheduled acetaminophen, fentanyl patch 50mcg q72h, dilaudid 4mg PO TID prn breakthrough  -bowel regimen  -follows with pain medicine Dr. Almazan at [facility]   -post-op pain regimen: Tylenol 650mg q6h, fentanyl patch 50mcg q72h, 1mg IV dilaudid q3h for moderate pain, 2mg IV dilaudid q3h for severe pain    #Hyperkalemia - resolved 5.0 now  -6.0 on 2/24/2023 with EKG of peaked T-waves, 5.8 on 2/23 resolved peaked T-waves  -hx of hyperkalemic  episodes on review  -no CKD  -given insulin, d50, calcium gluconate, and lokelma   -will hold maxime   -conitnue to monitor    #COPD, 4L at baseline  -SpO2 goal 88-92%  -Started on proventill (takes at home per wife)     #HFpEF   #HTN  -TTE Feb 2022: EF 65-70%, normal LV diastolic function, normal RV  -Hold ASA for possible procedure  -C/w home atorva, metop 12.5 BID, maxime 50 BID, losartan 50 qd, amlodipine 10  -Hold maxime for hyperkalemia on 2/24    #Seizure disorder - continue Keppra  #Anxiety - continue Buspar   #BPH - continue tamsulosin   #Gout - continue allopurinol     #CHILANGO  -Not compliant with CPAP at home  -Elevated bicarb inpatient, likely chronic    DVT ppx: lovenox   Code status: FULL (confirmed on admission)  NOK: Wife Mike [phone]            Attestation:   Note Completion:  I am a:  Resident/Fellow   Attending Attestation I saw and evaluated the patient.  I personally obtained the key and critical portions of the history and physical exam or was physically present for key and  critical portions performed by the resident/fellow. I reviewed the resident/fellow?s documentation and discussed the patient with the resident/fellow.  I agree with the resident/fellow?s medical decision making as documented in the note.     I personally evaluated the patient on 28-Feb-2023         Electronic Signatures:  Donte  Mame BRANDON)  (Signed 28-Feb-2023 11:19)   Authored: Note Completion   Co-Signer: Service, Subjective Data, Objective Data, Assessment and Plan, Note Completion  Freddie Caraballo (Resident))  (Signed 28-Feb-2023 11:01)   Authored: Service, Subjective Data, Objective Data, Assessment  and Plan, Note Completion      Last Updated: 28-Feb-2023 11:19 by Mame Kent)

## 2023-09-14 NOTE — PROGRESS NOTES
Service: Infectious Disease     Subjective Data:   RAJ HARMON is a 67 year old Male who is Hospital Day # 10 and POD #8 for 1. exploration of lumbar wound dehiscence;2. removal of lumbar spinal hardware (set caps, rods, and screws);3. irrigation  and debridement of lumbar wound.     Pt evaluated at bedside today. Had no complaints beside itching on lower back from tape. denies fevers, chills.    Objective Data:     Objective Information:      T   P  R  BP   MAP  SpO2   Value  36.2  84  18  119/62   90  97%  Date/Time 2/23 12:59 2/23 12:59 2/23 12:59 2/23 12:59  2/23 4:43 2/23 12:59  Range  (36.2C - 36.9C )  (69 - 84 )  (18 - 18 )  (107 - 146 )/ (60 - 73 )  (90 - 90 )  (96% - 97% )   As of 22-Feb-2023 08:33:00, patient is on 4 L/min of oxygen via nasal cannula with humidification.  Highest temp of 36.9 C was recorded at 2/23 9:21      Pain reported at 2/23 10:24: 8 = Severe    Physical Exam Narrative:  ·  Physical Exam:    Gen: well-appearing, NAD  Head and neck: NCAT, neck supple without LAD, severe kyphoscoliosis   HEENT: MMM, normal nose without congestion, poor dentition   CV: RRR no mrg  Pulm: CTAB, prolonged exp phase, no wheezing or crackles, normal WOB on 4L  Abd: obese, soft, non-tender, non-distended  Ext: no cyanosis or clubbing, trace LE edema, thenar wasting bilat; 2-3cm diameter stage 1 ulcer on L heel  Neuro: alert and oriented x3, normal tone, face symmetric, moves all extremities spontaneously   Back: Wound vac placed and secured. No blood oozing or purulence.      Recent Lab Results:    Results:    CBC: 2/23/2023 06:01              \     Hgb     /                              \     7.1 L    /  WBC  ----------------  Plt               6.1       ----------------    276              /     Hct     \                              /     25.6 L    \            RBC: 2.18 L    MCV: 117 H          RFP: 2/23/2023 09:02  NA+        Cl-     BUN  /                         138    97 L   25 H   /  --------------------------------  Glucose                ---------------------------  92    K+     HCO3-   Creat \                         5.2    37 H   0.90  \  Calcium : 9.6Anion Gap : 9 L         Albumin : 3.2 L     Phos : 4.0      Assessment and Plan:   Daily Risk Screen:  ·  Does patient have a central line? yes   ·  Central Line Type PICC   ·  Plan for PICC removal today? no   ·  The patient continues to require a PICC for parenteral medication     Comorbidities:  ·  Comorbidity Other     Code Status:  ·  Code Status Full Code     Assessment:    67 year old Male with CHF, COPD on 4L at home, seizure disorder and a history of multiple lumbar spine surgeries complicated  by MRSA wound infection with a known chronic open lumbar wound status post multiple debridements by plastic surgery, who presented to [place] with worsening low back pain as well as generalized fatigue and weakness, found to have MRSA infection of sacral  wound with exposed hardware, and elevated inflammatory markers. He presented to [facility]  as a transfer for surgical evaluation by spine team. He was started on vancomcyin and zosyn on 2/10 in [place], which were continued on admission.  BCx from 2/10 with NGTD. Nsgy was consulted, and he underwent removal of lumbar spinal hardware and irrigation and debridement of lumbar wound on 2/15. Plastic surgery was consulted and recommended leaving wound open with TID dressing changes and plan  for return to OR on 2/22 for lumbar spinal wound I&D and potential wound vac. Intraoperative wound cultures had no growth aerobically or anaerobically. Pt has a hx of chronic pain, and pain was managed after surgery with fentanyl patch (per home regimen),  Tylenol (per home regimen), and IV dilaudid 1mg IV q3h for moderate pain and 2mg IV q3h for severe pain. Pain regimen can be down-titrated to home regimen after plastic surgery intervention. Zosyn was discontinued on 2/19 given no evidence of pseudomonas.  Pt  continues to be on vancomycin. Patient was having loose bowel movements multiple times daily but was negative for C.diff and stool softeners were stopped. Plastic surgery recommended wound vac and it was placed on 2/22.    Updates 2/23:  -hydrocortisone for itching on lower back   -No I&D to be done with plastic surgery tomorrow given wounds look better on imaging now from plastics standpoint, to reassess wound vac on Friday and evaluated for wound closure next  week.  -Vanc level 25 yesterday, continue to monitor  -EDILIA resolved   -Vanc to continue for 8 weeks until 4/12  -Closely monitor vancomycin levels as patient had a break in medication and needs to be ensured to continue to receive    -Pain regimen currently at 2mg q3h as needed  -Outpatient f/u with Dr. Harrison    Cx history:     ·  2/10 OSH bedside wound cx - MRSA (final)      ·  2/15 OR deep wound cs - NGTD (final 2/17)    Abx hx:  1) Vancomcyin 2/10-   2) Zosyn 2/10- 2/19 (d/c given no culture evidence of pseudomonas)      #MRSA sacral wound infection  Micro:   -BCx 2/10 x2 NGTD  -[place] Wound cx 2/10/23: MRSA (S: clinda, vanc; R: TMP-SMX, tetracyclines)  -ESR elevated at >130  -CRP elevated at 5.26  -Intraoperative wound cx 2/15/23: no growth aerobically or anaerobically   - S/p below on 2/15:   1. exploration of lumbar wound dehiscence  2. removal of lumbar spinal hardware (set caps, rods, and screws)  3. irrigation and debridement of lumbar wound  - Plastic surgery recs  2/15:   1. WTD Kerlix dressings with Dakin's/Saline solution packed to the entire depth of the wound three times daily and PRN if soiled   2. return to OR with plastic surgery on 2/22 for lumbar spinal wound I&D and possible application of wound vac  3. Lumbar CT without contrast, obtained on 2/18: soft tissue defect overlying lumbar and lower thoracic spine, soft tissue thickening, scattered soft tissue emphysema; multilevel degenerative changes of lumbar spine  - Plastics decided to  place wound vac on 2/22 instead of I&D and to reassess wound vac on Friday and evaluated for wound closure next week.  Abx:   Vancomycin dosing: Pt received vancomycin 2/10 - 2/13, supratherapeutic (20.4) and not resumed on admission on 2/14, 1x dose 1.5g IV intra-operatively on 2/15, and resumed on 2/17 with 1500mg q12h dosing, redosed to 1250mg q12h on 2/19  Plan:   1) Vancomcyin 2/10-   2) Zosyn 2/10- 2/19 (d/c given no culture evidence of pseudomonas)  3) Appreciate plastic surgery recs  4) F/u intraoperative cultures     #Chronic pain - lower back  -home pain regimen: scheduled acetaminophen, fentanyl patch 50mcg q72h, dilaudid 4mg PO TID prn breakthrough  -bowel regimen  -follows with pain medicine Dr[facility]zan [facility]   -post-op pain regimen: Tylenol 650mg q6h, fentanyl patch 50mcg q72h, 1mg IV dilaudid q3h for moderate pain, 2mg IV dilaudid q3h for severe  pain    #COPD, 4L at baseline  -SpO2 goal 88-92%  -Started on proventill (takes at home per wife)     #HFpEF   #HTN  -TTE Feb 2022: EF 65-70%, normal LV diastolic function, normal RV  -Hold ASA for possible procedure  -C/w home atorva, metop 12.5 BID, maxime 50 BID, losartan 50 qd, amlodipine 10    #Seizure disorder - continue Keppra  #Anxiety - continue Buspar   #BPH - continue tamsulosin   #Gout - continue allopurinol     DVT ppx: lovenox   Code status: FULL (confirmed on admission)  NOK: Wife Mike [phone]      Attestation:   Note Completion:  I am a:  Resident/Fellow   Attending Attestation I saw and evaluated the patient.  I personally obtained the key and critical portions of the history and physical exam or was physically present for key and  critical portions performed by the resident/fellow. I reviewed the resident/fellow?s documentation and discussed the patient with the resident/fellow.  I agree with the resident/fellow?s medical decision making as documented in the note.     I personally evaluated the patient on 23-Feb-2023          Electronic Signatures:  Freddie Caraballo (Resident))  (Signed 23-Feb-2023 14:10)   Authored: Service, Subjective Data, Objective Data, Assessment  and Plan, Note Completion  Darrian Galvez)  (Signed 23-Feb-2023 14:43)   Authored: Note Completion   Co-Signer: Service, Subjective Data, Objective Data, Assessment and Plan, Note Completion      Last Updated: 23-Feb-2023 14:43 by Darrian Galvez)

## 2023-09-14 NOTE — PROGRESS NOTES
Service: Infectious Disease     Subjective Data:   RAJ HARMON is a 67 year old Male who is Hospital Day # 8 and POD #6 for 1. exploration of lumbar wound dehiscence;2. removal of lumbar spinal hardware (set caps, rods, and screws);3. irrigation  and debridement of lumbar wound.     Patient evaluated at bedside today. No acute complaints. still having loose stools. No christian diarrhea. Denies fevers, chills, abdominal pain.    Objective Data:     Objective Information:      T   P  R  BP   MAP  SpO2   Value  37.1  83  16  121/60      96%  Date/Time 2/21 14:35 2/21 14:35 2/21 14:35 2/21 14:35    2/21 14:35  Range  (36C - 37.1C )  (74 - 100 )  (16 - 19 )  (121 - 152 )/ (60 - 73 )    (96% - 99% )   As of 21-Feb-2023 14:49:00, patient is on 4 L/min of oxygen via nasal cannula.  Highest temp of 37.1 C was recorded at 2/21 14:35      Pain reported at 2/21 17:00: 8 = Severe      T   P  R  BP   MAP  SpO2   Value  37.1  83  16  121/60      96%  Date/Time 2/21 14:35 2/21 14:35 2/21 14:35 2/21 14:35    2/21 14:35  Range  (36C - 37.1C )  (74 - 100 )  (16 - 19 )  (121 - 152 )/ (60 - 73 )    (96% - 99% )   As of 21-Feb-2023 14:49:00, patient is on 4 L/min of oxygen via nasal cannula.  Highest temp of 37.1 C was recorded at 2/21 14:35    Physical Exam Narrative:  ·  Physical Exam:    Gen: well-appearing, NAD  Head and neck: NCAT, neck supple without LAD, severe kyphoscoliosis   HEENT: MMM, normal nose without congestion, poor dentition   CV: RRR no mrg  Pulm: CTAB, prolonged exp phase, no wheezing or crackles, normal WOB on 4L  Abd: obese, soft, non-tender, non-distended  Ext: no cyanosis or clubbing, trace LE edema, thenar wasting bilat; 2-3cm diameter stage 1 ulcer on L heel  Neuro: alert and oriented x3, normal tone, face symmetric, moves all extremities spontaneously       Medication:    Medications:          Continuous Medications       --------------------------------  No continuous medications are active        Scheduled Medications       --------------------------------    1. Acetaminophen:  650  mg  Oral  Every 6 Hours    2. Allopurinol:  300  mg  Oral  Every 24 Hours    3. amLODIPine (NORVASC):  10  mg  Oral  Daily    4. Ascorbic Acid:  1000  mg  Oral  Daily    5. Atorvastatin:  20  mg  Oral  Daily    6. busPIRone (BUSPAR):  5  mg  Oral  Every 12 Hours    7. Docusate 50 mg - Senna 8.6 m  tablet(s)  Oral  2 Times a Day    8. Enoxaparin SubCutaneous:  40  mg  SubCutaneous  Every 24 Hours    9. fentaNYL 50 micrograms/ hour TransDermal:  1  patch  TransDermal  Every 72 Hours    10. levETIRAcetam (KEPPRA):  500  mg  Oral  2 Times a Day    11. Metoprolol Tartrate:  12.5  mg  Oral  2 Times a Day    12. Polyethylene Glycol:  17  gram(s)  Oral  Daily    13. Sodium Hypochlorite 0.125% (Dakins Quarter):  1  application(s)  Topical  3 Times a Day    14. Spironolactone:  50  mg  Oral  2 Times a Day    15. Tamsulosin:  0.4  mg  Oral  Daily    16. Vancomycin - RPh to Dose - IV Piggy Back:  1  each  As Specified  Variable    17. Vancomycin 1 gram IVPB/ Premixed Soln 200 mL:  1  gram(s)  IntraVenous Piggyback  Every 12 Hours         PRN Medications       --------------------------------    1. Acetaminophen:  650  mg  Oral  Once    2. Albuterol 90 micrograms/ Inhalation MDI:  2  inhalation  Inhalation  Every 6 Hours    3. HYDROmorphone Injectable:  1  mg  IntraVenous Push  Every 3 Hours    4. HYDROmorphone Injectable:  2  mg  IntraVenous Push  Every 3 Hours    5. Melatonin:  3  mg  Oral  At Bedtime    6. Naloxone Injectable:  0.2  mg  IntraVenous Push  Once    7. Sodium Chloride 0.9% Injectable Flush:  10  mL  IntraVenous Flush  Every 8 Hours and as Needed        Recent Lab Results:    Results:    CBC: 2023 05:45              \     Hgb     /                              \     8.3 L    /  WBC  ----------------  Plt               7.6       ----------------    317              /     Hct     \                              /      28.6 L    \            RBC: 2.58 L    MCV: 111 H    Neutrophil  %: 58.5      RFP: 2/21/2023 05:45  NA+        Cl-     BUN  /                         137    98    24 H /  --------------------------------  Glucose                ---------------------------  96    K+     HCO3-   Creat \                         5.2    38 H   0.98  \  Calcium : 9.9Anion Gap : 6 L         Albumin : 3.2 L     Phos : 3.2        I have reviewed these laboratory results:    Complete Blood Count + Differential  21-Feb-2023 05:45:00      Result Value    White Blood Cell Count  7.6    Nucleated Erythrocyte Count  0.0    Red Blood Cell Count  2.58   L   HGB  8.3   L   HCT  28.6   L   MCV  111   H   MCHC  29.0   L   PLT  317    RDW-CV  12.5    Neutrophil %  58.5    Immature Granulocytes %  3.0   H   Lymphocyte %  24.3    Monocyte %  8.1    Eosinophil %  4.9    Basophil %  1.2    Neutrophil Count  4.46    Lymphocyte Count  1.85    Monocyte Count  0.62    Eosinophil Count  0.37    Basophil Count  0.09      Renal Function Panel  21-Feb-2023 05:45:00      Result Value    Glucose, Serum  96    NA  137    K  5.2    CL  98    Bicarbonate, Serum  38   H   Anion Gap, Serum  6   L   BUN  24   H   CREAT  0.98    GFR Male  83    Calcium, Serum  9.9    Phosphorus, Serum  3.2    ALB  3.2   L     Magnesium, Serum  21-Feb-2023 05:45:00      Result Value    Magnesium, Serum  1.85      Coronavirus 2019 by PCR  20-Feb-2023 23:48:00      Result Value    Fluid Source  Nasal, Nasopharyngeal    Coronavirus 2019,PCR  NOT DETECTED    Reference Range: Not Detected .  This assay is designed to detect the ORF1ab and/or S genes of SARS-CoV-2 via   nucleic acid amplification. A Not Detected result does not preclude   2019-nCoV infection since the adequacy of sample jose luis        Assessment and Plan:   Daily Risk Screen:  ·  Does patient have a central line? yes   ·  Central Line Type PICC   ·  Plan for PICC removal today? no   ·  The patient continues to require a PICC  for parenteral medication     Comorbidities:  ·  Comorbidity Other     Code Status:  ·  Code Status Full Code     Assessment:    67 year old Male with CHF, COPD on 4L at home, seizure disorder and a history of multiple lumbar spine surgeries complicated  by MRSA wound infection with a known chronic open lumbar wound status post multiple debridements by plastic surgery, who presented to [place] with worsening low back pain as well as generalized fatigue and weakness, found to have MRSA infection of sacral  wound with exposed hardware, and elevated inflammatory markers. He presented to [facility]  as a transfer for surgical evaluation by spine team. He was started on vancomcyin and zosyn on 2/10 in [place], which were continued on admission.  BCx from 2/10 with NGTD. Nsgy was consulted, and he underwent removal of lumbar spinal hardware and irrigation and debridement of lumbar wound on 2/15. Plastic surgery was consulted and recommended leaving wound open with TID dressing changes and plan  for return to OR on 2/22 for lumbar spinal wound I&D and potential wound vac. Intraoperative wound cultures had no growth aerobically or anaerobically. Pt has a hx of chronic pain, and pain was managed after surgery with fentanyl patch (per home regimen),  Tylenol (per home regimen), and IV dilaudid 1mg IV q3h for moderate pain and 2mg IV q3h for severe pain. Pain regimen can be down-titrated to home regimen after plastic surgery intervention. Zosyn was discontinued on 2/19 given no evidence of pseudomonas.  Pt continues to be on vancomycin. Patient was having loose bowel movements multiple times daily but was negative for C.diff and stool softeners were stopped. Plan is for lumbar wound I&D and possible wound vac on 2/22.     Updates 2/21:  -No I&D to be done with plastic surgery tomorrow given wounds look better on imaging now from plastics standpoint  -Wound vac placed today, to be replaced Friday, and evaluation for wound closure next  week  -Vanc level 23.3   -EDILIA improving   -Outpatient f/u with Dr. Harrison  -Duration of ABX?    Updates 2/20:   - C/w vancomycin  - Plan for I&D with plastics on 2/22  - Optimize diet per nutrition recs for optimal wound healing  - Patient not in favor of going to SNF given past negative experience. His wife is a retired nurse and  he will discuss with her whether she can administer his IV antibiotics at home  - Closely monitor vancomycin levels as patient had a break in medication and needs to be ensured to continue to receive     Updates 2/19:  - C/w vanc  - D/c Zosyn (2/19) given no culture evidence of pseudomonas  - C. diff negative   - 2//18 CT LSpine demonstrates:  interval removal of the posterior fusion hardware from the lumbar spine. There is again a large soft tissue defect with marked thinning  of the skin and subcutaneous soft tissues and probable dehiscence overlying the lumbar and lower thoracic spine with nonspecific soft tissue thickening and loss of fat soft tissue planes as well as scattered soft tissue emphysema along the periphery of  the defect. The possibility of osteomyelitis cannot be excluded on the basis of this examination. 2. Marked, multilevel degenerative changes of the lumbar spine with multilevel postoperative changes from laminectomies and posterolateral fusion masses.  [ ] f/u Plastic Surg recs      #MRSA sacral wound infection  Micro:   -BCx 2/10 x2 NGTD  -[place] Wound cx 2/10/23: MRSA (S: clinda, vanc; R: TMP-SMX, tetracyclines)  -ESR elevated at >130  -CRP elevated at 5.26  -Intraoperative wound cx 2/15/23: no growth aerobically or anaerobically   - S/p below on 2/15:   1. exploration of lumbar wound dehiscence  2. removal of lumbar spinal hardware (set caps, rods, and screws)  3. irrigation and debridement of lumbar wound  - Plastic surgery recs  2/15:   1. WTD Kerlix dressings with Dakin's/Saline solution packed to the entire depth of the wound three times daily and PRN if  soiled   2. return to OR with plastic surgery on 2/22 for lumbar spinal wound I&D and possible application of wound vac  3. Lumbar CT without contrast, obtained on 2/18: soft tissue defect overlying lumbar and lower thoracic spine, soft tissue thickening, scattered soft tissue emphysema; multilevel degenerative changes of lumbar spine  Abx:   Vancomycin dosing: Pt received vancomycin 2/10 - 2/13, supratherapeutic (20.4) and not resumed on admission on 2/14, 1x dose 1.5g IV intra-operatively on 2/15, and resumed on 2/17 with 1500mg q12h dosing   Plan:   1) Vancomcyin 2/10-   2) Zosyn 2/10- 2/19  3) Appreciate plastic surgery recs  4) F/u intraoperative cultures     #Chronic pain - lower back  -home pain regimen: scheduled acetaminophen, fentanyl patch 50mcg q72h, dilaudid 4mg PO TID prn breakthrough  -bowel regimen  -follows with pain medicine Dr. Al[facility]zan at [facility]   -post-op pain regimen: Tylenol 650mg q6h, fentanyl patch 50mcg q72h, 1mg IV dilaudid q3h for moderate pain, 2mg IV dilaudid q3h for severe  pain    #COPD, 4L at baseline  -SpO2 goal 88-92%  -Started on proventill (takes at home per wife)     #HFpEF   #HTN  -TTE Feb 2022: EF 65-70%, normal LV diastolic function, normal RV  -Hold ASA for possible procedure  -C/w home atorva, metop 12.5 BID, maxime 50 BID, losartan 50 qd, amlodipine 10    #Seizure disorder - continue Keppra  #Anxiety - continue Buspar   #BPH - continue tamsulosin   #Gout - continue allopurinol     DVT ppx: lovenox   Code status: FULL (confirmed on admission)  NOK: Wife Mike [phone]      Attestation:   Note Completion:  I am a:  Resident/Fellow   Attending Attestation I saw and evaluated the patient.  I personally obtained the key and critical portions of the history and physical exam or was physically present for key and  critical portions performed by the resident/fellow. I reviewed the resident/fellow?s documentation and discussed the patient with the resident/fellow.  I agree with  the resident/fellow?s medical decision making as documented in the note.     I personally evaluated the patient on 21-Feb-2023         Electronic Signatures:  Freddie Caraballo (Resident))  (Signed 21-Feb-2023 19:13)   Authored: Service, Subjective Data, Objective Data, Assessment  and Plan, Note Completion  Darrian Galvez)  (Signed 22-Feb-2023 13:19)   Authored: Note Completion   Co-Signer: Service, Subjective Data, Objective Data, Assessment and Plan, Note Completion      Last Updated: 22-Feb-2023 13:19 by Darrian Galvez)

## 2023-09-14 NOTE — PROGRESS NOTES
Service: Infectious Disease     Subjective Data:   RAJ HARMON is a 67 year old Male who is Hospital Day # 33 and POD #4 for 1. Excision and debridement down to and including spinous process;2. Wound vac exchange (15 x 11 cm);3. ;4. ;5.     Patient denies any complaints today. NAEO.    Objective Data:     Objective Information:      T   P  R  BP   MAP  SpO2   Value  36.5  69  18  149/66   94  100%  Date/Time 3/18 6:05 3/18 6:05 3/18 6:05 3/18 6:05  3/18 6:05 3/18 6:05  Range  (36.5C - 36.6C )  (69 - 79 )  (18 - 24 )  (125 - 156 )/ (59 - 79 )  (81 - 94 )  (97% - 100% )   As of 18-Mar-2023 08:43:00, patient is on 3 L/min of oxygen via nasal cannula.      Pain reported at 3/18 8:43: 6 = Moderate    Physical Exam Narrative:  ·  Physical Exam:    Gen: well-appearing, NAD  Head and neck: NCAT  HEENT: MMM, poor dentition  CV: RRR no mrg  Pulm: CTAB, decreased airflow, no wheezing or crackles, normal WOB on 4L  Abd: obese, soft, non-tender, non-distended  Ext: WWP, mild peripheral edema  Neuro: alert and oriented x3, no gross FND  Back: Wound vac placed and secured, serosanguineous drainage in wound vac      Medication:    Medications:          Continuous Medications       --------------------------------  No continuous medications are active       Scheduled Medications       --------------------------------    1. Acetaminophen:  975  mg  Oral  Every 8 Hours    2. Allopurinol:  300  mg  Oral  Every 24 Hours    3. amLODIPine (NORVASC):  10  mg  Oral  Daily    4. Ascorbic Acid:  1000  mg  Oral  Daily    5. Atorvastatin:  20  mg  Oral  Daily    6. busPIRone (BUSPAR):  5  mg  Oral  Every 12 Hours    7. Docusate 50 mg - Senna 8.6 m  tablet(s)  Oral  2 Times a Day    8. DULoxetine:  30  mg  Oral  At Bedtime    9. Enoxaparin SubCutaneous:  40  mg  SubCutaneous  Every 24 Hours    10. fentaNYL 75 micrograms/ hour TransDermal:  1  patch  TransDermal  Every 72 Hours    11. Hydrocortisone 1% Topical:  1  application(s)   Topical  2 Times a Day    12. levETIRAcetam (KEPPRA):  500  mg  Oral  2 Times a Day    13. Lidocaine 4% TransDermal:  1  patch  TransDermal  Every 24 Hours    14. Metoprolol Tartrate:  12.5  mg  Oral  2 Times a Day    15. Nystatin 100,000 Units/ gram Topical:  1  application(s)  Topical  3 Times a Day    16. Polyethylene Glycol:  17  gram(s)  Oral  Daily    17. Sodium Hypochlorite 0.125% (Dakins Quarter):  1  application(s)  Topical  3 Times a Day    18. Sodium Zirconium Cyclosilicate:  10  gram(s)  Oral  Daily    19. Tamsulosin:  0.8  mg  Oral  Daily    20. Vancomycin - RPh to Dose - IV Piggy Back:  1  each  As Specified  Variable    21. Vancomycin 750 mg/ D5W IVPB Premixed Soln 150 mL:  750  mg  IntraVenous Piggyback  Every 12 Hours         PRN Medications       --------------------------------    1. Albuterol 2.5 mg - Ipratropium 0.5 mg/ 3 mL Neb Soln:  3  mL  Inhalation  Every 6 Hours    2. diphenhydrAMINE:  25  mg  Oral  Every 24 Hours    3. Heparin Flush 10 unit/ mL Injectable PRN:  5  mL  IntraVenous Flush  According to Flush Policy    4. HYDROmorphone:  4  mg  Oral  Every 6 Hours    5. HYDROmorphone Injectable:  0.2  mg  IntraVenous Push  Every 8 Hours    6. Naloxone Injectable:  0.2  mg  IntraVenous Push  Once    7. Sodium Chloride 0.9% Injectable Flush:  10  mL  IntraVenous Flush  Every 8 Hours and as Needed        Recent Lab Results:    Results:    CBC: 3/17/2023 12:08              \     Hgb     /                              \     8.0 L    /  WBC  ----------------  Plt               8.8       ----------------    292              /     Hct     \                              /     26.7 L    \            RBC: 2.44 L    MCV: 109 H    Neutrophil  %: 81.5      RFP: 3/18/2023 08:37  NA+        Cl-     BUN  /                         141    94 L   13  /  --------------------------------  Glucose                ---------------------------  85    K+     HCO3-   Creat \                         3.5    42 HH    0.48 L  \  Calcium : 9.3Anion Gap : 9 L         Albumin : 2.7 L     Phos : 2.7      Assessment and Plan:   Daily Risk Screen:  ·  Does patient have a central line? yes   ·  Central Line Type PICC   ·  Plan for PICC removal today? no   ·  The patient continues to require a PICC for parenteral medication     Code Status:  ·  Code Status Full Code     Assessment:    67 year old Male with CHF, COPD on 4L at home, seizure disorder and a history of multiple lumbar spine surgeries complicated  by MRSA wound infection with a known chronic open lumbar wound status post multiple debridements by plastic surgery, who presented to [place] with worsening low back pain as well as generalized fatigue and weakness, found to have MRSA infection of sacral  wound with exposed hardware, and elevated inflammatory markers (ESR >130,  CRP elevated at 5.26). He presented to [facility] as a transfer for surgical evaluation by spine team. He was started on vancomcyin and zosyn  on 2/10 in [place], which were continued on admission. BCx from 2/10 with NGTD. Nsgy was consulted, and he underwent removal of lumbar spinal hardware and irrigation and debridement of lumbar wound on 2/15. Plastic surgery was consulted and recommended  leaving wound open with TID dressing changes and plan for return to OR on 2/22 for lumbar spinal wound I&D and potential wound vac. Intraoperative wound cultures had no growth aerobically or anaerobically. Pt has a hx of chronic pain, and pain was managed  after surgery with fentanyl patch (per home regimen), Tylenol (per home regimen), and IV dilaudid 1mg IV q3h for moderate pain and 2mg IV q3h for severe pain. Pain regimen can be down-titrated to home regimen after plastic surgery intervention. Zosyn  was discontinued on 2/19 given no evidence of pseudomonas. Pt continues to be on vancomycin. Patient was having loose bowel movements multiple times daily but was negative for C.diff and stool softeners were stopped. Plastic  surgery recommended wound  vac and it was placed on 2/22, replaced on 2/24. Patient had hyperkalemic episode on 2/34 of potassium 6.0 (repeat from 6.1) with EKG showing  peaked T-waves, ordered insulin & D5 & Ca gluconate & Lokelma 10 mg TID, held Spironolactone . Hyperkalemia resolved with these interventions. Patient had another wound vac replaced on 2/27. Went to OR with plastic surgery on 2/28 for debridement. Patient went for another debridement on 3/7 with plastic surgery; noted to have improved since previous  procedure but still required continued debridement with next procedure planned for 3/14. Going forward patient will no longer need any more debridements at this time, set for wound vac with changes MWF. Pending SNF placement    Updates 3/18:  -C/w vancomcyin  -RT will see patient today to discuss bipap    Cx history:     ·  2/10 OSH bedside wound cx - MRSA (final)      ·  2/15 OR deep wound cs - NGTD (final 2/17)    Abx hx:  1) Vancomcyin 2/10-   2) Zosyn 2/10- 2/19 (d/c given no culture evidence of pseudomonas)      #MRSA sacral wound infection  Micro:   -BCx 2/10 x2 NGTD  -[place] Wound cx 2/10/23: MRSA (S: clinda, vanc; R: TMP-SMX, tetracyclines)  -Intraoperative wound cx 2/15/23: no growth aerobically or anaerobically   - S/p below on 2/15:   1. exploration of lumbar wound dehiscence  2. removal of lumbar spinal hardware (set caps, rods, and screws)  3. irrigation and debridement of lumbar wound  - Plastic surgery recs  2/15:   1. WTD Kerlix dressings with Dakin's/Saline solution packed to the entire depth of the wound three times daily and PRN if soiled   -Lumbar CT without contrast, obtained on 2/18: soft tissue defect overlying lumbar and lower thoracic spine, soft tissue thickening, scattered soft tissue emphysema; multilevel degenerative changes of lumbar spine  - Plastics decided to place wound vac on 2/22 instead of I&D, reassessed and replaced wound vac on 2/24 and on 2/27. Went to OR on 2/28  for lumbar wound excision and debridement and wound vac placement.  plan to go back to OR on 3/7.  -Patient underwent debridement on 3/7, additional debridement on 3/14 going forward  Abx:   Plan:   1) Vancomcyin 2/10 - 4/12 ( need to extend due to debridement on 3/14)  2) Zosyn 2/10- 2/19 (d/c given no culture evidence of pseudomonas)  3) No need for further debridement patient will be discharged to SNF with MWF wound vac changes  4) Follow up with Infectious disease on april 25th, 2023    #Chronic pain - lower back  -home pain regimen: scheduled acetaminophen, fentanyl patch 50mcg q72h, dilaudid 4mg PO TID prn breakthrough  -bowel regimen  -follows with pain medicine Dr. 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 Recommend follow up outpatient. Currently on Lokelma.   -6.0 on 2/24/2023 with EKG of peaked T-waves, 5.8 on 2/23 resolved peaked T-waves  -hx of hyperkalemic episodes  on review  -no CKD  -gave insulin, d50, calcium gluconate, and lokelma TID  for 48 hrs  -held maxime  -conitnue to monitor  -continue lokelma    #COPD, 4L at baseline  -SpO2 goal 88-92%  -Started on proventill (takes at home per wife)     #HFpEF   #HTN  -TTE Feb 2022: EF 65-70%, normal LV diastolic function, normal RV  -Hold ASA for possible procedure  -C/w home atorva, metop 12.5 BID, maxime 50 BID, losartan 50 qd, amlodipine 10  -Held maxime for hyperkalemia on 2/24    #Seizure disorder - continue Keppra  #Anxiety - continue Buspar   #BPH - continue tamsulosin   #Gout - continue allopurinol     #CHILANGO  -Not compliant with CPAP at home  -Elevated bicarb inpatient, likely chronic    DVT ppx: lovenox   Code status: FULL (confirmed on  admission)  NOK: Wife Mike [phone]      Attestation:   Note Completion:  I am a:  Resident/Fellow   Attending Attestation I saw and evaluated the patient.  I personally obtained the key and critical portions of the history and physical exam or was physically present for key and  critical portions performed by the resident/fellow. I reviewed the resident/fellow?s documentation and discussed the patient with the resident/fellow.  I agree with the resident/fellow?s medical decision making as documented in the note.     I personally evaluated the patient on 18-Mar-2023         Electronic Signatures:  Cande Guillermo (Resident))  (Signed 18-Mar-2023 11:00)   Authored: Service, Subjective Data, Objective Data, Assessment  and Plan, Note Completion  Angel Ham)  (Signed 18-Mar-2023 11:21)   Authored: Note Completion   Co-Signer: Service, Subjective Data, Objective Data, Assessment and Plan, Note Completion      Last Updated: 18-Mar-2023 11:21 by Angel Ham)

## 2023-09-14 NOTE — PROGRESS NOTES
Service: Plastic Surgery     Subjective Data:   RAJ HARMON is a 67 year old Male who is Hospital Day # 12 and POD #10 for 1. exploration of lumbar wound dehiscence;2. removal of lumbar spinal hardware (set caps, rods, and screws);3. irrigation  and debridement of lumbar wound.     No acute concerns. +BM this morning. WV intact. Denies fever, chest pain, SOB, abd pain, n/v/d.    Objective Data:     Objective Information:      T   P  R  BP   MAP  SpO2   Value  36.1  81  18  126/56      95%  Date/Time 2/25 5:12 2/25 5:12 2/25 5:12 2/25 5:12    2/25 5:12  Range  (36.1C - 36.7C )  (77 - 104 )  (16 - 18 )  (126 - 155 )/ (56 - 71 )    (93% - 100% )   As of 24-Feb-2023 20:42:00, patient is on 4 L/min of oxygen via nasal cannula.      Pain reported at 2/25 9:57: 8 = Severe    Physical Exam by System:    Constitutional: Alert and cooperative, NAD   Eyes: EOMI, clear sclera   ENMT: MMM   Head/Neck: NC/AT   Respiratory/Thorax: Unlabored respirations on 4L  NC   Genitourinary: Voiding per urinal/commode   Musculoskeletal: Wound vac intact to lumbar spine  without leak or obstruction   Neurological: A&O x3   Psychological: Appropriate behavior and mood   Skin: Warm and dry, no lesions, no rashes     Recent Lab Results:    Results:    CBC: 2/25/2023 05:28              \     Hgb     /                              \     7.9 L    /  WBC  ----------------  Plt               8.1       ----------------    290              /     Hct     \                              /     26.7 L    \            RBC: 2.38 L    MCV: 112 H    Neutrophil  %: 73.8      RFP: 2/25/2023 05:28  NA+        Cl-     BUN  /                         136    95 L   29 H  /  --------------------------------  Glucose                ---------------------------  122 H    K+     HCO3-   Creat \                         5.8 H   39 H    0.97  \  Calcium : 10.1Anion Gap : 8 L         Albumin : 3.0 L     Phos : 3.7      Assessment and Plan:   Daily Risk  Screen:  ·  Does patient have an indwelling urinary catheter? n/a consulting service   ·  Does patient have a central line? n/a consulting service     Comorbidities:  ·  Comorbidity Other     Code Status:  ·  Code Status Full Code     Assessment:    RAJ HARMON is a 67 year old Male known to the plastic surgery team with a h/o multiple lumbar spinal surgeries which have been c/b recurrent MRSA infections  requiring multiple extensive plastic surgery interventions including multiple debridements and attempted closures. Admitted to [facility] as transfer on 2/14 for concern of MRSA infection of chronically open lumbar wound with exposed hardware. Patient now  s/p lumbar spinal wound I&D and removal of spinal hardware per NSGY on 2/15. Plastic Surgery consulted for spinal wound debridement and coverage.     OR course (this admission):   2/15 - Lumbar spinal wound exploration, I&D and removal of spinal hardware (per NSGY)    Plan/Recommendations:   - Plan to return to OR for debridement of lumbar spine wound, potential flap vs. wound vac application  - Ensure diligent pressure offloading to lumbar spine/wound site with frequent repositioning, q2h turns   - Nutrition optimization to promote wound healing, consider addition of daily MV and nutritional supplements/shakes as able   - Cx history:     ·  2/10 OSH bedside wound cx - MRSA (final)      ·  2/15 OR deep wound cs - NGTD (final 2/17)  - Continue IV ABX per ID recs (currently IV Vanc)   - Appreciate remaining supportive care per primary service   - Plastics will continue to follow    Aisha Shannon PA-C  Plastic and Reconstructive Surgery  Doc [facility], Pager [phone], Team phones: [phone]    Time spent on the assessment of patient, gathering and interpreting data, review of medical record/patient history, personally reviewing radiographic imaging and formulation of this note 30 minutes. With greater than 50% spent in personal discussion with   patient.        Electronic Signatures:  Aisha Shannon (PAC)  (Signed 25-Feb-2023 11:05)   Authored: Service, Subjective Data, Objective Data, Assessment  and Plan, Note Completion      Last Updated: 25-Feb-2023 11:05 by Aisha Shannon (PAC)

## 2023-09-14 NOTE — PROGRESS NOTES
Service: Plastic Surgery     Subjective Data:   RAJ HARMON is a 67 year old Male who is Hospital Day # 9 and POD #7 for 1. exploration of lumbar wound dehiscence;2. removal of lumbar spinal hardware (set caps, rods, and screws);3. irrigation  and debridement of lumbar wound.     No acute concerns. Aware of plans for vac change on Friday, and following plans pending wound appearance. Tolerating diet. Denies any issues with vac overnight.     Denies any fever, chills, night sweats, CP, SOB, palpitations, nausea, vomiting, or abdominal pain/discomfort.    Overnight Events: Patient had an uneventful night.     Objective Data:     Objective Information:      T   P  R  BP   MAP  SpO2   Value  36.5  78  18  126/66      96%  Date/Time 2/22 6:12 2/22 6:12 2/22 6:12 2/22 6:12    2/22 6:12  Range  (36.1C - 37.1C )  (76 - 100 )  (16 - 19 )  (121 - 152 )/ (58 - 73 )    (96% - 99% )   As of 22-Feb-2023 01:19:00, patient is on 4 L/min of oxygen via nasal cannula.  Highest temp of 37.1 C was recorded at 2/21 14:35      Pain reported at 2/22 8:33: 8 = Severe    Physical Exam by System:    Constitutional: Alert and cooperative, NAD   Eyes: EOMI, PERRL.   ENMT: MMM, no ulcerations/lesions   Head/Neck: NC/AT   Respiratory/Thorax: Unlabored respirations on 4L  NC.   Cardiovascular: Regular rate, extremities W/WP.   Gastrointestinal: Soft, large panus, nt/nd.   Genitourinary: Voiding per urinal/commode   Musculoskeletal: Generalized deconditioning.   Neurological: A&O x3.   Psychological: Appropriate behavior and mood.   Skin: Stage 1 ulcer on L heal. Wound vac in place  to  lumbar spinal wound. Vac maintaining suction without leak/blockage. Scant serosanguinous drainage in canister. Mild periwound erythema.     Medication:    Medications:      1. Vancomycin - RPh to Dose - IV Piggy Back:  1  each  As Specified  Variable      ALTERNATIVE MEDICINES:    1. Melatonin:  3  mg  Oral  At Bedtime   PRN          ANTI-INFECTIVES:    1. Vancomycin 1 gram IVPB/ Premixed Soln 200 mL:  1  gram(s)  IntraVenous Piggyback  Every 12 Hours      CARDIOVASCULAR AGENTS:    1. Spironolactone:  50  mg  Oral  2 Times a Day    2. Tamsulosin:  0.4  mg  Oral  Daily    3. Metoprolol Tartrate:  12.5  mg  Oral  2 Times a Day    4. amLODIPine (NORVASC):  10  mg  Oral  Daily      CENTRAL NERVOUS SYSTEM AGENTS:    1. Acetaminophen:  650  mg  Oral  Once   PRN       2. Acetaminophen:  650  mg  Oral  Every 6 Hours    3. fentaNYL 50 micrograms/ hour TransDermal:  1  patch  TransDermal  Every 72 Hours    4. HYDROmorphone Injectable:  1  mg  IntraVenous Push  Every 3 Hours   PRN       5. HYDROmorphone Injectable:  2  mg  IntraVenous Push  Every 3 Hours   PRN       6. levETIRAcetam (KEPPRA):  500  mg  Oral  2 Times a Day    7. busPIRone (BUSPAR):  5  mg  Oral  Every 12 Hours      COAGULATION MODIFIERS:    1. Enoxaparin SubCutaneous:  40  mg  SubCutaneous  Every 24 Hours      GASTROINTESTINAL AGENTS:    1. Docusate 50 mg - Senna 8.6 m  tablet(s)  Oral  2 Times a Day    2. Polyethylene Glycol:  17  gram(s)  Oral  Daily      METABOLIC AGENTS:    1. Allopurinol:  300  mg  Oral  Every 24 Hours    2. Atorvastatin:  20  mg  Oral  Daily      MISCELLANEOUS AGENTS:    1. Naloxone Injectable:  0.2  mg  IntraVenous Push  Once   PRN         NUTRITIONAL PRODUCTS:    1. Sodium Chloride 0.9% Injectable Flush:  10  mL  IntraVenous Flush  Every 8 Hours and as Needed   PRN       2. Ascorbic Acid:  1000  mg  Oral  Daily      RESPIRATORY AGENTS:    1. diphenhydrAMINE:  25  mg  Oral  Every 24 Hours   PRN       2. Albuterol 90 micrograms/ Inhalation MDI:  2  inhalation  Inhalation  Every 6 Hours   PRN         TOPICAL AGENTS:    1. Sodium Hypochlorite 0.125% (Dakins Quarter):  1  application(s)  Topical  3 Times a Day      Recent Lab Results:    Results:    CBC: 2023 06:33              \     Hgb     /                              \     8.3 L    /  WBC   ----------------  Plt               8.4       ----------------    331              /     Hct     \                              /     28.5 L    \            RBC: 2.55 L    MCV: 112 H    Neutrophil  %: 64.1      RFP: 2/22/2023 06:33  NA+        Cl-     BUN  /                         140    99    25 H /  --------------------------------  Glucose                ---------------------------  115 H    K+     HCO3-   Creat \                         4.8    37 H   0.96  \  Calcium : 9.8Anion Gap : 9 L         Albumin : 3.2 L     Phos : 3.3      Radiology Results:    Results:    Impression:  1. There has been interval removal of the posterior fusion hardware  from the lumbar spine. There is again a large soft tissue defect with  marked thinning of the skin and subcutaneous soft tissues and  probable dehiscence overlying the lumbar and lower thoracic spine  with nonspecific soft tissue thickening and loss of fat soft tissue  planes as well as scattered soft tissue emphysema along the periphery  of the defect. The possibility of osteomyelitis cannot be excluded on  the basis of this examination.  2. Marked, multilevel degenerative changes of the lumbar spine with  multilevel postoperative changes from laminectomies and  posterolateral fusion masses.    CT L Spine without Contrast [Feb 18 2023  2:16PM]      Assessment and Plan:   Daily Risk Screen:  ·  Does patient have an indwelling urinary catheter? n/a consulting service   ·  Does patient have a central line? n/a consulting service     Comorbidities:  ·  Comorbidity Other     Code Status:  ·  Code Status Full Code     Assessment:    RAJ HARMON is a 67 year old Male known to the plastic surgery team with a h/o multiple lumbar spinal surgeries which have been c/b recurrent MRSA infections  requiring multiple extensive plastic surgery interventions including multiple debridements and attempted closures. Admitted to [facility] as transfer on 2/14 for concern of MRSA  infection of chronically open lumbar wound with exposed hardware. Patient now  s/p lumbar spinal wound I&D and removal of spinal hardware per NSGY on 2/15. Plastic Surgery consulted for spinal wound debridement and coverage.     OR course (this admission):   2/15 - Lumbar spinal wound exploration, I&D and removal of spinal hardware (per NSGY)    Plan/Recommendations:   - Plan for vac change with plastics on Friday 2/24.     · Further planning pending wound appearance during next vac change  - Ensure diligent pressure offloading to lumbar spine/wound site with frequent repositioning, q2h turns   - Nutrition optimization to promote wound healing      ·  Can consider addition of daily MV and nutritional supplements/shakes as able   - Cx history:     ·  2/10 OSH bedside wound cx - MRSA (final)      ·  2/15 OR deep wound cs - NGTD (final 2/17)  - Continue IV ABX per ID recs (currently IV Vanc)   - Appreciate remaining supportive care per primary service   - Plastics will continue to follow    Patient and plan discussed with Dr. Jaime.     FIDEL Ace-CNP  Plastic and Reconstructive Surgery  Doc [facility], Pager #[phone], Team phones: [phone]     Time spent on the assessment of patient, gathering and interpreting data, review of medical record/patient history, personally reviewing radiographic imaging and formulation of this note 30 minutes. With greater than 50% spent in personal discussion with  patient.       Electronic Signatures:  Angel Johnson (APRREBECA-CNP)  (Signed 22-Feb-2023 10:04)   Authored: Service, Subjective Data, Objective Data, Assessment  and Plan, Note Completion      Last Updated: 22-Feb-2023 10:04 by Angel Johnson (APRN-CNP)

## 2023-09-14 NOTE — PROGRESS NOTES
Service: Plastic Surgery     Subjective Data:   RAJ HARMON is a 67 year old Male who is Hospital Day # 28 and POD #5 for Debridement washout, vac placement.     NAEON. WV fell off, changed at bedside. Denies fever, chest pain, SOB, abd pain, n/v/d.    Objective Data:     Objective Information:      T   P  R  BP   MAP  SpO2   Value  36.7  63  18  139/54   91  100%  Date/Time 3/13 5:31 3/13 5:31 3/13 5:31 3/13 5:31  3/12 15:01 3/13 5:31  Range  (36C - 36.7C )  (60 - 69 )  (18 - 20 )  (139 - 148 )/ (54 - 76 )  (91 - 91 )  (97% - 100% )   As of 12-Mar-2023 23:02:00, patient is on 4 L/min of oxygen via nasal cannula.      Pain reported at 3/13 5:31: 8 = Severe    Physical Exam by System:    Constitutional: Alert and cooperative, NAD   Eyes: EOMI, clear sclera   ENMT: MMM   Head/Neck: NC/AT   Respiratory/Thorax: Unlabored respirations on 4L  NC, symmetrical chest rise   Cardiovascular: Extremities WWP   Gastrointestinal: Protuberant, soft ND/NT   Musculoskeletal: Wound vac dressing intact to midline  lumbar spine   Extremities: ROJO x4, generalized deconditioned strength.   Neurological: A&O x3   Psychological: Appropriate behavior and mood.   Skin: Warm and dry, no lesions, no rashes     Assessment and Plan:   Daily Risk Screen:  ·  Does patient have an indwelling urinary catheter? n/a consulting service   ·  Does patient have a central line? n/a consulting service     Code Status:  ·  Code Status Full Code     Assessment:    RAJ HARMON is a 67 year old Male known to the plastic surgery team with a h/o multiple lumbar spinal surgeries which have been c/b recurrent MRSA infections  requiring multiple extensive plastic surgery interventions including multiple debridements and attempted closures. Admitted to [facility] as transfer on 2/14 for concern of MRSA infection of chronically open lumbar wound with exposed hardware. Patient now  s/p lumbar spinal wound I&D and removal of spinal hardware per NSGY on  2/15. Plastic Surgery consulted for spinal wound debridement and coverage.     OR course (this admission):   2/15 - Lumbar spinal wound exploration, I&D and removal of spinal hardware (per NSGY)  2/28 - Lumbar wound excision and debridement down to and including subcutaneous tissue, partial adjacent tissue transfer, wound vac application (per plastic surgery)   3/7 - Lumbar spinal wound irrigation and debridement, application of wound vac (per plastic surgery)    Plan/Recommendations:   - Return to OR with plastics tomorrow 3/14 for additional lumbar spinal wound I&D and wound  vac application  -> npo @ [facility], covid and T&S collected   - Continue interim vac therapy to lumbar wound until return to OR     ·  Settings: 125 mmHg low continuous suction      ·  Please keep dressing C/D/I, reinforce PRN      ·  Record vac output quality and quantity per nursing q8h      ·  Please alert plastics team with any concerns regarding vac      ·  Last change 3/13, next change to occur in OR 3/14   - Diligent pressure offloading to lumbar spine/wound site with q2h turns and frequent repositioning in bed  - Nutrition optimization to promote wound healing     ·  Consider addition of daily MV and nutritional supplements/shakes as able   - Continue IV ABX per ID recs/primary   - Culture history:     · 2/10 OSH bedside withound cx - MRSA (final 2/12)      · 2/15 OR deep wound cx - NGTD (final 2/17)  - Appreciate remaining supportive care per primary service   - Plastics will continue to follow    Patient and plan discussed with Dr. Yeni Shannon, PA-C  Plastic and Reconstructive Surgery  [facility], Pager [phone], Team phones: [phone]    Time spent on the assessment of patient, gathering and interpreting data, review of medical record/patient history, personally reviewing radiographic imaging and formulation of this note 30 minutes. With greater than 50% spent in personal discussion with  patient.        Electronic  Signatures:  Aisha Shannon (PAC)  (Signed 13-Mar-2023 12:44)   Authored: Service, Subjective Data, Objective Data, Assessment  and Plan, Note Completion      Last Updated: 13-Mar-2023 12:44 by Aisha Shannon (PAC)

## 2023-09-14 NOTE — PROGRESS NOTES
Service: Plastic Surgery     Subjective Data:   RAJ HARMON is a 67 year old Male who is Hospital Day # 10 and POD #8 for 1. exploration of lumbar wound dehiscence;2. removal of lumbar spinal hardware (set caps, rods, and screws);3. irrigation  and debridement of lumbar wound.     Pt in NAD. Evaluated bedside, currently working with OT. Pt states he is overall doing well and denies any issues in the PM interval with wound vac. He endorses tolerating diet, urinating and passing  gas.   Denies any fever, chills, night sweats, CP, SOB, palpitations, nausea, vomiting, diarrhea, abdominal pain.    Objective Data:     Objective Information:      T   P  R  BP   MAP  SpO2   Value  36.9  82  18  117/70   90  96%  Date/Time 2/23 9:21 2/23 9:21 2/23 9:21 2/23 9:21 2/23 4:43 2/23 9:21  Range  (36.3C - 36.9C )  (69 - 84 )  (18 - 18 )  (107 - 146 )/ (60 - 73 )  (90 - 90 )  (96% - 97% )   As of 22-Feb-2023 08:33:00, patient is on 4 L/min of oxygen via nasal cannula with humidification.  Highest temp of 36.9 C was recorded at 2/23 9:21      Pain reported at 2/23 7:01: 8 = Severe    Physical Exam by System:    Constitutional: Alert and cooperative, NAD   Eyes: EOMI, PERRL.   ENMT: MMM, no ulcerations/lesions   Head/Neck: NC/AT   Respiratory/Thorax: Unlabored respirations on 4L  NC.   Cardiovascular: Regular rate, extremities W/WP.   Gastrointestinal: Soft, large panus, nt/nd.   Genitourinary: Voiding per urinal/commode   Musculoskeletal: Generalized deconditioning.   Neurological: A&O x3.   Psychological: Appropriate behavior and mood.   Skin: Stage 1 ulcer on L heal. Wound vac in place  to  lumbar spinal wound. Vac maintaining suction without leak/blockage. Scant serosanguinous drainage in canister. Periwound erythema. Surrounding tissue remains, soft with minimal ttp. No areas of palpable fluid collection or areas of induration at/  around the vac site.     Recent Lab Results:    Results:    CBC: 2/23/2023 06:01               \     Hgb     /                              \     7.1 L    /  WBC  ----------------  Plt               6.1       ----------------    276              /     Hct     \                              /     25.6 L    \            RBC: 2.18 L    MCV: 117 H          RFP: 2/23/2023 06:01  NA+        Cl-     BUN  /                         Canceled    Canceled    Canceled  /  --------------------------------  Glucose                ---------------------------  Canceled    K+     HCO3-   Creat \                         Canceled    Canceled    Canceled  \  Calcium : CanceledAnion Gap : Canceled          Albumin : Canceled     Phos : Canceled The performance characteristics of phosphorus testing in   heparinized plasma have been validated by the individual     laboratory site where testing is performed. Testing    on heparinized plasma is not approved by the FDA;    however, suc      Radiology Results:    Results:    Impression:  1. There has been interval removal of the posterior fusion hardware  from the lumbar spine. There is again a large soft tissue defect with  marked thinning of the skin and subcutaneous soft tissues and  probable dehiscence overlying the lumbar and lower thoracic spine  with nonspecific soft tissue thickening and loss of fat soft tissue  planes as well as scattered soft tissue emphysema along the periphery  of the defect. The possibility of osteomyelitis cannot be excluded on  the basis of this examination.  2. Marked, multilevel degenerative changes of the lumbar spine with  multilevel postoperative changes from laminectomies and  posterolateral fusion masses.    CT L Spine without Contrast [Feb 18 2023  2:16PM]      Assessment and Plan:   Daily Risk Screen:  ·  Does patient have an indwelling urinary catheter? n/a consulting service   ·  Does patient have a central line? n/a consulting service     Comorbidities:  ·  Comorbidity Other     Code Status:  ·  Code Status Full Code      Assessment:    RAJ HARMON is a 67 year old Male known to the plastic surgery team with a h/o multiple lumbar spinal surgeries which have been c/b recurrent MRSA infections  requiring multiple extensive plastic surgery interventions including multiple debridements and attempted closures. Admitted to [facility] as transfer on 2/14 for concern of MRSA infection of chronically open lumbar wound with exposed hardware. Patient now  s/p lumbar spinal wound I&D and removal of spinal hardware per NSGY on 2/15. Plastic Surgery consulted for spinal wound debridement and coverage.     OR course (this admission):   2/15 - Lumbar spinal wound exploration, I&D and removal of spinal hardware (per NSGY)    A: Pt overall doing well, discussed plans for bedside change 2/24. Pt expressed understanding and is agreeable.     Plan/Recommendations:   - Plan for vac change with plastics on Friday 2/24.     · Further planning pending wound appearance during next vac change  - Ensure diligent pressure offloading to lumbar spine/wound site with frequent repositioning, q2h turns   - Nutrition optimization to promote wound healing      ·  Can consider addition of daily MV and nutritional supplements/shakes as able   - Cx history:     ·  2/10 OSH bedside wound cx - MRSA (final)      ·  2/15 OR deep wound cs - NGTD (final 2/17)  - Continue IV ABX per ID recs (currently IV Vanc)   - Appreciate remaining supportive care per primary service   - Plastics will continue to follow    Patient and plan discussed with Dr. Jaime.     Shirley Romero PA-C   Plastic and Reconstructive Surgery    Available via [facility], pager: [phone] or Team phones: [phone]     Time spent on the assessment of patient, gathering and interpreting data, review of medical record/patient history, personally reviewing radiographic imaging and formulation of this note 30 minutes. With greater than 50% spent in personal discussion with  patient.        Electronic  Signatures:  Shirley Romero (PA (PHYSICIAN))  (Signed 23-Feb-2023 10:16)   Authored: Service, Subjective Data, Objective Data, Assessment  and Plan, Note Completion      Last Updated: 23-Feb-2023 10:16 by Shirley Romero (PA (PHYSICIAN))

## 2023-09-14 NOTE — PROGRESS NOTES
Service: Infectious Disease     Subjective Data:   RAJ HARMON is a 67 year old Male who is Hospital Day # 20 and POD #5 for 1. Excision and debridement down to and including subcutaneous tissue;2. Partial adjacent tissue transfer;3. ;4. ;5.     No acute events overnight. Patient evaluated at bedside. Having regular bowel movements. Denies fevers, chills, CP, SOB, N/V.    Objective Data:     Objective Information:      T   P  R  BP   MAP  SpO2   Value  36.5  69  18  123/54      95%  Date/Time 3/5 5:58 3/5 5:58 3/5 5:58 3/5 5:58    3/5 5:58  Range  (36.5C - 37C )  (69 - 88 )  (18 - 19 )  (116 - 155 )/ (51 - 80 )    (95% - 97% )   As of 04-Mar-2023 20:36:00, patient is on 4 L/min of oxygen via nasal cannula.  Highest temp of 37 C was recorded at 3/4 5:46      Pain reported at 3/5 5:26: 8 = Severe    Physical Exam Narrative:  ·  Physical Exam:    Gen: well-appearing, NAD  Head and neck: NCAT, neck supple without LAD, severe kyphoscoliosis   HEENT: MMM, normal nose without congestion, poor dentition   CV: RRR no mrg  Pulm: CTAB, prolonged exp phase, no wheezing or crackles, normal WOB on 4L  Abd: obese, soft, non-tender, non-distended  Ext: no cyanosis or clubbing, trace LE edema, thenar wasting bilat; 2-3cm diameter stage 1 ulcer on L heel  Neuro: alert and oriented x3, normal tone, face symmetric, moves all extremities spontaneously. mild tremor in hands bilaterally  Back: Wound vac placed and secured. No blood oozing or purulence. mild skin irritation under the wound vac tape      Medication:    Medications:          Continuous Medications       --------------------------------  No continuous medications are active       Scheduled Medications       --------------------------------    1. Acetaminophen:  650  mg  Oral  Every 6 Hours    2. Allopurinol:  300  mg  Oral  Every 24 Hours    3. amLODIPine (NORVASC):  10  mg  Oral  Daily    4. Ascorbic Acid:  1000  mg  Oral  Daily    5. Atorvastatin:  20  mg  Oral   Daily    6. busPIRone (BUSPAR):  5  mg  Oral  Every 12 Hours    7. Docusate 50 mg - Senna 8.6 m  tablet(s)  Oral  2 Times a Day    8. Enoxaparin SubCutaneous:  40  mg  SubCutaneous  Every 24 Hours    9. fentaNYL 50 micrograms/ hour TransDermal:  1  patch  TransDermal  Every 72 Hours    10. Hydrocortisone 1% Topical:  1  application(s)  Topical  2 Times a Day    11. levETIRAcetam (KEPPRA):  500  mg  Oral  2 Times a Day    12. Metoprolol Tartrate:  12.5  mg  Oral  2 Times a Day    13. Polyethylene Glycol:  17  gram(s)  Oral  Daily    14. Sodium Hypochlorite 0.125% (Dakins Quarter):  1  application(s)  Topical  3 Times a Day    15. Sodium Zirconium Cyclosilicate:  10  gram(s)  Oral  Daily    16. Tamsulosin:  0.4  mg  Oral  Daily    17. Vancomycin - RPh to Dose - IV Piggy Back:  1  each  As Specified  Variable    18. Vancomycin 750 mg/ D5W IVPB Premixed Soln 150 mL:  750  mg  IntraVenous Piggyback  Every 12 Hours         PRN Medications       --------------------------------    1. Albuterol 2.5 mg - Ipratropium 0.5 mg/ 3 mL Neb Soln:  3  mL  Inhalation  Every 6 Hours    2. diphenhydrAMINE:  25  mg  Oral  Every 24 Hours    3. Heparin Flush 10 unit/ mL Injectable PRN:  5  mL  IntraVenous Flush  According to Flush Policy    4. HYDROmorphone Injectable:  2  mg  IntraVenous Push  Every 3 Hours    5. Naloxone Injectable:  0.2  mg  IntraVenous Push  Once    6. Sodium Chloride 0.9% Injectable Flush:  10  mL  IntraVenous Flush  Every 8 Hours and as Needed        Recent Lab Results:    Results:        I have reviewed these laboratory results:    Renal Function Panel  05-Mar-2023 05:27:00      Result Value    Lab Comment:  BIC CALLED RB TO SANDEEP SOLER, 2023 08:32    Glucose, Serum  99    NA  139    K  4.4    CL  95   L   Bicarbonate, Serum  40   HH   Anion Gap, Serum  8   L   BUN  30   H   CREAT  0.80    GFR Male  >90    Calcium, Serum  9.4    Phosphorus, Serum  3.0    ALB  2.8   L       Assessment and Plan:    Daily Risk Screen:  ·  Does patient have a central line? yes   ·  Central Line Type PICC   ·  Plan for PICC removal today? no   ·  The patient continues to require a PICC for parenteral medication     Comorbidities:  ·  Comorbidity Other     Code Status:  ·  Code Status Full Code     Assessment:    67 year old Male with CHF, COPD on 4L at home, seizure disorder and a history of multiple lumbar spine surgeries complicated  by MRSA wound infection with a known chronic open lumbar wound status post multiple debridements by plastic surgery, who presented to [place] with worsening low back pain as well as generalized fatigue and weakness, found to have MRSA infection of sacral  wound with exposed hardware, and elevated inflammatory markers (ESR >130,  CRP elevated at 5.26). He presented to [facility] as a transfer for surgical evaluation by spine team. He was started on vancomcyin and zosyn  on 2/10 in [place], which were continued on admission. BCx from 2/10 with NGTD. Nsgy was consulted, and he underwent removal of lumbar spinal hardware and irrigation and debridement of lumbar wound on 2/15. Plastic surgery was consulted and recommended  leaving wound open with TID dressing changes and plan for return to OR on 2/22 for lumbar spinal wound I&D and potential wound vac. Intraoperative wound cultures had no growth aerobically or anaerobically. Pt has a hx of chronic pain, and pain was managed  after surgery with fentanyl patch (per home regimen), Tylenol (per home regimen), and IV dilaudid 1mg IV q3h for moderate pain and 2mg IV q3h for severe pain. Pain regimen can be down-titrated to home regimen after plastic surgery intervention. Zosyn  was discontinued on 2/19 given no evidence of pseudomonas. Pt continues to be on vancomycin. Patient was having loose bowel movements multiple times daily but was negative for C.diff and stool softeners were stopped. Plastic surgery recommended wound  vac and it was placed on 2/22, replaced  on 2/24. Patient had hyperkalemic episode on 2/34 of potassium 6.0 (repeat from 6.1) with EKG showing  peaked T-waves, ordered insulin & D5 & Ca gluconate & Lokelma 10 mg TID, held Spironolactone . Hyperkalemia resolved with these interventions. Patient had another wound vac replaced on 2/27. Went to OR with plastic surgery on 2/28 for debridement.     Updates 3/5:  -Lumbar wound excision and debridement w/ wound vac on 2/28. Plan to go to OR again on 3/7  -Repeat T&S and Covid, surgery on 3/7  -Hyperkalemia resolving, c/w Lokelma once daily   -Vanc to continue for 8 weeks until 2/15 - 4/12  -Pain regimen currently at dilaudid IV 2mg q3h as needed      Cx history:     ·  2/10 OSH bedside wound cx - MRSA (final)      ·  2/15 OR deep wound cs - NGTD (final 2/17)    Abx hx:  1) Vancomcyin 2/10-   2) Zosyn 2/10- 2/19 (d/c given no culture evidence of pseudomonas)      #MRSA sacral wound infection  Micro:   -BCx 2/10 x2 NGTD  -[place] Wound cx 2/10/23: MRSA (S: clinda, vanc; R: TMP-SMX, tetracyclines)  -Intraoperative wound cx 2/15/23: no growth aerobically or anaerobically   - S/p below on 2/15:   1. exploration of lumbar wound dehiscence  2. removal of lumbar spinal hardware (set caps, rods, and screws)  3. irrigation and debridement of lumbar wound  - Plastic surgery recs  2/15:   1. WTD Kerlix dressings with Dakin's/Saline solution packed to the entire depth of the wound three times daily and PRN if soiled   -Lumbar CT without contrast, obtained on 2/18: soft tissue defect overlying lumbar and lower thoracic spine, soft tissue thickening, scattered soft tissue emphysema; multilevel degenerative changes of lumbar spine  - Plastics decided to place wound vac on 2/22 instead of I&D, reassessed and replaced wound vac on 2/24 and on 2/27. Went to OR on 2/28 for lumbar wound excision and debridement and wound vac placement.  plan to go back to OR on 3/7.  Abx:   Plan:   1) Vancomcyin 2/10 - 4/12   2) Zosyn 2/10- 2/19 (d/c  given no culture evidence of pseudomonas)    #Chronic pain - lower back  -home pain regimen: scheduled acetaminophen, fentanyl patch 50mcg q72h, dilaudid 4mg PO TID prn breakthrough  -bowel regimen  -follows with pain medicine Dr. 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 Recommend follow up outpatient. Currently on Lokelma.   -6.0 on 2/24/2023 with EKG of peaked T-waves, 5.8 on 2/23 resolved peaked T-waves  -hx of hyperkalemic episodes  on review  -no CKD  -gave insulin, d50, calcium gluconate, and lokelma TID  for 48 hrs  -held maxime  -conitnue to monitor  - continue lokelma    #COPD, 4L at baseline  -SpO2 goal 88-92%  -Started on proventill (takes at home per wife)     #HFpEF   #HTN  -TTE Feb 2022: EF 65-70%, normal LV diastolic function, normal RV  -Hold ASA for possible procedure  -C/w home atorva, metop 12.5 BID, maxime 50 BID, losartan 50 qd, amlodipine 10  -Held maxime for hyperkalemia on 2/24    #Seizure disorder - continue Keppra  #Anxiety - continue Buspar   #BPH - continue tamsulosin   #Gout - continue allopurinol     #CHILANGO  -Not compliant with CPAP at home  -Elevated bicarb inpatient, likely chronic    DVT ppx: lovenox   Code status: FULL (confirmed on admission)  NOK: Wife Mike [phone]            Attestation:   Note Completion:  I am a:  Resident/Fellow   Attending Attestation I saw and evaluated the patient.  I personally obtained the key and critical portions of the history and physical exam or was physically present for key and  critical portions performed by the resident/fellow. I reviewed the resident/fellow?s documentation and discussed the patient with the resident/fellow.  I agree with the  resident/fellow?s medical decision making as documented in the note.     I personally evaluated the patient on 05-Mar-2023         Electronic Signatures:  Mmae Kent (MD)  (Signed 05-Mar-2023 17:55)   Authored: Note Completion   Co-Signer: Service, Subjective Data, Objective Data, Assessment and Plan, Note Completion  Freddie Caraballo (Resident))  (Signed 05-Mar-2023 09:40)   Authored: Service, Subjective Data, Objective Data, Assessment  and Plan, Note Completion      Last Updated: 05-Mar-2023 17:55 by Mame Kent)

## 2023-09-14 NOTE — PROGRESS NOTES
Service: Plastic Surgery     Subjective Data:   RAJ HARMON is a 67 year old Male who is Hospital Day # 20 and POD #5 for 1. Excision and debridement down to and including subcutaneous tissue;2. Partial adjacent tissue transfer;3. ;4. ;5.     Pt POD 5 from debridement of lumbar spinal wound. Has positional pain in back as well as some itching, but overall pain is well controlled. No additional complaints. No issues with his wound vac overnight.     Denies any fever, chills, night sweats, CP, SOB, palpitations, nausea, vomiting, or abdominal pain/discomfort.    Overnight Events: Patient had an uneventful night.     Objective Data:     Objective Information:      T   P  R  BP   MAP  SpO2   Value  36.5  69  18  123/54      95%  Date/Time 3/5 5:58 3/5 5:58 3/5 5:58 3/5 5:58    3/5 5:58  Range  (36.5C - 37C )  (69 - 88 )  (18 - 19 )  (116 - 155 )/ (51 - 80 )    (95% - 97% )   As of 04-Mar-2023 20:36:00, patient is on 4 L/min of oxygen via nasal cannula.  Highest temp of 37 C was recorded at 3/4 5:46      Pain reported at 3/5 5:26: 8 = Severe    Physical Exam by System:    Constitutional: Alert and cooperative, NAD.   Eyes: EOMI, clear sclera.   ENMT: MMM.   Head/Neck: NC/AT.   Respiratory/Thorax: Unlabored respirations on 4L  NC, symmetrical chest rise   Gastrointestinal: soft nt/nd   Genitourinary: Voiding per urinal/commode.   Musculoskeletal: Wound vac intact to lumbar spine.   Canister with moderate amount of dark bloody drainage. Appropriately TTP throughout wound   Extremities: ROJO   Neurological: A&O x3.   Psychological: Appropriate behavior and mood.   Skin: Warm and dry, no lesions, no rashes.     Medication:    Medications:      1. Vancomycin - RPh to Dose - IV Piggy Back:  1  each  As Specified  Variable      ANTI-INFECTIVES:    1. Vancomycin 750 mg/ D5W IVPB Premixed Soln 150 mL:  750  mg  IntraVenous Piggyback  Every 12 Hours      CARDIOVASCULAR AGENTS:    1. Tamsulosin:  0.4  mg  Oral  Daily     2. Metoprolol Tartrate:  12.5  mg  Oral  2 Times a Day    3. amLODIPine (NORVASC):  10  mg  Oral  Daily      CENTRAL NERVOUS SYSTEM AGENTS:    1. Acetaminophen:  650  mg  Oral  Every 6 Hours    2. fentaNYL 50 micrograms/ hour TransDermal:  1  patch  TransDermal  Every 72 Hours    3. HYDROmorphone Injectable:  2  mg  IntraVenous Push  Every 3 Hours   PRN       4. levETIRAcetam (KEPPRA):  500  mg  Oral  2 Times a Day    5. busPIRone (BUSPAR):  5  mg  Oral  Every 12 Hours      COAGULATION MODIFIERS:    1. Enoxaparin SubCutaneous:  40  mg  SubCutaneous  Every 24 Hours    2. Heparin Flush 10 unit/ mL Injectable PRN:  5  mL  IntraVenous Flush  According to Flush Policy   PRN         GASTROINTESTINAL AGENTS:    1. Docusate 50 mg - Senna 8.6 m  tablet(s)  Oral  2 Times a Day    2. Polyethylene Glycol:  17  gram(s)  Oral  Daily      METABOLIC AGENTS:    1. Allopurinol:  300  mg  Oral  Every 24 Hours    2. Atorvastatin:  20  mg  Oral  Daily      MISCELLANEOUS AGENTS:    1. Naloxone Injectable:  0.2  mg  IntraVenous Push  Once   PRN       2. Sodium Zirconium Cyclosilicate:  10  gram(s)  Oral  Daily      NUTRITIONAL PRODUCTS:    1. Sodium Chloride 0.9% Injectable Flush:  10  mL  IntraVenous Flush  Every 8 Hours and as Needed   PRN       2. Ascorbic Acid:  1000  mg  Oral  Daily      RESPIRATORY AGENTS:    1. diphenhydrAMINE:  25  mg  Oral  Every 24 Hours   PRN       2. Albuterol 2.5 mg - Ipratropium 0.5 mg/ 3 mL Neb Soln:  3  mL  Inhalation  Every 6 Hours   PRN         TOPICAL AGENTS:    1. Sodium Hypochlorite 0.125% (Dakins Quarter):  1  application(s)  Topical  3 Times a Day    2. Hydrocortisone 1% Topical:  1  application(s)  Topical  2 Times a Day      Recent Lab Results:    Results:        RFP: 3/5/2023 05:27  NA+        Cl-     BUN  /                         139    95 L   30 H  /  --------------------------------  Glucose                ---------------------------  99    K+     HCO3-   Creat \                          4.4    40 HH   0.80  \  Calcium : 9.4Anion Gap : 8 L         Albumin : 2.8 L     Phos : 3.0      Assessment and Plan:   Daily Risk Screen:  ·  Does patient have an indwelling urinary catheter? n/a consulting service   ·  Does patient have a central line? n/a consulting service     Comorbidities:  ·  Comorbidity Other     Code Status:  ·  Code Status Full Code     Assessment:    RAJ HARMON is a 67 year old Male known to the plastic surgery team with a h/o multiple lumbar spinal surgeries which have been c/b recurrent MRSA infections  requiring multiple extensive plastic surgery interventions including multiple debridements and attempted closures. Admitted to [facility] as transfer on 2/14 for concern of MRSA infection of chronically open lumbar wound with exposed hardware. Patient now  s/p lumbar spinal wound I&D and removal of spinal hardware per NSGY on 2/15. Plastic Surgery consulted for spinal wound debridement and coverage.     OR course (this admission):   2/15 - Lumbar spinal wound exploration, I&D and removal of spinal hardware (per NSGY)  2/28 - Lumbar wound excision and debridement down to and including subcutaneous tissue, partial adjacent tissue transfer, wound vac application (per plastic surgery)     Plan/Recommendations:   S/p lumbar wound I&D, partial adjacent tissue transfer, wound vac placement   - Plan to return to OR Tuesday 3/7 for additional debridement of lumbar spine wound, potential flap vs wound vac application, patient agreeable  with this plan       · NPO at 0001 3/7       · Update T&S and COVID 3/6   - Maintain wound vac to lumbar spine at -125mmHg low continuous suction,       · No interim bedside changes necessary prior to return to OR 3/7        · Nursing to monitor/record wound vac canister output       · Please notify plastic surgery with any concerns regarding wound vac leak or malfunction   - Continue to ensure diligent pressure offloading to lumbar spine/wound site  with frequent repositioning, Q2h turns, elevate heels from bed  - May cleanse wound/shower during future vac changes  - Nutrition optimization to promote wound healing, consider addition of daily MV and nutritional supplements/shakes as able   - Culture history       · 2/10 OSH bedside withound cx - MRSA (final)        · 2/15 OR deep withound cs - NGTD (final 2/17)       · Urine cultures obtained intraop 2/28 due to concerns of dark/sedimentary urine to more, cx with no growth. UA positive for large leukocyte esterase  - Continue IV ABX per ID recs (currently IV Vanc)   - Appreciate remaining supportive care per primary service   - Plastics will continue to follow    FIDEL Ace-CNP  Plastic and Reconstructive Surgery  [facility], Pager #[phone], Team phones: [phone]    Time spent on the assessment of patient, gathering and interpreting data, review of medical record/patient history, personally reviewing radiographic imaging and formulation of this note 30 minutes. With greater than 50% spent in personal discussion with  patient.            Electronic Signatures:  Angel Johnson (APRN-CNP)  (Signed 05-Mar-2023 09:46)   Authored: Service, Subjective Data, Objective Data, Assessment  and Plan, Note Completion      Last Updated: 05-Mar-2023 09:46 by Angel Johnson (APRN-CNP)

## 2023-09-14 NOTE — PROGRESS NOTES
Service: Infectious Disease     Subjective Data:   RAJ HARMON is a 67 year old Male who is Hospital Day # 12 and POD #10 for 1. exploration of lumbar wound dehiscence;2. removal of lumbar spinal hardware (set caps, rods, and screws);3. irrigation  and debridement of lumbar wound.     No acute events overnight. Patient evaluated at bedside today. Had no acute complaints. No loose stools for the past 48 hrs. Denies fevers, chills, abdominal pain.    Objective Data:     Objective Information:      T   P  R  BP   MAP  SpO2   Value  36.1  81  18  126/56      95%  Date/Time 2/25 5:12 2/25 5:12 2/25 5:12 2/25 5:12    2/25 5:12  Range  (36.1C - 36.7C )  (77 - 104 )  (16 - 18 )  (126 - 155 )/ (56 - 71 )    (93% - 100% )   As of 24-Feb-2023 20:42:00, patient is on 4 L/min of oxygen via nasal cannula.      Pain reported at 2/25 9:57: 8 = Severe      T   P  R  BP   MAP  SpO2   Value  36.1  81  18  126/56      95%  Date/Time 2/25 5:12 2/25 5:12 2/25 5:12 2/25 5:12    2/25 5:12  Range  (36.1C - 36.7C )  (77 - 104 )  (16 - 18 )  (126 - 155 )/ (56 - 71 )    (93% - 100% )   As of 24-Feb-2023 20:42:00, patient is on 4 L/min of oxygen via nasal cannula.    Physical Exam Narrative:  ·  Physical Exam:    Gen: well-appearing, NAD  Head and neck: NCAT, neck supple without LAD, severe kyphoscoliosis   HEENT: MMM, normal nose without congestion, poor dentition   CV: RRR no mrg  Pulm: CTAB, prolonged exp phase, no wheezing or crackles, normal WOB on 4L  Abd: obese, soft, non-tender, non-distended  Ext: no cyanosis or clubbing, trace LE edema, thenar wasting bilat; 2-3cm diameter stage 1 ulcer on L heel  Neuro: alert and oriented x3, normal tone, face symmetric, moves all extremities spontaneously   Back: Wound vac placed and secured. No blood oozing or purulence. skin irritation under the wound vac tape      Medication:    Medications:          Continuous Medications       --------------------------------  No continuous  medications are active       Scheduled Medications       --------------------------------    1. Acetaminophen:  650  mg  Oral  Every 6 Hours    2. Allopurinol:  300  mg  Oral  Every 24 Hours    3. amLODIPine (NORVASC):  10  mg  Oral  Daily    4. Ascorbic Acid:  1000  mg  Oral  Daily    5. Atorvastatin:  20  mg  Oral  Daily    6. busPIRone (BUSPAR):  5  mg  Oral  Every 12 Hours    7. Docusate 50 mg - Senna 8.6 m  tablet(s)  Oral  2 Times a Day    8. Enoxaparin SubCutaneous:  40  mg  SubCutaneous  Every 24 Hours    9. fentaNYL 50 micrograms/ hour TransDermal:  1  patch  TransDermal  Every 72 Hours    10. Hydrocortisone 1% Topical:  1  application(s)  Topical  2 Times a Day    11. levETIRAcetam (KEPPRA):  500  mg  Oral  2 Times a Day    12. Metoprolol Tartrate:  12.5  mg  Oral  2 Times a Day    13. Polyethylene Glycol:  17  gram(s)  Oral  Daily    14. Sodium Hypochlorite 0.125% (Dakins Quarter):  1  application(s)  Topical  3 Times a Day    15. Sodium Zirconium Cyclosilicate:  10  gram(s)  Oral  3 Times a Day    16. Tamsulosin:  0.4  mg  Oral  Daily    17. Vancomycin - RPh to Dose - IV Piggy Back:  1  each  As Specified  Variable    18. Vancomycin 500 mg IVPB/ Premixed Soln 100 mL:  500  mg  IntraVenous Piggyback  Every 12 Hours         PRN Medications       --------------------------------    1. Acetaminophen:  650  mg  Oral  Once    2. Albuterol 90 micrograms/ Inhalation MDI:  2  inhalation  Inhalation  Every 6 Hours    3. diphenhydrAMINE:  25  mg  Oral  Every 24 Hours    4. HYDROmorphone Injectable:  1  mg  IntraVenous Push  Every 3 Hours    5. Melatonin:  3  mg  Oral  At Bedtime    6. Naloxone Injectable:  0.2  mg  IntraVenous Push  Once    7. Sodium Chloride 0.9% Injectable Flush:  10  mL  IntraVenous Flush  Every 8 Hours and as Needed        Recent Lab Results:    Results:    CBC: 2023 05:28              \     Hgb     /                              \     7.9 L    /  WBC  ----------------  Plt                8.1       ----------------    290              /     Hct     \                              /     26.7 L    \            RBC: 2.38 L    MCV: 112 H    Neutrophil  %: 73.8      RFP: 2/25/2023 05:28  NA+        Cl-     BUN  /                         136    95 L   29 H  /  --------------------------------  Glucose                ---------------------------  122 H    K+     HCO3-   Creat \                         5.8 H   39 H    0.97  \  Calcium : 10.1Anion Gap : 8 L         Albumin : 3.0 L     Phos : 3.7        I have reviewed these laboratory results:    Renal Function Panel  25-Feb-2023 05:28:00      Result Value    Glucose, Serum  122   H   NA  136    K  5.8   H   CL  95   L   Bicarbonate, Serum  39   H   Anion Gap, Serum  8   L   BUN  29   H   CREAT  0.97    GFR Male  84    Calcium, Serum  10.1    Phosphorus, Serum  3.7    ALB  3.0   L     Complete Blood Count + Differential  25-Feb-2023 05:28:00      Result Value    White Blood Cell Count  8.1    Nucleated Erythrocyte Count  0.0    Red Blood Cell Count  2.38   L   HGB  7.9   L   HCT  26.7   L   MCV  112   H   MCHC  29.6   L   PLT  290    RDW-CV  12.6    Neutrophil %  73.8    Immature Granulocytes %  1.2   H   Lymphocyte %  12.1    Monocyte %  9.0    Eosinophil %  3.2    Basophil %  0.7    Neutrophil Count  5.99    Lymphocyte Count  0.98   L   Monocyte Count  0.73    Eosinophil Count  0.26    Basophil Count  0.06      Magnesium, Serum  25-Feb-2023 05:28:00      Result Value    Magnesium, Serum  1.91        Assessment and Plan:   Daily Risk Screen:  ·  Does patient have a central line? yes   ·  Central Line Type non-tunneled   ·  Plan for non-tunneled central line removal today? no   ·  The patient continues to require non-tunneled central line access for parenteral medication     Comorbidities:  ·  Comorbidity Other     Code Status:  ·  Code Status Full Code     Assessment:    67 year old Male with CHF, COPD on 4L at home, seizure disorder and a history  of multiple lumbar spine surgeries complicated  by MRSA wound infection with a known chronic open lumbar wound status post multiple debridements by plastic surgery, who presented to [place] with worsening low back pain as well as generalized fatigue and weakness, found to have MRSA infection of sacral  wound with exposed hardware, and elevated inflammatory markers. He presented to [facility] as a transfer for surgical evaluation by spine team. He was started on vancomcyin and zosyn on 2/10 in [place], which were continued on admission. BCx from 2/10 with NGTD.  Nsgy was consulted, and he underwent removal of lumbar spinal hardware and irrigation and debridement of lumbar wound on 2/15. Plastic surgery was consulted and recommended leaving wound open with TID dressing changes and plan for return to OR on 2/22  for lumbar spinal wound I&D and potential wound vac. Intraoperative wound cultures had no growth aerobically or anaerobically. Pt has a hx of chronic pain, and pain was managed after surgery with fentanyl patch (per home regimen), Tylenol (per home regimen),  and IV dilaudid 1mg IV q3h for moderate pain and 2mg IV q3h for severe pain. Pain regimen can be down-titrated to home regimen after plastic surgery intervention. Zosyn was discontinued on 2/19 given no evidence of pseudomonas. Pt continues to be on vancomycin.  Patient was having loose bowel movements multiple times daily but was negative for C.diff and stool softeners were stopped. Plastic surgery recommended wound vac and it was placed on 2/22, replaced on 2/24.    Updates 2/24:  -Potassium 6.0 yesterday (repeat from 6.1) with EKG showing peaked T-waves, ordered insulin & D5 & Ca gluconate & Lokelma, held Spironolactone.   -K 5.8 today, c/w Lokelma  -hydrocortisone for itching on lower back   -Plan to evaluate patient in OR next week  -Wound vac replaced by plastics on 2/24  -Vanc level 21.8 on 2/23, Vanc was decreased to 500 q12h  -Vanc to continue for 8 weeks  until 2/15 - 4/12  -Closely monitor vancomycin levels as patient had a break in medication and needs to be ensured to continue to receive   -Pain regimen  currently at 2mg q3h as needed  -Tremor to be discussed with patient and wife and chart review   -Outpatient f/u with Dr. Harrison    Cx history:     ·  2/10 OSH bedside wound cx - MRSA (final)      ·  2/15 OR deep wound cs - NGTD (final 2/17)    Abx hx:  1) Vancomcyin 2/10-   2) Zosyn 2/10- 2/19 (d/c given no culture evidence of pseudomonas)      #MRSA sacral wound infection  Micro:   -BCx 2/10 x2 NGTD  -[place] Wound cx 2/10/23: MRSA (S: clinda, vanc; R: TMP-SMX, tetracyclines)  -ESR elevated at >130  -CRP elevated at 5.26  -Intraoperative wound cx 2/15/23: no growth aerobically or anaerobically   - S/p below on 2/15:   1. exploration of lumbar wound dehiscence  2. removal of lumbar spinal hardware (set caps, rods, and screws)  3. irrigation and debridement of lumbar wound  - Plastic surgery recs  2/15:   1. WTD Kerlix dressings with Dakin's/Saline solution packed to the entire depth of the wound three times daily and PRN if soiled   -Lumbar CT without contrast, obtained on 2/18: soft tissue defect overlying lumbar and lower thoracic spine, soft tissue thickening, scattered soft tissue emphysema; multilevel degenerative changes of lumbar spine  - Plastics decided to place wound vac on 2/22 instead of I&D, reassessed and replaced wound vac on 2/24  Abx:   Plan:   1) Vancomcyin 2/10 - 4/12   2) Zosyn 2/10- 2/19 (d/c given no culture evidence of pseudomonas)    #Chronic pain - lower back  -home pain regimen: scheduled acetaminophen, fentanyl patch 50mcg q72h, dilaudid 4mg PO TID prn breakthrough  -bowel regimen  -follows with pain medicine Dr. Almazan at [facility]   -post-op pain regimen: Tylenol 650mg q6h, fentanyl patch 50mcg q72h, 1mg IV dilaudid q3h for moderate pain, 2mg IV dilaudid q3h for severe pain    #Hyperkalemia  -6.0 on 2/24/2023 with EKG of peaked T-waves,  5.8 on 2/23 resolved peaked T-waves  -hx of hyperkalemic episodes on review  -no CKD  -given insulin, d50, calcium gluconate, and lokelma   -will hold maxime   -conitnue  to monitor    #COPD, 4L at baseline  -SpO2 goal 88-92%  -Started on proventill (takes at home per wife)     #HFpEF   #HTN  -TTE Feb 2022: EF 65-70%, normal LV diastolic function, normal RV  -Hold ASA for possible procedure  -C/w home atorva, metop 12.5 BID, maxime 50 BID, losartan 50 qd, amlodipine 10  -Hold maxime for hyperkalemia on 2/24    #Seizure disorder - continue Keppra  #Anxiety - continue Buspar   #BPH - continue tamsulosin   #Gout - continue allopurinol     DVT ppx: lovenox   Code status: FULL (confirmed on admission)  NOK: Wife Mike [phone]            Attestation:   Note Completion:  I am a:  Resident/Fellow   Attending Attestation I saw and evaluated the patient.  I personally obtained the key and critical portions of the history and physical exam or was physically present for key and  critical portions performed by the resident/fellow. I reviewed the resident/fellow?s documentation and discussed the patient with the resident/fellow.  I agree with the resident/fellow?s medical decision making as documented in the note.     I personally evaluated the patient on 25-Feb-2023         Electronic Signatures:  Mame Kent)  (Signed 26-Feb-2023 09:49)   Authored: Note Completion   Co-Signer: Service, Subjective Data, Objective Data, Assessment and Plan, Note Completion  Freddie Caraballo (Resident))  (Signed 25-Feb-2023 14:23)   Authored: Service, Subjective Data, Objective Data, Assessment  and Plan, Note Completion      Last Updated: 26-Feb-2023 09:49 by Mame Kent)

## 2023-09-14 NOTE — PROGRESS NOTES
Service: Infectious Disease     Subjective Data:   RAJ HARMON is a 67 year old Male who is Hospital Day # 5 and POD #3 for 1. exploration of lumbar wound dehiscence;2. removal of lumbar spinal hardware (set caps, rods, and screws);3. irrigation  and debridement of lumbar wound.    Additional Information:    This AM, pt continued to endorse soft, mushy stools, and persistent pain which is well controlled with his current pain regimen. He stated that he was amenable to  trying the lumbar CT scan with premedication with ativan and dilaudid.     Objective Data:     Objective Information:      T   P  R  BP   MAP  SpO2   Value  36.6  78  18  119/72      96%  Date/Time 2/18 12:57 2/18 12:57 2/18 12:57 2/18 12:57    2/18 12:57  Range  (35.8C - 36.8C )  (73 - 79 )  (17 - 20 )  (119 - 144 )/ (56 - 73 )    (96% - 98% )   As of 18-Feb-2023 08:55:00, patient is on 4 L/min of oxygen via nasal cannula.      Pain reported at 2/18 8:55: 9 = Severe    Physical Exam Narrative:  ·  Physical Exam:    Gen: well-appearing, NAD  Head and neck: NCAT, neck supple without LAD, severe kyphoscoliosis   HEENT: MMM, normal nose without congestion, poor dentition   CV: RRR no mrg  Pulm: CTAB, prolonged exp phase, no wheezing or crackles, normal WOB on 4L  Abd: obese, soft, non-tender, non-distended  Ext: no cyanosis or clubbing, trace LE edema, thenar wasting bilat; 2-3cm diameter stage 1 ulcer on L heel  Neuro: alert and oriented x3, normal tone, face symmetric, moves all extremities spontaneously     Medication:    Medications:          Continuous Medications       --------------------------------  No continuous medications are active       Scheduled Medications       --------------------------------    1. Acetaminophen:  650  mg  Oral  Every 6 Hours    2. Allopurinol:  300  mg  Oral  Every 24 Hours    3. amLODIPine (NORVASC):  10  mg  Oral  Daily    4. Ascorbic Acid:  1000  mg  Oral  Daily    5. Atorvastatin:  20  mg  Oral  Daily     6. busPIRone (BUSPAR):  5  mg  Oral  Every 12 Hours    7. Docusate 50 mg - Senna 8.6 m  tablet(s)  Oral  2 Times a Day    8. Enoxaparin SubCutaneous:  40  mg  SubCutaneous  Every 24 Hours    9. fentaNYL 50 micrograms/ hour TransDermal:  1  patch  TransDermal  Every 72 Hours    10. levETIRAcetam (KEPPRA):  500  mg  Oral  2 Times a Day    11. Metoprolol Tartrate:  12.5  mg  Oral  2 Times a Day    12. Piperacillin - Tazobactam 4.5 gram/Iso-osmotic 100 mL Premix IVPB:  100  mL  IntraVenous Piggyback  Every 6 Hours    13. Polyethylene Glycol:  17  gram(s)  Oral  Daily    14. Sodium Hypochlorite 0.125% (Dakins Quarter):  1  application(s)  Topical  3 Times a Day    15. Spironolactone:  50  mg  Oral  2 Times a Day    16. Tamsulosin:  0.4  mg  Oral  Daily    17. Vancomycin - RPh to Dose - IV Piggy Back:  1  each  As Specified  Variable    18. Vancomycin IV Piggy Back:  1500  mg  IntraVenous Piggyback  Every 12 Hours         PRN Medications       --------------------------------    1. Acetaminophen:  650  mg  Oral  Once    2. Albuterol 90 micrograms/ Inhalation MDI:  2  inhalation  Inhalation  Every 6 Hours    3. HYDROmorphone Injectable:  1  mg  IntraVenous Push  Every 3 Hours    4. HYDROmorphone Injectable:  2  mg  IntraVenous Push  Every 3 Hours    5. Melatonin:  3  mg  Oral  At Bedtime    6. Naloxone Injectable:  0.2  mg  IntraVenous Push  Once    7. Sodium Chloride 0.9% Injectable Flush:  10  mL  IntraVenous Flush  Every 8 Hours and as Needed        Recent Lab Results:    Results:    CBC: 2023 05:57              \     Hgb     /                              \     8.1 L    /  WBC  ----------------  Plt               9.0       ----------------    327              /     Hct     \                              /     27.1 L    \            RBC: 2.44 L    MCV: 111 H    Neutrophil  %: 70.3      RFP: 2023 05:57  NA+        Cl-     BUN  /                         137    98    18   /  --------------------------------  Glucose                ---------------------------  103 H    K+     HCO3-   Creat \                         4.6    35 H   1.19  \  Calcium : 9.2Anion Gap : 9 L         Albumin : 2.9 L     Phos : 2.9      Radiology Results:    Results:        Impression:    1. There has been interval removal of the posterior fusion hardware  from the lumbar spine. There is again a large soft tissue defect with  marked thinning of the skin and subcutaneous soft tissues and  probable dehiscence overlying the lumbar and lower thoracic spine  with nonspecific soft tissue thickening and loss of fat soft tissue  planes as well as scattered soft tissue emphysema along the periphery  of the defect. The possibility of osteomyelitis cannot be excluded on  the basis of this examination.  2. Marked, multilevel degenerative changes of the lumbar spine with  multilevel postoperative changes from laminectomies and  posterolateral fusion masses.     CT L Spine without Contrast [Feb 18 2023  2:16PM]      Assessment and Plan:   Daily Risk Screen:  ·  Does patient have a central line? yes   ·  Central Line Type PICC   ·  Plan for PICC removal today? no   ·  The patient continues to require a PICC for parenteral medication     Comorbidities:  ·  Comorbidity Other     Code Status:  ·  Code Status Full Code     Assessment:    67 year old Male with CHF, COPD on 4L at home, seizure disorder and a history of multiple lumbar spine surgeries complicated  by MRSA wound infection with a known chronic open lumbar wound status post multiple debridements by plastic surgery, who presented to [place] with worsening low back pain as well as generalized fatigue and weakness, found to have MRSA infection of sacral  wound with exposed hardware, and elevated inflammatory markers. He presented to [facility]  as a transfer for surgical evaluation by spine team. He was started on vancomcyin and zosyn on 2/10 in [place], which were continued on  admission.  BCx from 2/10 with NGTD. Nsgy was consulted, and he underwent removal of lumbar spinal hardware and irrigation and debridement of lumbar wound on 2/15. Plastic surgery was consulted and recommended leaving wound open with TID dressing changes and plan  for return to OR on 2/22 for lumbar spinal wound I&D and potential wound vac. Intraoperative wound cultures had no growth aerobically or anaerobically. Pt has a hx of chronic pain, and pain was managed after surgery with fentanyl patch (per home regimen),  Tylenol (per home regimen), and IV dilaudid 1mg IV q3h for moderate pain and 2mg IV q3h for severe pain. Pain regimen can be down-titrated to home regimen after plastic surgery intervention. Pt continues to be on vanc/zosyn.     Updates 2/18  - C/w vanc and zosyn  - Obtained CT L-spine with premedication for pain and anxiety, results: soft tissue defect overlying lumbar and lower thoracic spine, soft tissue thickening, scattered soft tissue emphysema; multilevel degenerative changes of lumbar spine   - C. diff negative     #MRSA sacral wound infection  Micro:   -BCx 2/10 x2 NGTD  -[place] Wound cx 2/10/23: MRSA (S: clinda, vanc; R: TMP-SMX, tetracyclines)  -ESR elevated at >130  -CRP elevated at 5.26  -Intraoperative wound cx 2/15/23: no growth aerobically or anaerobically   - S/p below on 2/15:   1. exploration of lumbar wound dehiscence  2. removal of lumbar spinal hardware (set caps, rods, and screws)  3. irrigation and debridement of lumbar wound  - Plastic surgery recs  2/15:   1. WTD Kerlix dressings with Dakin's/Saline solution packed to the entire depth of the wound three times daily and PRN if soiled   2. return to OR with plastic surgery on 2/22 for lumbar spinal wound I&D and possible application of wound vac  3. Lumbar CT without contrast, obtained on 2/18: soft tissue defect overlying lumbar and lower thoracic spine, soft tissue thickening, scattered soft tissue emphysema; multilevel  degenerative changes of lumbar spine  Abx:   Vancomycin dosing: Pt received vancomycin 2/10 - 2/13, supratherapeutic (20.4) and not resumed on admission on 2/14, 1x dose 1.5g IV intra-operatively on 2/15, and resumed on 2/17 with 1500mg q12h dosing   Plan:   1) Vancomcyin 2/10-   2) Zosyn 2/10-   3) Appreciate plastic surgery recs  4) F/u intraoperative cultures     #Chronic pain - lower back  -home pain regimen: scheduled acetaminophen, fentanyl patch 50mcg q72h, dilaudid 4mg PO TID prn breakthrough  -bowel regimen  -follows with pain medicine Dr. Almazan at [facility]   -post-op pain regimen: Tylenol 650mg q6h, fentanyl patch 50mcg q72h, 1mg IV dilaudid q3h for moderate pain, 2mg IV dilaudid q3h for severe  pain    #COPD, 4L at baseline  -SpO2 goal 88-92%  -Started on proventill (takes at home per wife)     #HFpEF   #HTN  -TTE Feb 2022: EF 65-70%, normal LV diastolic function, normal RV  -Hold ASA for possible procedure  -C/w home atorva, metop 12.5 BID, maxime 50 BID, losartan 50 qd, amlodipine 10    #Seizure disorder - continue Keppra  #Anxiety - continue Buspar   #BPH - continue tamsulosin   #Gout - continue allopurinol     DVT ppx: lovenox   Code status: FULL (confirmed on admission)  NOK: Wife Mike [phone]      Attestation:   Note Completion:  I am a:  Resident/Fellow   Attending Attestation I saw and evaluated the patient.  I personally obtained the key and critical portions of the history and physical exam or was physically present for key and  critical portions performed by the resident/fellow. I reviewed the resident/fellow?s documentation and discussed the patient with the resident/fellow.  I agree with the resident/fellow?s medical decision making as documented in the note.     I personally evaluated the patient on 18-Feb-2023         Electronic Signatures:  Christa Funk (Resident))  (Signed 18-Feb-2023 15:46)   Authored: Service, Subjective Data, Objective Data, Assessment  and Plan, Note  Completion  Darrian Galvez)  (Signed 19-Feb-2023 08:10)   Authored: Note Completion   Co-Signer: Assessment and Plan, Note Completion      Last Updated: 19-Feb-2023 08:10 by Darrian Galvez)

## 2023-09-14 NOTE — PROGRESS NOTES
Service: Plastic Surgery     Subjective Data:   RAJ HARMON is a 67 year old Male who is Hospital Day # 15 and POD #0 for 1. Excision and debridement down to and including subcutaneous tissue;2. Partial adjacent tissue transfer;3. ;4. ;5.     Pt evaluated in PM interval after return to [facility] from PACU following OR this afternoon. Overall patient reports he is doing well. Pain is present to the lumbar spine which he describes as an ache but  is responsive to regular pain medication. Wound vac holding suction without alarming since OR. Denies fever, chest pain, SOB, abd pain, n/v/d.    Objective Data:     Objective Information:      T   P  R  BP   MAP  SpO2   Value  36.6  117  18  123/61      93%  Date/Time 2/28 20:27 2/28 20:27 2/28 20:27 2/28 20:27 2/28 20:27  Range  (36.2C - 36.7C )  (62 - 117 )  (18 - 22 )  (113 - 175 )/ (61 - 78 )    (93% - 98% )   As of 28-Feb-2023 08:55:00, patient is on 4 L/min of oxygen via nasal cannula.      Pain reported at 2/28 20:42: 9 = Severe    Physical Exam by System:    Constitutional: Alert and cooperative, NAD.   Eyes: EOMI, clear sclera.   ENMT: MMM.   Head/Neck: NC/AT.   Respiratory/Thorax: Unlabored respirations on O2  via NC. Symmetric chest rise.   Genitourinary: Voiding per urinal/commode.   Musculoskeletal: Wound vac intact to lumbar spine  without leak or obstruction. +tenderness surrounding wound. Skin tear noted to distal edge and pinpoint bleeding 2/2 to dry/fragile skin. Scant bloody output to collecting canister (<5cc).   Neurological: A&O x3.   Psychological: Appropriate behavior and mood.   Skin: Warm and dry, no lesions, no rashes.     Medication:    Medications:          Continuous Medications       --------------------------------  No continuous medications are active       Scheduled Medications       --------------------------------    1. Acetaminophen:  650  mg  Oral  Every 6 Hours    2. Allopurinol:  300  mg  Oral  Every 24 Hours    3. amLODIPine  (NORVASC):  10  mg  Oral  Daily    4. Ascorbic Acid:  1000  mg  Oral  Daily    5. Atorvastatin:  20  mg  Oral  Daily    6. busPIRone (BUSPAR):  5  mg  Oral  Every 12 Hours    7. Docusate 50 mg - Senna 8.6 m  tablet(s)  Oral  2 Times a Day    8. Enoxaparin SubCutaneous:  40  mg  SubCutaneous  Every 24 Hours    9. fentaNYL 50 micrograms/ hour TransDermal:  1  patch  TransDermal  Every 72 Hours    10. Hydrocortisone 1% Topical:  1  application(s)  Topical  2 Times a Day    11. levETIRAcetam (KEPPRA):  500  mg  Oral  2 Times a Day    12. Metoprolol Tartrate:  12.5  mg  Oral  2 Times a Day    13. Polyethylene Glycol:  17  gram(s)  Oral  Daily    14. Sodium Hypochlorite 0.125% (Dakins Quarter):  1  application(s)  Topical  3 Times a Day    15. Tamsulosin:  0.4  mg  Oral  Daily    16. Vancomycin - RPh to Dose - IV Piggy Back:  1  each  As Specified  Variable    17. Vancomycin 750 mg/ D5W IVPB Premixed Soln 150 mL:  750  mg  IntraVenous Piggyback  Every 12 Hours         PRN Medications       --------------------------------    1. Albuterol 90 micrograms/ Inhalation MDI:  2  inhalation  Inhalation  Every 6 Hours    2. Calcium Carbonate Chewable:  500  mg  Oral  Every 2 Hours    3. diphenhydrAMINE:  25  mg  Oral  Every 24 Hours    4. Heparin Flush 10 unit/ mL Injectable PRN:  5  mL  IntraVenous Flush  According to Flush Policy    5. HYDROmorphone Injectable:  1  mg  IntraVenous Push  Every 3 Hours    6. HYDROmorphone Injectable:  2  mg  IntraVenous Push  Every 3 Hours    7. Melatonin:  3  mg  Oral  At Bedtime    8. Naloxone Injectable:  0.2  mg  IntraVenous Push  Once    9. Sodium Chloride 0.9% Injectable Flush:  10  mL  IntraVenous Flush  Every 8 Hours and as Needed        Recent Lab Results:    Results:    CBC: 2023 06:02              \     Hgb     /                              \     7.5 L    /  WBC  ----------------  Plt               7.1       ----------------    286              /     Hct     \                               /     24.9 L    \            RBC: 2.27 L    MCV: 110 H    Neutrophil  %: 67.2      RFP: 2/28/2023 06:02  NA+        Cl-     BUN  /                         138    94 L   26 H  /  --------------------------------  Glucose                ---------------------------  107 H    K+     HCO3-   Creat \                         4.5    40 HH   0.81  \  Calcium : 9.4Anion Gap : 9 L         Albumin : 2.9 L     Phos : 3.6      Assessment and Plan:   Daily Risk Screen:  ·  Does patient have an indwelling urinary catheter? n/a consulting service   ·  Does patient have a central line? n/a consulting service     Comorbidities:  ·  Comorbidity Other     Code Status:  ·  Code Status Full Code     Assessment:    RAJ HARMON is a 67 year old Male known to the plastic surgery team with a h/o multiple lumbar spinal surgeries which have been c/b recurrent MRSA infections  requiring multiple extensive plastic surgery interventions including multiple debridements and attempted closures. Admitted to [facility] as transfer on 2/14 for concern of MRSA infection of chronically open lumbar wound with exposed hardware. Patient now  s/p lumbar spinal wound I&D and removal of spinal hardware per NSGY on 2/15. Plastic Surgery consulted for spinal wound debridement and coverage.     OR course (this admission):   2/15 - Lumbar spinal wound exploration, I&D and removal of spinal hardware (per NSGY)  2/28 - Lumbar wound excision and debridement down to and including subcutaneous tissue, partial adjacent tissue transfer, wound vac application (per plastic surgery)     Plan/Recommendations:   S/p lumbar wound I&D, partial adjacent tissue transfer, wound vac placement   - Plan to return to OR Tuesday 3/7 for additional debridement of lumbar spine wound, potential flap vs wound vac application       · NPO at midnight        · Update T&S and COVID 3/6   - Maintain wound vac to lumbar spine at -125mmHg low continuous suction        · No  interim bedside changes necessary prior to return to OR 3/7        · Nursing to monitor/record wound vac canister output       · Please notify plastic surgery with any concerns regarding wound vac leak or malfunction   - Ensure diligent pressure offloading to lumbar spine/wound site with frequent repositioning, Q2h turns   - Nutrition optimization to promote wound healing, consider addition of daily MV and nutritional supplements/shakes as able   - Culture history       · 2/10 OSH bedside withound cx - MRSA (final)        · 2/15 OR deep withound cs - NGTD (final 2/17)       · Urine cultures obtained intraop 2/28 due to concerns of dark/sedimentary urine to more, results pending  - Continue IV ABX per ID recs (currently IV Vanc)   - Appreciate remaining supportive care per primary service   - Plastics will continue to follow    Patient and plan discussed with Dr. Yeni Ariza, PA-C  Plastic and Reconstructive Surgery  Doc [place], Pager [phone], Team phones: [phone]    Time spent on the assessment of patient, gathering and interpreting data, review of medical record/patient history, personally reviewing radiographic imaging and formulation of this note 30 minutes. With greater than 50% spent in personal discussion with  patient.          Electronic Signatures:  Zunilda Ariza (PAC)  (Signed 28-Feb-2023 21:45)   Authored: Service, Subjective Data, Objective Data, Assessment  and Plan, Note Completion      Last Updated: 28-Feb-2023 21:45 by Zunilda Ariza (PAC)

## 2023-09-14 NOTE — PROGRESS NOTES
Service: Infectious Disease     Subjective Data:   RAJ HARMON is a 67 year old Male who is Hospital Day # 24 and POD #2 for Debridement washout, vac placement.     NAEO. Pt reports feeling well this morning.    Objective Data:     Objective Information:      T   P  R  BP   MAP  SpO2   Value  36.2  84  20  144/72   97  98%  Date/Time 3/9 5:05 3/9 5:05 3/9 5:05 3/9 5:05  3/8 13:27 3/9 5:05  Range  (36.1C - 36.3C )  (56 - 84 )  (18 - 22 )  (129 - 164 )/ (58 - 72 )  (97 - 97 )  (96% - 99% )   As of 08-Mar-2023 20:30:00, patient is on 4 L/min of oxygen via nasal cannula.      Pain reported at 3/9 7:15: 0 = None    Physical Exam Narrative:  ·  Physical Exam:    Gen: well-appearing, NAD  Head and neck: NCAT  HEENT: MMM, poor dentition  CV: RRR no mrg  Pulm: CTAB, decreased airflow, no wheezing or crackles, normal WOB on 4L  Abd: obese, soft, non-tender, non-distended  Ext: WWP, mild peripheral edema  Neuro: alert and oriented x3, no gross FND  Back: Wound vac placed and secured, sanguineous drainage in wound vac      Recent Lab Results:    Results:    CBC: 3/8/2023 06:02              \     Hgb     /                              \     7.5 L    /  WBC  ----------------  Plt               6.4       ----------------    315              /     Hct     \                              /     25.7 L    \            RBC: 2.31 L    MCV: 111 H    Neutrophil  %: 70.0      RFP: 3/8/2023 06:02  NA+        Cl-     BUN  /                         141    96 L   25 H  /  --------------------------------  Glucose                ---------------------------  103 H    K+     HCO3-   Creat \                         4.6    41 HH   0.69  \  Calcium : 9.2Anion Gap : 9 L         Albumin : 2.7 L     Phos : 4.1      Assessment and Plan:   Daily Risk Screen:  ·  Does patient have a central line? yes   ·  Central Line Type PICC   ·  Plan for PICC removal today? no   ·  The patient continues to require a PICC for parenteral medication      Code Status:  ·  Code Status Full Code     Advance Care Planning:  Advance Care Planning: I evaluated the patient  and determined the patient's capacity to understand the risks, benefits and alternatives to treatment. I elicited the patient's goals for treatment and reviewed advance directives and medical orders for life sustaining treatment. The patient was given  an opportunity to review a blank advance directive as appropriate.     Assessment:    67 year old Male with CHF, COPD on 4L at home, seizure disorder and a history of multiple lumbar spine surgeries complicated  by MRSA wound infection with a known chronic open lumbar wound status post multiple debridements by plastic surgery, who presented to [place] with worsening low back pain as well as generalized fatigue and weakness, found to have MRSA infection of sacral  wound with exposed hardware, and elevated inflammatory markers (ESR >130,  CRP elevated at 5.26). He presented to [facility] as a transfer for surgical evaluation by spine team. He was started on vancomcyin and zosyn  on 2/10 in [place], which were continued on admission. BCx from 2/10 with NGTD. Nsgy was consulted, and he underwent removal of lumbar spinal hardware and irrigation and debridement of lumbar wound on 2/15. Plastic surgery was consulted and recommended  leaving wound open with TID dressing changes and plan for return to OR on 2/22 for lumbar spinal wound I&D and potential wound vac. Intraoperative wound cultures had no growth aerobically or anaerobically. Pt has a hx of chronic pain, and pain was managed  after surgery with fentanyl patch (per home regimen), Tylenol (per home regimen), and IV dilaudid 1mg IV q3h for moderate pain and 2mg IV q3h for severe pain. Pain regimen can be down-titrated to home regimen after plastic surgery intervention. Zosyn  was discontinued on 2/19 given no evidence of pseudomonas. Pt continues to be on vancomycin. Patient was having loose bowel movements  multiple times daily but was negative for C.diff and stool softeners were stopped. Plastic surgery recommended wound  vac and it was placed on 2/22, replaced on 2/24. Patient had hyperkalemic episode on 2/34 of potassium 6.0 (repeat from 6.1) with EKG showing  peaked T-waves, ordered insulin & D5 & Ca gluconate & Lokelma 10 mg TID, held Spironolactone . Hyperkalemia resolved with these interventions. Patient had another wound vac replaced on 2/27. Went to OR with plastic surgery on 2/28 for debridement. Patient went for another debridement on 3/7 with plastic surgery; noted to have improved since previous  procedure but still required continued debridement with next procedure planned for 3/14.    Updates 3/9:  -Plan for OR on 3/14  -C/w vancomycin  -Labs twice a week    Cx history:     ·  2/10 OSH bedside wound cx - MRSA (final)      ·  2/15 OR deep wound cs - NGTD (final 2/17)    Abx hx:  1) Vancomcyin 2/10-   2) Zosyn 2/10- 2/19 (d/c given no culture evidence of pseudomonas)      #MRSA sacral wound infection  Micro:   -BCx 2/10 x2 NGTD  -[place] Wound cx 2/10/23: MRSA (S: clinda, vanc; R: TMP-SMX, tetracyclines)  -Intraoperative wound cx 2/15/23: no growth aerobically or anaerobically   - S/p below on 2/15:   1. exploration of lumbar wound dehiscence  2. removal of lumbar spinal hardware (set caps, rods, and screws)  3. irrigation and debridement of lumbar wound  - Plastic surgery recs  2/15:   1. WTD Kerlix dressings with Dakin's/Saline solution packed to the entire depth of the wound three times daily and PRN if soiled   -Lumbar CT without contrast, obtained on 2/18: soft tissue defect overlying lumbar and lower thoracic spine, soft tissue thickening, scattered soft tissue emphysema; multilevel degenerative changes of lumbar spine  - Plastics decided to place wound vac on 2/22 instead of I&D, reassessed and replaced wound vac on 2/24 and on 2/27. Went to OR on 2/28 for lumbar wound excision and debridement and  wound vac placement.  plan to go back to OR on 3/7.  -Patient underwent debridement on 3/7, will need additional debridement on 3/14 going forward  Abx:   Plan:   1) Vancomcyin 2/10 - 4/12 ( need to extend due to debridement on 3/7)  2) Zosyn 2/10- 2/19 (d/c given no culture evidence of pseudomonas)    #Chronic pain - lower back  -home pain regimen: scheduled acetaminophen, fentanyl patch 50mcg q72h, dilaudid 4mg PO TID prn breakthrough  -bowel regimen  -follows with pain medicine Dr. 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 Recommend follow up outpatient. Currently on Lokelma.   -6.0 on 2/24/2023 with EKG of peaked T-waves, 5.8 on 2/23 resolved peaked T-waves  -hx of hyperkalemic episodes  on review  -no CKD  -gave insulin, d50, calcium gluconate, and lokelma TID  for 48 hrs  -held maxime  -conitnue to monitor  -continue lokelma    #COPD, 4L at baseline  -SpO2 goal 88-92%  -Started on proventill (takes at home per wife)     #HFpEF   #HTN  -TTE Feb 2022: EF 65-70%, normal LV diastolic function, normal RV  -Hold ASA for possible procedure  -C/w home atorva, metop 12.5 BID, maxime 50 BID, losartan 50 qd, amlodipine 10  -Held maxime for hyperkalemia on 2/24    #Seizure disorder - continue Keppra  #Anxiety - continue Buspar   #BPH - continue tamsulosin   #Gout - continue allopurinol     #CHILANGO  -Not compliant with CPAP at home  -Elevated bicarb inpatient, likely chronic    DVT ppx: lovenox   Code status: FULL (confirmed on admission)  NOK: Wife Mike [phone]      Attestation:   Note Completion:  I am a:  Resident/Fellow   Attending Attestation I saw and evaluated the patient.  I personally obtained the key and critical portions  of the history and physical exam or was physically present for key and  critical portions performed by the resident/fellow. I reviewed the resident/fellow?s documentation and discussed the patient with the resident/fellow.  I agree with the resident/fellow?s medical decision making as documented in the note.     I personally evaluated the patient on 09-Mar-2023         Electronic Signatures:  Cande Guillermo (Resident))  (Signed 09-Mar-2023 11:17)   Authored: Service, Subjective Data, Objective Data, Assessment  and Plan, Note Completion  Mame Kent)  (Signed 09-Mar-2023 11:18)   Authored: Note Completion   Co-Signer: Service, Subjective Data, Objective Data, Assessment and Plan, Note Completion      Last Updated: 09-Mar-2023 11:18 by Mame Kent)

## 2023-09-14 NOTE — PROGRESS NOTES
Service: Plastic Surgery     Subjective Data:   RAJ HARMON is a 67 year old Male who is Hospital Day # 23 and POD #1 for Debridement washout, vac placement.     Pt in NAD and resting comfortably in bed. Denies any issues post operatively. Denies any issues with wound vac.     Denies any fever, chills, night sweats, CP, SOB, palpitations, nausea, vomiting, diarrhea, abdominal pain.    Objective Data:     Objective Information:      T   P  R  BP   MAP  SpO2   Value  36.2  56  19  164/58   78  96%  Date/Time 3/8 0:15 3/8 0:15 3/8 0:15 3/8 0:15  3/7 13:00 3/8 0:15  Range  (36.2C - 36.6C )  (56 - 107 )  (18 - 19 )  (119 - 164 )/ (53 - 65 )  (78 - 83 )  (94% - 98% )   As of 07-Mar-2023 19:50:00, patient is on 4 L/min of oxygen via nasal cannula.      Pain reported at 3/7 23:40: 8 = Severe    Physical Exam by System:    Constitutional: Alert and cooperative, NAD.   Eyes: EOMI, clear sclera.   ENMT: MMM.   Head/Neck: NC/AT.   Respiratory/Thorax: Unlabored respirations on 4L  NC, symmetrical chest rise   Gastrointestinal: soft nt/nd   Genitourinary: Voiding per urinal/commode.   Musculoskeletal: Wound vac intact to lumbar spine  without leak. Cannister with SS output.  Periwound erythema. Appropriately TTP throughout surrounding tissue.   Extremities: ROJO x 4   Neurological: A&O x3.   Psychological: Appropriate behavior and mood.   Skin: Warm and dry, no lesions, no rashes.     Recent Lab Results:    Results:    CBC: 3/8/2023 06:02              \     Hgb     /                              \     7.5 L    /  WBC  ----------------  Plt               6.4       ----------------    315              /     Hct     \                              /     25.7 L    \            RBC: 2.31 L    MCV: 111 H    Neutrophil  %: 70.0      RFP: 3/8/2023 06:02  NA+        Cl-     BUN  /                         141    96 L   25 H  /  --------------------------------  Glucose                ---------------------------  103 H    K+      HCO3-   Creat \                         4.6    41 HH   0.69  \  Calcium : 9.2Anion Gap : 9 L         Albumin : 2.7 L     Phos : 4.1      Assessment and Plan:   Daily Risk Screen:  ·  Does patient have an indwelling urinary catheter? n/a consulting service   ·  Does patient have a central line? n/a consulting service     Comorbidities:  ·  Comorbidity Other     Code Status:  ·  Code Status Full Code     Assessment:    RAJ HARMON is a 67 year old Male known to the plastic surgery team with a h/o multiple lumbar spinal surgeries which have been c/b recurrent MRSA infections  requiring multiple extensive plastic surgery interventions including multiple debridements and attempted closures. Admitted to [facility] as transfer on 2/14 for concern of MRSA infection of chronically open lumbar wound with exposed hardware. Patient now  s/p lumbar spinal wound I&D and removal of spinal hardware per NSGY on 2/15. Plastic Surgery consulted for spinal wound debridement and coverage.     OR course (this admission):   2/15 - Lumbar spinal wound exploration, I&D and removal of spinal hardware (per NSGY)  2/28 - Lumbar wound excision and debridement down to and including subcutaneous tissue, partial adjacent tissue transfer, wound vac application (per plastic surgery)     A: Pt doing well post operatively.     Plan/Recommendations:   S/p lumbar wound I&D, partial adjacent tissue transfer, wound vac placement   - Plan to return to OR Tuesday 3/14 for additional debridement of lumbar spine wound, potential flap vs wound vac application  - Maintain wound vac to lumbar spine at -125mmHg low continuous suction,       · Next change to occur bedside: 3/10        · Nursing to monitor/record wound vac canister output       · Please notify plastic surgery with any concerns regarding wound vac leak or malfunction   - Continue to ensure diligent pressure offloading to lumbar spine/wound site with frequent repositioning, Q2h turns,  elevate heels from bed  - May cleanse wound/shower during future vac changes  - Nutrition optimization to promote wound healing, consider addition of daily MV and nutritional supplements/shakes as able   - Culture history       · 2/10 OSH bedside withound cx - MRSA (final)        · 2/15 OR deep withound cs - NGTD (final 2/17)       · Urine cultures obtained intraop 2/28 due to concerns of dark/sedimentary urine to more, cx with no growth. UA positive for large leukocyte esterase  - Continue IV ABX per ID recs (currently IV Vanc)   - Appreciate remaining supportive care per primary service   - Plastics will continue to follow    Shirley Romero PA-C   Plastic and Reconstructive Surgery    Available via [employer], pager: [phone] or Team phones: [phone]     Time spent on the assessment of patient, gathering and interpreting data, review of medical record/patient history, personally reviewing radiographic imaging and formulation of this note 30 minutes. With greater than 50% spent in personal discussion with  patient.        Electronic Signatures:  Shirley Romero (PA (PHYSICIAN))  (Signed 08-Mar-2023 10:27)   Authored: Service, Subjective Data, Objective Data, Assessment  and Plan, Note Completion      Last Updated: 08-Mar-2023 10:27 by Shirley Romero (PA (PHYSICIAN))

## 2023-09-14 NOTE — PROGRESS NOTES
Service: Infectious Disease     Subjective Data:   RAJ HARMON is a 67 year old Male who is Hospital Day # 30 and POD #1 for 1. Excision and debridement down to and including spinous process;2. Wound vac exchange (15 x 11 cm);3. ;4. ;5.     Patient denies any complaints today. Wants to see podiatrist on outpatient basis. Reports pain is well controlled. Denies any other complaints.    Objective Data:     Objective Information:      T   P  R  BP   MAP  SpO2   Value  36.9  69  18  139/66   89  97%  Date/Time 3/15 5:48 3/15 5:48 3/15 5:48 3/15 5:48  3/14 4:30 3/15 5:48  Range  (36C - 36.9C )  (69 - 91 )  (16 - 18 )  (126 - 160 )/ (54 - 66 )  (89 - 89 )  (95% - 100% )   As of 14-Mar-2023 10:10:00, patient is on 4 L/min of oxygen via room air.  Highest temp of 36.9 C was recorded at 3/15 5:48      Pain reported at 3/15 9:18: 8 = Severe    Physical Exam Narrative:  ·  Physical Exam:    Gen: well-appearing, NAD  Head and neck: NCAT  HEENT: MMM, poor dentition  CV: RRR no mrg  Pulm: CTAB, decreased airflow, no wheezing or crackles, normal WOB on 4L  Abd: obese, soft, non-tender, non-distended  Ext: WWP, mild peripheral edema  Neuro: alert and oriented x3, no gross FND  Back: Wound vac placed and secured, sanguineous drainage in wound vac      Medication:    Medications:          Continuous Medications       --------------------------------  No continuous medications are active       Scheduled Medications       --------------------------------    1. Acetaminophen:  975  mg  Oral  Every 8 Hours    2. Allopurinol:  300  mg  Oral  Every 24 Hours    3. amLODIPine (NORVASC):  10  mg  Oral  Daily    4. Ascorbic Acid:  1000  mg  Oral  Daily    5. Atorvastatin:  20  mg  Oral  Daily    6. busPIRone (BUSPAR):  5  mg  Oral  Every 12 Hours    7. Docusate 50 mg - Senna 8.6 m  tablet(s)  Oral  2 Times a Day    8. DULoxetine:  30  mg  Oral  At Bedtime    9. Enoxaparin SubCutaneous:  40  mg  SubCutaneous  Every 24 Hours     10. fentaNYL 75 micrograms/ hour TransDermal:  1  patch  TransDermal  Every 72 Hours    11. Hydrocortisone 1% Topical:  1  application(s)  Topical  2 Times a Day    12. levETIRAcetam (KEPPRA):  500  mg  Oral  2 Times a Day    13. Lidocaine 4% TransDermal:  1  patch  TransDermal  Every 24 Hours    14. Metoprolol Tartrate:  12.5  mg  Oral  2 Times a Day    15. Polyethylene Glycol:  17  gram(s)  Oral  Daily    16. Sodium Hypochlorite 0.125% (Dakins Quarter):  1  application(s)  Topical  3 Times a Day    17. Sodium Zirconium Cyclosilicate:  10  gram(s)  Oral  Daily    18. Tamsulosin:  0.8  mg  Oral  Daily    19. Vancomycin - RPh to Dose - IV Piggy Back:  1  each  As Specified  Variable    20. Vancomycin 750 mg/ D5W IVPB Premixed Soln 150 mL:  750  mg  IntraVenous Piggyback  Every 12 Hours         PRN Medications       --------------------------------    1. Albuterol 2.5 mg - Ipratropium 0.5 mg/ 3 mL Neb Soln:  3  mL  Inhalation  Every 6 Hours    2. diphenhydrAMINE:  25  mg  Oral  Every 24 Hours    3. Heparin Flush 10 unit/ mL Injectable PRN:  5  mL  IntraVenous Flush  According to Flush Policy    4. HYDROmorphone:  4  mg  Oral  Every 6 Hours    5. Naloxone Injectable:  0.2  mg  IntraVenous Push  Once    6. Sodium Chloride 0.9% Injectable Flush:  10  mL  IntraVenous Flush  Every 8 Hours and as Needed        Recent Lab Results:    Results:    CBC: 3/15/2023 05:58              \     Hgb     /                              \     7.5 L    /  WBC  ----------------  Plt               6.4       ----------------    277              /     Hct     \                              /     25.6 L    \            RBC: 2.30 L    MCV: 111 H    Neutrophil  %: 72.5      RFP: 3/15/2023 05:58  NA+        Cl-     BUN  /                         142    97 L   18  /  --------------------------------  Glucose                ---------------------------  112 H    K+     HCO3-   Creat \                         4.1    40 HH   0.91  \  Calcium  : 9.0Anion Gap : 9 L         Albumin : 2.8 L     Phos : 3.5      Assessment and Plan:   Daily Risk Screen:  ·  Does patient have a central line? yes   ·  Central Line Type PICC   ·  Plan for PICC removal today? no   ·  The patient continues to require a PICC for parenteral medication     Code Status:  ·  Code Status Full Code     Assessment:    67 year old Male with CHF, COPD on 4L at home, seizure disorder and a history of multiple lumbar spine surgeries complicated  by MRSA wound infection with a known chronic open lumbar wound status post multiple debridements by plastic surgery, who presented to [facility] with worsening low back pain as well as generalized fatigue and weakness, found to have MRSA infection of sacral  wound with exposed hardware, and elevated inflammatory markers (ESR >130,  CRP elevated at 5.26). He presented to [facility] as a transfer for surgical evaluation by spine team. He was started on vancomcyin and zosyn  on 2/10 in [facility], which were continued on admission. BCx from 2/10 with NGTD. Nsgy was consulted, and he underwent removal of lumbar spinal hardware and irrigation and debridement of lumbar wound on 2/15. Plastic surgery was consulted and recommended  leaving wound open with TID dressing changes and plan for return to OR on 2/22 for lumbar spinal wound I&D and potential wound vac. Intraoperative wound cultures had no growth aerobically or anaerobically. Pt has a hx of chronic pain, and pain was managed  after surgery with fentanyl patch (per home regimen), Tylenol (per home regimen), and IV dilaudid 1mg IV q3h for moderate pain and 2mg IV q3h for severe pain. Pain regimen can be down-titrated to home regimen after plastic surgery intervention. Zosyn  was discontinued on 2/19 given no evidence of pseudomonas. Pt continues to be on vancomycin. Patient was having loose bowel movements multiple times daily but was negative for C.diff and stool softeners were stopped. Plastic surgery recommended  wound  vac and it was placed on 2/22, replaced on 2/24. Patient had hyperkalemic episode on 2/34 of potassium 6.0 (repeat from 6.1) with EKG showing  peaked T-waves, ordered insulin & D5 & Ca gluconate & Lokelma 10 mg TID, held Spironolactone . Hyperkalemia resolved with these interventions. Patient had another wound vac replaced on 2/27. Went to OR with plastic surgery on 2/28 for debridement. Patient went for another debridement on 3/7 with plastic surgery; noted to have improved since previous  procedure but still required continued debridement with next procedure planned for 3/14.    Updates 3/15:  -OR yesterday --> no more debridements, wound vac changes MWF going forward  -C/w Vancomycin  -ID appointment set for april 25th, also needs ortho spine, pcp, and podiatry follow up  -C/w lovenox tomorrow    Cx history:     ·  2/10 OSH bedside wound cx - MRSA (final)      ·  2/15 OR deep wound cs - NGTD (final 2/17)    Abx hx:  1) Vancomcyin 2/10-   2) Zosyn 2/10- 2/19 (d/c given no culture evidence of pseudomonas)      #MRSA sacral wound infection  Micro:   -BCx 2/10 x2 NGTD  -[place] Wound cx 2/10/23: MRSA (S: clinda, vanc; R: TMP-SMX, tetracyclines)  -Intraoperative wound cx 2/15/23: no growth aerobically or anaerobically   - S/p below on 2/15:   1. exploration of lumbar wound dehiscence  2. removal of lumbar spinal hardware (set caps, rods, and screws)  3. irrigation and debridement of lumbar wound  - Plastic surgery recs  2/15:   1. WTD Kerlix dressings with Dakin's/Saline solution packed to the entire depth of the wound three times daily and PRN if soiled   -Lumbar CT without contrast, obtained on 2/18: soft tissue defect overlying lumbar and lower thoracic spine, soft tissue thickening, scattered soft tissue emphysema; multilevel degenerative changes of lumbar spine  - Plastics decided to place wound vac on 2/22 instead of I&D, reassessed and replaced wound vac on 2/24 and on 2/27. Went to OR on 2/28 for lumbar  wound excision and debridement and wound vac placement.  plan to go back to OR on 3/7.  -Patient underwent debridement on 3/7, additional debridement on 3/14 going forward  Abx:   Plan:   1) Vancomcyin 2/10 - 4/12 ( need to extend due to debridement on 3/14)  2) Zosyn 2/10- 2/19 (d/c given no culture evidence of pseudomonas)  3) No need for further debridement patient will be discharged to SNF with MWF wound vac changes  4) Follow up with Infectious disease on april 25th, 2023    #Chronic pain - lower back  -home pain regimen: scheduled acetaminophen, fentanyl patch 50mcg q72h, dilaudid 4mg PO TID prn breakthrough  -bowel regimen  -follows with pain medicine Dr. [facility]zan 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 Recommend follow up outpatient. Currently on Lokelma.   -6.0 on 2/24/2023 with EKG of peaked T-waves, 5.8 on 2/23 resolved peaked T-waves  -hx of hyperkalemic episodes  on review  -no CKD  -gave insulin, d50, calcium gluconate, and lokelma TID  for 48 hrs  -held maxime  -conitnue to monitor  -continue lokelma    #COPD, 4L at baseline  -SpO2 goal 88-92%  -Started on proventill (takes at home per wife)     #HFpEF   #HTN  -TTE Feb 2022: EF 65-70%, normal LV diastolic function, normal RV  -Hold ASA for possible procedure  -C/w home atorva, metop 12.5 BID, maxime 50 BID, losartan 50 qd, amlodipine 10  -Held maxime for hyperkalemia on 2/24    #Seizure disorder - continue Keppra  #Anxiety - continue Buspar   #BPH - continue tamsulosin   #Gout - continue allopurinol     #CHILANGO  -Not compliant with CPAP at home  -Elevated bicarb inpatient, likely chronic    DVT ppx: lovenox   Code status: FULL (confirmed on admission)  NOK: Wife  Mike [phone]      Attestation:   Note Completion:  I am a:  Resident/Fellow   Attending Attestation I saw and evaluated the patient.  I personally obtained the key and critical portions of the history and physical exam or was physically present for key and  critical portions performed by the resident/fellow. I reviewed the resident/fellow?s documentation and discussed the patient with the resident/fellow.  I agree with the resident/fellow?s medical decision making as documented in the note.     I personally evaluated the patient on 15-Mar-2023         Electronic Signatures:  Cande Guillermo (Resident))  (Signed 15-Mar-2023 13:09)   Authored: Service, Subjective Data, Objective Data, Assessment  and Plan, Note Completion  Darrian Galvez)  (Signed 15-Mar-2023 14:12)   Authored: Note Completion   Co-Signer: Service, Subjective Data, Objective Data, Assessment and Plan, Note Completion      Last Updated: 15-Mar-2023 14:12 by Darrian Galvez)

## 2023-09-14 NOTE — PROGRESS NOTES
Service: Infectious Disease     Subjective Data:   RAJ HARMON is a 67 year old Male who is Hospital Day # 22 and POD #7 for 1.     Patient denies any complaints overnight. He has a bruise on his right shin this morning he was concerned about, but denies any bruising on any other parts of his body. No calf tenderness, worsening shortness  of breath. Denies any chest pain, palpitations.    Objective Data:     Objective Information:      T   P  R  BP   MAP  SpO2   Value  36.2  71  18  122/53   83  97%  Date/Time 3/7 7:23 3/7 7:23 3/7 7:23 3/7 7:23  3/7 5:35 3/7 7:23  Range  (36.2C - 36.3C )  (62 - 71 )  (18 - 18 )  (122 - 161 )/ (53 - 64 )  (79 - 83 )  (95% - 100% )   As of 07-Mar-2023 07:27:00, patient is on 4 L/min of oxygen via nasal cannula.      Pain reported at 3/7 5:36: 8 = Severe    Physical Exam Narrative:  ·  Physical Exam:    Gen: well-appearing, NAD  Head and neck: NCAT  HEENT: MMM, poor dentition  CV: RRR no mrg  Pulm: CTAB, decreased airflow, no wheezing or crackles, normal WOB on 4L  Abd: obese, soft, non-tender, non-distended  Ext: WWP, mild periphearl edema  Neuro: alert and oriented x3, no gross FND  Back: Wound vac placed and secured. No blood oozing or purulence.      Medication:    Medications:          Continuous Medications       --------------------------------  No continuous medications are active       Scheduled Medications       --------------------------------    1. Vancomycin - RPh to Dose - IV Piggy Back:  1  each  As Specified  Variable         PRN Medications       --------------------------------  No PRN medications are active        Recent Lab Results:    Results:    CBC: 3/7/2023 05:37              \     Hgb     /                              \     7.5 L    /  WBC  ----------------  Plt               6.7       ----------------    317              /     Hct     \                              /     25.8 L    \            RBC: 2.35 L    MCV: 110 H    Neutrophil  %:  64.4      RFP: 3/7/2023 05:37  NA+        Cl-     BUN  /                         138    94 L   28 H  /  --------------------------------  Glucose                ---------------------------  110 H    K+     HCO3-   Creat \                         4.4    40 H   0.77  \  Calcium : 9.4Anion Gap : 8 L         Albumin : 2.8 L     Phos : 3.6      Assessment and Plan:   Daily Risk Screen:  ·  Does patient have a central line? yes   ·  Central Line Type PICC   ·  Plan for PICC removal today? no    ·  The patient continues to require a PICC for parenteral medication     Comorbidities:  ·  Comorbidity anemia   ·  Anemia anemia of chronic disease     Code Status:  ·  Code Status Full Code     Assessment:    67 year old Male with CHF, COPD on 4L at home, seizure disorder and a history of multiple lumbar spine surgeries complicated  by MRSA wound infection with a known chronic open lumbar wound status post multiple debridements by plastic surgery, who presented to [place] with worsening low back pain as well as generalized fatigue and weakness, found to have MRSA infection of sacral  wound with exposed hardware, and elevated inflammatory markers (ESR >130,  CRP elevated at 5.26). He presented to [facility] as a transfer for surgical evaluation by spine team. He was started on vancomcyin and zosyn  on 2/10 in [place], which were continued on admission. BCx from 2/10 with NGTD. Nsgy was consulted, and he underwent removal of lumbar spinal hardware and irrigation and debridement of lumbar wound on 2/15. Plastic surgery was consulted and recommended  leaving wound open with TID dressing changes and plan for return to OR on 2/22 for lumbar spinal wound I&D and potential wound vac. Intraoperative wound cultures had no growth aerobically or anaerobically. Pt has a hx of chronic pain, and pain was managed  after surgery with fentanyl patch (per home regimen), Tylenol (per home regimen), and IV dilaudid 1mg IV q3h for moderate pain and 2mg  IV q3h for severe pain. Pain regimen can be down-titrated to home regimen after plastic surgery intervention. Zosyn  was discontinued on 2/19 given no evidence of pseudomonas. Pt continues to be on vancomycin. Patient was having loose bowel movements multiple times daily but was negative for C.diff and stool softeners were stopped. Plastic surgery recommended wound  vac and it was placed on 2/22, replaced on 2/24. Patient had hyperkalemic episode on 2/34 of potassium 6.0 (repeat from 6.1) with EKG showing  peaked T-waves, ordered insulin & D5 & Ca gluconate & Lokelma 10 mg TID, held Spironolactone . Hyperkalemia resolved with these interventions. Patient had another wound vac replaced on 2/27. Went to OR with plastic surgery on 2/28 for debridement.     Updates 3/7:  -Plan for O.R. today  -All medications held except for keppra  -Follow up what was done at surgery, may need to adjust antibiotic regimen if continued debridement    Cx history:     ·  2/10 OSH bedside wound cx - MRSA (final)      ·  2/15 OR deep wound cs - NGTD (final 2/17)    Abx hx:  1) Vancomcyin 2/10-   2) Zosyn 2/10- 2/19 (d/c given no culture evidence of pseudomonas)      #MRSA sacral wound infection  Micro:   -BCx 2/10 x2 NGTD  -[place] Wound cx 2/10/23: MRSA (S: clinda, vanc; R: TMP-SMX, tetracyclines)  -Intraoperative wound cx 2/15/23: no growth aerobically or anaerobically   - S/p below on 2/15:   1. exploration of lumbar wound dehiscence  2. removal of lumbar spinal hardware (set caps, rods, and screws)  3. irrigation and debridement of lumbar wound  - Plastic surgery recs  2/15:   1. WTD Kerlix dressings with Dakin's/Saline solution packed to the entire depth of the wound three times daily and PRN if soiled   -Lumbar CT without contrast, obtained on 2/18: soft tissue defect overlying lumbar and lower thoracic spine, soft tissue thickening, scattered soft tissue emphysema; multilevel degenerative changes of lumbar spine  - Plastics decided to  place wound vac on 2/22 instead of I&D, reassessed and replaced wound vac on 2/24 and on 2/27. Went to OR on 2/28 for lumbar wound excision and debridement and wound vac placement.  plan to go back to OR on 3/7.  Abx:   Plan:   1) Vancomcyin 2/10 - 4/12 (might possibly need to extend pending what is done at surgery)  2) Zosyn 2/10- 2/19 (d/c given no culture evidence of pseudomonas)    #Chronic pain - lower back  -home pain regimen: scheduled acetaminophen, fentanyl patch 50mcg q72h, dilaudid 4mg PO TID prn breakthrough  -bowel regimen  -follows with pain medicine Dr. 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 Recommend follow up outpatient. Currently on Lokelma.   -6.0 on 2/24/2023 with EKG of peaked T-waves, 5.8 on 2/23 resolved peaked T-waves  -hx of hyperkalemic episodes  on review  -no CKD  -gave insulin, d50, calcium gluconate, and lokelma TID  for 48 hrs  -held maxime  -conitnue to monitor  -continue lokelma    #COPD, 4L at baseline  -SpO2 goal 88-92%  -Started on proventill (takes at home per wife)     #HFpEF   #HTN  -TTE Feb 2022: EF 65-70%, normal LV diastolic function, normal RV  -Hold ASA for possible procedure  -C/w home atorva, metop 12.5 BID, maxime 50 BID, losartan 50 qd, amlodipine 10  -Held maxime for hyperkalemia on 2/24    #Seizure disorder - continue Keppra  #Anxiety - continue Buspar   #BPH - continue tamsulosin   #Gout - continue allopurinol     #CHILANGO  -Not compliant with CPAP at home  -Elevated bicarb inpatient, likely chronic    DVT ppx: lovenox   Code status: FULL (confirmed on admission)  NOK: Wife Mike [phone]      Attestation:   Note Completion:  I am a:  Resident/Fellow   Attending Attestation I  saw and evaluated the patient.  I personally obtained the key and critical portions of the history and physical exam or was physically present for key and  critical portions performed by the resident/fellow. I reviewed the resident/fellow?s documentation and discussed the patient with the resident/fellow.  I agree with the resident/fellow?s medical decision making as documented in the note.     I personally evaluated the patient on 07-Mar-2023         Electronic Signatures:  Cande Guillermo (Resident))  (Signed 07-Mar-2023 10:21)   Authored: Service, Subjective Data, Objective Data, Assessment  and Plan, Note Completion  Mame Kent)  (Signed 07-Mar-2023 10:49)   Authored: Assessment and Plan, Note Completion   Co-Signer: Service, Subjective Data, Objective Data, Assessment and Plan, Note Completion      Last Updated: 07-Mar-2023 10:49 by Mame Kent)

## 2023-09-14 NOTE — PROGRESS NOTES
Service: Infectious Disease     Subjective Data:   RAJ HARMON is a 67 year old Male who is Hospital Day # 18 and POD #3 for 1. Excision and debridement down to and including subcutaneous tissue;2. Partial adjacent tissue transfer;3. ;4. ;5.     Evaluated at bedside. No acute complaints. Denies fevers, chills, SOB, CP.    Objective Data:     Objective Information:      T   P  R  BP   MAP  SpO2   Value  36.5  83  18  133/67      96%  Date/Time 3/3 3:45 3/3 3:45 3/3 3:45 3/3 3:45    3/3 3:45  Range  (36.1C - 36.5C )  (56 - 102 )  (18 - 19 )  (122 - 135 )/ (58 - 67 )    (96% - 98% )   As of 02-Mar-2023 14:21:00, patient is on 4 L/min of oxygen via nasal cannula.      Pain reported at 3/3 6:52: 10 = Severe      T   P  R  BP   MAP  SpO2   Value  36.5  83  18  133/67      96%  Date/Time 3/3 3:45 3/3 3:45 3/3 3:45 3/3 3:45    3/3 3:45  Range  (36.1C - 36.5C )  (56 - 102 )  (18 - 19 )  (122 - 135 )/ (58 - 67 )    (96% - 98% )   As of 02-Mar-2023 14:21:00, patient is on 4 L/min of oxygen via nasal cannula.    Physical Exam Narrative:  ·  Physical Exam:    Gen: well-appearing, NAD  Head and neck: NCAT, neck supple without LAD, severe kyphoscoliosis   HEENT: MMM, normal nose without congestion, poor dentition   CV: RRR no mrg  Pulm: CTAB, prolonged exp phase, no wheezing or crackles, normal WOB on 4L  Abd: obese, soft, non-tender, non-distended  Ext: no cyanosis or clubbing, trace LE edema, thenar wasting bilat; 2-3cm diameter stage 1 ulcer on L heel  Neuro: alert and oriented x3, normal tone, face symmetric, moves all extremities spontaneously. mild tremor in hands bilaterally  Back: Wound vac placed and secured. No blood oozing or purulence. mild skin irritation under the wound vac tape      Medication:    Medications:          Continuous Medications       --------------------------------  No continuous medications are active       Scheduled Medications       --------------------------------    1. Acetaminophen:   650  mg  Oral  Every 6 Hours    2. Allopurinol:  300  mg  Oral  Every 24 Hours    3. amLODIPine (NORVASC):  10  mg  Oral  Daily    4. Ascorbic Acid:  1000  mg  Oral  Daily    5. Atorvastatin:  20  mg  Oral  Daily    6. busPIRone (BUSPAR):  5  mg  Oral  Every 12 Hours    7. Docusate 50 mg - Senna 8.6 m  tablet(s)  Oral  2 Times a Day    8. Enoxaparin SubCutaneous:  40  mg  SubCutaneous  Every 24 Hours    9. fentaNYL 50 micrograms/ hour TransDermal:  1  patch  TransDermal  Every 72 Hours    10. Hydrocortisone 1% Topical:  1  application(s)  Topical  2 Times a Day    11. levETIRAcetam (KEPPRA):  500  mg  Oral  2 Times a Day    12. Metoprolol Tartrate:  12.5  mg  Oral  2 Times a Day    13. Polyethylene Glycol:  17  gram(s)  Oral  Daily    14. Sodium Hypochlorite 0.125% (Dakins Quarter):  1  application(s)  Topical  3 Times a Day    15. Sodium Zirconium Cyclosilicate:  10  gram(s)  Oral  Daily    16. Tamsulosin:  0.4  mg  Oral  Daily    17. Vancomycin - RPh to Dose - IV Piggy Back:  1  each  As Specified  Variable    18. Vancomycin 750 mg/ D5W IVPB Premixed Soln 150 mL:  750  mg  IntraVenous Piggyback  Every 12 Hours         PRN Medications       --------------------------------    1. diphenhydrAMINE:  25  mg  Oral  Every 24 Hours    2. Heparin Flush 10 unit/ mL Injectable PRN:  5  mL  IntraVenous Flush  According to Flush Policy    3. HYDROmorphone Injectable:  2  mg  IntraVenous Push  Every 3 Hours    4. Naloxone Injectable:  0.2  mg  IntraVenous Push  Once    5. Sodium Chloride 0.9% Injectable Flush:  10  mL  IntraVenous Flush  Every 8 Hours and as Needed        Recent Lab Results:    Results:    CBC: 3/3/2023 04:36              \     Hgb     /                              \     7.5 L    /  WBC  ----------------  Plt               6.4       ----------------    323              /     Hct     \                              /     25.7 L    \            RBC: 2.32 L    MCV: 111 H    Neutrophil  %: 67.6      RFP:  3/3/2023 04:36  NA+        Cl-     BUN  /                         135 L   91 L    37 H /  --------------------------------  Glucose                ---------------------------  111 H    K+     HCO3-   Creat \                         4.8    40 H   0.86  \  Calcium : 9.4Anion Gap : 9 L         Albumin : 2.8 L     Phos : 3.1        I have reviewed these laboratory results:    Renal Function Panel  03-Mar-2023 04:36:00      Result Value    Glucose, Serum  111   H   NA  135   L   K  4.8    CL  91   L   Bicarbonate, Serum  40   H   Anion Gap, Serum  9   L   BUN  37   H   CREAT  0.86    GFR Male  >90    Calcium, Serum  9.4    Phosphorus, Serum  3.1    ALB  2.8   L     Complete Blood Count + Differential  03-Mar-2023 04:36:00      Result Value    White Blood Cell Count  6.4    Nucleated Erythrocyte Count  0.0    Red Blood Cell Count  2.32   L   HGB  7.5   L   HCT  25.7   L   MCV  111   H   MCHC  29.2   L   PLT  323    RDW-CV  12.2    Neutrophil %  67.6    Immature Granulocytes %  0.8    Lymphocyte %  13.3    Monocyte %  13.0    Eosinophil %  4.5    Basophil %  0.8    Neutrophil Count  4.32    Lymphocyte Count  0.85   L   Monocyte Count  0.83    Eosinophil Count  0.29    Basophil Count  0.05      Magnesium, Serum  03-Mar-2023 04:36:00      Result Value    Magnesium, Serum  1.79        Assessment and Plan:   Daily Risk Screen:  ·  Does patient have a central line? yes   ·  Central Line Type PICC   ·  Plan for PICC removal today? no   ·  The patient continues to require a PICC for parenteral medication     Comorbidities:  ·  Comorbidity Other     Code Status:  ·  Code Status Full Code     Assessment:    67 year old Male with CHF, COPD on 4L at home, seizure disorder and a history of multiple lumbar spine surgeries complicated  by MRSA wound infection with a known chronic open lumbar wound status post multiple debridements by plastic surgery, who presented to [facility] with worsening low back pain as well as generalized  fatigue and weakness, found to have MRSA infection of sacral  wound with exposed hardware, and elevated inflammatory markers (ESR >130,  CRP elevated at 5.26). He presented to [facility] as a transfer for surgical evaluation by spine team. He was started on vancomcyin and zosyn  on 2/10 in [place], which were continued on admission. BCx from 2/10 with NGTD. Nsgy was consulted, and he underwent removal of lumbar spinal hardware and irrigation and debridement of lumbar wound on 2/15. Plastic surgery was consulted and recommended  leaving wound open with TID dressing changes and plan for return to OR on 2/22 for lumbar spinal wound I&D and potential wound vac. Intraoperative wound cultures had no growth aerobically or anaerobically. Pt has a hx of chronic pain, and pain was managed  after surgery with fentanyl patch (per home regimen), Tylenol (per home regimen), and IV dilaudid 1mg IV q3h for moderate pain and 2mg IV q3h for severe pain. Pain regimen can be down-titrated to home regimen after plastic surgery intervention. Zosyn  was discontinued on 2/19 given no evidence of pseudomonas. Pt continues to be on vancomycin. Patient was having loose bowel movements multiple times daily but was negative for C.diff and stool softeners were stopped. Plastic surgery recommended wound  vac and it was placed on 2/22, replaced on 2/24. Patient had hyperkalemic episode on 2/34 of potassium 6.0 (repeat from 6.1) with EKG showing  peaked T-waves, ordered insulin & D5 & Ca gluconate & Lokelma 10 mg TID, held Spironolactone . Hyperkalemia resolved with these interventions. Patient had another wound vac replaced on 2/27. Went to OR with plastic surgery on 2/28 for debridement.     Updates 3/3:  -Lumbar wound excision and debridement w/ wound vac on 2/28. Plan to go to OR again on 3/7  -UCx negative, no symptoms of UTI  -Hyperkalemia resolving, c/w Lokelma once daily   -Vanc level 15.1 on 3/1   -Vanc to continue for 8 weeks until 2/15 -  4/12  -Pain regimen currently at dilaudid IV 2mg q3h as needed   -Continue to monitor bicarb (underlying CHILANGO)  -Urine lytes for chronic hyperkalemia work up      Cx history:     ·  2/10 OSH bedside wound cx - MRSA (final)      ·  2/15 OR deep wound cs - NGTD (final 2/17)    Abx hx:  1) Vancomcyin 2/10-   2) Zosyn 2/10- 2/19 (d/c given no culture evidence of pseudomonas)      #MRSA sacral wound infection  Micro:   -BCx 2/10 x2 NGTD  -[place] Wound cx 2/10/23: MRSA (S: clinda, vanc; R: TMP-SMX, tetracyclines)  -Intraoperative wound cx 2/15/23: no growth aerobically or anaerobically   - S/p below on 2/15:   1. exploration of lumbar wound dehiscence  2. removal of lumbar spinal hardware (set caps, rods, and screws)  3. irrigation and debridement of lumbar wound  - Plastic surgery recs  2/15:   1. WTD Kerlix dressings with Dakin's/Saline solution packed to the entire depth of the wound three times daily and PRN if soiled   -Lumbar CT without contrast, obtained on 2/18: soft tissue defect overlying lumbar and lower thoracic spine, soft tissue thickening, scattered soft tissue emphysema; multilevel degenerative changes of lumbar spine  - Plastics decided to place wound vac on 2/22 instead of I&D, reassessed and replaced wound vac on 2/24 and on 2/27. Went to OR on 2/28 for lumbar wound excision and debridement and wound vac placement.  plan to go back to OR on 3/7.  Abx:   Plan:   1) Vancomcyin 2/10 - 4/12   2) Zosyn 2/10- 2/19 (d/c given no culture evidence of pseudomonas)    #Chronic pain - lower back  -home pain regimen: scheduled acetaminophen, fentanyl patch 50mcg q72h, dilaudid 4mg PO TID prn breakthrough  -bowel regimen  -follows with pain medicine Dr. Almazan at [facility]   -post-op pain regimen: Tylenol 650mg q6h, fentanyl patch 50mcg q72h, 1mg IV dilaudid q3h for moderate pain, 2mg IV dilaudid q3h for severe pain    #Hyperkalemia - resolved now  -6.0 on 2/24/2023 with EKG of peaked T-waves, 5.8 on 2/23 resolved peaked  T-waves  -hx of hyperkalemic  episodes on review  -no CKD  -gave insulin, d50, calcium gluconate, and lokelma  TID for 48 hrs  -held maxime  -conitnue to monitor    #COPD, 4L at baseline  -SpO2 goal 88-92%  -Started on proventill (takes at home per wife)     #HFpEF   #HTN  -TTE Feb 2022: EF 65-70%, normal LV diastolic function, normal RV  -Hold ASA for possible procedure  -C/w home atorva, metop 12.5 BID, maxime 50 BID, losartan 50 qd, amlodipine 10  -Held maxime for hyperkalemia on 2/24    #Seizure disorder - continue Keppra  #Anxiety - continue Buspar   #BPH - continue tamsulosin   #Gout - continue allopurinol     #CHILANGO  -Not compliant with CPAP at home  -Elevated bicarb inpatient, likely chronic    DVT ppx: lovenox   Code status: FULL (confirmed on admission)  NOK: Wife Mike [phone]            Attestation:   Note Completion:  I am a:  Resident/Fellow   Attending Attestation I saw and evaluated the patient.  I personally obtained the key and critical portions of the history and physical exam or was physically present for key and  critical portions performed by the resident/fellow. I reviewed the resident/fellow?s documentation and discussed the patient with the resident/fellow.  I agree with the resident/fellow?s medical decision making as documented in the note.     I personally evaluated the patient on 03-Mar-2023         Electronic Signatures:  Mame Kent)  (Signed 03-Mar-2023 15:26)   Authored: Note Completion   Co-Signer: Service, Subjective Data, Objective Data, Assessment and Plan, Note Completion  Freddie Caraballo (Resident))  (Signed 03-Mar-2023 12:53)   Authored: Service, Subjective Data, Objective Data, Assessment  and Plan, Note Completion      Last Updated: 03-Mar-2023 15:26 by Mame Kent)

## 2023-09-14 NOTE — PROGRESS NOTES
Service: Infectious Disease     Subjective Data:   RAJ HARMON is a 67 year old Male who is Hospital Day # 9 and POD #7 for 1. exploration of lumbar wound dehiscence;2. removal of lumbar spinal hardware (set caps, rods, and screws);3. irrigation  and debridement of lumbar wound.     Had itching over the wound vac site overnight and releived with benadryl. Evaluated at bedside this morning. No acute complaints. denies fevers, chills, diarrhea. Still endorsing pain over the wound  site that is relieved with the current pain regimen.    Objective Data:     Objective Information:      T   P  R  BP   MAP  SpO2   Value  36.3  79  18  107/60      96%  Date/Time 2/22 13:08 2/22 13:08 2/22 13:08 2/22 13:08    2/22 13:08  Range  (36.1C - 37.1C )  (76 - 100 )  (16 - 19 )  (107 - 152 )/ (58 - 73 )    (96% - 99% )   As of 22-Feb-2023 08:33:00, patient is on 4 L/min of oxygen via nasal cannula with humidification.  Highest temp of 37.1 C was recorded at 2/21 14:35      Pain reported at 2/22 17:11: 8 = Severe      T   P  R  BP   MAP  SpO2   Value  36.3  79  18  107/60      96%  Date/Time 2/22 13:08 2/22 13:08 2/22 13:08 2/22 13:08    2/22 13:08  Range  (36.1C - 37.1C )  (76 - 100 )  (16 - 19 )  (107 - 152 )/ (58 - 73 )    (96% - 99% )   As of 22-Feb-2023 08:33:00, patient is on 4 L/min of oxygen via nasal cannula with humidification.  Highest temp of 37.1 C was recorded at 2/21 14:35    Physical Exam Narrative:  ·  Physical Exam:    Gen: well-appearing, NAD  Head and neck: NCAT, neck supple without LAD, severe kyphoscoliosis   HEENT: MMM, normal nose without congestion, poor dentition   CV: RRR no mrg  Pulm: CTAB, prolonged exp phase, no wheezing or crackles, normal WOB on 4L  Abd: obese, soft, non-tender, non-distended  Ext: no cyanosis or clubbing, trace LE edema, thenar wasting bilat; 2-3cm diameter stage 1 ulcer on L heel  Neuro: alert and oriented x3, normal tone, face symmetric, moves all extremities  spontaneously   Back: Wound vac placed and secured. No blood oozing or purulence.      Medication:    Medications:          Continuous Medications       --------------------------------  No continuous medications are active       Scheduled Medications       --------------------------------    1. Acetaminophen:  650  mg  Oral  Every 6 Hours    2. Allopurinol:  300  mg  Oral  Every 24 Hours    3. amLODIPine (NORVASC):  10  mg  Oral  Daily    4. Ascorbic Acid:  1000  mg  Oral  Daily    5. Atorvastatin:  20  mg  Oral  Daily    6. busPIRone (BUSPAR):  5  mg  Oral  Every 12 Hours    7. Docusate 50 mg - Senna 8.6 m  tablet(s)  Oral  2 Times a Day    8. Enoxaparin SubCutaneous:  40  mg  SubCutaneous  Every 24 Hours    9. fentaNYL 50 micrograms/ hour TransDermal:  1  patch  TransDermal  Every 72 Hours    10. levETIRAcetam (KEPPRA):  500  mg  Oral  2 Times a Day    11. Metoprolol Tartrate:  12.5  mg  Oral  2 Times a Day    12. Polyethylene Glycol:  17  gram(s)  Oral  Daily    13. Sodium Hypochlorite 0.125% (Dakins Quarter):  1  application(s)  Topical  3 Times a Day    14. Spironolactone:  50  mg  Oral  2 Times a Day    15. Tamsulosin:  0.4  mg  Oral  Daily    16. Vancomycin - RPh to Dose - IV Piggy Back:  1  each  As Specified  Variable    17. Vancomycin 750 mg/ D5W IVPB Premixed Soln 150 mL:  750  mg  IntraVenous Piggyback  Every 12 Hours         PRN Medications       --------------------------------    1. Acetaminophen:  650  mg  Oral  Once    2. Albuterol 90 micrograms/ Inhalation MDI:  2  inhalation  Inhalation  Every 6 Hours    3. diphenhydrAMINE:  25  mg  Oral  Every 24 Hours    4. HYDROmorphone Injectable:  1  mg  IntraVenous Push  Every 3 Hours    5. HYDROmorphone Injectable:  2  mg  IntraVenous Push  Every 3 Hours    6. Melatonin:  3  mg  Oral  At Bedtime    7. Naloxone Injectable:  0.2  mg  IntraVenous Push  Once    8. Sodium Chloride 0.9% Injectable Flush:  10  mL  IntraVenous Flush  Every 8 Hours and as  Needed        Recent Lab Results:    Results:    CBC: 2/22/2023 06:33              \     Hgb     /                              \     8.3 L    /  WBC  ----------------  Plt               8.4       ----------------    331              /     Hct     \                              /     28.5 L    \            RBC: 2.55 L    MCV: 112 H    Neutrophil  %: 64.1      RFP: 2/22/2023 06:33  NA+        Cl-     BUN  /                         140    99    25 H /  --------------------------------  Glucose                ---------------------------  115 H    K+     HCO3-   Creat \                         4.8    37 H   0.96  \  Calcium : 9.8Anion Gap : 9 L         Albumin : 3.2 L     Phos : 3.3        I have reviewed these laboratory results:    Vancomycin Level, Trough  22-Feb-2023 13:34:00      Result Value    Lab Comment:  VANCT CALLED RB TO JULITA NYE , 02/22/2023 14:25    Vancomycin Level, Trough  25.0   HH     Complete Blood Count + Differential  22-Feb-2023 06:33:00      Result Value    White Blood Cell Count  8.4    Nucleated Erythrocyte Count  0.0    Red Blood Cell Count  2.55   L   HGB  8.3   L   HCT  28.5   L   MCV  112   H   MCHC  29.1   L   PLT  331    RDW-CV  12.7    Neutrophil %  64.1    Immature Granulocytes %  1.9   H   Lymphocyte %  19.2    Monocyte %  8.4    Eosinophil %  5.1    Basophil %  1.3    Neutrophil Count  5.36    Lymphocyte Count  1.61    Monocyte Count  0.70    Eosinophil Count  0.43    Basophil Count  0.11   H     Renal Function Panel  22-Feb-2023 06:33:00      Result Value    Glucose, Serum  115   H   NA  140    K  4.8    CL  99    Bicarbonate, Serum  37   H   Anion Gap, Serum  9   L   BUN  25   H   CREAT  0.96    GFR Male  85    Calcium, Serum  9.8    Phosphorus, Serum  3.3    ALB  3.2   L     Magnesium, Serum  22-Feb-2023 06:33:00      Result Value    Magnesium, Serum  1.82        Assessment and Plan:   Daily Risk Screen:  ·  Does patient have a central line? yes   ·  Central Line Type  PICC   ·  Plan for PICC removal today? no   ·  The patient continues to require a PICC for parenteral medication     Comorbidities:  ·  Comorbidity Other     Code Status:  ·  Code Status Full Code     Assessment:    67 year old Male with CHF, COPD on 4L at home, seizure disorder and a history of multiple lumbar spine surgeries complicated  by MRSA wound infection with a known chronic open lumbar wound status post multiple debridements by plastic surgery, who presented to [place] with worsening low back pain as well as generalized fatigue and weakness, found to have MRSA infection of sacral  wound with exposed hardware, and elevated inflammatory markers. He presented to [facility]  as a transfer for surgical evaluation by spine team. He was started on vancomcyin and zosyn on 2/10 in [place], which were continued on admission.  BCx from 2/10 with NGTD. Nsgy was consulted, and he underwent removal of lumbar spinal hardware and irrigation and debridement of lumbar wound on 2/15. Plastic surgery was consulted and recommended leaving wound open with TID dressing changes and plan  for return to OR on 2/22 for lumbar spinal wound I&D and potential wound vac. Intraoperative wound cultures had no growth aerobically or anaerobically. Pt has a hx of chronic pain, and pain was managed after surgery with fentanyl patch (per home regimen),  Tylenol (per home regimen), and IV dilaudid 1mg IV q3h for moderate pain and 2mg IV q3h for severe pain. Pain regimen can be down-titrated to home regimen after plastic surgery intervention. Zosyn was discontinued on 2/19 given no evidence of pseudomonas.  Pt continues to be on vancomycin. Patient was having loose bowel movements multiple times daily but was negative for C.diff and stool softeners were stopped. Plastic surgery recommended wound vac and it was placed on 2/22.    Updates 2/22:  -No I&D to be done with plastic surgery tomorrow given wounds look better on imaging now from plastics  standpoint, to reassess wound vac on Friday and evaluated for wound closure next week.  -Vanc level 25, vanc held and to redose  by pharmacy  -EDILIA improving   -Vanc to continue for 8 weeks until 4/12  -Closely monitor vancomycin levels as patient had a break in medication and needs to be ensured to continue to receive   -Pain regimen currently at 2mg q3h as needed  -Outpatient f/u with Dr. Harrison      #MRSA sacral wound infection  Micro:   -BCx 2/10 x2 NGTD  -[place] Wound cx 2/10/23: MRSA (S: clinda, vanc; R: TMP-SMX, tetracyclines)  -ESR elevated at >130  -CRP elevated at 5.26  -Intraoperative wound cx 2/15/23: no growth aerobically or anaerobically   - S/p below on 2/15:   1. exploration of lumbar wound dehiscence  2. removal of lumbar spinal hardware (set caps, rods, and screws)  3. irrigation and debridement of lumbar wound  - Plastic surgery recs  2/15:   1. WTD Kerlix dressings with Dakin's/Saline solution packed to the entire depth of the wound three times daily and PRN if soiled   2. return to OR with plastic surgery on 2/22 for lumbar spinal wound I&D and possible application of wound vac  3. Lumbar CT without contrast, obtained on 2/18: soft tissue defect overlying lumbar and lower thoracic spine, soft tissue thickening, scattered soft tissue emphysema; multilevel degenerative changes of lumbar spine  - Plastics decided to place wound vac on 2/22 instead of I&D and to reassess wound vac on Friday and evaluated for wound closure next week.  Abx:   Vancomycin dosing: Pt received vancomycin 2/10 - 2/13, supratherapeutic (20.4) and not resumed on admission on 2/14, 1x dose 1.5g IV intra-operatively on 2/15, and resumed on 2/17 with 1500mg q12h dosing, redosed to 1250mg q12h on 2/19  Plan:   1) Vancomcyin 2/10-   2) Zosyn 2/10- 2/19 (d/c given no culture evidence of pseudomonas)  3) Appreciate plastic surgery recs  4) F/u intraoperative cultures     #Chronic pain - lower back  -home pain regimen: scheduled  acetaminophen, fentanyl patch 50mcg q72h, dilaudid 4mg PO TID prn breakthrough  -bowel regimen  -follows with pain medicine Dr. Almazan at [facility]   -post-op pain regimen: Tylenol 650mg q6h, fentanyl patch 50mcg q72h, 1mg IV dilaudid q3h for moderate pain, 2mg IV dilaudid q3h for severe  pain    #COPD, 4L at baseline  -SpO2 goal 88-92%  -Started on proventill (takes at home per wife)     #HFpEF   #HTN  -TTE Feb 2022: EF 65-70%, normal LV diastolic function, normal RV  -Hold ASA for possible procedure  -C/w home atorva, metop 12.5 BID, maxime 50 BID, losartan 50 qd, amlodipine 10    #Seizure disorder - continue Keppra  #Anxiety - continue Buspar   #BPH - continue tamsulosin   #Gout - continue allopurinol     DVT ppx: lovenox   Code status: FULL (confirmed on admission)  NOK: Wife Mike [phone]      Attestation:   Note Completion:  I am a:  Resident/Fellow   Attending Attestation I saw and evaluated the patient.  I personally obtained the key and critical portions of the history and physical exam or was physically present for key and  critical portions performed by the resident/fellow. I reviewed the resident/fellow?s documentation and discussed the patient with the resident/fellow.  I agree with the resident/fellow?s medical decision making as documented in the note.     I personally evaluated the patient on 22-Feb-2023         Electronic Signatures:  Freddie Caraballo (Resident))  (Signed 22-Feb-2023 17:42)   Authored: Service, Subjective Data, Objective Data, Assessment  and Plan, Note Completion  Darrian Galvez)  (Signed 23-Feb-2023 10:32)   Authored: Note Completion   Co-Signer: Service, Subjective Data, Objective Data, Assessment and Plan, Note Completion      Last Updated: 23-Feb-2023 10:32 by Darrian Galvez)

## 2023-09-14 NOTE — PROGRESS NOTES
Service: Plastic Surgery     Subjective Data:   RAJ HARMON is a 67 year old Male who is Hospital Day #24 and POD #2 for Debridement washout, vac placement.     Wound vac with alarm overnight for break in seal which was resolved with dressing reinforcement per nursing. Denies acute concerns on AM assessment, pain controlled. Tolerating diet. Having regular BMs.  Aware of plan for next bedside wound vac dressing change tomorrow and return to OR with plastics Tuesday (3/14) for additional lumbar spinal wound debridement and vac exchange.    Objective Data:     Objective Information:      T   P  R  BP   MAP  SpO2   Value  36.2  84  20  144/72   97  98%  Date/Time 3/9 5:05 3/9 5:05 3/9 5:05 3/9 5:05  3/8 13:27 3/9 5:05  Range  (36.1C - 36.3C )  (56 - 84 )  (18 - 22 )  (129 - 164 )/ (58 - 72 )  (97 - 97 )  (96% - 99% )    As of 08-Mar-2023 20:30:00, patient is on 4 L/min of oxygen via nasal cannula.    Pain reported at 3/9 9:50: 6 = Moderate    Physical Exam by System:    Constitutional: Alert and cooperative, NAD.   Eyes: EOMI, clear sclera.   ENMT: MMM.   Head/Neck: NC/AT, neck supple, trachea midline.   Respiratory/Thorax: Unlabored respirations on 4L  NC, symmetrical chest rise.   Cardiovascular: Extremities WWP.   Gastrointestinal: Protuberant, soft ND/NT.   Musculoskeletal: Wound vac dressing intact to midline  lumbar spine without issue. Maintaining continuous suction at 125 mmHg without alarm, leak, obstruction or malfunction. Cannister with ~250cc SS output. Periwound erythema. Appropriately TTP throughout surrounding tissue.   Extremities: ROJO x4, generalized deconditioned strength.   Neurological: A&O x3, no gross neuro deficits.   Psychological: Appropriate behavior and mood.   Skin: Warm and dry, no lesions, no rashes. See MSK  exam.     Medication:    Medications:        Continuous Medications       --------------------------------  No continuous medications are active       Scheduled  Medications       --------------------------------  1. Acetaminophen:  650  mg  Oral  Every 6 Hours    2. Allopurinol:  300  mg  Oral  Every 24 Hours    3. amLODIPine (NORVASC):  10  mg  Oral  Daily    4. Ascorbic Acid:  1000  mg  Oral  Daily    5. Atorvastatin:  20  mg  Oral  Daily    6. busPIRone (BUSPAR):  5  mg  Oral  Every 12 Hours    7. Docusate 50 mg - Senna 8.6 m  tablet(s)  Oral  2 Times a Day    8. Enoxaparin SubCutaneous:  40  mg  SubCutaneous  Every 24 Hours    9. fentaNYL 50 micrograms/ hour TransDermal:  1  patch  TransDermal  Every 72 Hours    10. Hydrocortisone 1% Topical:  1  application(s)  Topical  2 Times a Day    11. levETIRAcetam (KEPPRA):  500  mg  Oral  2 Times a Day    12. Metoprolol Tartrate:  12.5  mg  Oral  2 Times a Day    13. Polyethylene Glycol:  17  gram(s)  Oral  Daily    14. Sodium Hypochlorite 0.125% (Dakins Quarter):  1  application(s)  Topical  3 Times a Day    15. Sodium Zirconium Cyclosilicate:  10  gram(s)  Oral  Daily    16. Tamsulosin:  0.4  mg  Oral  Daily    17. Vancomycin - RPh to Dose - IV Piggy Back:  1  each  As Specified  Variable    18. Vancomycin 1 gram IVPB/ Premixed Soln 200 mL:  1  gram(s)  IntraVenous Piggyback  Every 12 Hours         PRN Medications       --------------------------------  1. Albuterol 2.5 mg - Ipratropium 0.5 mg/ 3 mL Neb Soln:  3  mL  Inhalation  Every 6 Hours    2. diphenhydrAMINE:  25  mg  Oral  Every 24 Hours    3. Heparin Flush 10 unit/ mL Injectable PRN:  5  mL  IntraVenous Flush  According to Flush Policy    4. HYDROmorphone Injectable:  2  mg  IntraVenous Push  Every 3 Hours    5. Naloxone Injectable:  0.2  mg  IntraVenous Push  Once    6. Sodium Chloride 0.9% Injectable Flush:  10  mL  IntraVenous Flush  Every 8 Hours and as Needed        Recent Lab Results:    Results:    I have reviewed these laboratory results:    Renal Function Panel  08-Mar-2023 06:02:00      Result Value    Lab Comment:  BIC CALLED RB TO RODERICK BRAND ,  03/08/2023 07:18    Glucose, Serum  103   H   NA  141    K  4.6    CL  96   L   Bicarbonate, Serum  41   HH   Anion Gap, Serum  9   L   BUN  25   H   CREAT  0.69    GFR Male  >90    Calcium, Serum  9.2    Phosphorus, Serum  4.1    ALB  2.7   L     Complete Blood Count + Differential  08-Mar-2023 06:02:00      Result Value    White Blood Cell Count  6.4    Nucleated Erythrocyte Count  0.0    Red Blood Cell Count  2.31   L   HGB  7.5   L   HCT  25.7   L   MCV  111   H   MCHC  29.2   L   PLT  315    RDW-CV  12.1    Neutrophil %  70.0    Immature Granulocytes %  1.1   H   Lymphocyte %  14.0    Monocyte %  8.9    Eosinophil %  5.2    Basophil %  0.8    Neutrophil Count  4.46    Lymphocyte Count  0.89   L   Monocyte Count  0.57    Eosinophil Count  0.33    Basophil Count  0.05      Magnesium, Serum  08-Mar-2023 06:02:00      Result Value    Magnesium, Serum  2.04        Assessment and Plan:   Daily Risk Screen:  ·  Does patient have an indwelling urinary catheter? n/a consulting service   ·  Does patient have a central line? n/a consulting service     Comorbidities:  ·  Comorbidity Other     Code Status:  ·  Code Status Full Code     Assessment:    RAJ HARMON is a 67 year old Male known to the plastic surgery team with a h/o multiple lumbar spinal surgeries which have been c/b recurrent MRSA infections  requiring multiple extensive plastic surgery interventions including multiple debridements and attempted closures. Admitted to [facility] as transfer on 2/14 for concern of MRSA infection of chronically open lumbar wound with exposed hardware. Patient now  s/p lumbar spinal wound I&D and removal of spinal hardware per NSGY on 2/15. Plastic Surgery consulted for spinal wound debridement and coverage.     OR course (this admission):   2/15 - Lumbar spinal wound exploration, I&D and removal of spinal hardware (per NSGY)  2/28 - Lumbar wound excision and debridement down to and including subcutaneous tissue, partial  adjacent tissue transfer, wound vac application (per plastic surgery)   3/7 - Lumbar spinal wound irrigation and debridement, application of wound vac (per plastic surgery)    Plan/Recommendations:   - Return to OR with plastics Tues 3/14 for additional lumbar spinal wound I&D,     potential flap vs. wound vac application  - Continue interim vac therapy to lumbar wound until return to OR     ·  Settings: 125 mmHg low continuous suction      ·  Please keep dressing C/D/I, reinforce PRN      ·  Record vac output quality and quantity per nursing q8h      ·  Please alert plastics team with any concerns regarding vac      ·  Next change to occur at bedside Friday 3/10 per plastics      ·  Patient can be encouraged to cleanse wound/shower during next vac change  - Diligent pressure offloading to lumbar spine/wound site with q2h turns and frequent repositioning in bed  - Nutrition optimization to promote wound healing     ·  Consider addition of daily MV and nutritional supplements/shakes as able   - Continue IV ABX per ID recs/primary (currently IV Vanc)   - Culture history:     · 2/10 OSH bedside withound cx - MRSA (final 2/12)      · 2/15 OR deep wound cx - NGTD (final 2/17)     · Urine cx obtained intraop 2/28 d/t c/f dark/sedimentary urine per [facility]         -> UA positive for large leukocyte esterase, cx with no growth   - Appreciate remaining supportive care per primary service   - Plastics will continue to follow    Patient and plan discussed with Dr. Jaime.     Kiki Lin PA-C  Plastic and Reconstructive Surgery   [facility]  Pager #[phone]  Team phones: [phone]     Time spent on the assessment of patient, gathering and interpreting data, review of medical record/patient history, personally reviewing radiographic imaging and formulation of this note 30 minutes. With greater than 50% spent in personal discussion with  patient.        Electronic Signatures:  Kiki Lin (PAC)  (Signed  09-Mar-2023 13:09)   Authored: Service, Subjective Data, Objective Data, Assessment  and Plan, Note Completion      Last Updated: 09-Mar-2023 13:09 by Kiki Lin (PAC)

## 2023-09-14 NOTE — PROGRESS NOTES
Service: Plastic Surgery     Subjective Data:   RAJ HARMON is a 67 year old Male who is Hospital Day # 32 and POD #3 for 1. Excision and debridement down to and including spinous process;2. Wound vac exchange (15 x 11 cm);3. ;4. ;5.     Pt resting comfortably. He notes that he had a difficult night with wound vac issues and repetitive efforts to seal leaks. He endorses being sore, but overall pain is well managed.     Denies any fever, chills, night sweats, CP, SOB, palpitations, nausea, vomiting, diarrhea, abdominal pain.    Objective Data:     Objective Information:      T   P  R  BP   MAP  SpO2   Value  36.6  79  24  156/79   81  97%  Date/Time 3/17 12:38 3/17 12:38 3/17 12:38 3/17 12:38  3/17 6:38 3/17 12:38  Range  (36.1C - 36.6C )  (62 - 79 )  (18 - 24 )  (125 - 156 )/ (55 - 79 )  (81 - 81 )  (97% - 99% )   As of 17-Mar-2023 09:43:00, patient is on 3 L/min of oxygen via nasal cannula.      Pain reported at 3/17 10:43: 6 = Moderate    Physical Exam by System:    Constitutional: Alert and cooperative, NAD, sitting  upright in bed.   Eyes: EOMI, clear sclera.   ENMT: MMM.   Head/Neck: NC/AT.   Respiratory/Thorax: Unlabored respirations on 4L  NC, symmetrical chest rise.   Cardiovascular: Extremities W/WP.   Gastrointestinal: Protuberant, soft ND/NT.   Genitourinary: Voiding independently via urinal.   Musculoskeletal: Wound vac dressing no longer holding  seal. Periwound tissue pink, irritated appearing with maceration, minimally TTP.   Extremities: ROJO x4, generalized deconditioned strength.   Neurological: A&O x3, no gross neuro deficits.   Psychological: Appropriate behavior and mood.   Skin: Warm and dry, see MSK exam.     Recent Lab Results:    Results:    I have reviewed these laboratory results:    Renal Function Panel  16-Mar-2023 07:05:00      Result Value    Lab Comment:  BIC CALLED RB TO ISABEL PARIKH RN , 03/16/2023 08:52BIC CALLED RB TO ISABEL PARIKH RN , 03/16/2023 08:52    Glucose,  Serum  109   H   NA  141    K  4.1    CL  96   L   Bicarbonate, Serum  43   HH   Anion Gap, Serum  6   L   BUN  19    CREAT  0.60    GFR Male  >90    Calcium, Serum  9.1    Phosphorus, Serum  2.6    ALB  2.7   L     Complete Blood Count + Differential  16-Mar-2023 07:05:00      Result Value    White Blood Cell Count  6.9    Nucleated Erythrocyte Count  0.0    Red Blood Cell Count  2.24   L   HGB  7.1   L   HCT  25.1   L   MCV  112   H   MCHC  28.3   L   PLT  272    RDW-CV  12.1    Neutrophil %  72.3    Immature Granulocytes %  1.2   H   Lymphocyte %  13.7    Monocyte %  8.6    Eosinophil %  3.6    Basophil %  0.6    Neutrophil Count  4.97    Lymphocyte Count  0.94   L   Monocyte Count  0.59    Eosinophil Count  0.25    Basophil Count  0.04      Magnesium, Serum  16-Mar-2023 07:05:00      Result Value    Magnesium, Serum  2.02        Assessment and Plan:   Daily Risk Screen:  ·  Does patient have an indwelling urinary catheter? n/a consulting service   ·  Does patient have a central line? n/a consulting service     Code Status:  ·  Code Status Full Code     Assessment:    RAJ HARMON is a 67 year old Male known to the plastic surgery team with a h/o multiple lumbar spinal surgeries which have been c/b recurrent MRSA infections  requiring multiple extensive plastic surgery interventions including multiple debridements and attempted closures. Admitted to [facility] as transfer on 2/14 for concern of MRSA infection of chronically open lumbar wound with exposed hardware. Patient now  s/p lumbar spinal wound I&D and removal of spinal hardware per NSGY on 2/15. Plastic Surgery consulted for spinal wound debridement and coverage.     OR course (this admission):   2/15 - Lumbar spinal wound exploration, I&D and removal of spinal hardware (per NSGY)  2/28 - Lumbar wound excision and debridement down to and including subcutaneous tissue, partial adjacent tissue transfer, wound vac application (per plastic surgery)   3/7 -  Lumbar spinal wound irrigation and debridement, application of wound vac (per plastic surgery)  3/14 - Lumbar spinal wound I&D down to and including spinous process, application of wound vac (per plastics)     Plan/Recommendations:   - No additional acute surgical intervention planned from plastic surgery standpoint this admission  - Continue vac therapy to lumbar wound to promote healing and granulation of wound     -> Anticipate 3-4 weeks of vac therapy with re-eval at that time to determine plans/timing of         any additional reconstructive surgical intervention      ·  M/W/F vac dressing changes - Last change 3/17 bedside.                ·  Patient may have dressing removed, shower/clean wound, and vac dressing be re-applied later that day by wound care on 3/20     ·  Settings: 125 mmHg low continuous suction      ·  Please keep dressing C/D/I, reinforce PRN      ·  Record vac output quality and quantity per nursing q8h      ·  Please alert plastics team with any concerns regarding vac   - Diligent pressure offloading to lumbar spine/wound site with q2h turns and frequent repositioning in bed  - Nutrition optimization to promote wound healing     ·  Consider addition of daily MV and nutritional supplements/shakes as able   - Continue ABX per ID/primary (IV Vanc currently)   - Culture history:     · 2/10 OSH bedside withound cx - MRSA (final 2/12)      · 2/15 OR deep wound cx - NGTD (final 2/17)  - OK for DC from plastic surgery perspective, final disposition per primary when medically clear       ·  Will need home health care arranged for 3x weekly vac dressing changes if home-going      ·  Plastics can assist in arranging home vac if pt is not going to facility     ·  Outpt f/u with plastics arranged for 4/3 at 0930 with Dr. Jaime at the [facility]         (may need to be rescheduled if patient remains in house)   - Appreciate remaining supportive care per primary service   - Plastic surgery will  continue to follow     Patient and plan discussed with Dr. Jaime.    Shirley Romero PA-C   Plastic and Reconstructive Surgery    Available via [facility], pager: [phone] or Team phones: [phone]     Time spent on the assessment of patient, gathering and interpreting data, review of medical record/patient history, personally reviewing radiographic imaging and formulation of this note 30 minutes. With greater than 50% spent in personal discussion with  patient.        Electronic Signatures:  Shirley Romero (PA (PHYSICIAN))  (Signed 17-Mar-2023 13:35)   Authored: Service, Subjective Data, Objective Data, Assessment  and Plan, Note Completion      Last Updated: 17-Mar-2023 13:35 by Shirley Romero (PA (PHYSICIAN))

## 2023-09-14 NOTE — PROGRESS NOTES
Service: Plastic Surgery     Subjective Data:   RAJ HARMON is a 67 year old Male who is Hospital Day # 25 and POD #3 for Debridement washout, vac placement.     Patient resting in bed. Wound vac changed at bedside. Notes moderate back pain requiring medication while leaning forward during wound vac change. Denies fever, chest pain, SOB, abd pain, n/v/d.    Objective Data:     Objective Information:      T   P  R  BP   MAP  SpO2   Value  36.5  79  18  156/76   103  98%  Date/Time 3/10 5:42 3/10 5:42 3/10 5:42 3/10 5:42  3/10 5:42 3/10 13:33  Range  (36.2C - 36.5C )  (77 - 85 )  (18 - 20 )  (127 - 164 )/ (55 - 76 )  (103 - 104 )  (97% - 99% )   As of 09-Mar-2023 20:35:00, patient is on 4 L/min of oxygen via nasal cannula.      Pain reported at 3/10 12:47: 8 = Severe    Physical Exam by System:    Constitutional: Alert and cooperative, NAD   Eyes: EOMI, clear sclera   ENMT: MMM   Head/Neck: NC/AT   Respiratory/Thorax: Unlabored respirations on 4L  NC, symmetrical chest rise   Cardiovascular: Extremities WWP   Gastrointestinal: Protuberant, soft ND/NT   Musculoskeletal: Wound vac dressing intact to midline  lumbar spine without issue. Maintaining continuous suction at 125 mmHg without alarm, leak, obstruction or malfunction. Upon wound vac removal, wound base with minimal granulation tissue to right upper quadrant. Undermining present to the 12-2o'clock  position and 4-6o'clock position. Inferior portion of wound with grey/dusky appearance and sloughing. Inferior eulalio-wound skin with macerated edges. Surrounding wound non-tender to palpation.   Extremities: ROJO x4, generalized deconditioned strength.   Neurological: A&O x3   Psychological: Appropriate behavior and mood.   Skin: Warm and dry, no lesions, no rashes     Assessment and Plan:   Daily Risk Screen:  ·  Does patient have an indwelling urinary catheter? n/a consulting service   ·  Does patient have a central line? n/a consulting service     Code  Status:  ·  Code Status Full Code     Assessment:    RAJ HARMON is a 67 year old Male known to the plastic surgery team with a h/o multiple lumbar spinal surgeries which have been c/b recurrent MRSA infections  requiring multiple extensive plastic surgery interventions including multiple debridements and attempted closures. Admitted to [facility] as transfer on 2/14 for concern of MRSA infection of chronically open lumbar wound with exposed hardware. Patient now  s/p lumbar spinal wound I&D and removal of spinal hardware per NSGY on 2/15. Plastic Surgery consulted for spinal wound debridement and coverage.     OR course (this admission):   2/15 - Lumbar spinal wound exploration, I&D and removal of spinal hardware (per NSGY)  2/28 - Lumbar wound excision and debridement down to and including subcutaneous tissue, partial adjacent tissue transfer, wound vac application (per plastic surgery)   3/7 - Lumbar spinal wound irrigation and debridement, application of wound vac (per plastic surgery)    Plan/Recommendations:   - Return to OR with plastics Tues 3/14 for additional lumbar spinal wound I&D and wound vac  application  - Continue interim vac therapy to lumbar wound until return to OR     ·  Settings: 125 mmHg low continuous suction      ·  Please keep dressing C/D/I, reinforce PRN      ·  Record vac output quality and quantity per nursing q8h      ·  Please alert plastics team with any concerns regarding vac      ·  Next change to occur in OR 3/14   - Diligent pressure offloading to lumbar spine/wound site with q2h turns and frequent repositioning in bed  - Nutrition optimization to promote wound healing     ·  Consider addition of daily MV and nutritional supplements/shakes as able   - Continue IV ABX per ID recs/primary   - Culture history:     · 2/10 OSH bedside withound cx - MRSA (final 2/12)      · 2/15 OR deep wound cx - NGTD (final 2/17)  - Appreciate remaining supportive care per primary service   -  Plastics will continue to follow    Patient and plan discussed with Dr. Yeni Shannon PA-C  Plastic and Reconstructive Surgery  [facility], Pager [phone], Team phones: [phone]    Time spent on the assessment of patient, gathering and interpreting data, review of medical record/patient history, personally reviewing radiographic imaging and formulation of this note 30 minutes. With greater than 50% spent in personal discussion with  patient.        Electronic Signatures:  Aisha Shannon (PAC)  (Signed 10-Mar-2023 15:32)   Authored: Service, Subjective Data, Objective Data, Assessment  and Plan, Note Completion      Last Updated: 10-Mar-2023 15:32 by Aisha Shannon (PAC)

## 2023-09-14 NOTE — PROGRESS NOTES
Service: Infectious Disease     Subjective Data:   RAJ HARMON is a 67 year old Male who is Hospital Day # 1.    Overnight Events: Patient had an uneventful night.   Additional Information:    This AM, pt with primary complaint of back pain, inadequately controlled by current regimen. States that he is at his baseline O2 requirement. Denies leg weakness,  numbness, and states he is able to get around at home with his rollator.     Objective Data:     Objective Information:      T   P  R  BP   MAP  SpO2   Value  36.9  73  18  121/63      98%  Date/Time 2/14 12:51 2/14 12:51 2/14 12:51 2/14 12:51    2/14 12:51  Range  (36.2C - 36.9C )  (73 - 101 )  (16 - 18 )  (120 - 134 )/ (63 - 74 )    (98% - 98% )   As of 14-Feb-2023 13:10:00, patient is on 5 L/min of oxygen via nasal cannula.  Highest temp of 36.9 C was recorded at 2/14 12:51      Pain reported at 2/14 15:19: 10 = Severe    Physical Exam Narrative:  ·  Physical Exam:    Gen: well-appearing, NAD  Head and neck: NCAT, neck supple without LAD, severe kyphoscoliosis   HEENT: MMM, normal nose without congestion, poor dentition   CV: RRR no mrg  Pulm: CTAB, prolonged exp phase, no wheezing or crackles, normal WOB on 4L  Abd: obese, soft, non-tender, non-distended  Back: 10-15cm stage IV ulcer in the lumbosacral region, midline, exposed hardware, minimal surrounding erythema (see images)  Ext: no cyanosis or clubbing, trace LE edema, thenar wasting bilat; 2-3cm diameter stage 1 ulcer on L heel  Neuro: alert and oriented x3, normal tone, face symmetric, moves all extremities spontaneously     Medication:    Medications:          Continuous Medications       --------------------------------  No continuous medications are active       Scheduled Medications       --------------------------------    1. Acetaminophen:  650  mg  Oral  Every 6 Hours    2. Allopurinol:  300  mg  Oral  Every 24 Hours    3. amLODIPine (NORVASC):  10  mg  Oral  Daily    4. Ascorbic Acid:   1000  mg  Oral  Daily    5. Atorvastatin:  20  mg  Oral  Daily    6. busPIRone (BUSPAR):  5  mg  Oral  Every 12 Hours    7. Docusate 50 mg - Senna 8.6 m  tablet(s)  Oral  2 Times a Day    8. Enoxaparin SubCutaneous:  40  mg  SubCutaneous  Every 12 Hours    9. fentaNYL 50 micrograms/ hour TransDermal:  1  patch  TransDermal  Every 72 Hours    10. levETIRAcetam (KEPPRA):  500  mg  Oral  2 Times a Day    11. Losartan:  50  mg  Oral  Daily    12. Metoprolol Tartrate:  12.5  mg  Oral  2 Times a Day    13. Multivitamin with Minerals:  1  tablet(s)  Oral  Daily    14. Piperacillin - Tazobactam 4.5 gram/Iso-osmotic 100 mL Premix IVPB:  100  mL  IntraVenous Piggyback  Every 6 Hours    15. Polyethylene Glycol:  17  gram(s)  Oral  Daily    16. Spironolactone:  50  mg  Oral  2 Times a Day    17. Tamsulosin:  0.4  mg  Oral  Daily         PRN Medications       --------------------------------    1. HYDROmorphone:  4  mg  Oral  Every 4 Hours    2. Melatonin:  3  mg  Oral  At Bedtime    3. Naloxone Injectable:  0.2  mg  IntraVenous Push  Once    4. Sodium Chloride 0.9% Injectable Flush:  10  mL  IntraVenous Flush  Every 8 Hours and as Needed        Recent Lab Results:    Results:    CBC: 2023 06:29              \     Hgb     /                              \     9.3 L    /  WBC  ----------------  Plt               9.0       ----------------    414              /     Hct     \                              /     31.1 L    \            RBC: 2.86 L    MCV: 109 H    Neutrophil  %: 71.2      RFP: 2023 06:29  NA+        Cl-     BUN  /                         141    102    15  /  --------------------------------  Glucose                ---------------------------  105 H    K+     HCO3-   Creat \                         4.6    35 H   0.86  \  Calcium : 9.8Anion Gap : 9 L         Albumin : 2.6 L     Phos : 3.2      Assessment and Plan:   Daily Risk Screen:  ·  Does patient have a central line? yes   ·  Central Line  Type PICC   ·  Plan for PICC removal today? no   ·  The patient continues to require a PICC for parenteral medication     Code Status:  ·  Code Status Full Code     Assessment:    67 year old Male with CHF, COPD on 4L at home, seizure disorder and a history of multiple lumbar spine surgeries complicated  by MRSA wound infection with a known chronic open lumbar wound status post multiple debridements by plastic surgery, who presented to [facility] with worsening low back pain as well as generalized fatigue and weakness, found to have MRSA infection of sacral  wound with exposed hardware. He presents to [facility] as a transfer for surgical evaluation by spine team. High suspicion for osteomyelitis in this host with ESR > 130; OM has not been ruled out. Patient unable to tolerate MRI d/t kyphoscoliosis. Will continue  vanc for MRSA, and zosyn because of wound contamination with diarrhea.     Updates 2/14  -Consulted nsgy, tentative plan to remove hardware  -Consulted plastic surgery, avail for OR w/ NSGY tomorrow if needed   -Increased dilaudid to 4mg PO q4h from q8h as needed for break through pain     #MRSA sacral wound infection  -BCx 2/10 x2 NGTD  -[place] Wound cx 2/10/23: MRSA (S: clinda, vanc; R: TMP-SMX, tetracyclines)  -ESR elevated at >130  -CRP elevated at 5.26  Plan:   1) Vancomcyin 2/10-   2) Zosyn 2/10-  3) Neurosurgery c/s, appreciate recs, possible OR tomorrow   4) Plastic surgery c/s, available to assist in OR tomorrow with nsgy     #COPD, 4L at baseline  -SpO2 goal 88-92%  [ ] no inhalers on med rec from wife, but will need to fu because was on symbicort  and albuterol in the past    #HFpEF   #HTN  -TTE Feb 2022: EF 65-70%, normal LV diastolic function, normal RV  -Hold ASA for possible procedure  -C/w home atorva, metop 12.5 BID, maxime 50 BID, losartan 50 qd, amlodipine 10    #Chronic pain - lower back  -home pain regimen: scheduled acetaminophen, fentanyl patch 50mcg q72h, dilaudid 4mg PO TID prn  breakthrough  -will follow above pain regimen, but will increase to dilaudid 4mg PO q4h as needed breakthrough   -bowel regimen  -follows with pain medicine Dr. Almazan at [facility]     #Seizure disorder - continue Keppra  #Anxiety - continue Buspar   #BPH - continue tamsulosin   #Gout - continue allopurinol     DVT ppx: SQL (will hold at midnight in case of procedure tomorrow)   Code status: FULL (confirmed on admission)  NOK: Wife Mike [phone]      Attestation:   Note Completion:  I am a:  Resident/Fellow   Attending Attestation I saw and evaluated the patient.  I personally obtained the key and critical portions of the history and physical exam or was physically present for key and  critical portions performed by the resident/fellow. I reviewed the resident/fellow?s documentation and discussed the patient with the resident/fellow.  I agree with the resident/fellow?s medical decision making as documented in the note.     I personally evaluated the patient on 14-Feb-2023         Electronic Signatures:  Ankur Harrison)  (Signed 15-Feb-2023 05:53)   Authored: Note Completion   Co-Signer: Service, Subjective Data, Objective Data, Assessment and Plan, Note Completion  Christa Funk (Resident))  (Signed 14-Feb-2023 17:15)   Authored: Service, Subjective Data, Objective Data, Assessment  and Plan, Note Completion      Last Updated: 15-Feb-2023 05:53 by Ankur Harrison)

## 2023-09-14 NOTE — PROGRESS NOTES
Service: Plastic Surgery     Subjective Data:   RAJ HARMON is a 67 year old Male who is Hospital Day # 21 and POD #6 for 1. Excision and debridement down to and including subcutaneous tissue;2. Partial adjacent tissue transfer;3. ;4. ;5.     Pt is resting comfortably in bed eating breakfast on exam. Denies any problems with vac change that occurred with weekend.     Denies any fever, chills, night sweats, CP, SOB, palpitations, nausea, vomiting, diarrhea, abdominal pain.    Objective Data:     Objective Information:      T   P  R  BP   MAP  SpO2   Value  36.2  68  18  131/63      95%  Date/Time 3/6 5:01 3/6 5:01 3/6 5:01 3/6 5:01    3/6 5:01  Range  (36.2C - 36.6C )  (64 - 91 )  (18 - 18 )  (115 - 131 )/ (54 - 66 )    (95% - 98% )   As of 05-Mar-2023 19:55:00, patient is on 4 L/min of oxygen via nasal cannula.      Pain reported at 3/6 2:18: 8 = Severe    Physical Exam by System:    Constitutional: Alert and cooperative, NAD.   Eyes: EOMI, clear sclera.   ENMT: MMM.   Head/Neck: NC/AT.   Respiratory/Thorax: Unlabored respirations on 4L  NC, symmetrical chest rise   Gastrointestinal: soft nt/nd   Genitourinary: Voiding per urinal/commode.   Musculoskeletal: Wound vac intact to lumbar spine  without leak. Cannister with SS output.  Periwound erythema. Appropriately TTP throughout wound and surrounding tissue.   Extremities: ROJO   Neurological: A&O x3.   Psychological: Appropriate behavior and mood.   Skin: Warm and dry, no lesions, no rashes.     Recent Lab Results:    Results:    CBC: 3/6/2023 07:05              \     Hgb     /                              \     7.3 L    /  WBC  ----------------  Plt               6.2       ----------------    303              /     Hct     \                              /     25.2 L    \            RBC: 2.26 L    MCV: 112 H    Neutrophil  %: 56.2      RFP: 3/6/2023 07:05  NA+        Cl-     BUN  /                         139    96 L   27 H   /  --------------------------------  Glucose                ---------------------------  102 H    K+     HCO3-   Creat \                         4.3    41 HH   0.71  \  Calcium : 9.2Anion Gap : 6 L         Albumin : 2.7 L     Phos : 3.8      Assessment and Plan:   Daily Risk Screen:  ·  Does patient have an indwelling urinary catheter? n/a consulting service   ·  Does patient have a central line? n/a consulting service     Comorbidities:  ·  Comorbidity Other     Code Status:  ·  Code Status Full Code     Assessment:    RAJ HARMON is a 67 year old Male known to the plastic surgery team with a h/o multiple lumbar spinal surgeries which have been c/b recurrent MRSA infections  requiring multiple extensive plastic surgery interventions including multiple debridements and attempted closures. Admitted [facility] as transfer on 2/14 for concern of MRSA infection of chronically open lumbar wound with exposed hardware. Patient now  s/p lumbar spinal wound I&D and removal of spinal hardware per NSGY on 2/15. Plastic Surgery consulted for spinal wound debridement and coverage.     OR course (this admission):   2/15 - Lumbar spinal wound exploration, I&D and removal of spinal hardware (per NSGY)  2/28 - Lumbar wound excision and debridement down to and including subcutaneous tissue, partial adjacent tissue transfer, wound vac application (per plastic surgery)     A: Pt stable. Agrees with plans for OR 3/7. All questions answered. Pt expressed understanding.     Plan/Recommendations:   S/p lumbar wound I&D, partial adjacent tissue transfer, wound vac placement   - Plan to return to OR Tuesday 3/7 for additional debridement of lumbar spine wound, potential flap vs wound vac application, patient agreeable  with this plan       · NPO at 0001 3/7       · Update T&S and COVID 3/6   - Maintain wound vac to lumbar spine at -125mmHg low continuous suction,       · No interim bedside changes necessary prior to return to OR 3/7         · Nursing to monitor/record wound vac canister output       · Please notify plastic surgery with any concerns regarding wound vac leak or malfunction   - Continue to ensure diligent pressure offloading to lumbar spine/wound site with frequent repositioning, Q2h turns, elevate heels from bed  - May cleanse wound/shower during future vac changes  - Nutrition optimization to promote wound healing, consider addition of daily MV and nutritional supplements/shakes as able   - Culture history       · 2/10 OSH bedside withound cx - MRSA (final)        · 2/15 OR deep withound cs - NGTD (final 2/17)       · Urine cultures obtained intraop 2/28 due to concerns of dark/sedimentary urine to more, cx with no growth. UA positive for large leukocyte esterase  - Continue IV ABX per ID recs (currently IV Vanc)   - Appreciate remaining supportive care per primary service   - Plastics will continue to follow    Shirley Romero PA-C   Plastic and Reconstructive Surgery    Available via [employer], pager: [phone] or Team phones: [phone]     Time spent on the assessment of patient, gathering and interpreting data, review of medical record/patient history, personally reviewing radiographic imaging and formulation of this note 30 minutes. With greater than 50% spent in personal discussion with  patient.        Electronic Signatures:  Shirley Romero (PA (PHYSICIAN))  (Signed 06-Mar-2023 09:45)   Authored: Service, Subjective Data, Objective Data, Assessment  and Plan, Note Completion      Last Updated: 06-Mar-2023 09:45 by Shirley Romero (PA (PHYSICIAN))

## 2023-09-14 NOTE — H&P
History & Physical Reviewed:   I have reviewed the History and Physical dated:  14-Feb-2023   History and Physical reviewed and relevant findings noted. Patient examined to review pertinent physical  findings.: No significant changes   Home Medications Reviewed: no changes noted   Allergies Reviewed: no changes noted       ERAS (Enhanced Recovery After Surgery):  ·  ERAS Patient: no     Consent:   COVID-19 Consent:  ·  COVID-19 Risk Consent Surgeon has reviewed key risks related to the risk of josiah COVID-19 and if they contract COVID-19 what the risks are.       Electronic Signatures:  Kiki Lin (PAC)  (Signed 06-Mar-2023 23:27)   Authored: History & Physical Reviewed, ERAS, Consent,  Note Completion      Last Updated: 06-Mar-2023 23:27 by Kiki Lin (PAC)

## 2023-09-14 NOTE — PROGRESS NOTES
Service: Plastic Surgery     Subjective Data:   RAJ HARMON is a 67 year old Male who is Hospital Day # 11 and POD #9 for 1. exploration of lumbar wound dehiscence;2. removal of lumbar spinal hardware (set caps, rods, and screws);3. irrigation  and debridement of lumbar wound.     Pt resting comfortably in bed in NAD. He is currently eating breakfast and states he overall is doing well.     Denies any fever, chills, night sweats, CP, SOB, palpitations, nausea, vomiting, diarrhea, abdominal pain.    Objective Data:     Objective Information:      T   P  R  BP   MAP  SpO2   Value  36.2  77  18  126/59   90  97%  Date/Time 2/24 5:25 2/24 5:25 2/24 5:25 2/24 5:25  2/23 4:43 2/24 5:25  Range  (36C - 36.9C )  (69 - 84 )  (18 - 18 )  (117 - 128 )/ (59 - 73 )  (90 - 90 )  (96% - 97% )  Highest temp of 36.9 C was recorded at 2/23 9:21      Pain reported at 2/24 9:00: 5 = Moderate    Physical Exam by System:    Constitutional: Alert and cooperative, NAD   Eyes: EOMI, PERRL.   ENMT: MMM, no ulcerations/lesions   Head/Neck: NC/AT   Respiratory/Thorax: Unlabored respirations on 4L  NC.   Cardiovascular: Regular rate   Gastrointestinal: Soft, large panus, nt/nd.   Genitourinary: Voiding per urinal/commode   Musculoskeletal: Generalized deconditioning.   Neurological: A&O x3.   Psychological: Appropriate behavior and mood.   Skin: Lumbar wound: Pink/red granulation tissue with  minimal exudate. Periwound erythema. Surrounding tissue remains, soft with minimal ttp. No areas of palpable fluid collection or areas of induration at/ around the vac site.     Recent Lab Results:    Results:    CBC: 2/24/2023 08:32              \     Hgb     /                              \     8.6 L    /  WBC  ----------------  Plt               10.2       ----------------    334              /     Hct     \                              /     28.5 L    \            RBC: 2.66 L    MCV: 107 H          RFP: 2/24/2023 10:58  NA+        Cl-      BUN  /                         137    94 L   26 H  /  --------------------------------  Glucose                ---------------------------  120 H    K+     HCO3-   Creat \                         6.0 H   40 HH    1.03  \  Calcium : 9.8Anion Gap : 9 L         Albumin : 3.4     Phos : 3.9      Radiology Results:    Results:    Impression:  1. There has been interval removal of the posterior fusion hardware  from the lumbar spine. There is again a large soft tissue defect with  marked thinning of the skin and subcutaneous soft tissues and  probable dehiscence overlying the lumbar and lower thoracic spine  with nonspecific soft tissue thickening and loss of fat soft tissue  planes as well as scattered soft tissue emphysema along the periphery  of the defect. The possibility of osteomyelitis cannot be excluded on  the basis of this examination.  2. Marked, multilevel degenerative changes of the lumbar spine with  multilevel postoperative changes from laminectomies and  posterolateral fusion masses.    CT L Spine without Contrast [Feb 18 2023  2:16PM]      Assessment and Plan:   Daily Risk Screen:  ·  Does patient have an indwelling urinary catheter? n/a consulting service   ·  Does patient have a central line? n/a consulting service     Comorbidities:  ·  Comorbidity Other     Code Status:  ·  Code Status Full Code     Assessment:    RAJ HARMON is a 67 year old Male known to the plastic surgery team with a h/o multiple lumbar spinal surgeries which have been c/b recurrent MRSA infections  requiring multiple extensive plastic surgery interventions including multiple debridements and attempted closures. Admitted to [facility] as transfer on 2/14 for concern of MRSA infection of chronically open lumbar wound with exposed hardware. Patient now  s/p lumbar spinal wound I&D and removal of spinal hardware per NSGY on 2/15. Plastic Surgery consulted for spinal wound debridement and coverage.     OR course (this  admission):   2/15 - Lumbar spinal wound exploration, I&D and removal of spinal hardware (per NSGY)    A: Pt continues to progress well. Wound vac therapy continuing without issue.      Plan/Recommendations:   - Vac changed bedside 2/24 - Plans for return to OR for further intervention TBD - likely next week.   - Ensure diligent pressure offloading to lumbar spine/wound site with frequent repositioning, q2h turns      - take care to not tug or pull vac tubing / tape.   - Nutrition optimization to promote wound healing      ·  Can consider addition of daily MV and nutritional supplements/shakes as able   - Cx history:     ·  2/10 OSH bedside wound cx - MRSA (final)      ·  2/15 OR deep wound cs - NGTD (final 2/17)  - Continue IV ABX per ID recs (currently IV Vanc)   - Appreciate remaining supportive care per primary service   - Plastics will continue to follow    Patient and plan discussed with Dr. Jaime.     Shirley Romero PA-C   Plastic and Reconstructive Surgery    Available via [employer], pager: [phone] or Team phones: [phone]     Time spent on the assessment of patient, gathering and interpreting data, review of medical record/patient history, personally reviewing radiographic imaging and formulation of this note 30 minutes. With greater than 50% spent in personal discussion with  patient.        Electronic Signatures:  Shirley Romero (PA (PHYSICIAN))  (Signed 24-Feb-2023 12:53)   Authored: Service, Subjective Data, Objective Data, Assessment  and Plan, Note Completion      Last Updated: 24-Feb-2023 12:53 by Shirley Romero (PA (PHYSICIAN))

## 2023-09-14 NOTE — PROGRESS NOTES
Service: Plastic Surgery     Subjective Data:   RAJ HARMON is a 67 year old Male who is Hospital Day #31 and POD #2 for 1. Excision and debridement down to and including spinous process; 2. Wound vac exchange (15 x 11 cm).     Found resting in bed upon assessment, no acute concerns. Pain controlled. Tolerating diet. Voiding independently via urinal. OOB to bedside commode intermittently. Contacted per patient nurse for wound  vac alarm for air leak this afternoon which was resolved with dressing reinforcement. Patient aware of plan for next bedside wound vac dressing change per plastics tomorrow.    Overnight Events: Patient had an uneventful night.     Objective Data:     Objective Information:      T   P  R  BP   MAP  SpO2   Value  36.4  74  18  132/55   81  97%  Date/Time 3/16 4:48 3/16 4:48 3/16 4:48 3/16 4:48  3/16 4:48 3/16 4:48  Range  (36.4C - 36.9C )  (65 - 83 )  (16 - 18 )  (124 - 139 )/ (50 - 66 )  (77 - 81 )  (97% - 98% )    Highest temp of 36.9 C was recorded at 3/15 5:48    Pain reported at 3/16 9:14: 8 = Severe    Physical Exam by System:    Constitutional: Alert and cooperative, NAD, sitting  upright in bed.   Eyes: EOMI, clear sclera.   ENMT: MMM.   Head/Neck: NC/AT.   Respiratory/Thorax: Unlabored respirations on 4L  NC, symmetrical chest rise.   Cardiovascular: Extremities W/WP.   Gastrointestinal: Protuberant, soft ND/NT.   Genitourinary: Voiding independently via urinal.   Musculoskeletal: Wound vac dressing intact to midline  lumbar spine, small area of air leak at the inferior portion of the dressing which resolved with reinforcement with additional vac tape. Periwound tissue pink, irritated appearing with maceration, minimally TTP.   Extremities: ROJO x4, generalized deconditioned strength.   Neurological: A&O x3, no gross neuro deficits.   Psychological: Appropriate behavior and mood.   Skin: Warm and dry, see MSK exam.     Medication:    Medications:        Continuous  Medications       --------------------------------  No continuous medications are active       Scheduled Medications       --------------------------------  1. Acetaminophen:  975  mg  Oral  Every 8 Hours    2. Allopurinol:  300  mg  Oral  Every 24 Hours    3. amLODIPine (NORVASC):  10  mg  Oral  Daily    4. Ascorbic Acid:  1000  mg  Oral  Daily    5. Atorvastatin:  20  mg  Oral  Daily    6. busPIRone (BUSPAR):  5  mg  Oral  Every 12 Hours    7. Docusate 50 mg - Senna 8.6 m  tablet(s)  Oral  2 Times a Day    8. DULoxetine:  30  mg  Oral  At Bedtime    9. Enoxaparin SubCutaneous:  40  mg  SubCutaneous  Every 24 Hours    10. fentaNYL 75 micrograms/ hour TransDermal:  1  patch  TransDermal  Every 72 Hours    11. Hydrocortisone 1% Topical:  1  application(s)  Topical  2 Times a Day    12. levETIRAcetam (KEPPRA):  500  mg  Oral  2 Times a Day    13. Lidocaine 4% TransDermal:  1  patch  TransDermal  Every 24 Hours    14. Metoprolol Tartrate:  12.5  mg  Oral  2 Times a Day    15. Polyethylene Glycol:  17  gram(s)  Oral  Daily    16. Sodium Hypochlorite 0.125% (Dakins Quarter):  1  application(s)  Topical  3 Times a Day    17. Sodium Zirconium Cyclosilicate:  10  gram(s)  Oral  Daily    18. Tamsulosin:  0.8  mg  Oral  Daily    19. Vancomycin - RPh to Dose - IV Piggy Back:  1  each  As Specified  Variable    20. Vancomycin 750 mg/ D5W IVPB Premixed Soln 150 mL:  750  mg  IntraVenous Piggyback  Every 12 Hours         PRN Medications       --------------------------------  1. Albuterol 2.5 mg - Ipratropium 0.5 mg/ 3 mL Neb Soln:  3  mL  Inhalation  Every 6 Hours    2. diphenhydrAMINE:  25  mg  Oral  Every 24 Hours    3. Heparin Flush 10 unit/ mL Injectable PRN:  5  mL  IntraVenous Flush  According to Flush Policy    4. HYDROmorphone:  4  mg  Oral  Every 6 Hours    5. Naloxone Injectable:  0.2  mg  IntraVenous Push  Once    6. Sodium Chloride 0.9% Injectable Flush:  10  mL  IntraVenous Flush  Every 8 Hours and as Needed         Recent Lab Results:    Results:    I have reviewed these laboratory results:    Renal Function Panel  16-Mar-2023 07:05:00      Result Value    Lab Comment:  BIC CALLED RB TO ISABEL PARIKH RN , 03/16/2023 08:52BIC CALLED RB TO ISABEL PARIKH RN , 03/16/2023 08:52    Glucose, Serum  109   H   NA  141    K  4.1    CL  96   L   Bicarbonate, Serum  43   HH   Anion Gap, Serum  6   L   BUN  19    CREAT  0.60    GFR Male  >90    Calcium, Serum  9.1    Phosphorus, Serum  2.6    ALB  2.7   L     Complete Blood Count + Differential  16-Mar-2023 07:05:00      Result Value    White Blood Cell Count  6.9    Nucleated Erythrocyte Count  0.0    Red Blood Cell Count  2.24   L   HGB  7.1   L   HCT  25.1   L   MCV  112   H   MCHC  28.3   L   PLT  272    RDW-CV  12.1    Neutrophil %  72.3    Immature Granulocytes %  1.2   H   Lymphocyte %  13.7    Monocyte %  8.6    Eosinophil %  3.6    Basophil %  0.6    Neutrophil Count  4.97    Lymphocyte Count  0.94   L   Monocyte Count  0.59    Eosinophil Count  0.25    Basophil Count  0.04      Magnesium, Serum  16-Mar-2023 07:05:00      Result Value    Magnesium, Serum  2.02        Assessment and Plan:   Daily Risk Screen:  ·  Does patient have an indwelling urinary catheter? n/a consulting service    ·  Does patient have a central line? n/a consulting service     Code Status:  ·  Code Status Full Code     Assessment:    RAJ HARMON is a 67 year old Male known to the plastic surgery team with a h/o multiple lumbar spinal surgeries which have been c/b recurrent MRSA infections  requiring multiple extensive plastic surgery interventions including multiple debridements and attempted closures. Admitted to [facility] as transfer on 2/14 for concern of MRSA infection of chronically open lumbar wound with exposed hardware. Patient now  s/p lumbar spinal wound I&D and removal of spinal hardware per NSGY on 2/15. Plastic Surgery consulted for spinal wound debridement and coverage.     OR course  (this admission):   2/15 - Lumbar spinal wound exploration, I&D and removal of spinal hardware (per NSGY)  2/28 - Lumbar wound excision and debridement down to and including subcutaneous tissue, partial adjacent tissue transfer, wound vac application (per plastic surgery)   3/7 - Lumbar spinal wound irrigation and debridement, application of wound vac (per plastic surgery)  3/14 - Lumbar spinal wound I&D down to and including spinous process, application of wound vac (per plastics)     Plan/Recommendations:   - No additional acute surgical intervention planned from plastic surgery standpoint this admission  - Continue vac therapy to lumbar wound to promote healing and granulation of wound     -> Anticipate 3-4 weeks of vac therapy with re-eval at that time to determine plans/timing of         any additional reconstructive surgical intervention      ·  M/W/F vac dressing changes - last intraoperatively on 3/14, next planned at bedside 3/17 per plastics      ·  Patient may have dressing removed, shower/clean wound, and vac dressing be re-applied later that day      ·  Settings: 125 mmHg low continuous suction      ·  Please keep dressing C/D/I, reinforce PRN      ·  Record vac output quality and quantity per nursing q8h      ·  Please alert plastics team with any concerns regarding vac   - Diligent pressure offloading to lumbar spine/wound site with q2h turns and frequent repositioning in bed  - Nutrition optimization to promote wound healing     ·  Consider addition of daily MV and nutritional supplements/shakes as able   - Continue ABX per ID/primary (IV Vanc currently)   - Culture history:     · 2/10 OSH bedside withound cx - MRSA (final 2/12)      · 2/15 OR deep wound cx - NGTD (final 2/17)  - OK for DC from plastic surgery perspective, final disposition per primary when medically clear       ·  Will need home health care arranged for 3x weekly vac dressing changes if home-going      ·  Plastics can assist in  arranging home vac if pt is not going to facility     ·  Outpt f/u with plastics arranged for 4/3 at 0930 with Dr. Jaime at the [facility]         (may need to be rescheduled if patient remains in house)   - Appreciate remaining supportive care per primary service   - Plastic surgery will continue to follow     Patient and plan discussed with Dr. Jaime.    Kiki Lin PA-C  Plastic and Reconstructive Surgery   Doc Halo  Pager #[phone]  Team phones: [phone]     Time spent on the assessment of patient, gathering and interpreting data, review of medical record/patient history, personally reviewing radiographic imaging and formulation of this note 50 minutes. With greater than 50% spent in personal discussion with  patient.        Electronic Signatures:  Kiki Lin (PAC)  (Signed 16-Mar-2023 14:03)   Authored: Service, Subjective Data, Objective Data, Assessment  and Plan, Note Completion      Last Updated: 16-Mar-2023 14:03 by Kiki Lin (PAC)

## 2023-09-14 NOTE — PROGRESS NOTES
Service: Plastic Surgery     Subjective Data:   RAJ HARMON is a 67 year old Male who is Hospital Day # 27 and POD #5 for Debridement washout, vac placement.     NAEON.    Objective Data:     Objective Information:      T   P  R  BP   MAP  SpO2   Value  36.7  60  18  148/76   94  97%  Date/Time 3/12 5:57 3/12 5:57 3/12 5:57 3/12 5:57  3/11 13:25 3/12 5:57  Range  (36.5C - 36.7C )  (60 - 78 )  (18 - 20 )  (132 - 154 )/ (57 - 76 )  (82 - 94 )  (96% - 99% )   As of 11-Mar-2023 20:00:00, patient is on 4 L/min of oxygen via nasal cannula.      Pain reported at 3/11 19:30: 8 = Severe    Physical Exam by System:    Constitutional: Alert and cooperative, NAD   Eyes: EOMI, clear sclera   ENMT: MMM   Head/Neck: NC/AT   Respiratory/Thorax: Unlabored respirations on 4L  NC, symmetrical chest rise   Cardiovascular: Extremities WWP   Gastrointestinal: Protuberant, soft ND/NT   Musculoskeletal: Wound vac dressing intact to midline  lumbar spine without issue. Maintaining continuous suction at 125 mmHg without alarm, leak, obstruction or malfunction.   Extremities: ROJO x4, generalized deconditioned strength.   Neurological: A&O x3   Psychological: Appropriate behavior and mood.   Skin: Warm and dry, no lesions, no rashes     Assessment and Plan:   Daily Risk Screen:  ·  Does patient have an indwelling urinary catheter? n/a consulting service   ·  Does patient have a central line? n/a consulting service     Code Status:  ·  Code Status Full Code     Assessment:    RAJ HARMON is a 67 year old Male known to the plastic surgery team with a h/o multiple lumbar spinal surgeries which have been c/b recurrent MRSA infections  requiring multiple extensive plastic surgery interventions including multiple debridements and attempted closures. Admitted to [facility] as transfer on 2/14 for concern of MRSA infection of chronically open lumbar wound with exposed hardware. Patient now  s/p lumbar spinal wound I&D and removal of  spinal hardware per NSGY on 2/15. Plastic Surgery consulted for spinal wound debridement and coverage.     OR course (this admission):   2/15 - Lumbar spinal wound exploration, I&D and removal of spinal hardware (per NSGY)  2/28 - Lumbar wound excision and debridement down to and including subcutaneous tissue, partial adjacent tissue transfer, wound vac application (per plastic surgery)   3/7 - Lumbar spinal wound irrigation and debridement, application of wound vac (per plastic surgery)    Plan/Recommendations:   - Return to OR with plastics Tues 3/14 for additional lumbar spinal wound I&D and wound vac  application  - Continue interim vac therapy to lumbar wound until return to OR     ·  Settings: 125 mmHg low continuous suction      ·  Please keep dressing C/D/I, reinforce PRN      ·  Record vac output quality and quantity per nursing q8h      ·  Please alert plastics team with any concerns regarding vac      ·  Last change 3/10, next change to occur in OR 3/14   - Diligent pressure offloading to lumbar spine/wound site with q2h turns and frequent repositioning in bed  - Nutrition optimization to promote wound healing     ·  Consider addition of daily MV and nutritional supplements/shakes as able   - Continue IV ABX per ID recs/primary   - Culture history:     · 2/10 OSH bedside withound cx - MRSA (final 2/12)      · 2/15 OR deep wound cx - NGTD (final 2/17)  - Appreciate remaining supportive care per primary service   - Plastics will continue to follow    Patient and plan discussed with Dr. Yeni Shannon PA-C  Plastic and Reconstructive Surgery  Doc Halo, Pager [phone], Team phones: [phone]    Time spent on the assessment of patient, gathering and interpreting data, review of medical record/patient history, personally reviewing radiographic imaging and formulation of this note 30 minutes. With greater than 50% spent in personal discussion with  patient.        Electronic Signatures:  Shiva  Aisha (PAC)  (Signed 12-Mar-2023 09:45)   Authored: Service, Subjective Data, Objective Data, Assessment  and Plan, Note Completion      Last Updated: 12-Mar-2023 09:45 by Aisha Shannon (PAC)

## 2023-09-14 NOTE — PROGRESS NOTES
Service: Infectious Disease     Subjective Data:   RAJ HARMON is a 67 year old Male who is Hospital Day # 7 and POD #5 for 1. exploration of lumbar wound dehiscence;2. removal of lumbar spinal hardware (set caps, rods, and screws);3. irrigation  and debridement of lumbar wound.     No acute events overnight. Patient had not acute complaints. Still having loose bowel movements. Denies fevers, chills, abdominal pain. Patient says he would rather not go to SNF and that his wife can  administer the IV medications as she is a retired nurse but he will discuss this with her again.    Objective Data:     Objective Information:      T   P  R  BP   MAP  SpO2   Value  36.7  80  18  133/70      98%  Date/Time 2/20 13:09 2/20 13:09 2/20 13:09 2/20 13:09 2/20 13:09  Range  (36C - 36.7C )  (71 - 83 )  (18 - 18 )  (121 - 152 )/ (56 - 86 )    (97% - 100% )   As of 20-Feb-2023 13:09:00, patient is on 4 L/min of oxygen via ventilator assisted;  nasal cannula.      Pain reported at 2/20 13:09: 8 = Severe      T   P  R  BP   MAP  SpO2   Value  36.7  80  18  133/70      98%  Date/Time 2/20 13:09 2/20 13:09 2/20 13:09 2/20 13:09 2/20 13:09  Range  (36C - 36.7C )  (71 - 83 )  (18 - 18 )  (121 - 152 )/ (56 - 86 )    (97% - 100% )   As of 20-Feb-2023 13:09:00, patient is on 4 L/min of oxygen via ventilator assisted;  nasal cannula.    Physical Exam Narrative:  ·  Physical Exam:    Gen: well-appearing, NAD  Head and neck: NCAT, neck supple without LAD, severe kyphoscoliosis   HEENT: MMM, normal nose without congestion, poor dentition   CV: RRR no mrg  Pulm: CTAB, prolonged exp phase, no wheezing or crackles, normal WOB on 4L  Abd: obese, soft, non-tender, non-distended  Ext: no cyanosis or clubbing, trace LE edema, thenar wasting bilat; 2-3cm diameter stage 1 ulcer on L heel  Neuro: alert and oriented x3, normal tone, face symmetric, moves all extremities spontaneously       Medication:    Medications:          Continuous  Medications       --------------------------------  No continuous medications are active       Scheduled Medications       --------------------------------    1. Acetaminophen:  650  mg  Oral  Every 6 Hours    2. Allopurinol:  300  mg  Oral  Every 24 Hours    3. amLODIPine (NORVASC):  10  mg  Oral  Daily    4. Ascorbic Acid:  1000  mg  Oral  Daily    5. Atorvastatin:  20  mg  Oral  Daily    6. busPIRone (BUSPAR):  5  mg  Oral  Every 12 Hours    7. Docusate 50 mg - Senna 8.6 m  tablet(s)  Oral  2 Times a Day    8. Enoxaparin SubCutaneous:  40  mg  SubCutaneous  Every 24 Hours    9. fentaNYL 50 micrograms/ hour TransDermal:  1  patch  TransDermal  Every 72 Hours    10. levETIRAcetam (KEPPRA):  500  mg  Oral  2 Times a Day    11. Metoprolol Tartrate:  12.5  mg  Oral  2 Times a Day    12. Polyethylene Glycol:  17  gram(s)  Oral  Daily    13. Sodium Hypochlorite 0.125% (Dakins Quarter):  1  application(s)  Topical  3 Times a Day    14. Spironolactone:  50  mg  Oral  2 Times a Day    15. Tamsulosin:  0.4  mg  Oral  Daily    16. Vancomycin - RPh to Dose - IV Piggy Back:  1  each  As Specified  Variable    17. Vancomycin IV Piggy Back:  1250  mg  IntraVenous Piggyback  Every 12 Hours         PRN Medications       --------------------------------    1. Acetaminophen:  650  mg  Oral  Once    2. Albuterol 90 micrograms/ Inhalation MDI:  2  inhalation  Inhalation  Every 6 Hours    3. HYDROmorphone Injectable:  1  mg  IntraVenous Push  Every 3 Hours    4. HYDROmorphone Injectable:  2  mg  IntraVenous Push  Every 3 Hours    5. Melatonin:  3  mg  Oral  At Bedtime    6. Naloxone Injectable:  0.2  mg  IntraVenous Push  Once    7. Sodium Chloride 0.9% Injectable Flush:  10  mL  IntraVenous Flush  Every 8 Hours and as Needed        Recent Lab Results:    Results:    CBC: 2023 05:46              \     Hgb     /                              \     7.8 L    /  WBC  ----------------  Plt               7.3       ----------------     331              /     Hct     \                              /     26.1 L    \            RBC: 2.35 L    MCV: 111 H          RFP: 2/20/2023 05:46  NA+        Cl-     BUN  /                         139    100    19  /  --------------------------------  Glucose                ---------------------------  102 H    K+     HCO3-   Creat \                         5.0    37 H   0.83  \  Calcium : 9.2Anion Gap : 7 L         Albumin : 2.8 L     Phos : 3.1        I have reviewed these laboratory results:    Complete Blood Count  20-Feb-2023 05:46:00      Result Value    White Blood Cell Count  7.3    Nucleated Erythrocyte Count  0.0    Red Blood Cell Count  2.35   L   HGB  7.8   L   HCT  26.1   L   MCV  111   H   MCHC  29.9   L   PLT  331    RDW-CV  12.3      Renal Function Panel  20-Feb-2023 05:46:00      Result Value    Glucose, Serum  102   H   NA  139    K  5.0    CL  100    Bicarbonate, Serum  37   H   Anion Gap, Serum  7   L   BUN  19    CREAT  0.83    GFR Male  >90    Calcium, Serum  9.2    Phosphorus, Serum  3.1    ALB  2.8   L     Magnesium, Serum  20-Feb-2023 05:46:00      Result Value    Magnesium, Serum  1.79        Radiology Results:    Results:        Impression:    1. There has been interval removal of the posterior fusion hardware  from the lumbar spine. There is again a large soft tissue defect with  marked thinning of the skin and subcutaneous soft tissues and  probable dehiscence overlying the lumbar and lower thoracic spine  with nonspecific soft tissue thickening and loss of fat soft tissue  planes as well as scattered soft tissue emphysema along the periphery  of the defect. The possibility of osteomyelitis cannot be excluded on  the basis of this examination.  2. Marked, multilevel degenerative changes of the lumbar spine with  multilevel postoperative changes from laminectomies and  posterolateral fusion masses.     CT L Spine without Contrast [Feb 18 2023  2:16PM]      Assessment and Plan:    Daily Risk Screen:  ·  Does patient have a central line? yes   ·  Central Line Type PICC   ·  Plan for PICC removal today? no   ·  The patient continues to require a PICC for parenteral medication     Comorbidities:  ·  Comorbidity Other     Code Status:  ·  Code Status Full Code     Assessment:    67 year old Male with CHF, COPD on 4L at home, seizure disorder and a history of multiple lumbar spine surgeries complicated  by MRSA wound infection with a known chronic open lumbar wound status post multiple debridements by plastic surgery, who presented to [facility] with worsening low back pain as well as generalized fatigue and weakness, found to have MRSA infection of sacral  wound with exposed hardware, and elevated inflammatory markers. He presented to [facility]  as a transfer for surgical evaluation by spine team. He was started on vancomcyin and zosyn on 2/10 in [facility], which were continued on admission.  BCx from 2/10 with NGTD. Nsgy was consulted, and he underwent removal of lumbar spinal hardware and irrigation and debridement of lumbar wound on 2/15. Plastic surgery was consulted and recommended leaving wound open with TID dressing changes and plan  for return to OR on 2/22 for lumbar spinal wound I&D and potential wound vac. Intraoperative wound cultures had no growth aerobically or anaerobically. Pt has a hx of chronic pain, and pain was managed after surgery with fentanyl patch (per home regimen),  Tylenol (per home regimen), and IV dilaudid 1mg IV q3h for moderate pain and 2mg IV q3h for severe pain. Pain regimen can be down-titrated to home regimen after plastic surgery intervention. Zosyn was discontinued on 2/19 given no evidence of pseudomonas.  Pt continues to be on vancomycin. Patient was having loose bowel movements multiple times daily but was negative for C.diff and stool softeners were stopped. Plan is for lumbar wound I&D and possible wound vac on 2/22.     Updates 2/20:   - C/w vancomycin  - Plan for  I&D with plastics on 2/22  - Optimize diet per nutrition recs for optimal wound healing  - Patient not in favor of going to SNF given past negative experience. His wife is a retired nurse and  he will discuss with her whether she can administer his IV antibiotics at home  - Closely monitor vancomycin levels as patient had a break in medication and needs to be ensured to continue to receive     Updates 2/19:  - C/w vanc  - D/c Zosyn (2/19) given no culture evidence of pseudomonas  - C. diff negative   - 2//18 CT LSpine demonstrates:  interval removal of the posterior fusion hardware from the lumbar spine. There is again a large soft tissue defect with marked thinning  of the skin and subcutaneous soft tissues and probable dehiscence overlying the lumbar and lower thoracic spine with nonspecific soft tissue thickening and loss of fat soft tissue planes as well as scattered soft tissue emphysema along the periphery of  the defect. The possibility of osteomyelitis cannot be excluded on the basis of this examination. 2. Marked, multilevel degenerative changes of the lumbar spine with multilevel postoperative changes from laminectomies and posterolateral fusion masses.  [ ] f/u Plastic Surg recs      #MRSA sacral wound infection  Micro:   -BCx 2/10 x2 NGTD  -[place] Wound cx 2/10/23: MRSA (S: clinda, vanc; R: TMP-SMX, tetracyclines)  -ESR elevated at >130  -CRP elevated at 5.26  -Intraoperative wound cx 2/15/23: no growth aerobically or anaerobically   - S/p below on 2/15:   1. exploration of lumbar wound dehiscence  2. removal of lumbar spinal hardware (set caps, rods, and screws)  3. irrigation and debridement of lumbar wound  - Plastic surgery recs  2/15:   1. WTD Kerlix dressings with Dakin's/Saline solution packed to the entire depth of the wound three times daily and PRN if soiled   2. return to OR with plastic surgery on 2/22 for lumbar spinal wound I&D and possible application of wound vac  3. Lumbar CT without  contrast, obtained on 2/18: soft tissue defect overlying lumbar and lower thoracic spine, soft tissue thickening, scattered soft tissue emphysema; multilevel degenerative changes of lumbar spine  Abx:   Vancomycin dosing: Pt received vancomycin 2/10 - 2/13, supratherapeutic (20.4) and not resumed on admission on 2/14, 1x dose 1.5g IV intra-operatively on 2/15, and resumed on 2/17 with 1500mg q12h dosing   Plan:   1) Vancomcyin 2/10-   2) Zosyn 2/10- 2/19  3) Appreciate plastic surgery recs  4) F/u intraoperative cultures     #Chronic pain - lower back  -home pain regimen: scheduled acetaminophen, fentanyl patch 50mcg q72h, dilaudid 4mg PO TID prn breakthrough  -bowel regimen  -follows with pain medicine Dr. Almazan at [facility]   -post-op pain regimen: Tylenol 650mg q6h, fentanyl patch 50mcg q72h, 1mg IV dilaudid q3h for moderate pain, 2mg IV dilaudid q3h for severe  pain    #COPD, 4L at baseline  -SpO2 goal 88-92%  -Started on proventill (takes at home per wife)     #HFpEF   #HTN  -TTE Feb 2022: EF 65-70%, normal LV diastolic function, normal RV  -Hold ASA for possible procedure  -C/w home atorva, metop 12.5 BID, maxime 50 BID, losartan 50 qd, amlodipine 10    #Seizure disorder - continue Keppra  #Anxiety - continue Buspar   #BPH - continue tamsulosin   #Gout - continue allopurinol     DVT ppx: lovenox   Code status: FULL (confirmed on admission)  NOK: Wife Mike [phone]      Attestation:   Note Completion:  I am a:  Resident/Fellow   Attending Attestation I saw and evaluated the patient.  I personally obtained the key and critical portions of the history and physical exam or was physically present for key and  critical portions performed by the resident/fellow. I reviewed the resident/fellow?s documentation and discussed the patient with the resident/fellow.  I agree with the resident/fellow?s medical decision making as documented in the note.     I personally evaluated the patient on 20-Feb-2023          Electronic Signatures:  Freddie Caraballo (Resident))  (Signed 20-Feb-2023 15:14)   Authored: Service, Subjective Data, Objective Data, Assessment  and Plan, Note Completion  Darrian Galvez)  (Signed 21-Feb-2023 14:24)   Authored: Note Completion   Co-Signer: Service, Subjective Data, Objective Data, Assessment and Plan, Note Completion      Last Updated: 21-Feb-2023 14:24 by Darrian Galvez)

## 2023-09-14 NOTE — H&P
History & Physical Reviewed:   I have reviewed the History and Physical dated:  14-Feb-2023   History and Physical reviewed and relevant findings noted. Patient examined to review pertinent physical  findings.: No significant changes   Home Medications Reviewed: no changes noted   Allergies Reviewed: no changes noted       ERAS (Enhanced Recovery After Surgery):  ·  ERAS Patient: no     Consent:   COVID-19 Consent:  ·  COVID-19 Risk Consent Surgeon has reviewed key risks related to the risk of josiah COVID-19 and if they contract COVID-19 what the risks are.       Electronic Signatures:  Loreta Nguyen (APRN-CNP)  (Signed 14-Mar-2023 06:02)   Authored: History & Physical Reviewed, ERAS, Consent,  Note Completion      Last Updated: 14-Mar-2023 06:02 by Loreta Nguyen (APRN-CNP)

## 2023-09-14 NOTE — PROGRESS NOTES
Service: Plastic Surgery     Subjective Data:   RAJ HARMON is a 67 year old Male who is Hospital Day # 30 and POD #1 for 1. Excision and debridement down to and including spinous process;2. Wound vac exchange (15 x 11 cm);3. ;4. ;5.     Pt is resting in bed. Denies any problems with vac in PM interval.     Denies any fever, chills, night sweats, CP, SOB, palpitations, nausea, vomiting, diarrhea, abdominal pain.    Objective Data:     Objective Information:      T   P  R  BP   MAP  SpO2   Value  36.6  65  16  124/66   89  97%  Date/Time 3/15 13:13 3/15 13:13 3/15 13:13 3/15 13:13  3/14 4:30 3/15 13:13  Range  (36C - 36.9C )  (65 - 91 )  (16 - 18 )  (124 - 160 )/ (54 - 66 )  (89 - 89 )  (95% - 100% )   As of 14-Mar-2023 10:10:00, patient is on 4 L/min of oxygen via room air.  Highest temp of 36.9 C was recorded at 3/15 5:48      Pain reported at 3/15 14:18: 8 = Severe    Physical Exam by System:    Constitutional: Alert and cooperative, NAD   Eyes: EOMI, clear sclera   ENMT: MMM   Head/Neck: NC/AT   Respiratory/Thorax: Unlabored respirations on 4L  NC, symmetrical chest rise   Cardiovascular: Extremities WWP   Gastrointestinal: Protuberant, soft ND/NT   Musculoskeletal: Wound vac dressing intact to midline  lumbar spine, small area of leak at the superior portion of sponge (Reinforced). Surrounding tissue soft and minimally ttp.   Extremities: ROJO x4, generalized deconditioned strength.   Neurological: A&O x3   Psychological: Appropriate behavior and mood.   Skin: Warm and dry, no lesions, no rashes     Recent Lab Results:    Results:    CBC: 3/15/2023 05:58              \     Hgb     /                              \     7.5 L    /  WBC  ----------------  Plt               6.4       ----------------    277              /     Hct     \                              /     25.6 L    \            RBC: 2.30 L    MCV: 111 H    Neutrophil  %: 72.5      RFP: 3/15/2023 05:58  NA+        Cl-     BUN  /                          142    97 L   18  /  --------------------------------  Glucose                ---------------------------  112 H    K+     HCO3-   Creat \                         4.1    40 HH   0.91  \  Calcium : 9.0Anion Gap : 9 L         Albumin : 2.8 L     Phos : 3.5      Assessment and Plan:   Daily Risk Screen:  ·  Does patient have an indwelling urinary catheter? n/a consulting service   ·  Does patient have a central line? n/a consulting service     Code Status:  ·  Code Status Full Code     Assessment:    RAJ HARMON is a 67 year old Male known to the plastic surgery team with a h/o multiple lumbar spinal surgeries which have been c/b recurrent MRSA infections  requiring multiple extensive plastic surgery interventions including multiple debridements and attempted closures. Admitted to [facility] as transfer on 2/14 for concern of MRSA infection of chronically open lumbar wound with exposed hardware. Patient now  s/p lumbar spinal wound I&D and removal of spinal hardware per NSGY on 2/15. Plastic Surgery consulted for spinal wound debridement and coverage.     OR course (this admission):   2/15 - Lumbar spinal wound exploration, I&D and removal of spinal hardware (per NSGY)  2/28 - Lumbar wound excision and debridement down to and including subcutaneous tissue, partial adjacent tissue transfer, wound vac application (per plastic surgery)   3/7 - Lumbar spinal wound irrigation and debridement, application of wound vac (per plastic surgery)    Plan/Recommendations:   - s/p Lumbar wound I&D and wound vac placement.      ·  No further OR intervention indicated.      ·  Cont. with wound vac therapy with MWF changes.      ·  Next vac change - 3/17   - Continue  vac therapy to lumbar wound until return to OR     ·  Settings: 125 mmHg low continuous suction      ·  Please keep dressing C/D/I, reinforce PRN      ·  Record vac output quality and quantity per nursing q8h      ·  Please alert plastics team with  any concerns regarding vac   - Diligent pressure offloading to lumbar spine/wound site with q2h turns and frequent repositioning in bed  - Nutrition optimization to promote wound healing     ·  Consider addition of daily MV and nutritional supplements/shakes as able   - Continue IV ABX per ID recs/primary   - Culture history:     · 2/10 OSH bedside withound cx - MRSA (final 2/12)      · 2/15 OR deep wound cx - NGTD (final 2/17)  - Appreciate remaining supportive care per primary service   - Plastics will continue to follow.     Pt is OK for DC from plastics standpoint, but will need continued wound management with vac therapy. ( Plastics can arrange home vac if pt is not going to facility)     Patient and plan discussed with Dr. Yeni Romero PA-C   Plastic and Reconstructive Surgery    Available via [employer], pager: [phone] or Team phones: [phone]     Time spent on the assessment of patient, gathering and interpreting data, review of medical record/patient history, personally reviewing radiographic imaging and formulation of this note 30 minutes. With greater than 50% spent in personal discussion with  patient.          Electronic Signatures:  Shirley Romero (PA (PHYSICIAN))  (Signed 15-Mar-2023 14:46)   Authored: Service, Subjective Data, Objective Data, Assessment  and Plan, Note Completion      Last Updated: 15-Mar-2023 14:46 by Shirley Romero (PA (PHYSICIAN))

## 2023-09-14 NOTE — PROGRESS NOTES
Service: Infectious Disease     Subjective Data:   RAJ HARMON is a 67 year old Male who is Hospital Day # 13 and POD #11 for 1. exploration of lumbar wound dehiscence;2. removal of lumbar spinal hardware (set caps, rods, and screws);3. irrigation  and debridement of lumbar wound.     Experience chills and tachycardia (105) yesterday, no fever, slightly anxious about the future of the wound on his back. Improved overnight after pain medications. Feeling better this morning, denies  fevers, chills.    Objective Data:     Objective Information:      T   P  R  BP   MAP  SpO2   Value  36.8  78  19  134/68      96%  Date/Time 2/26 5:31 2/26 5:31 2/26 5:31 2/26 5:31    2/26 5:31  Range  (36.1C - 37C )  (78 - 107 )  (18 - 28 )  (126 - 136 )/ (56 - 68 )    (95% - 98% )   As of 25-Feb-2023 19:50:00, patient is on 5 L/min of oxygen via nasal cannula.  Highest temp of 37 C was recorded at 2/25 18:38      Pain reported at 2/26 5:12: 8 = Severe      T   P  R  BP   MAP  SpO2   Value  36.8  78  19  134/68      96%  Date/Time 2/26 5:31 2/26 5:31 2/26 5:31 2/26 5:31    2/26 5:31  Range  (36.1C - 37C )  (78 - 107 )  (18 - 28 )  (126 - 136 )/ (56 - 68 )    (95% - 98% )   As of 25-Feb-2023 19:50:00, patient is on 5 L/min of oxygen via nasal cannula.  Highest temp of 37 C was recorded at 2/25 18:38    Physical Exam Narrative:  ·  Physical Exam:    Gen: well-appearing, NAD  Head and neck: NCAT, neck supple without LAD, severe kyphoscoliosis   HEENT: MMM, normal nose without congestion, poor dentition   CV: RRR no mrg  Pulm: CTAB, prolonged exp phase, no wheezing or crackles, normal WOB on 4L  Abd: obese, soft, non-tender, non-distended  Ext: no cyanosis or clubbing, trace LE edema, thenar wasting bilat; 2-3cm diameter stage 1 ulcer on L heel  Neuro: alert and oriented x3, normal tone, face symmetric, moves all extremities spontaneously. mild tremor in hands bilaterally  Back: Wound vac placed and secured. No blood oozing or  purulence. skin irritation under the wound vac tape      Medication:    Medications:          Continuous Medications       --------------------------------  No continuous medications are active       Scheduled Medications       --------------------------------    1. Acetaminophen:  650  mg  Oral  Every 6 Hours    2. Allopurinol:  300  mg  Oral  Every 24 Hours    3. amLODIPine (NORVASC):  10  mg  Oral  Daily    4. Ascorbic Acid:  1000  mg  Oral  Daily    5. Atorvastatin:  20  mg  Oral  Daily    6. busPIRone (BUSPAR):  5  mg  Oral  Every 12 Hours    7. Docusate 50 mg - Senna 8.6 m  tablet(s)  Oral  2 Times a Day    8. Enoxaparin SubCutaneous:  40  mg  SubCutaneous  Every 24 Hours    9. fentaNYL 50 micrograms/ hour TransDermal:  1  patch  TransDermal  Every 72 Hours    10. Hydrocortisone 1% Topical:  1  application(s)  Topical  2 Times a Day    11. levETIRAcetam (KEPPRA):  500  mg  Oral  2 Times a Day    12. Metoprolol Tartrate:  12.5  mg  Oral  2 Times a Day    13. Polyethylene Glycol:  17  gram(s)  Oral  Daily    14. Sodium Hypochlorite 0.125% (Dakins Quarter):  1  application(s)  Topical  3 Times a Day    15. Sodium Zirconium Cyclosilicate:  10  gram(s)  Oral  3 Times a Day    16. Tamsulosin:  0.4  mg  Oral  Daily    17. Vancomycin - RPh to Dose - IV Piggy Back:  1  each  As Specified  Variable    18. Vancomycin 500 mg IVPB/ Premixed Soln 100 mL:  500  mg  IntraVenous Piggyback  Every 12 Hours         PRN Medications       --------------------------------    1. Acetaminophen:  650  mg  Oral  Once    2. Albuterol 90 micrograms/ Inhalation MDI:  2  inhalation  Inhalation  Every 6 Hours    3. diphenhydrAMINE:  25  mg  Oral  Every 24 Hours    4. Heparin Flush 10 unit/ mL Injectable PRN:  5  mL  IntraVenous Flush  According to Flush Policy    5. HYDROmorphone Injectable:  1  mg  IntraVenous Push  Every 3 Hours    6. Melatonin:  3  mg  Oral  At Bedtime    7. Naloxone Injectable:  0.2  mg  IntraVenous Push  Once    8.  Sodium Chloride 0.9% Injectable Flush:  10  mL  IntraVenous Flush  Every 8 Hours and as Needed        Recent Lab Results:    Results:    CBC: 2/26/2023 04:57              \     Hgb     /                              \     8.0 L    /  WBC  ----------------  Plt               8.8       ----------------    301              /     Hct     \                              /     27.4 L    \            RBC: 2.48 L    MCV: 110 H    Neutrophil  %: 69.1      RFP: 2/26/2023 04:57  NA+        Cl-     BUN  /                         137    94 L   29 H  /  --------------------------------  Glucose                ---------------------------  94    K+     HCO3-   Creat \                         5.0    39 H   0.90  \  Calcium : 10.0Anion Gap : 9 L         Albumin : 3.3 L     Phos : 3.0        I have reviewed these laboratory results:    Renal Function Panel  26-Feb-2023 04:57:00      Result Value    Glucose, Serum  94    NA  137    K  5.0    CL  94   L   Bicarbonate, Serum  39   H   Anion Gap, Serum  9   L   BUN  29   H   CREAT  0.90    GFR Male  >90    Calcium, Serum  10.0    Phosphorus, Serum  3.0    ALB  3.3   L     Complete Blood Count + Differential  26-Feb-2023 04:57:00      Result Value    White Blood Cell Count  8.8    Nucleated Erythrocyte Count  0.0    Red Blood Cell Count  2.48   L   HGB  8.0   L   HCT  27.4   L   MCV  110   H   MCHC  29.2   L   PLT  301    RDW-CV  12.3    Neutrophil %  69.1    Immature Granulocytes %  0.8    Lymphocyte %  13.9    Monocyte %  10.0    Eosinophil %  5.2    Basophil %  1.0    Neutrophil Count  6.08    Lymphocyte Count  1.22    Monocyte Count  0.88    Eosinophil Count  0.46    Basophil Count  0.09      Magnesium, Serum  26-Feb-2023 04:57:00      Result Value    Magnesium, Serum  1.90        Assessment and Plan:   Daily Risk Screen:  ·  Does patient have a central line? yes   ·  Central Line Type non-tunneled   ·  Plan for non-tunneled central line removal today? no   ·  The patient  continues to require non-tunneled central line access for parenteral medication     Comorbidities:  ·  Comorbidity Other     Code Status:  ·  Code Status Full Code     Assessment:    67 year old Male with CHF, COPD on 4L at home, seizure disorder and a history of multiple lumbar spine surgeries complicated  by MRSA wound infection with a known chronic open lumbar wound status post multiple debridements by plastic surgery, who presented to [place] with worsening low back pain as well as generalized fatigue and weakness, found to have MRSA infection of sacral  wound with exposed hardware, and elevated inflammatory markers. He presented to [facility] as a transfer for surgical evaluation by spine team. He was started on vancomcyin and zosyn on 2/10 in [place], which were continued on admission. BCx from 2/10 with NGTD.  Nsgy was consulted, and he underwent removal of lumbar spinal hardware and irrigation and debridement of lumbar wound on 2/15. Plastic surgery was consulted and recommended leaving wound open with TID dressing changes and plan for return to OR on 2/22  for lumbar spinal wound I&D and potential wound vac. Intraoperative wound cultures had no growth aerobically or anaerobically. Pt has a hx of chronic pain, and pain was managed after surgery with fentanyl patch (per home regimen), Tylenol (per home regimen),  and IV dilaudid 1mg IV q3h for moderate pain and 2mg IV q3h for severe pain. Pain regimen can be down-titrated to home regimen after plastic surgery intervention. Zosyn was discontinued on 2/19 given no evidence of pseudomonas. Pt continues to be on vancomycin.  Patient was having loose bowel movements multiple times daily but was negative for C.diff and stool softeners were stopped. Plastic surgery recommended wound vac and it was placed on 2/22, replaced on 2/24.  Patient had hyperkalemic episode on 2/34 of potassium 6.0 (repeat from 6.1) with EKG showing peaked T-waves, ordered insulin & D5 & Ca gluconate  & Lokelma  10 mg TID, held Spironolactone. Hyperkalemia resolved with these interventions.    Updates 2/26:  -Potassium 6.0 yesterday (repeat from 6.1) with EKG showing peaked T-waves, ordered insulin & D5 & Ca gluconate & Lokelma,  held Spironolactone.   -K 5.0 today, c/w Lokelma TID  -hydrocortisone for itching on lower back   -Plan to evaluate patient in OR next week Tues  -Wound vac replaced by plastics on 2/24  -Vanc level 15.2 on 2/25 , c/w Vanc 500 q12h  -Vanc to continue for 8 weeks until 2/15 - 4/12  -Closely monitor vancomycin levels as patient had a break  in medication and needs to be ensured to continue to receive   -Pain regimen currently at 2mg q3h as needed  -Tremor to be discussed with patient and wife and chart review   -Outpatient f/u with Dr. Harrison    Cx history:     ·  2/10 OSH bedside wound cx - MRSA (final)      ·  2/15 OR deep wound cs - NGTD (final 2/17)    Abx hx:  1) Vancomcyin 2/10-   2) Zosyn 2/10- 2/19 (d/c given no culture evidence of pseudomonas)      #MRSA sacral wound infection  Micro:   -BCx 2/10 x2 NGTD  -[place] Wound cx 2/10/23: MRSA (S: clinda, vanc; R: TMP-SMX, tetracyclines)  -ESR elevated at >130  -CRP elevated at 5.26  -Intraoperative wound cx 2/15/23: no growth aerobically or anaerobically   - S/p below on 2/15:   1. exploration of lumbar wound dehiscence  2. removal of lumbar spinal hardware (set caps, rods, and screws)  3. irrigation and debridement of lumbar wound  - Plastic surgery recs  2/15:   1. WTD Kerlix dressings with Dakin's/Saline solution packed to the entire depth of the wound three times daily and PRN if soiled   -Lumbar CT without contrast, obtained on 2/18: soft tissue defect overlying lumbar and lower thoracic spine, soft tissue thickening, scattered soft tissue emphysema; multilevel degenerative changes of lumbar spine  - Plastics decided to place wound vac on 2/22 instead of I&D, reassessed and replaced wound vac on 2/24  Abx:   Plan:   1) Deliaomninain  2/10 - 4/12   2) Zosyn 2/10- 2/19 (d/c given no culture evidence of pseudomonas)    #Chronic pain - lower back  -home pain regimen: scheduled acetaminophen, fentanyl patch 50mcg q72h, dilaudid 4mg PO TID prn breakthrough  -bowel regimen  -follows with pain medicine Dr. Almazan at [facility]   -post-op pain regimen: Tylenol 650mg q6h, fentanyl patch 50mcg q72h, 1mg IV dilaudid q3h for moderate pain, 2mg IV dilaudid q3h for severe pain    #Hyperkalemia - resolved 5.0 now  -6.0 on 2/24/2023 with EKG of peaked T-waves, 5.8 on 2/23 resolved peaked T-waves  -hx of hyperkalemic  episodes on review  -no CKD  -given insulin, d50, calcium gluconate, and lokelma   -will hold maxime   -conitnue to monitor    #COPD, 4L at baseline  -SpO2 goal 88-92%  -Started on proventill (takes at home per wife)     #HFpEF   #HTN  -TTE Feb 2022: EF 65-70%, normal LV diastolic function, normal RV  -Hold ASA for possible procedure  -C/w home atorva, metop 12.5 BID, maxime 50 BID, losartan 50 qd, amlodipine 10  -Hold maxime for hyperkalemia on 2/24    #Seizure disorder - continue Keppra  #Anxiety - continue Buspar   #BPH - continue tamsulosin   #Gout - continue allopurinol     DVT ppx: lovenox   Code status: FULL (confirmed on admission)  NOK: Wife Mike [phone]            Attestation:   Note Completion:  I am a:  Resident/Fellow   Attending Attestation I saw and evaluated the patient.  I personally obtained the key and critical portions of the history and physical exam or was physically present for key and  critical portions performed by the resident/fellow. I reviewed the resident/fellow?s documentation and discussed the patient with the resident/fellow.  I agree with the resident/fellow?s medical decision making as documented in the note.     I personally evaluated the patient on 26-Feb-2023         Electronic Signatures:  Mame Kent)  (Signed 26-Feb-2023 10:48)   Authored: Note Completion   Co-Signer: Service, Subjective Data,  Objective Data, Assessment and Plan, Note Completion  Freddie Caraballo (Resident))  (Signed 26-Feb-2023 10:41)   Authored: Service, Subjective Data, Objective Data, Assessment  and Plan, Note Completion      Last Updated: 26-Feb-2023 10:48 by Mame Kent)

## 2023-09-14 NOTE — PROGRESS NOTES
Service: Plastic Surgery     Subjective Data:   RAJ HARMON is a 67 year old Male who is Hospital Day # 19 and POD #4 for 1. Excision and debridement down to and including subcutaneous tissue;2. Partial adjacent tissue transfer;3. ;4. ;5.     Pt POD 4 from debridement of lumbar spinal wound. Has positional pain in back as well as some itching, but overall pain is well controlled. No additional complaints. No issues with his wound vac overnight.     Denies any fever, chills, night sweats, CP, SOB, palpitations, nausea, vomiting, or abdominal pain/discomfort.    Overnight Events: Patient had an uneventful night.     Objective Data:     Objective Information:      T   P  R  BP   MAP  SpO2   Value  37  88  19  155/80      95%  Date/Time 3/4 5:46 3/4 5:46 3/4 5:46 3/4 5:46    3/4 5:46  Range  (36.5C - 37.1C )  (79 - 100 )  (18 - 19 )  (121 - 155 )/ (67 - 80 )    (93% - 96% )   As of 03-Mar-2023 21:48:00, patient is on 4 L/min of oxygen via nasal cannula.  Highest temp of 37.1 C was recorded at 3/3 21:38      Pain reported at 3/4 6:11: 8 = Severe    Physical Exam by System:    Constitutional: Alert and cooperative, NAD.   Eyes: EOMI, clear sclera.   ENMT: MMM.   Head/Neck: NC/AT.   Respiratory/Thorax: Unlabored respirations on 4L  NC, symmetrical chest rise   Gastrointestinal: soft nt/nd   Genitourinary: Voiding per urinal/commode.   Musculoskeletal: Wound vac intact to lumbar spine.   Canister with moderate amount of dark bloody drainage. Appropriately TTP throughout wound   Extremities: ROJO   Neurological: A&O x3.   Psychological: Appropriate behavior and mood.   Skin: Warm and dry, no lesions, no rashes.     Recent Lab Results:    Results:        RFP: 3/4/2023 06:05  NA+        Cl-     BUN  /                         138    93 L   33 H  /  --------------------------------  Glucose                ---------------------------  101 H    K+     HCO3-   Creat \                         4.4    38 H   0.86   \  Calcium : 9.6Anion Gap : 11          Albumin : 3.0 L    Phos : 2.6      Assessment and Plan:   Daily Risk Screen:  ·  Does patient have an indwelling urinary catheter? n/a consulting service   ·  Does patient have a central line? n/a consulting service     Comorbidities:  ·  Comorbidity Other     Code Status:  ·  Code Status Full Code     Assessment:    RAJ HARMON is a 67 year old Male known to the plastic surgery team with a h/o multiple lumbar spinal surgeries which have been c/b recurrent MRSA infections  requiring multiple extensive plastic surgery interventions including multiple debridements and attempted closures. Admitted to [facility] as transfer on 2/14 for concern of MRSA infection of chronically open lumbar wound with exposed hardware. Patient now  s/p lumbar spinal wound I&D and removal of spinal hardware per NSGY on 2/15. Plastic Surgery consulted for spinal wound debridement and coverage.     OR course (this admission):   2/15 - Lumbar spinal wound exploration, I&D and removal of spinal hardware (per NSGY)  2/28 - Lumbar wound excision and debridement down to and including subcutaneous tissue, partial adjacent tissue transfer, wound vac application (per plastic surgery)     Plan/Recommendations:   S/p lumbar wound I&D, partial adjacent tissue transfer, wound vac placement   - Plan to return to OR Tuesday 3/7 for additional debridement of lumbar spine wound, potential flap vs wound vac application, patient agreeable  with this plan       · NPO at 0001 3/7       · Update T&S and COVID 3/6   - Maintain wound vac to lumbar spine at -125mmHg low continuous suction,       · No interim bedside changes necessary prior to return to OR 3/7        · Nursing to monitor/record wound vac canister output       · Please notify plastic surgery with any concerns regarding wound vac leak or malfunction   - Continue to ensure diligent pressure offloading to lumbar spine/wound site with frequent repositioning, Q2h  turns, elevate heels from bed  - Nutrition optimization to promote wound healing, consider addition of daily MV and nutritional supplements/shakes as able   - Culture history       · 2/10 OSH bedside withound cx - MRSA (final)        · 2/15 OR deep withound cs - NGTD (final 2/17)       · Urine cultures obtained intraop 2/28 due to concerns of dark/sedimentary urine to more, cx with no growth. UA positive for large leukocyte esterase  - Continue IV ABX per ID recs (currently IV Vanc)   - Appreciate remaining supportive care per primary service   - Plastics will continue to follow    FIDEL Ace-CNP  Plastic and Reconstructive Surgery  [facility], Pager #[phone], Team phones: [phone]    Time spent on the assessment of patient, gathering and interpreting data, review of medical record/patient history, personally reviewing radiographic imaging and formulation of this note 30 minutes. With greater than 50% spent in personal discussion with  patient.            Electronic Signatures:  Angel Johnson (APRN-CNP)  (Signed 04-Mar-2023 09:51)   Authored: Service, Subjective Data, Objective Data, Assessment  and Plan, Note Completion      Last Updated: 04-Mar-2023 09:51 by Angel Johnson (APRN-CNP)

## 2023-09-14 NOTE — PROGRESS NOTES
Service: Plastic Surgery     Subjective Data:   RAJ HARMON is a 67 year old Male who is Hospital Day # 29 and POD #0 for 1. Excision and debridement down to and including spinous process;2. Wound vac exchange (15 x 11 cm);3. ;4. ;5.     Pt has returned from OR this AM. He notes he has had no issues post procedure. Tolerating diet. pain well managed.     Denies any fever, chills, night sweats, CP, SOB, palpitations, nausea, vomiting, diarrhea, abdominal pain.    Objective Data:     Objective Information:      T   P  R  BP   MAP  SpO2   Value  36.4  87  16  160/63   89  100%  Date/Time 3/14 9:55 3/14 9:55 3/14 9:55 3/14 9:55  3/14 4:30 3/14 9:55  Range  (36.4C - 36.7C )  (61 - 87 )  (16 - 18 )  (134 - 160 )/ (54 - 63 )  (81 - 89 )  (98% - 100% )   As of 13-Mar-2023 09:00:00, patient is on 4 L/min of oxygen via nasal cannula.      Pain reported at 3/14 11:00: 8 = Severe    Physical Exam by System:    Constitutional: Alert and cooperative, NAD   Eyes: EOMI, clear sclera   ENMT: MMM   Head/Neck: NC/AT   Respiratory/Thorax: Unlabored respirations on 4L  NC, symmetrical chest rise   Cardiovascular: Extremities WWP   Gastrointestinal: Protuberant, soft ND/NT   Musculoskeletal: Wound vac dressing intact to midline  lumbar spine no areas of leak or obstruction. Surrounding tissue soft and minimally ttp.   Extremities: ROJO x4, generalized deconditioned strength.   Neurological: A&O x3   Psychological: Appropriate behavior and mood.   Skin: Warm and dry, no lesions, no rashes     Assessment and Plan:   Daily Risk Screen:  ·  Does patient have an indwelling urinary catheter? n/a consulting service   ·  Does patient have a central line? n/a consulting service     Code Status:  ·  Code Status Full Code     Assessment:    RAJ HARMON is a 67 year old Male known to the plastic surgery team with a h/o multiple lumbar spinal surgeries which have been c/b recurrent MRSA infections  requiring multiple extensive  plastic surgery interventions including multiple debridements and attempted closures. Admitted to [facility] as transfer on 2/14 for concern of MRSA infection of chronically open lumbar wound with exposed hardware. Patient now  s/p lumbar spinal wound I&D and removal of spinal hardware per NSGY on 2/15. Plastic Surgery consulted for spinal wound debridement and coverage.     OR course (this admission):   2/15 - Lumbar spinal wound exploration, I&D and removal of spinal hardware (per NSGY)  2/28 - Lumbar wound excision and debridement down to and including subcutaneous tissue, partial adjacent tissue transfer, wound vac application (per plastic surgery)   3/7 - Lumbar spinal wound irrigation and debridement, application of wound vac (per plastic surgery)    Plan/Recommendations:   - s/p Lumbar wound I&D and wound vac placement.      ·  No further OR intervention indicated.      ·  Cont. with wound vac therapy with MWF changes.   - Continue  vac therapy to lumbar wound until return to OR     ·  Settings: 125 mmHg low continuous suction      ·  Please keep dressing C/D/I, reinforce PRN      ·  Record vac output quality and quantity per nursing q8h      ·  Please alert plastics team with any concerns regarding vac   - Diligent pressure offloading to lumbar spine/wound site with q2h turns and frequent repositioning in bed  - Nutrition optimization to promote wound healing     ·  Consider addition of daily MV and nutritional supplements/shakes as able   - Continue IV ABX per ID recs/primary   - Culture history:     · 2/10 OSH bedside withound cx - MRSA (final 2/12)      · 2/15 OR deep wound cx - NGTD (final 2/17)  - Appreciate remaining supportive care per primary service   - Plastics will continue to follow.     Pt is OK for DC from plastics standpoint, but will need continued wound management with vac therapy. ( Plastics can arrange home vac if pt is not going to facility)     Patient and plan discussed with   Yeni Romero PA-C   Plastic and Reconstructive Surgery    Available via [facility], pager: [phone] or Team phones: [phone]     Time spent on the assessment of patient, gathering and interpreting data, review of medical record/patient history, personally reviewing radiographic imaging and formulation of this note 30 minutes. With greater than 50% spent in personal discussion with  patient.          Electronic Signatures:  Shirley oRmero (PA (PHYSICIAN))  (Signed 14-Mar-2023 13:03)   Authored: Service, Subjective Data, Objective Data, Assessment  and Plan, Note Completion      Last Updated: 14-Mar-2023 13:03 by Shirley Romero (PA (PHYSICIAN))

## 2023-09-14 NOTE — PROGRESS NOTES
Service: Infectious Disease     Subjective Data:   RAJ HARMON is a 67 year old Male who is Hospital Day # 27 and POD #5 for Debridement washout, vac placement.     No acute event overnight. Still c/o pain at the back, graded as 6-7/10. Review of medications show he has been receiving IV Dilaudid often (though prescribed as PRN - due to persistent pain).  Otherwise has no other concerns this morning.  Denies any other symptoms at this time.    Objective Data:     Objective Information:      T   P  R  BP   MAP  SpO2   Value  36.7  60  18  148/76   94  97%  Date/Time 3/12 5:57 3/12 5:57 3/12 5:57 3/12 5:57  3/11 13:25 3/12 5:57  Range  (36.5C - 36.7C )  (60 - 78 )  (18 - 20 )  (132 - 154 )/ (57 - 76 )  (82 - 94 )  (96% - 99% )   As of 11-Mar-2023 20:00:00, patient is on 4 L/min of oxygen via nasal cannula.      Pain reported at 3/11 19:30: 8 = Severe    Physical Exam Narrative:  ·  Physical Exam:    Gen: well-appearing, NAD  Head and neck: NCAT  HEENT: MMM, poor dentition  CV: RRR no mrg  Pulm: CTAB, decreased airflow, no wheezing or crackles, normal WOB on 4L  Abd: obese, soft, non-tender, non-distended  Ext: WWP, mild peripheral edema  Neuro: alert and oriented x3, no gross FND  Back: Wound vac placed and secured, sanguineous drainage in wound vac      Recent Lab Results:    Results:    CBC: 3/11/2023 06:12              \     Hgb     /                              \     7.2 L    /  WBC  ----------------  Plt               6.5       ----------------    320              /     Hct     \                              /     23.8 L    \            RBC: 2.16 L    MCV: 110 H    Neutrophil  %: 68.2      RFP: 3/11/2023 06:12  NA+        Cl-     BUN  /                         142    95 L   19  /  --------------------------------  Glucose                ---------------------------  86    K+     HCO3-   Creat \                         4.1    44 HH   0.53  \  Calcium : 9.3Anion Gap : 7 L         Albumin : 2.6 L      Phos : 2.9      Assessment and Plan:   Daily Risk Screen:  ·  Does patient have a central line? yes   ·  Central Line Type PICC   ·  Plan for PICC removal today? no   ·  The patient continues to require a PICC for parenteral medication     Code Status:  ·  Code Status Full Code     Assessment:    67 year old Male with CHF, COPD on 4L at home, seizure disorder and a history of multiple lumbar spine surgeries complicated  by MRSA wound infection with a known chronic open lumbar wound status post multiple debridements by plastic surgery, who presented to [place] with worsening low back pain as well as generalized fatigue and weakness, found to have MRSA infection of sacral  wound with exposed hardware, and elevated inflammatory markers (ESR >130,  CRP elevated at 5.26). He presented to [facility] as a transfer for surgical evaluation by spine team. He was started on vancomcyin and zosyn  on 2/10 in [place], which were continued on admission. BCx from 2/10 with NGTD. Nsgy was consulted, and he underwent removal of lumbar spinal hardware and irrigation and debridement of lumbar wound on 2/15. Plastic surgery was consulted and recommended  leaving wound open with TID dressing changes and plan for return to OR on 2/22 for lumbar spinal wound I&D and potential wound vac. Intraoperative wound cultures had no growth aerobically or anaerobically. Pt has a hx of chronic pain, and pain was managed  after surgery with fentanyl patch (per home regimen), Tylenol (per home regimen), and IV dilaudid 1mg IV q3h for moderate pain and 2mg IV q3h for severe pain. Pain regimen can be down-titrated to home regimen after plastic surgery intervention. Zosyn  was discontinued on 2/19 given no evidence of pseudomonas. Pt continues to be on vancomycin. Patient was having loose bowel movements multiple times daily but was negative for C.diff and stool softeners were stopped. Plastic surgery recommended wound  vac and it was placed on 2/22, replaced  on 2/24. Patient had hyperkalemic episode on 2/34 of potassium 6.0 (repeat from 6.1) with EKG showing  peaked T-waves, ordered insulin & D5 & Ca gluconate & Lokelma 10 mg TID, held Spironolactone . Hyperkalemia resolved with these interventions. Patient had another wound vac replaced on 2/27. Went to OR with plastic surgery on 2/28 for debridement. Patient went for another debridement on 3/7 with plastic surgery; noted to have improved since previous  procedure but still required continued debridement with next procedure planned for 3/14.    Updates 3/12:  -Start Oral Percocet 5mg TID scheduled  -Dilaudid IV for breakthrough pain only  -Plan for OR on Tuesday 3/14  -NPO overnight toomorrow (on Monday)  -C/w vancomycin; Monitor Vanc troughs  -Labs twice a week    Cx history:     ·  2/10 OSH bedside wound cx - MRSA (final)      ·  2/15 OR deep wound cs - NGTD (final 2/17)    Abx hx:  1) Vancomcyin 2/10-   2) Zosyn 2/10- 2/19 (d/c given no culture evidence of pseudomonas)      #MRSA sacral wound infection  Micro:   -BCx 2/10 x2 NGTD  -[place] Wound cx 2/10/23: MRSA (S: clinda, vanc; R: TMP-SMX, tetracyclines)  -Intraoperative wound cx 2/15/23: no growth aerobically or anaerobically   - S/p below on 2/15:   1. exploration of lumbar wound dehiscence  2. removal of lumbar spinal hardware (set caps, rods, and screws)  3. irrigation and debridement of lumbar wound  - Plastic surgery recs  2/15:   1. WTD Kerlix dressings with Dakin's/Saline solution packed to the entire depth of the wound three times daily and PRN if soiled   -Lumbar CT without contrast, obtained on 2/18: soft tissue defect overlying lumbar and lower thoracic spine, soft tissue thickening, scattered soft tissue emphysema; multilevel degenerative changes of lumbar spine  - Plastics decided to place wound vac on 2/22 instead of I&D, reassessed and replaced wound vac on 2/24 and on 2/27. Went to OR on 2/28 for lumbar wound excision and debridement and wound vac  placement.  plan to go back to OR on 3/7.  -Patient underwent debridement on 3/7, will need additional debridement on 3/14 going forward  Abx:   Plan:   1) Vancomcyin 2/10 - 4/12 ( need to extend due to debridement on 3/7)  2) Zosyn 2/10- 2/19 (d/c given no culture evidence of pseudomonas)    #Chronic pain - lower back  -home pain regimen: scheduled acetaminophen, fentanyl patch 50mcg q72h, dilaudid 4mg PO TID prn breakthrough  -bowel regimen  -follows with pain medicine Dr. 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 Recommend follow up outpatient. Currently on Lokelma.   -6.0 on 2/24/2023 with EKG of peaked T-waves, 5.8 on 2/23 resolved peaked T-waves  -hx of hyperkalemic episodes  on review  -no CKD  -gave insulin, d50, calcium gluconate, and lokelma TID  for 48 hrs  -held maxime  -conitnue to monitor  -continue lokelma    #COPD, 4L at baseline  -SpO2 goal 88-92%  -Started on proventill (takes at home per wife)     #HFpEF   #HTN  -TTE Feb 2022: EF 65-70%, normal LV diastolic function, normal RV  -Hold ASA for possible procedure  -C/w home atorva, metop 12.5 BID, maxime 50 BID, losartan 50 qd, amlodipine 10  -Held maxime for hyperkalemia on 2/24    #Seizure disorder - continue Keppra  #Anxiety - continue Buspar   #BPH - continue tamsulosin   #Gout - continue allopurinol     #CHILANGO  -Not compliant with CPAP at home  -Elevated bicarb inpatient, likely chronic    DVT ppx: lovenox   Code status: FULL (confirmed on admission)  NOK: Wife Mike [phone]        Electronic Signatures:  Gee-Carline Valdez (MD (Resident))  (Signed 12-Mar-2023 15:47)   Authored: Service, Subjective Data, Objective Data, Assessment  and Plan  Subauste,  Darrian BRANDON)  (Signed 13-Mar-2023 10:03)   Authored: Note Completion   Co-Signer: Service, Subjective Data, Objective Data, Assessment and Plan      Last Updated: 13-Mar-2023 10:03 by Darrian Galvez)

## 2023-09-14 NOTE — PROGRESS NOTES
Service: Infectious Disease     Subjective Data:   RAJ HARMNO is a 67 year old Male who is Hospital Day # 21 and POD #6 for 1.     No acute events overnight. Patient evaluated at bedside.    Objective Data:     Objective Information:      T   P  R  BP   MAP  SpO2   Value  36.2  68  18  131/63      95%  Date/Time 3/6 5:01 3/6 5:01 3/6 5:01 3/6 5:01    3/6 5:01  Range  (36.2C - 36.6C )  (64 - 91 )  (18 - 18 )  (115 - 131 )/ (54 - 66 )    (95% - 98% )   As of 05-Mar-2023 19:55:00, patient is on 4 L/min of oxygen via nasal cannula.      Pain reported at 3/6 2:18: 8 = Severe    Physical Exam Narrative:  ·  Physical Exam:    Gen: well-appearing, NAD  Head and neck: NCAT  HEENT: MMM, poor dentition  CV: RRR no mrg  Pulm: CTAB, decreased airflow, no wheezing or crackles, normal WOB on 4L  Abd: obese, soft, non-tender, non-distended  Ext: WWP, mild periphearl edema  Neuro: alert and oriented x3, no gross FND  Back: Wound vac placed and secured. No blood oozing or purulence.      Medication:    Medications:          Continuous Medications       --------------------------------  No continuous medications are active       Scheduled Medications       --------------------------------    1. Acetaminophen:  650  mg  Oral  Every 6 Hours    2. Allopurinol:  300  mg  Oral  Every 24 Hours    3. amLODIPine (NORVASC):  10  mg  Oral  Daily    4. Ascorbic Acid:  1000  mg  Oral  Daily    5. Atorvastatin:  20  mg  Oral  Daily    6. busPIRone (BUSPAR):  5  mg  Oral  Every 12 Hours    7. Docusate 50 mg - Senna 8.6 m  tablet(s)  Oral  2 Times a Day    8. Enoxaparin SubCutaneous:  40  mg  SubCutaneous  Every 24 Hours    9. fentaNYL 50 micrograms/ hour TransDermal:  1  patch  TransDermal  Every 72 Hours    10. Hydrocortisone 1% Topical:  1  application(s)  Topical  2 Times a Day    11. levETIRAcetam (KEPPRA):  500  mg  Oral  2 Times a Day    12. Metoprolol Tartrate:  12.5  mg  Oral  2 Times a Day    13. Polyethylene Glycol:  17   gram(s)  Oral  Daily    14. Sodium Hypochlorite 0.125% (Dakins Quarter):  1  application(s)  Topical  3 Times a Day    15. Sodium Zirconium Cyclosilicate:  10  gram(s)  Oral  Daily    16. Tamsulosin:  0.4  mg  Oral  Daily    17. Vancomycin - RPh to Dose - IV Piggy Back:  1  each  As Specified  Variable    18. Vancomycin 750 mg/ D5W IVPB Premixed Soln 150 mL:  750  mg  IntraVenous Piggyback  Every 12 Hours         PRN Medications       --------------------------------    1. Albuterol 2.5 mg - Ipratropium 0.5 mg/ 3 mL Neb Soln:  3  mL  Inhalation  Every 6 Hours    2. diphenhydrAMINE:  25  mg  Oral  Every 24 Hours    3. Heparin Flush 10 unit/ mL Injectable PRN:  5  mL  IntraVenous Flush  According to Flush Policy    4. HYDROmorphone Injectable:  2  mg  IntraVenous Push  Every 3 Hours    5. Naloxone Injectable:  0.2  mg  IntraVenous Push  Once    6. Sodium Chloride 0.9% Injectable Flush:  10  mL  IntraVenous Flush  Every 8 Hours and as Needed        Recent Lab Results:    Results:    CBC: 3/6/2023 07:05              \     Hgb     /                              \     7.3 L    /  WBC  ----------------  Plt               6.2       ----------------    303              /     Hct     \                              /     25.2 L    \            RBC: 2.26 L    MCV: 112 H    Neutrophil  %: 56.2      RFP: 3/6/2023 07:05  NA+        Cl-     BUN  /                         139    96 L   27 H  /  --------------------------------  Glucose                ---------------------------  102 H    K+     HCO3-   Creat \                         4.3    41 HH   0.71  \  Calcium : 9.2Anion Gap : 6 L         Albumin : 2.7 L     Phos : 3.8      Assessment and Plan:   Daily Risk Screen:  ·  Does patient have a central line? yes   ·  Central Line Type PICC   ·  Plan for PICC removal today? no   ·  The patient continues to require a PICC for parenteral medication     Comorbidities:  ·  Comorbidity Other     Code Status:  ·  Code Status Full  Code     Assessment:    67 year old Male with CHF, COPD on 4L at home, seizure disorder and a history of multiple lumbar spine surgeries complicated  by MRSA wound infection with a known chronic open lumbar wound status post multiple debridements by plastic surgery, who presented to [place] with worsening low back pain as well as generalized fatigue and weakness, found to have MRSA infection of sacral  wound with exposed hardware, and elevated inflammatory markers (ESR >130,  CRP elevated at 5.26). He presented to [facility] as a transfer for surgical evaluation by spine team. He was started on vancomcyin and zosyn  on 2/10 in [place], which were continued on admission. BCx from 2/10 with NGTD. Nsgy was consulted, and he underwent removal of lumbar spinal hardware and irrigation and debridement of lumbar wound on 2/15. Plastic surgery was consulted and recommended  leaving wound open with TID dressing changes and plan for return to OR on 2/22 for lumbar spinal wound I&D and potential wound vac. Intraoperative wound cultures had no growth aerobically or anaerobically. Pt has a hx of chronic pain, and pain was managed  after surgery with fentanyl patch (per home regimen), Tylenol (per home regimen), and IV dilaudid 1mg IV q3h for moderate pain and 2mg IV q3h for severe pain. Pain regimen can be down-titrated to home regimen after plastic surgery intervention. Zosyn  was discontinued on 2/19 given no evidence of pseudomonas. Pt continues to be on vancomycin. Patient was having loose bowel movements multiple times daily but was negative for C.diff and stool softeners were stopped. Plastic surgery recommended wound  vac and it was placed on 2/22, replaced on 2/24. Patient had hyperkalemic episode on 2/34 of potassium 6.0 (repeat from 6.1) with EKG showing  peaked T-waves, ordered insulin & D5 & Ca gluconate & Lokelma 10 mg TID, held Spironolactone . Hyperkalemia resolved with these interventions. Patient had another wound vac  replaced on 2/27. Went to OR with plastic surgery on 2/28 for debridement.     Updates 3/6:  -Plan for O.R. on 3/7  -Repeat T+S resulted today on 3/6, COVID swab today  -Hyperkalemia still resolved, c/w Lokelma once daily   -Vanc to continue for 8 weeks until 2/15 - 4/12  -Pain regimen currently at dilaudid IV 2mg q3h as needed , fentanyl patch      Cx history:     ·  2/10 OSH bedside wound cx - MRSA (final)      ·  2/15 OR deep wound cs - NGTD (final 2/17)    Abx hx:  1) Vancomcyin 2/10-   2) Zosyn 2/10- 2/19 (d/c given no culture evidence of pseudomonas)      #MRSA sacral wound infection  Micro:   -BCx 2/10 x2 NGTD  -[place] Wound cx 2/10/23: MRSA (S: clinda, vanc; R: TMP-SMX, tetracyclines)  -Intraoperative wound cx 2/15/23: no growth aerobically or anaerobically   - S/p below on 2/15:   1. exploration of lumbar wound dehiscence  2. removal of lumbar spinal hardware (set caps, rods, and screws)  3. irrigation and debridement of lumbar wound  - Plastic surgery recs  2/15:   1. WTD Kerlix dressings with Dakin's/Saline solution packed to the entire depth of the wound three times daily and PRN if soiled   -Lumbar CT without contrast, obtained on 2/18: soft tissue defect overlying lumbar and lower thoracic spine, soft tissue thickening, scattered soft tissue emphysema; multilevel degenerative changes of lumbar spine  - Plastics decided to place wound vac on 2/22 instead of I&D, reassessed and replaced wound vac on 2/24 and on 2/27. Went to OR on 2/28 for lumbar wound excision and debridement and wound vac placement.  plan to go back to OR on 3/7.  Abx:   Plan:   1) Vancomcyin 2/10 - 4/12   2) Zosyn 2/10- 2/19 (d/c given no culture evidence of pseudomonas)    #Chronic pain - lower back  -home pain regimen: scheduled acetaminophen, fentanyl patch 50mcg q72h, dilaudid 4mg PO TID prn breakthrough  -bowel regimen  -follows with pain medicine Dr. 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 Recommend follow up outpatient. Currently on Lokelma.   -6.0 on 2/24/2023 with EKG of peaked T-waves, 5.8 on 2/23 resolved peaked T-waves  -hx of hyperkalemic episodes  on review  -no CKD  -gave insulin, d50, calcium gluconate, and lokelma TID  for 48 hrs  -held maxime  -conitnue to monitor  -continue lokelma    #COPD, 4L at baseline  -SpO2 goal 88-92%  -Started on proventill (takes at home per wife)     #HFpEF   #HTN  -TTE Feb 2022: EF 65-70%, normal LV diastolic function, normal RV  -Hold ASA for possible procedure  -C/w home atorva, metop 12.5 BID, maxime 50 BID, losartan 50 qd, amlodipine 10  -Held maxime for hyperkalemia on 2/24    #Seizure disorder - continue Keppra  #Anxiety - continue Buspar   #BPH - continue tamsulosin   #Gout - continue allopurinol     #CHILANGO  -Not compliant with CPAP at home  -Elevated bicarb inpatient, likely chronic    DVT ppx: lovenox   Code status: FULL (confirmed on admission)  NOK: Giulia Mckeon [phone]            Attestation:   Note Completion:  I am a:  Resident/Fellow   Attending Attestation I saw and evaluated the patient.  I personally obtained the key and critical portions of the history and physical exam or was physically present for key and  critical portions performed by the resident/fellow. I reviewed the resident/fellow?s documentation and discussed the patient with the resident/fellow.  I agree with the resident/fellow?s medical decision making as documented in the note.     I personally evaluated the patient on 06-Mar-2023         Electronic Signatures:  Cande Guillermo (Resident))  (Signed 06-Mar-2023 10:14)   Authored: Service, Subjective Data, Objective Data, Assessment  and Plan, Note Completion  Mame Kent)   (Signed 06-Mar-2023 11:20)   Authored: Note Completion   Co-Signer: Service, Subjective Data, Objective Data, Assessment and Plan, Note Completion      Last Updated: 06-Mar-2023 11:20 by Mame Kent)

## 2023-09-14 NOTE — PROGRESS NOTES
Service: Infectious Disease     Subjective Data:   RAJ HARMON is a 67 year old Male who is Hospital Day # 17 and POD #2 for 1. Excision and debridement down to and including subcutaneous tissue;2. Partial adjacent tissue transfer;3. ;4. ;5.     No acute complaints overnight. Evaluated at bedside today. Not complaining of any new acute issues. Denies fevers, chills, CP, SOB. Reports good pain level under current pain regimen.    Objective Data:     Objective Information:      T   P  R  BP   MAP  SpO2   Value  36.1  56  18  122/58      98%  Date/Time 3/2 12:22 3/2 12:22 3/2 12:22 3/2 12:22    3/2 12:22  Range  (36.1C - 36.6C )  (56 - 102 )  (18 - 19 )  (116 - 135 )/ (52 - 65 )    (96% - 98% )   As of 02-Mar-2023 09:22:00, patient is on 4 L/min of oxygen via nasal cannula.      Pain reported at 3/2 12:55: 8 = Severe      T   P  R  BP   MAP  SpO2   Value  36.1  56  18  122/58      98%  Date/Time 3/2 12:22 3/2 12:22 3/2 12:22 3/2 12:22    3/2 12:22  Range  (36.1C - 36.6C )  (56 - 102 )  (18 - 19 )  (116 - 135 )/ (52 - 65 )    (96% - 98% )   As of 02-Mar-2023 09:22:00, patient is on 4 L/min of oxygen via nasal cannula.    Physical Exam Narrative:  ·  Physical Exam:    Gen: well-appearing, NAD  Head and neck: NCAT, neck supple without LAD, severe kyphoscoliosis   HEENT: MMM, normal nose without congestion, poor dentition   CV: RRR no mrg  Pulm: CTAB, prolonged exp phase, no wheezing or crackles, normal WOB on 4L  Abd: obese, soft, non-tender, non-distended  Ext: no cyanosis or clubbing, trace LE edema, thenar wasting bilat; 2-3cm diameter stage 1 ulcer on L heel  Neuro: alert and oriented x3, normal tone, face symmetric, moves all extremities spontaneously. mild tremor in hands bilaterally  Back: Wound vac placed and secured. No blood oozing or purulence. mild skin irritation under the wound vac tape      Medication:    Medications:          Continuous Medications       --------------------------------  No  continuous medications are active       Scheduled Medications       --------------------------------    1. Acetaminophen:  650  mg  Oral  Every 6 Hours    2. Allopurinol:  300  mg  Oral  Every 24 Hours    3. amLODIPine (NORVASC):  10  mg  Oral  Daily    4. Ascorbic Acid:  1000  mg  Oral  Daily    5. Atorvastatin:  20  mg  Oral  Daily    6. busPIRone (BUSPAR):  5  mg  Oral  Every 12 Hours    7. Docusate 50 mg - Senna 8.6 m  tablet(s)  Oral  2 Times a Day    8. Enoxaparin SubCutaneous:  40  mg  SubCutaneous  Every 24 Hours    9. fentaNYL 50 micrograms/ hour TransDermal:  1  patch  TransDermal  Every 72 Hours    10. Hydrocortisone 1% Topical:  1  application(s)  Topical  2 Times a Day    11. levETIRAcetam (KEPPRA):  500  mg  Oral  2 Times a Day    12. Metoprolol Tartrate:  12.5  mg  Oral  2 Times a Day    13. oxyCODONE Extended Release:  10  mg  Oral  Every 12 Hours    14. Polyethylene Glycol:  17  gram(s)  Oral  Daily    15. Sodium Hypochlorite 0.125% (Dakins Quarter):  1  application(s)  Topical  3 Times a Day    16. Sodium Zirconium Cyclosilicate:  10  gram(s)  Oral  3 Times a Day    17. Tamsulosin:  0.4  mg  Oral  Daily    18. Vancomycin - RPh to Dose - IV Piggy Back:  1  each  As Specified  Variable    19. Vancomycin 750 mg/ D5W IVPB Premixed Soln 150 mL:  750  mg  IntraVenous Piggyback  Every 12 Hours         PRN Medications       --------------------------------    1. Albuterol 90 micrograms/ Inhalation MDI:  2  inhalation  Inhalation  Every 6 Hours    2. Calcium Carbonate Chewable:  500  mg  Oral  Every 2 Hours    3. diphenhydrAMINE:  25  mg  Oral  Every 24 Hours    4. Heparin Flush 10 unit/ mL Injectable PRN:  5  mL  IntraVenous Flush  According to Flush Policy    5. HYDROmorphone Injectable:  2  mg  IntraVenous Push  Every 3 Hours    6. Melatonin:  3  mg  Oral  At Bedtime    7. Naloxone Injectable:  0.2  mg  IntraVenous Push  Once    8. Sodium Chloride 0.9% Injectable Flush:  10  mL  IntraVenous Flush   Every 8 Hours and as Needed        Recent Lab Results:    Results:    CBC: 3/2/2023 04:37              \     Hgb     /                              \     8.2 L    /  WBC  ----------------  Plt               9.5       ----------------    347              /     Hct     \                              /     27.6 L    \            RBC: 2.53 L    MCV: 109 H    Neutrophil  %: 78.7      RFP: 3/2/2023 04:37  NA+        Cl-     BUN  /                         136    92 L   35 H  /  --------------------------------  Glucose                ---------------------------  114 H    K+     HCO3-   Creat \                         5.0    39 H   0.97  \  Calcium : 9.5Anion Gap : 10          Albumin : 3.2 L    Phos : 3.4        I have reviewed these laboratory results:    Renal Function Panel  02-Mar-2023 04:37:00      Result Value    Glucose, Serum  114   H   NA  136    K  5.0    CL  92   L   Bicarbonate, Serum  39   H   Anion Gap, Serum  10    BUN  35   H   CREAT  0.97    GFR Male  84    Calcium, Serum  9.5    Phosphorus, Serum  3.4    ALB  3.2   L     Complete Blood Count + Differential  02-Mar-2023 04:37:00      Result Value    White Blood Cell Count  9.5    Nucleated Erythrocyte Count  0.0    Red Blood Cell Count  2.53   L   HGB  8.2   L   HCT  27.6   L   MCV  109   H   MCHC  29.7   L   PLT  347    RDW-CV  12.1    Neutrophil %  78.7    Immature Granulocytes %  0.7    Lymphocyte %  11.6    Monocyte %  7.9    Eosinophil %  0.8    Basophil %  0.3    Neutrophil Count  7.49    Lymphocyte Count  1.11   L   Monocyte Count  0.75    Eosinophil Count  0.08    Basophil Count  0.03      Magnesium, Serum  02-Mar-2023 04:37:00      Result Value    Magnesium, Serum  1.95        Assessment and Plan:   Daily Risk Screen:  ·  Does patient have a central line? yes   ·  Central Line Type PICC   ·  Plan for PICC removal today? no   ·  The patient continues to require a PICC for parenteral medication     Comorbidities:  ·  Comorbidity Other      Code Status:  ·  Code Status Full Code     Assessment:    67 year old Male with CHF, COPD on 4L at home, seizure disorder and a history of multiple lumbar spine surgeries complicated  by MRSA wound infection with a known chronic open lumbar wound status post multiple debridements by plastic surgery, who presented to [place] with worsening low back pain as well as generalized fatigue and weakness, found to have MRSA infection of sacral  wound with exposed hardware, and elevated inflammatory markers (ESR >130,  CRP elevated at 5.26). He presented to [facility] as a transfer for surgical evaluation by spine team. He was started on vancomcyin and zosyn  on 2/10 in [place], which were continued on admission. BCx from 2/10 with NGTD. Nsgy was consulted, and he underwent removal of lumbar spinal hardware and irrigation and debridement of lumbar wound on 2/15. Plastic surgery was consulted and recommended  leaving wound open with TID dressing changes and plan for return to OR on 2/22 for lumbar spinal wound I&D and potential wound vac. Intraoperative wound cultures had no growth aerobically or anaerobically. Pt has a hx of chronic pain, and pain was managed  after surgery with fentanyl patch (per home regimen), Tylenol (per home regimen), and IV dilaudid 1mg IV q3h for moderate pain and 2mg IV q3h for severe pain. Pain regimen can be down-titrated to home regimen after plastic surgery intervention. Zosyn  was discontinued on 2/19 given no evidence of pseudomonas. Pt continues to be on vancomycin. Patient was having loose bowel movements multiple times daily but was negative for C.diff and stool softeners were stopped. Plastic surgery recommended wound  vac and it was placed on 2/22, replaced on 2/24. Patient had hyperkalemic episode on 2/34 of potassium 6.0 (repeat from 6.1) with EKG showing  peaked T-waves, ordered insulin & D5 & Ca gluconate & Lokelma 10 mg TID, held Spironolactone . Hyperkalemia resolved with these  interventions. Patient had another wound vac replaced on 2/27. Went to OR with plastic surgery on 2/28 for debridement.     Updates 3/2:  -Lumbar wound excision and debridement w/ wound vac on 2/28. Plan to go to OR again on 3/7  -Findings of UTI on UA done is surgery  -UCx negative    -Patient has no UTI symptoms  -K 5.4, repeat evening RFP 5.5, restarted  Lokelma TID   -Vanc level 15.1 on 3/1   -Vanc to continue for 8 weeks until 2/15 - 4/12  -Pain regimen currently at dilaudid IV 2mg q3h as needed   -Continue to monitor bicarb (underlying CHILANGO)  -Consider starting Lokelma once daily   -Urine lytes for chronic hyperkalemia work up - pending       Cx history:     ·  2/10 OSH bedside wound cx - MRSA (final)      ·  2/15 OR deep wound cs - NGTD (final 2/17)    Abx hx:  1) Vancomcyin 2/10-   2) Zosyn 2/10- 2/19 (d/c given no culture evidence of pseudomonas)      #MRSA sacral wound infection  Micro:   -BCx 2/10 x2 NGTD  -[place] Wound cx 2/10/23: MRSA (S: clinda, vanc; R: TMP-SMX, tetracyclines)  -Intraoperative wound cx 2/15/23: no growth aerobically or anaerobically   - S/p below on 2/15:   1. exploration of lumbar wound dehiscence  2. removal of lumbar spinal hardware (set caps, rods, and screws)  3. irrigation and debridement of lumbar wound  - Plastic surgery recs  2/15:   1. WTD Kerlix dressings with Dakin's/Saline solution packed to the entire depth of the wound three times daily and PRN if soiled   -Lumbar CT without contrast, obtained on 2/18: soft tissue defect overlying lumbar and lower thoracic spine, soft tissue thickening, scattered soft tissue emphysema; multilevel degenerative changes of lumbar spine  - Plastics decided to place wound vac on 2/22 instead of I&D, reassessed and replaced wound vac on 2/24 and on 2/27. Went to OR on 2/28 for lumbar wound excision and debridement and wound vac placement.  plan to go back to OR on 3/7.  Abx:   Plan:   1) Vancomcyin 2/10 - 4/12   2) Zosyn 2/10- 2/19 (d/c given  no culture evidence of pseudomonas)    #Chronic pain - lower back  -home pain regimen: scheduled acetaminophen, fentanyl patch 50mcg q72h, dilaudid 4mg PO TID prn breakthrough  -bowel regimen  -follows with pain medicine Dr. Almazan at [facility]   -post-op pain regimen: Tylenol 650mg q6h, fentanyl patch 50mcg q72h, 1mg IV dilaudid q3h for moderate pain, 2mg IV dilaudid q3h for severe pain    #Hyperkalemia - resolved now  -6.0 on 2/24/2023 with EKG of peaked T-waves, 5.8 on 2/23 resolved peaked T-waves  -hx of hyperkalemic  episodes on review  -no CKD  -gave insulin, d50, calcium gluconate, and lokelma  TID for 48 hrs  -held maxime  -conitnue to monitor    #COPD, 4L at baseline  -SpO2 goal 88-92%  -Started on proventill (takes at home per wife)     #HFpEF   #HTN  -TTE Feb 2022: EF 65-70%, normal LV diastolic function, normal RV  -Hold ASA for possible procedure  -C/w home atorva, metop 12.5 BID, maxime 50 BID, losartan 50 qd, amlodipine 10  -Held maxime for hyperkalemia on 2/24    #Seizure disorder - continue Keppra  #Anxiety - continue Buspar   #BPH - continue tamsulosin   #Gout - continue allopurinol     #CHILANGO  -Not compliant with CPAP at home  -Elevated bicarb inpatient, likely chronic    DVT ppx: lovenox   Code status: FULL (confirmed on admission)  NOK: Wife Mike [phone]            Attestation:   Note Completion:  I am a:  Resident/Fellow   Attending Attestation I saw and evaluated the patient.  I personally obtained the key and critical portions of the history and physical exam or was physically present for key and  critical portions performed by the resident/fellow. I reviewed the resident/fellow?s documentation and discussed the patient with the resident/fellow.  I agree with the resident/fellow?s medical decision making as documented in the note.     I personally evaluated the patient on 02-Mar-2023         Electronic Signatures:  Mame Kent)  (Signed 02-Mar-2023 20:20)   Authored: Note  Completion   Co-Signer: Service, Subjective Data, Objective Data, Assessment and Plan, Note Completion  Freddie Caraballo (Resident))  (Signed 02-Mar-2023 17:09)   Authored: Service, Subjective Data, Objective Data, Assessment  and Plan, Note Completion      Last Updated: 02-Mar-2023 20:20 by Mame Kent)

## 2023-09-14 NOTE — PROGRESS NOTES
Service: Infectious Disease     Subjective Data:   RAJ HARMON is a 67 year old Male who is Hospital Day # 3 and POD #1 for 1. exploration of lumbar wound dehiscence;2. removal of lumbar spinal hardware (set caps, rods, and screws);3. irrigation  and debridement of lumbar wound.    Additional Information:    This AM, pt endorsed back pain. Described it as an 8/10, however stated that this was because he had only just woken up. Stated that the wound was draining significantly  yesterday evening, however it had improved this AM. Felt that the dilaudid was helping with his pain. Felt his breathing was slightly worse than normal, but continues to be satting fine on 4L NC. 2 BM recorded in chart.     Objective Data:     Objective Information:      T   P  R  BP   MAP  SpO2   Value  36.7  97  18  127/70      99%  Date/Time 2/16 9:35 2/16 9:35 2/16 9:35 2/16 9:35    2/16 9:35  Range  (35.6C - 36.7C )  (74 - 99 )  (18 - 18 )  (110 - 136 )/ (61 - 70 )    (96% - 99% )   As of 15-Feb-2023 21:15:00, patient is on 4 L/min of oxygen via nasal cannula.      Pain reported at 2/16 4:30: sleeping    Physical Exam Narrative:  ·  Physical Exam:    Gen: well-appearing, NAD  Head and neck: NCAT, neck supple without LAD, severe kyphoscoliosis   HEENT: MMM, normal nose without congestion, poor dentition   CV: RRR no mrg  Pulm: CTAB, prolonged exp phase, no wheezing or crackles, normal WOB on 4L  Abd: obese, soft, non-tender, non-distended  Ext: no cyanosis or clubbing, trace LE edema, thenar wasting bilat; 2-3cm diameter stage 1 ulcer on L heel  Neuro: alert and oriented x3, normal tone, face symmetric, moves all extremities spontaneously     Medication:    Medications:          Continuous Medications       --------------------------------  No continuous medications are active       Scheduled Medications       --------------------------------    1. Acetaminophen:  650  mg  Oral  Every 6 Hours    2. Allopurinol:  300  mg  Oral   Every 24 Hours    3. amLODIPine (NORVASC):  10  mg  Oral  Daily    4. Ascorbic Acid:  1000  mg  Oral  Daily    5. Atorvastatin:  20  mg  Oral  Daily    6. busPIRone (BUSPAR):  5  mg  Oral  Every 12 Hours    7. Docusate 50 mg - Senna 8.6 m  tablet(s)  Oral  2 Times a Day    8. fentaNYL 50 micrograms/ hour TransDermal:  1  patch  TransDermal  Every 72 Hours    9. levETIRAcetam (KEPPRA):  500  mg  Oral  2 Times a Day    10. Metoprolol Tartrate:  12.5  mg  Oral  2 Times a Day    11. Piperacillin - Tazobactam 4.5 gram/Iso-osmotic 100 mL Premix IVPB:  100  mL  IntraVenous Piggyback  Every 6 Hours    12. Polyethylene Glycol:  17  gram(s)  Oral  Daily    13. Spironolactone:  50  mg  Oral  2 Times a Day    14. Tamsulosin:  0.4  mg  Oral  Daily         PRN Medications       --------------------------------    1. Acetaminophen:  650  mg  Oral  Once    2. Albuterol 90 micrograms/ Inhalation MDI:  2  inhalation  Inhalation  Every 6 Hours    3. HYDROmorphone Injectable:  1  mg  IntraVenous Push  Every 3 Hours    4. HYDROmorphone Injectable:  2  mg  IntraVenous Push  Every 3 Hours    5. Melatonin:  3  mg  Oral  At Bedtime    6. Naloxone Injectable:  0.2  mg  IntraVenous Push  Once    7. Sodium Chloride 0.9% Injectable Flush:  10  mL  IntraVenous Flush  Every 8 Hours and as Needed        Recent Lab Results:    Results:    CBC: 2023 06:18              \     Hgb     /                              \     8.2 L    /  WBC  ----------------  Plt               9.2       ----------------    354              /     Hct     \                              /     27.9 L    \            RBC: 2.54 L    MCV: 110 H    Neutrophil  %: 70.3      RFP: 2023 06:18  NA+        Cl-     BUN  /                         140    102    20  /  --------------------------------  Glucose                ---------------------------  100 H    K+     HCO3-   Creat \                         5.1    35 H   1.11  \  Calcium : 9.2Anion Gap : 8 L          Albumin : 2.6 L     Phos : 3.9        ---------- Recent Arterial Blood Gas Results----------     2/15/2023 13:40  pO2 202  pH 7.41  pCO2 49  SO2 100  Base Excess 5.7null    Assessment and Plan:   Daily Risk Screen:  ·  Does patient have a central line? yes   ·  Central Line Type PICC   ·  Plan for PICC removal today? no   ·  The patient continues to require a PICC for parenteral medication     Comorbidities:  ·  Comorbidity Other     Code Status:  ·  Code Status Full Code     Assessment:    67 year old Male with CHF, COPD on 4L at home, seizure disorder and a history of multiple lumbar spine surgeries complicated  by MRSA wound infection with a known chronic open lumbar wound status post multiple debridements by plastic surgery, who presented to [place] with worsening low back pain as well as generalized fatigue and weakness, found to have MRSA infection of sacral  wound with exposed hardware. He presents to [facility] as a transfer for surgical evaluation by spine team. High suspicion for osteomyelitis in this host with ESR > 130; OM has not been ruled out. Patient unable to tolerate MRI d/t kyphoscoliosis. Will continue  vanc for MRSA, and zosyn because of wound contamination with diarrhea.     Updates 2/16  - Plastic surgery recs:  WTD Kerlix dressings with Dakin's/Saline solution packed to the entire depth of the wound three times daily and PRN if soiled   return to OR with plastic surgery on 2/22 for lumbar spinal wound I&D and possible application of wound vac.  - Pain control: Tylenol 650mg q6h, 1mg IV dilaudid q3h for moderate pain, 2mg IV dilaudid q3h for severe pain: received total 10mg IV dilaudid post-operatively until this AM   - No plans to change pain regimen today d/t patient's persistent pain, recency of surgery, and plan for surgery next week  - Micro: deep wound cx: no granulocytes/organisms   - Abx: c/w vancomycin, will plan to DC zosyn based on intra-operative wound cultures   - NSGY: ok to resume DVT  ppx on POD2 (2/17)     #MRSA sacral wound infection  -BCx 2/10 x2 NGTD  -[place] Wound cx 2/10/23: MRSA (S: clinda, vanc; R: TMP-SMX, tetracyclines)  -ESR elevated at >130  -CRP elevated at 5.26  - S/p below on 2/15:   1. exploration of lumbar wound dehiscence  2. removal of lumbar spinal hardware (set caps, rods, and screws)  3. irrigation and debridement of lumbar wound  - Plastic surgery recs  2/15:   WTD Kerlix dressings with Dakin's/Saline solution packed to the entire depth of the wound three times daily and PRN if soiled   return to OR with plastic surgery on 2/22 for lumbar spinal wound I&D and possible application of wound vac.  Plan:   1) Vancomcyin 2/10-   2) Zosyn 2/10-; will plan to DC zosyn based on intra-operative wound cultures   3) Appreciate plastic surgery recs  4) F/u nsgy recs     #Chronic pain - lower back  -home pain regimen: scheduled acetaminophen, fentanyl patch 50mcg q72h, dilaudid 4mg PO TID prn breakthrough  -bowel regimen  -follows with pain medicine Dr. Almazan at [facility]   -post-op pain regimen: Tylenol 650mg q6h, fentanyl patch 50mcg q72h, 1mg IV dilaudid q3h for moderate pain, 2mg IV dilaudid q3h for severe  pain    #COPD, 4L at baseline  -SpO2 goal 88-92%  -Started on proventill (takes at home per wife)     #HFpEF   #HTN  -TTE Feb 2022: EF 65-70%, normal LV diastolic function, normal RV  -Hold ASA for possible procedure  -C/w home atorva, metop 12.5 BID, maxime 50 BID, losartan 50 qd, amlodipine 10    #Seizure disorder - continue Keppra  #Anxiety - continue Buspar   #BPH - continue tamsulosin   #Gout - continue allopurinol     DVT ppx: SQL (will resume based on nsgy recs)   Code status: FULL (confirmed on admission)  NOK: Wife Mike [phone]      Attestation:   Note Completion:  I am a:  Resident/Fellow   Attending Attestation I saw and evaluated the patient.  I personally obtained the key and critical portions of the history and physical exam or was physically present for key and   critical portions performed by the resident/fellow. I reviewed the resident/fellow?s documentation and discussed the patient with the resident/fellow.  I agree with the resident/fellow?s medical decision making as documented in the note.     I personally evaluated the patient on 16-Feb-2023         Electronic Signatures:  Ankur Harrison (MD)  (Signed 16-Feb-2023 12:28)   Authored: Note Completion   Co-Signer: Assessment and Plan, Note Completion  Christa Funk (Resident))  (Signed 16-Feb-2023 10:45)   Authored: Service, Subjective Data, Objective Data, Assessment  and Plan, Note Completion      Last Updated: 16-Feb-2023 12:28 by Ankur Harrison)

## 2023-09-14 NOTE — PROGRESS NOTES
Service: Infectious Disease     Subjective Data:   RAJ HARMON is a 67 year old Male who is Hospital Day # 14 and POD #12 for 1. exploration of lumbar wound dehiscence;2. removal of lumbar spinal hardware (set caps, rods, and screws);3. irrigation  and debridement of lumbar wound.     Evaluated at [facility]. No acute complaints. Denies fevers, chills.    Objective Data:     Objective Information:      T   P  R  BP   MAP  SpO2   Value  36.4  62  19  175/78      98%  Date/Time 2/27 5:00 2/27 5:00 2/27 5:00 2/27 5:00    2/27 5:00  Range  (36.4C - 36.8C )  (62 - 87 )  (18 - 20 )  (133 - 175 )/ (60 - 78 )    (96% - 98% )   As of 27-Feb-2023 08:50:00, patient is on 4 L/min of oxygen via nasal cannula.      Pain reported at 2/27 8:50: 9 = Severe      T   P  R  BP   MAP  SpO2   Value  36.4  62  19  175/78      98%  Date/Time 2/27 5:00 2/27 5:00 2/27 5:00 2/27 5:00    2/27 5:00  Range  (36.4C - 36.8C )  (62 - 87 )  (18 - 20 )  (133 - 175 )/ (60 - 78 )    (96% - 98% )   As of 27-Feb-2023 08:50:00, patient is on 4 L/min of oxygen via nasal cannula.    Physical Exam Narrative:  ·  Physical Exam:    Gen: well-appearing, NAD  Head and neck: NCAT, neck supple without LAD, severe kyphoscoliosis   HEENT: MMM, normal nose without congestion, poor dentition   CV: RRR no mrg  Pulm: CTAB, prolonged exp phase, no wheezing or crackles, normal WOB on 4L  Abd: obese, soft, non-tender, non-distended  Ext: no cyanosis or clubbing, trace LE edema, thenar wasting bilat; 2-3cm diameter stage 1 ulcer on L heel  Neuro: alert and oriented x3, normal tone, face symmetric, moves all extremities spontaneously. mild tremor in hands bilaterally  Back: Wound vac placed and secured. No blood oozing or purulence. skin irritation under the wound vac tape      Medication:    Medications:          Continuous Medications       --------------------------------  No continuous medications are active       Scheduled Medications        --------------------------------    1. Acetaminophen:  650  mg  Oral  Every 6 Hours    2. Allopurinol:  300  mg  Oral  Every 24 Hours    3. amLODIPine (NORVASC):  10  mg  Oral  Daily    4. Ascorbic Acid:  1000  mg  Oral  Daily    5. Atorvastatin:  20  mg  Oral  Daily    6. busPIRone (BUSPAR):  5  mg  Oral  Every 12 Hours    7. Docusate 50 mg - Senna 8.6 m  tablet(s)  Oral  2 Times a Day    8. Enoxaparin SubCutaneous:  40  mg  SubCutaneous  Every 24 Hours    9. fentaNYL 50 micrograms/ hour TransDermal:  1  patch  TransDermal  Every 72 Hours    10. Hydrocortisone 1% Topical:  1  application(s)  Topical  2 Times a Day    11. levETIRAcetam (KEPPRA):  500  mg  Oral  2 Times a Day    12. Metoprolol Tartrate:  12.5  mg  Oral  2 Times a Day    13. Polyethylene Glycol:  17  gram(s)  Oral  Daily    14. Sodium Hypochlorite 0.125% (Dakins Quarter):  1  application(s)  Topical  3 Times a Day    15. Tamsulosin:  0.4  mg  Oral  Daily    16. Vancomycin - RPh to Dose - IV Piggy Back:  1  each  As Specified  Variable    17. Vancomycin 500 mg IVPB/ Premixed Soln 100 mL:  500  mg  IntraVenous Piggyback  Every 12 Hours         PRN Medications       --------------------------------    1. Albuterol 90 micrograms/ Inhalation MDI:  2  inhalation  Inhalation  Every 6 Hours    2. Calcium Carbonate Chewable:  500  mg  Oral  Every 2 Hours    3. diphenhydrAMINE:  25  mg  Oral  Every 24 Hours    4. Heparin Flush 10 unit/ mL Injectable PRN:  5  mL  IntraVenous Flush  According to Flush Policy    5. HYDROmorphone Injectable:  1  mg  IntraVenous Push  Every 3 Hours    6. Melatonin:  3  mg  Oral  At Bedtime    7. Naloxone Injectable:  0.2  mg  IntraVenous Push  Once    8. Sodium Chloride 0.9% Injectable Flush:  10  mL  IntraVenous Flush  Every 8 Hours and as Needed        Recent Lab Results:    Results:    CBC: 2023 04:43              \     Hgb     /                              \     12.1 L    /  WBC  ----------------  Plt                5.8       ----------------    204              /     Hct     \                              /     40.0 L    \            RBC: 3.75 L    MCV: 107 H    Neutrophil  %: 70.1      RFP: 2/27/2023 09:52  NA+        Cl-     BUN  /                         Canceled    Canceled    Canceled  /  --------------------------------  Glucose                ---------------------------  Canceled    K+     HCO3-   Creat \                         Canceled    Canceled    Canceled  \  Calcium : CanceledAnion Gap : Canceled          Albumin : Canceled     Phos : Canceled The performance characteristics of phosphorus testing in   heparinized plasma have been validated by the individual     laboratory site where testing is performed. Testing    on heparinized plasma is not approved by the FDA;    however, suc        I have reviewed these laboratory results:    Renal Function Panel  27-Feb-2023 04:43:00      Result Value    Lab Comment:  BICARB CALLED  RB TO DEBORAH COSBY, 02/27/2023 06:11    Glucose, Serum  90    NA  138    K  4.5    CL  94   L   Bicarbonate, Serum  41   HH   Anion Gap, Serum  8   L   BUN  25   H   CREAT  0.72    GFR Male  >90    Calcium, Serum  9.4    Phosphorus, Serum  3.2    ALB  3.1   L     Complete Blood Count + Differential  27-Feb-2023 04:43:00      Result Value    White Blood Cell Count  5.8    Nucleated Erythrocyte Count  0.0    Red Blood Cell Count  3.75   L   HGB  12.1   L   HCT  40.0   L   MCV  107   H   MCHC  30.3   L   PLT  204    RDW-CV  12.5    Neutrophil %  70.1    Immature Granulocytes %  0.7    Lymphocyte %  12.7    Monocyte %  11.0    Eosinophil %  4.5    Basophil %  1.0    Neutrophil Count  4.08    Lymphocyte Count  0.74   L   Monocyte Count  0.64    Eosinophil Count  0.26    Basophil Count  0.06      Magnesium, Serum  27-Feb-2023 04:43:00      Result Value    Magnesium, Serum  1.83        Assessment and Plan:   Daily Risk Screen:  ·  Does patient have a central line? yes   ·  Central Line Type  non-tunneled   ·  Plan for non-tunneled central line removal today? no   ·  The patient continues to require non-tunneled central line access for parenteral medication     Comorbidities:  ·  Comorbidity Other     Code Status:  ·  Code Status Full Code     Assessment:    67 year old Male with CHF, COPD on 4L at home, seizure disorder and a history of multiple lumbar spine surgeries complicated  by MRSA wound infection with a known chronic open lumbar wound status post multiple debridements by plastic surgery, who presented to [facility] with worsening low back pain as well as generalized fatigue and weakness, found to have MRSA infection of sacral  wound with exposed hardware, and elevated inflammatory markers. He presented to [facility] as a transfer for surgical evaluation by spine team. He was started on vancomcyin and zosyn on 2/10 in [place], which were continued on admission. BCx from 2/10 with NGTD.  Nsgy was consulted, and he underwent removal of lumbar spinal hardware and irrigation and debridement of lumbar wound on 2/15. Plastic surgery was consulted and recommended leaving wound open with TID dressing changes and plan for return to OR on 2/22  for lumbar spinal wound I&D and potential wound vac. Intraoperative wound cultures had no growth aerobically or anaerobically. Pt has a hx of chronic pain, and pain was managed after surgery with fentanyl patch (per home regimen), Tylenol (per home regimen),  and IV dilaudid 1mg IV q3h for moderate pain and 2mg IV q3h for severe pain. Pain regimen can be down-titrated to home regimen after plastic surgery intervention. Zosyn was discontinued on 2/19 given no evidence of pseudomonas. Pt continues to be on vancomycin.  Patient was having loose bowel movements multiple times daily but was negative for C.diff and stool softeners were stopped. Plastic surgery recommended wound vac and it was placed on 2/22, replaced on 2/24.  Patient had hyperkalemic episode on 2/34 of potassium 6.0  (repeat from 6.1) with EKG showing peaked T-waves, ordered insulin & D5 & Ca gluconate & Lokelma  10 mg TID, held Spironolactone. Hyperkalemia resolved with these interventions.    Uppdates 2/27:  -Replaced vac by plastics today   -OR tomorrow for debridgement   -NPO at midnight   -continue to monitor bicarb (underlying CHILANGO)    Updates 2/26:  -Potassium 6.0 yesterday (repeat from 6.1) with EKG showing peaked T-waves, ordered insulin & D5 & Ca gluconate & Lokelma,  held Spironolactone.   -K 5.0 today, c/w Lokelma TID  -hydrocortisone for itching on lower back   -Plan to evaluate patient in OR next week Tues  -Wound vac replaced by plastics on 2/24  -Vanc level 15.2 on 2/25 , c/w Vanc 500 q12h  -Vanc to continue for 8 weeks until 2/15 - 4/12  -Closely monitor vancomycin levels as patient had a break  in medication and needs to be ensured to continue to receive   -Pain regimen currently at 2mg q3h as needed  -Tremor to be discussed with patient and wife and chart review   -Outpatient f/u with Dr. Harrison    Cx history:     ·  2/10 OSH bedside wound cx - MRSA (final)      ·  2/15 OR deep wound cs - NGTD (final 2/17)    Abx hx:  1) Vancomcyin 2/10-   2) Zosyn 2/10- 2/19 (d/c given no culture evidence of pseudomonas)      #MRSA sacral wound infection  Micro:   -BCx 2/10 x2 NGTD  -[place] Wound cx 2/10/23: MRSA (S: clinda, vanc; R: TMP-SMX, tetracyclines)  -ESR elevated at >130  -CRP elevated at 5.26  -Intraoperative wound cx 2/15/23: no growth aerobically or anaerobically   - S/p below on 2/15:   1. exploration of lumbar wound dehiscence  2. removal of lumbar spinal hardware (set caps, rods, and screws)  3. irrigation and debridement of lumbar wound  - Plastic surgery recs  2/15:   1. WTD Kerlix dressings with Dakin's/Saline solution packed to the entire depth of the wound three times daily and PRN if soiled   -Lumbar CT without contrast, obtained on 2/18: soft tissue defect overlying lumbar and lower thoracic spine, soft  tissue thickening, scattered soft tissue emphysema; multilevel degenerative changes of lumbar spine  - Plastics decided to place wound vac on 2/22 instead of I&D, reassessed and replaced wound vac on 2/24  Abx:   Plan:   1) Vancomcyin 2/10 - 4/12   2) Zosyn 2/10- 2/19 (d/c given no culture evidence of pseudomonas)    #Chronic pain - lower back  -home pain regimen: scheduled acetaminophen, fentanyl patch 50mcg q72h, dilaudid 4mg PO TID prn breakthrough  -bowel regimen  -follows with pain medicine Dr. Almazan at [facility]   -post-op pain regimen: Tylenol 650mg q6h, fentanyl patch 50mcg q72h, 1mg IV dilaudid q3h for moderate pain, 2mg IV dilaudid q3h for severe pain    #Hyperkalemia - resolved 5.0 now  -6.0 on 2/24/2023 with EKG of peaked T-waves, 5.8 on 2/23 resolved peaked T-waves  -hx of hyperkalemic  episodes on review  -no CKD  -given insulin, d50, calcium gluconate, and lokelma   -will hold maxime   -conitnue to monitor    #COPD, 4L at baseline  -SpO2 goal 88-92%  -Started on proventill (takes at home per wife)     #HFpEF   #HTN  -TTE Feb 2022: EF 65-70%, normal LV diastolic function, normal RV  -Hold ASA for possible procedure  -C/w home atorva, metop 12.5 BID, maxime 50 BID, losartan 50 qd, amlodipine 10  -Hold maxime for hyperkalemia on 2/24    #Seizure disorder - continue Keppra  #Anxiety - continue Buspar   #BPH - continue tamsulosin   #Gout - continue allopurinol     DVT ppx: lovenox   Code status: FULL (confirmed on admission)  NOK: Wife Mike [phone]            Attestation:   Note Completion:  I am a:  Resident/Fellow   Attending Attestation I saw and evaluated the patient.  I personally obtained the key and critical portions of the history and physical exam or was physically present for key and  critical portions performed by the resident/fellow. I reviewed the resident/fellow?s documentation and discussed the patient with the resident/fellow.  I agree with the resident/fellow?s medical decision making as  documented in the note.     I personally evaluated the patient on 27-Feb-2023         Electronic Signatures:  Mame Kent (MD)  (Signed 27-Feb-2023 16:03)   Authored: Note Completion   Co-Signer: Service, Subjective Data, Objective Data, Assessment and Plan, Note Completion  Freddie Caraballo (Resident))  (Signed 27-Feb-2023 12:45)   Authored: Service, Subjective Data, Objective Data, Assessment  and Plan, Note Completion      Last Updated: 27-Feb-2023 16:03 by Mame Kent)

## 2023-09-14 NOTE — PROGRESS NOTES
Service: Infectious Disease     Subjective Data:   RAJ HARMON is a 67 year old Male who is Hospital Day # 28 and POD #5 for Debridement washout, vac placement.     Patient endorsing some upper back pain this morning. Otherwise no other events overnight. Pt is set for OR tomorrow, NPO after midnight.    Objective Data:     Objective Information:      T   P  R  BP   MAP  SpO2   Value  36.7  63  18  139/54   91  100%  Date/Time 3/13 5:31 3/13 5:31 3/13 5:31 3/13 5:31  3/12 15:01 3/13 5:31  Range  (36C - 36.7C )  (60 - 69 )  (18 - 20 )  (139 - 148 )/ (54 - 76 )  (91 - 91 )  (97% - 100% )   As of 12-Mar-2023 23:02:00, patient is on 4 L/min of oxygen via nasal cannula.      Pain reported at 3/13 5:31: 8 = Severe    Physical Exam Narrative:  ·  Physical Exam:    Gen: well-appearing, NAD  Head and neck: NCAT  HEENT: MMM, poor dentition  CV: RRR no mrg  Pulm: CTAB, decreased airflow, no wheezing or crackles, normal WOB on 4L  Abd: obese, soft, non-tender, non-distended  Ext: WWP, mild peripheral edema  Neuro: alert and oriented x3, no gross FND  Back: Wound vac placed and secured, sanguineous drainage in wound vac      Medication:    Medications:          Continuous Medications       --------------------------------  No continuous medications are active       Scheduled Medications       --------------------------------    1. Acetaminophen:  650  mg  Oral  Every 6 Hours    2. Allopurinol:  300  mg  Oral  Every 24 Hours    3. amLODIPine (NORVASC):  10  mg  Oral  Daily    4. Ascorbic Acid:  1000  mg  Oral  Daily    5. Atorvastatin:  20  mg  Oral  Daily    6. busPIRone (BUSPAR):  5  mg  Oral  Every 12 Hours    7. Docusate 50 mg - Senna 8.6 m  tablet(s)  Oral  2 Times a Day    8. Enoxaparin SubCutaneous:  40  mg  SubCutaneous  Every 24 Hours    9. fentaNYL 50 micrograms/ hour TransDermal:  1  patch  TransDermal  Every 72 Hours    10. Hydrocortisone 1% Topical:  1  application(s)  Topical  2 Times a Day    11.  levETIRAcetam (KEPPRA):  500  mg  Oral  2 Times a Day    12. Metoprolol Tartrate:  12.5  mg  Oral  2 Times a Day    13. oxyCODONE 5 mg - Acetaminophen 325 m  tablet(s)  Oral  Every 8 Hours    14. Polyethylene Glycol:  17  gram(s)  Oral  Daily    15. Sodium Hypochlorite 0.125% (Dakins Quarter):  1  application(s)  Topical  3 Times a Day    16. Sodium Zirconium Cyclosilicate:  10  gram(s)  Oral  Daily    17. Tamsulosin:  0.8  mg  Oral  Daily    18. Vancomycin - RPh to Dose - IV Piggy Back:  1  each  As Specified  Variable    19. Vancomycin 750 mg/ D5W IVPB Premixed Soln 150 mL:  750  mg  IntraVenous Piggyback  Every 12 Hours         PRN Medications       --------------------------------    1. Albuterol 2.5 mg - Ipratropium 0.5 mg/ 3 mL Neb Soln:  3  mL  Inhalation  Every 6 Hours    2. diphenhydrAMINE:  25  mg  Oral  Every 24 Hours    3. Heparin Flush 10 unit/ mL Injectable PRN:  5  mL  IntraVenous Flush  According to Flush Policy    4. HYDROmorphone Injectable:  2  mg  IntraVenous Push  Every 3 Hours    5. Naloxone Injectable:  0.2  mg  IntraVenous Push  Once    6. Sodium Chloride 0.9% Injectable Flush:  10  mL  IntraVenous Flush  Every 8 Hours and as Needed        Recent Lab Results:    Results:    CBC: 3/11/2023 06:12              \     Hgb     /                              \     7.2 L    /  WBC  ----------------  Plt               6.5       ----------------    320              /     Hct     \                              /     23.8 L    \            RBC: 2.16 L    MCV: 110 H    Neutrophil  %: 68.2      RFP: 3/11/2023 06:12  NA+        Cl-     BUN  /                         142    95 L   19  /  --------------------------------  Glucose                ---------------------------  86    K+     HCO3-   Creat \                         4.1    44 HH   0.53  \  Calcium : 9.3Anion Gap : 7 L         Albumin : 2.6 L     Phos : 2.9      Assessment and Plan:   Daily Risk Screen:  ·  Does patient have a central  line? yes   ·  Central Line Type PICC   ·  Plan for PICC removal today? no   ·  The patient continues to require a PICC for parenteral medication     Code Status:  ·  Code Status Full Code     Assessment:    67 year old Male with CHF, COPD on 4L at home, seizure disorder and a history of multiple lumbar spine surgeries complicated  by MRSA wound infection with a known chronic open lumbar wound status post multiple debridements by plastic surgery, who presented to [facility] with worsening low back pain as well as generalized fatigue and weakness, found to have MRSA infection of sacral  wound with exposed hardware, and elevated inflammatory markers (ESR >130,  CRP elevated at 5.26). He presented to [facility] as a transfer for surgical evaluation by spine team. He was started on vancomcyin and zosyn  on 2/10 in [place], which were continued on admission. BCx from 2/10 with NGTD. Nsgy was consulted, and he underwent removal of lumbar spinal hardware and irrigation and debridement of lumbar wound on 2/15. Plastic surgery was consulted and recommended  leaving wound open with TID dressing changes and plan for return to OR on 2/22 for lumbar spinal wound I&D and potential wound vac. Intraoperative wound cultures had no growth aerobically or anaerobically. Pt has a hx of chronic pain, and pain was managed  after surgery with fentanyl patch (per home regimen), Tylenol (per home regimen), and IV dilaudid 1mg IV q3h for moderate pain and 2mg IV q3h for severe pain. Pain regimen can be down-titrated to home regimen after plastic surgery intervention. Zosyn  was discontinued on 2/19 given no evidence of pseudomonas. Pt continues to be on vancomycin. Patient was having loose bowel movements multiple times daily but was negative for C.diff and stool softeners were stopped. Plastic surgery recommended wound  vac and it was placed on 2/22, replaced on 2/24. Patient had hyperkalemic episode on 2/34 of potassium 6.0 (repeat from 6.1) with EKG  showing  peaked T-waves, ordered insulin & D5 & Ca gluconate & Lokelma 10 mg TID, held Spironolactone . Hyperkalemia resolved with these interventions. Patient had another wound vac replaced on 2/27. Went to OR with plastic surgery on 2/28 for debridement. Patient went for another debridement on 3/7 with plastic surgery; noted to have improved since previous  procedure but still required continued debridement with next procedure planned for 3/14.    Updates 3/13:  -C/w current pain regimen; add on lidocaine patch to upper back  -Plan for OR on Tuesday 3/14  -NPO after midnight  -C/w vancomycin; Monitor Vanc troughs  -Labs twice a week    Cx history:     ·  2/10 OSH bedside wound cx - MRSA (final)      ·  2/15 OR deep wound cs - NGTD (final 2/17)    Abx hx:  1) Vancomcyin 2/10-   2) Zosyn 2/10- 2/19 (d/c given no culture evidence of pseudomonas)      #MRSA sacral wound infection  Micro:   -BCx 2/10 x2 NGTD  -[place] Wound cx 2/10/23: MRSA (S: clinda, vanc; R: TMP-SMX, tetracyclines)  -Intraoperative wound cx 2/15/23: no growth aerobically or anaerobically   - S/p below on 2/15:   1. exploration of lumbar wound dehiscence  2. removal of lumbar spinal hardware (set caps, rods, and screws)  3. irrigation and debridement of lumbar wound  - Plastic surgery recs  2/15:   1. WTD Kerlix dressings with Dakin's/Saline solution packed to the entire depth of the wound three times daily and PRN if soiled   -Lumbar CT without contrast, obtained on 2/18: soft tissue defect overlying lumbar and lower thoracic spine, soft tissue thickening, scattered soft tissue emphysema; multilevel degenerative changes of lumbar spine  - Plastics decided to place wound vac on 2/22 instead of I&D, reassessed and replaced wound vac on 2/24 and on 2/27. Went to OR on 2/28 for lumbar wound excision and debridement and wound vac placement.  plan to go back to OR on 3/7.  -Patient underwent debridement on 3/7, will need additional debridement on 3/14 going  forward  Abx:   Plan:   1) Vancomcyin 2/10 - 4/12 ( need to extend due to debridement on 3/7)  2) Zosyn 2/10- 2/19 (d/c given no culture evidence of pseudomonas)    #Chronic pain - lower back  -home pain regimen: scheduled acetaminophen, fentanyl patch 50mcg q72h, dilaudid 4mg PO TID prn breakthrough  -bowel regimen  -follows with pain medicine Dr. 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 Recommend follow up outpatient. Currently on Lokelma.   -6.0 on 2/24/2023 with EKG of peaked T-waves, 5.8 on 2/23 resolved peaked T-waves  -hx of hyperkalemic episodes  on review  -no CKD  -gave insulin, d50, calcium gluconate, and lokelma TID  for 48 hrs  -held maxime  -conitnue to monitor  -continue lokelma    #COPD, 4L at baseline  -SpO2 goal 88-92%  -Started on proventill (takes at home per wife)     #HFpEF   #HTN  -TTE Feb 2022: EF 65-70%, normal LV diastolic function, normal RV  -Hold ASA for possible procedure  -C/w home atorva, metop 12.5 BID, maxime 50 BID, losartan 50 qd, amlodipine 10  -Held maxime for hyperkalemia on 2/24    #Seizure disorder - continue Keppra  #Anxiety - continue Buspar   #BPH - continue tamsulosin   #Gout - continue allopurinol     #CHILANGO  -Not compliant with CPAP at home  -Elevated bicarb inpatient, likely chronic    DVT ppx: lovenox   Code status: FULL (confirmed on admission)  NOK: Wife Mike [phone]      Attestation:   Note Completion:  I am a:  Resident/Fellow   Attending Attestation I saw and evaluated the patient.  I personally obtained the key and critical portions of the history and physical exam or was physically present for key and  critical portions performed by the resident/fellow. I reviewed the  resident/fellow?s documentation and discussed the patient with the resident/fellow.  I agree with the resident/fellow?s medical decision making as documented in the note.     I personally evaluated the patient on 13-Mar-2023         Electronic Signatures:  Cande Guillermo (Resident))  (Signed 13-Mar-2023 10:09)   Authored: Service, Subjective Data, Objective Data, Assessment  and Plan, Note Completion  Darrian Galvez)  (Signed 13-Mar-2023 10:57)   Authored: Note Completion   Co-Signer: Service, Subjective Data, Objective Data, Assessment and Plan, Note Completion      Last Updated: 13-Mar-2023 10:57 by Darrian Galvez)

## 2023-09-14 NOTE — PROGRESS NOTES
Service: Plastic Surgery     Subjective Data:   RAJ HARMON is a 67 year old Male who is Hospital Day # 26 and POD #4 for Debridement washout, vac placement.     No acute concerns. Denies fever, chest pain, SOB, abd pain, n/v/d.    Objective Data:     Objective Information:      T   P  R  BP   MAP  SpO2   Value  36.5  62  20  132/57   82  98%  Date/Time 3/11 5:32 3/11 5:32 3/11 5:32 3/11 5:32  3/11 5:32 3/11 5:32  Range  (36.4C - 36.5C )  (62 - 86 )  (18 - 27 )  (132 - 156 )/ (57 - 76 )  (82 - 103 )  (97% - 98% )   As of 11-Mar-2023 09:25:00, patient is on 4 L/min of oxygen via nasal cannula.      Pain reported at 3/11 10:30: 8 = Severe    Physical Exam by System:    Constitutional: Alert and cooperative, NAD   Eyes: EOMI, clear sclera   ENMT: MMM   Head/Neck: NC/AT   Respiratory/Thorax: Unlabored respirations on 4L  NC, symmetrical chest rise   Cardiovascular: Extremities WWP   Gastrointestinal: Protuberant, soft ND/NT   Musculoskeletal: Wound vac dressing intact to midline  lumbar spine without issue. Maintaining continuous suction at 125 mmHg without alarm, leak, obstruction or malfunction.   Extremities: ROJO x4, generalized deconditioned strength.   Neurological: A&O x3   Psychological: Appropriate behavior and mood.   Skin: Warm and dry, no lesions, no rashes     Recent Lab Results:    Results:    CBC: 3/11/2023 06:12              \     Hgb     /                              \     7.2 L    /  WBC  ----------------  Plt               6.5       ----------------    320              /     Hct     \                              /     23.8 L    \            RBC: 2.16 L    MCV: 110 H    Neutrophil  %: 68.2      RFP: 3/11/2023 06:12  NA+        Cl-     BUN  /                         142    95 L   19  /  --------------------------------  Glucose                ---------------------------  86    K+     HCO3-   Creat \                         4.1    44 HH   0.53  \  Calcium : 9.3Anion Gap : 7 L          Albumin : 2.6 L     Phos : 2.9      Assessment and Plan:   Daily Risk Screen:  ·  Does patient have an indwelling urinary catheter? n/a consulting service   ·  Does patient have a central line? n/a consulting service     Code Status:  ·  Code Status Full Code     Assessment:    RAJ HARMON is a 67 year old Male known to the plastic surgery team with a h/o multiple lumbar spinal surgeries which have been c/b recurrent MRSA infections  requiring multiple extensive plastic surgery interventions including multiple debridements and attempted closures. Admitted to [facility] as transfer on 2/14 for concern of MRSA infection of chronically open lumbar wound with exposed hardware. Patient now  s/p lumbar spinal wound I&D and removal of spinal hardware per NSGY on 2/15. Plastic Surgery consulted for spinal wound debridement and coverage.     OR course (this admission):   2/15 - Lumbar spinal wound exploration, I&D and removal of spinal hardware (per NSGY)  2/28 - Lumbar wound excision and debridement down to and including subcutaneous tissue, partial adjacent tissue transfer, wound vac application (per plastic surgery)   3/7 - Lumbar spinal wound irrigation and debridement, application of wound vac (per plastic surgery)    Plan/Recommendations:   - Return to OR with plastics Tues 3/14 for additional lumbar spinal wound I&D and wound vac  application  - Continue interim vac therapy to lumbar wound until return to OR     ·  Settings: 125 mmHg low continuous suction      ·  Please keep dressing C/D/I, reinforce PRN      ·  Record vac output quality and quantity per nursing q8h      ·  Please alert plastics team with any concerns regarding vac      ·  Next change to occur in OR 3/14   - Diligent pressure offloading to lumbar spine/wound site with q2h turns and frequent repositioning in bed  - Nutrition optimization to promote wound healing     ·  Consider addition of daily MV and nutritional supplements/shakes as able   -  Continue IV ABX per ID recs/primary   - Culture history:     · 2/10 OSH bedside withound cx - MRSA (final 2/12)      · 2/15 OR deep wound cx - NGTD (final 2/17)  - Appreciate remaining supportive care per primary service   - Plastics will continue to follow    Patient and plan discussed with Dr. Yeni Shannon, PA-C  Plastic and Reconstructive Surgery  Doc Halo, Pager [phone], Team phones: [phone]    Time spent on the assessment of patient, gathering and interpreting data, review of medical record/patient history, personally reviewing radiographic imaging and formulation of this note 30 minutes. With greater than 50% spent in personal discussion with  patient.        Electronic Signatures:  Aisha Shannon (PAC)  (Signed 11-Mar-2023 12:58)   Authored: Service, Subjective Data, Objective Data, Assessment  and Plan, Note Completion      Last Updated: 11-Mar-2023 12:58 by Aisha Shannon (PAC)

## 2023-09-14 NOTE — PROGRESS NOTES
Service: Infectious Disease     Subjective Data:   RAJ HARMON is a 67 year old Male who is Hospital Day # 16 and POD #1 for 1. Excision and debridement down to and including subcutaneous tissue;2. Partial adjacent tissue transfer;3. ;4. ;5.     Went to OR yesterday for lumbar debridement with plastics surgery. was in pain afterwards and restarted his pain regimen. Evaluated at bedside. Patient endorses high frequency of urination but is chronic.  Denies dysuria, hematuria, fevers, chills, abdominal pain. Still endorsing back pain from his surgery.    Objective Data:     Objective Information:      T   P  R  BP   MAP  SpO2   Value  36.6  95  19  116/60      97%  Date/Time 3/1 4:48 3/1 4:48 3/1 4:48 3/1 4:48    3/1 4:48  Range  (36.2C - 36.7C )  (67 - 117 )  (18 - 22 )  (113 - 124 )/ (60 - 67 )    (93% - 97% )   As of 28-Feb-2023 08:55:00, patient is on 4 L/min of oxygen via room air.      Pain reported at 3/1 5:24: 9 = Severe      T   P  R  BP   MAP  SpO2   Value  36.6  95  19  116/60      97%  Date/Time 3/1 4:48 3/1 4:48 3/1 4:48 3/1 4:48    3/1 4:48  Range  (36.2C - 36.7C )  (67 - 117 )  (18 - 22 )  (113 - 124 )/ (60 - 67 )    (93% - 97% )   As of 28-Feb-2023 08:55:00, patient is on 4 L/min of oxygen via room air.    Physical Exam Narrative:  ·  Physical Exam:    Gen: well-appearing, NAD  Head and neck: NCAT, neck supple without LAD, severe kyphoscoliosis   HEENT: MMM, normal nose without congestion, poor dentition   CV: RRR no mrg  Pulm: CTAB, prolonged exp phase, no wheezing or crackles, normal WOB on 4L  Abd: obese, soft, non-tender, non-distended  Ext: no cyanosis or clubbing, trace LE edema, thenar wasting bilat; 2-3cm diameter stage 1 ulcer on L heel  Neuro: alert and oriented x3, normal tone, face symmetric, moves all extremities spontaneously. mild tremor in hands bilaterally  Back: Wound vac placed and secured. No blood oozing or purulence. skin irritation under the wound vac  tape      Medication:    Medications:          Continuous Medications       --------------------------------  No continuous medications are active       Scheduled Medications       --------------------------------    1. Acetaminophen:  650  mg  Oral  Every 6 Hours    2. Allopurinol:  300  mg  Oral  Every 24 Hours    3. amLODIPine (NORVASC):  10  mg  Oral  Daily    4. Ascorbic Acid:  1000  mg  Oral  Daily    5. Atorvastatin:  20  mg  Oral  Daily    6. busPIRone (BUSPAR):  5  mg  Oral  Every 12 Hours    7. Docusate 50 mg - Senna 8.6 m  tablet(s)  Oral  2 Times a Day    8. Enoxaparin SubCutaneous:  40  mg  SubCutaneous  Every 24 Hours    9. fentaNYL 50 micrograms/ hour TransDermal:  1  patch  TransDermal  Every 72 Hours    10. Hydrocortisone 1% Topical:  1  application(s)  Topical  2 Times a Day    11. levETIRAcetam (KEPPRA):  500  mg  Oral  2 Times a Day    12. Metoprolol Tartrate:  12.5  mg  Oral  2 Times a Day    13. Polyethylene Glycol:  17  gram(s)  Oral  Daily    14. Sodium Hypochlorite 0.125% (Dakins Quarter):  1  application(s)  Topical  3 Times a Day    15. Tamsulosin:  0.4  mg  Oral  Daily    16. Vancomycin - RPh to Dose - IV Piggy Back:  1  each  As Specified  Variable    17. Vancomycin 750 mg/ D5W IVPB Premixed Soln 150 mL:  750  mg  IntraVenous Piggyback  Every 12 Hours         PRN Medications       --------------------------------    1. Albuterol 90 micrograms/ Inhalation MDI:  2  inhalation  Inhalation  Every 6 Hours    2. Calcium Carbonate Chewable:  500  mg  Oral  Every 2 Hours    3. diphenhydrAMINE:  25  mg  Oral  Every 24 Hours    4. Heparin Flush 10 unit/ mL Injectable PRN:  5  mL  IntraVenous Flush  According to Flush Policy    5. HYDROmorphone Injectable:  1  mg  IntraVenous Push  Every 3 Hours    6. HYDROmorphone Injectable:  2  mg  IntraVenous Push  Every 3 Hours    7. Melatonin:  3  mg  Oral  At Bedtime    8. Naloxone Injectable:  0.2  mg  IntraVenous Push  Once    9. Sodium Chloride 0.9%  Injectable Flush:  10  mL  IntraVenous Flush  Every 8 Hours and as Needed        Recent Lab Results:    Results:    CBC: 3/1/2023 05:28              \     Hgb     /                              \     8.2 L    /  WBC  ----------------  Plt               7.2       ----------------    330              /     Hct     \                              /     27.5 L    \            RBC: 2.55 L    MCV: 108 H    Neutrophil  %: 89.0      RFP: 3/1/2023 05:28  NA+        Cl-     BUN  /                         138    93 L   27 H  /  --------------------------------  Glucose                ---------------------------  156 H    K+     HCO3-   Creat \                         5.4 H   41 H    1.18  \  Calcium : 9.8Anion Gap : 9 L         Albumin : 3.0 L     Phos : 4.3        I have reviewed these laboratory results:    Complete Blood Count + Differential  01-Mar-2023 05:28:00      Result Value    White Blood Cell Count  7.2    Nucleated Erythrocyte Count  0.0    Red Blood Cell Count  2.55   L   HGB  8.2   L   HCT  27.5   L   MCV  108   H   MCHC  29.8   L   PLT  330    RDW-CV  12.2    Neutrophil %  89.0    Immature Granulocytes %  1.1   H   Lymphocyte %  6.2    Monocyte %  3.6    Eosinophil %  0.0    Basophil %  0.1    Neutrophil Count  6.36    Lymphocyte Count  0.44   L   Monocyte Count  0.26    Eosinophil Count  0.00    Basophil Count  0.01      Renal Function Panel  01-Mar-2023 05:28:00      Result Value    Glucose, Serum  156   H   NA  138    K  5.4   H   CL  93   L   Bicarbonate, Serum  41   H   Anion Gap, Serum  9   L   BUN  27   H   CREAT  1.18    GFR Male  67    Calcium, Serum  9.8    Phosphorus, Serum  4.3    ALB  3.0   L     Magnesium, Serum  01-Mar-2023 05:28:00      Result Value    Magnesium, Serum  1.87        Assessment and Plan:   Daily Risk Screen:  ·  Does patient have a central line? yes   ·  Central Line Type PICC   ·  Plan for PICC removal today? no   ·  The patient continues to require a PICC for parenteral  medication     Comorbidities:  ·  Comorbidity Other     Code Status:  ·  Code Status Full Code     Assessment:    67 year old Male with CHF, COPD on 4L at home, seizure disorder and a history of multiple lumbar spine surgeries complicated  by MRSA wound infection with a known chronic open lumbar wound status post multiple debridements by plastic surgery, who presented to [place] with worsening low back pain as well as generalized fatigue and weakness, found to have MRSA infection of sacral  wound with exposed hardware, and elevated inflammatory markers (ESR >130,  CRP elevated at 5.26). He presented to [facility] as a transfer for surgical evaluation by spine team. He was started on vancomcyin and zosyn  on 2/10 in [place], which were continued on admission. BCx from 2/10 with NGTD. Nsgy was consulted, and he underwent removal of lumbar spinal hardware and irrigation and debridement of lumbar wound on 2/15. Plastic surgery was consulted and recommended  leaving wound open with TID dressing changes and plan for return to OR on 2/22 for lumbar spinal wound I&D and potential wound vac. Intraoperative wound cultures had no growth aerobically or anaerobically. Pt has a hx of chronic pain, and pain was managed  after surgery with fentanyl patch (per home regimen), Tylenol (per home regimen), and IV dilaudid 1mg IV q3h for moderate pain and 2mg IV q3h for severe pain. Pain regimen can be down-titrated to home regimen after plastic surgery intervention. Zosyn  was discontinued on 2/19 given no evidence of pseudomonas. Pt continues to be on vancomycin. Patient was having loose bowel movements multiple times daily but was negative for C.diff and stool softeners were stopped. Plastic surgery recommended wound  vac and it was placed on 2/22, replaced on 2/24. Patient had hyperkalemic episode on 2/34 of potassium 6.0 (repeat from 6.1) with EKG showing  peaked T-waves, ordered insulin & D5 & Ca gluconate & Lokelma 10 mg TID, held  Spironolactone . Hyperkalemia resolved with these interventions. Patient had another wound vac replaced on 2/27. Went to OR with plastic surgery on 2/28 for debridement.     Updates 3/1:  -Pt went to OR yesterday with plastics for lumbar wound excision and debridement. Plan to go OR 3/7  -UA results from yesterday in surgery: +leukocyte esterase, WBC 1858,   -Patient has no UTI symptoms   -Repeat UA  -pending  -UCx - pending  -K 5.4, repeat evening RFP  -Vanc level 15.1  -Vanc to continue for 8 weeks until 2/15 - 4/12  -Pain regimen currently at dilaudid IV 2mg q3h as needed   -Continue to monitor bicarb (underlying CHILANGO)      Cx history:     ·  2/10 OSH bedside wound cx - MRSA (final)      ·  2/15 OR deep wound cs - NGTD (final 2/17)    Abx hx:  1) Vancomcyin 2/10-   2) Zosyn 2/10- 2/19 (d/c given no culture evidence of pseudomonas)      #MRSA sacral wound infection  Micro:   -BCx 2/10 x2 NGTD  -[place] Wound cx 2/10/23: MRSA (S: clinda, vanc; R: TMP-SMX, tetracyclines)  -Intraoperative wound cx 2/15/23: no growth aerobically or anaerobically   - S/p below on 2/15:   1. exploration of lumbar wound dehiscence  2. removal of lumbar spinal hardware (set caps, rods, and screws)  3. irrigation and debridement of lumbar wound  - Plastic surgery recs  2/15:   1. WTD Kerlix dressings with Dakin's/Saline solution packed to the entire depth of the wound three times daily and PRN if soiled   -Lumbar CT without contrast, obtained on 2/18: soft tissue defect overlying lumbar and lower thoracic spine, soft tissue thickening, scattered soft tissue emphysema; multilevel degenerative changes of lumbar spine  - Plastics decided to place wound vac on 2/22 instead of I&D, reassessed and replaced wound vac on 2/24 and on 2/27. Went to OR on 2/28 for lumbar wound excision and debridement and wound vac placement.  plan to go back to OR on 3/7.  Abx:   Plan:   1) Vancomcyin 2/10 - 4/12   2) Zosyn 2/10- 2/19 (d/c given no culture  evidence of pseudomonas)    #Chronic pain - lower back  -home pain regimen: scheduled acetaminophen, fentanyl patch 50mcg q72h, dilaudid 4mg PO TID prn breakthrough  -bowel regimen  -follows with pain medicine Dr. Almazan at [facility]   -post-op pain regimen: Tylenol 650mg q6h, fentanyl patch 50mcg q72h, 1mg IV dilaudid q3h for moderate pain, 2mg IV dilaudid q3h for severe pain    #Hyperkalemia - resolved now  -6.0 on 2/24/2023 with EKG of peaked T-waves, 5.8 on 2/23 resolved peaked T-waves  -hx of hyperkalemic  episodes on review  -no CKD  -gave insulin, d50, calcium gluconate, and lokelma  TID for 48 hrs  -held maxime  -conitnue to monitor    #COPD, 4L at baseline  -SpO2 goal 88-92%  -Started on proventill (takes at home per wife)     #HFpEF   #HTN  -TTE Feb 2022: EF 65-70%, normal LV diastolic function, normal RV  -Hold ASA for possible procedure  -C/w home atorva, metop 12.5 BID, maxime 50 BID, losartan 50 qd, amlodipine 10  -Held maxime for hyperkalemia on 2/24    #Seizure disorder - continue Keppra  #Anxiety - continue Buspar   #BPH - continue tamsulosin   #Gout - continue allopurinol     #CHILANGO  -Not compliant with CPAP at home  -Elevated bicarb inpatient, likely chronic    DVT ppx: lovenox   Code status: FULL (confirmed on admission)  NOK: Wife Mike [phone]            Attestation:   Note Completion:  I am a:  Resident/Fellow   Attending Attestation I saw and evaluated the patient.  I personally obtained the key and critical portions of the history and physical exam or was physically present for key and  critical portions performed by the resident/fellow. I reviewed the resident/fellow?s documentation and discussed the patient with the resident/fellow.  I agree with the resident/fellow?s medical decision making as documented in the note.     I personally evaluated the patient on 01-Mar-2023         Electronic Signatures:  Mame Kent)  (Signed 01-Mar-2023 16:11)   Authored: Note Completion   Co-Signer:  Service, Subjective Data, Objective Data, Assessment and Plan, Note Completion  Freddie Caraballo (Resident))  (Signed 01-Mar-2023 11:13)   Authored: Service, Subjective Data, Objective Data, Assessment  and Plan, Note Completion      Last Updated: 01-Mar-2023 16:11 by Mame Kent)

## 2023-09-14 NOTE — PROGRESS NOTES
Service: Infectious Disease     Subjective Data:   RAJ HARMON is a 67 year old Male who is Hospital Day # 29 and POD #0 for 1. Excision and debridement down to and including spinous process;2. Wound vac exchange (15 x 11 cm);3. ;4. ;5.     Patient set for OR today. NAEO.    Objective Data:     Objective Information:      T   P  R  BP   MAP  SpO2   Value  36.4  87  16  160/63   89  100%  Date/Time 3/14 9:55 3/14 9:55 3/14 9:55 3/14 9:55  3/14 4:30 3/14 9:55  Range  (36.4C - 36.7C )  (61 - 87 )  (16 - 18 )  (134 - 160 )/ (54 - 63 )  (81 - 89 )  (98% - 100% )   As of 13-Mar-2023 09:00:00, patient is on 4 L/min of oxygen via nasal cannula.      Pain reported at 3/14 9:15: sleeping    Physical Exam Narrative:  ·  Physical Exam:    Gen: well-appearing, NAD  Head and neck: NCAT  HEENT: MMM, poor dentition  CV: RRR no mrg  Pulm: CTAB, decreased airflow, no wheezing or crackles, normal WOB on 4L  Abd: obese, soft, non-tender, non-distended  Ext: WWP, mild peripheral edema  Neuro: alert and oriented x3, no gross FND  Back: Wound vac placed and secured, sanguineous drainage in wound vac      Medication:    Medications:          Continuous Medications       --------------------------------  No continuous medications are active       Scheduled Medications       --------------------------------    1. Acetaminophen:  975  mg  Oral  Every 8 Hours    2. Allopurinol:  300  mg  Oral  Every 24 Hours    3. amLODIPine (NORVASC):  10  mg  Oral  Daily    4. Ascorbic Acid:  1000  mg  Oral  Daily    5. Atorvastatin:  20  mg  Oral  Daily    6. busPIRone (BUSPAR):  5  mg  Oral  Every 12 Hours    7. Docusate 50 mg - Senna 8.6 m  tablet(s)  Oral  2 Times a Day    8. DULoxetine:  30  mg  Oral  At Bedtime    9. Enoxaparin SubCutaneous:  40  mg  SubCutaneous  Every 24 Hours    10. fentaNYL 50 micrograms/ hour TransDermal:  1  patch  TransDermal  Every 72 Hours    11. Hydrocortisone 1% Topical:  1  application(s)  Topical  2 Times  a Day    12. levETIRAcetam (KEPPRA):  500  mg  Oral  2 Times a Day    13. Lidocaine 4% TransDermal:  1  patch  TransDermal  Every 24 Hours    14. Metoprolol Tartrate:  12.5  mg  Oral  2 Times a Day    15. Polyethylene Glycol:  17  gram(s)  Oral  Daily    16. Sodium Hypochlorite 0.125% (Dakins Quarter):  1  application(s)  Topical  3 Times a Day    17. Sodium Zirconium Cyclosilicate:  10  gram(s)  Oral  Daily    18. Tamsulosin:  0.8  mg  Oral  Daily    19. Vancomycin - RPh to Dose - IV Piggy Back:  1  each  As Specified  Variable         PRN Medications       --------------------------------    1. Albuterol 2.5 mg - Ipratropium 0.5 mg/ 3 mL Neb Soln:  3  mL  Inhalation  Every 6 Hours    2. diphenhydrAMINE:  25  mg  Oral  Every 24 Hours    3. Heparin Flush 10 unit/ mL Injectable PRN:  5  mL  IntraVenous Flush  According to Flush Policy    4. HYDROmorphone Injectable:  2  mg  IntraVenous Push  Every 3 Hours    5. Naloxone Injectable:  0.2  mg  IntraVenous Push  Once    6. Sodium Chloride 0.9% Injectable Flush:  10  mL  IntraVenous Flush  Every 8 Hours and as Needed        Recent Lab Results:    Results:    CBC: 3/11/2023 06:12              \     Hgb     /                              \     7.2 L    /  WBC  ----------------  Plt               6.5       ----------------    320              /     Hct     \                              /     23.8 L    \            RBC: 2.16 L    MCV: 110 H    Neutrophil  %: 68.2      RFP: 3/11/2023 06:12  NA+        Cl-     BUN  /                         142    95 L   19  /  --------------------------------  Glucose                ---------------------------  86    K+     HCO3-   Creat \                         4.1    44 HH   0.53  \  Calcium : 9.3Anion Gap : 7 L         Albumin : 2.6 L     Phos : 2.9      Assessment and Plan:   Daily Risk Screen:  ·  Does patient have a central line? yes   ·  Central Line Type PICC   ·  Plan for PICC removal today? no   ·  The patient continues to  require a PICC for parenteral medication     Code Status:  ·  Code Status Full Code     Assessment:    67 year old Male with CHF, COPD on 4L at home, seizure disorder and a history of multiple lumbar spine surgeries complicated  by MRSA wound infection with a known chronic open lumbar wound status post multiple debridements by plastic surgery, who presented to [place] with worsening low back pain as well as generalized fatigue and weakness, found to have MRSA infection of sacral  wound with exposed hardware, and elevated inflammatory markers (ESR >130,  CRP elevated at 5.26). He presented to [facility] as a transfer for surgical evaluation by spine team. He was started on vancomcyin and zosyn  on 2/10 in [place], which were continued on admission. BCx from 2/10 with NGTD. Nsgy was consulted, and he underwent removal of lumbar spinal hardware and irrigation and debridement of lumbar wound on 2/15. Plastic surgery was consulted and recommended  leaving wound open with TID dressing changes and plan for return to OR on 2/22 for lumbar spinal wound I&D and potential wound vac. Intraoperative wound cultures had no growth aerobically or anaerobically. Pt has a hx of chronic pain, and pain was managed  after surgery with fentanyl patch (per home regimen), Tylenol (per home regimen), and IV dilaudid 1mg IV q3h for moderate pain and 2mg IV q3h for severe pain. Pain regimen can be down-titrated to home regimen after plastic surgery intervention. Zosyn  was discontinued on 2/19 given no evidence of pseudomonas. Pt continues to be on vancomycin. Patient was having loose bowel movements multiple times daily but was negative for C.diff and stool softeners were stopped. Plastic surgery recommended wound  vac and it was placed on 2/22, replaced on 2/24. Patient had hyperkalemic episode on 2/34 of potassium 6.0 (repeat from 6.1) with EKG showing  peaked T-waves, ordered insulin & D5 & Ca gluconate & Lokelma 10 mg TID, held Spironolactone .  Hyperkalemia resolved with these interventions. Patient had another wound vac replaced on 2/27. Went to OR with plastic surgery on 2/28 for debridement. Patient went for another debridement on 3/7 with plastic surgery; noted to have improved since previous  procedure but still required continued debridement with next procedure planned for 3/14.    Updates 3/14:  -OR today, wound debridement and vac exchange  -C/w Vancomycin  -Labs drawn for tomorrow (twice a week)  -Chronic pain consult for further pain management  -C/w lovenox tomorrow    Cx history:     ·  2/10 OSH bedside wound cx - MRSA (final)      ·  2/15 OR deep wound cs - NGTD (final 2/17)    Abx hx:  1) Vancomcyin 2/10-   2) Zosyn 2/10- 2/19 (d/c given no culture evidence of pseudomonas)      #MRSA sacral wound infection  Micro:   -BCx 2/10 x2 NGTD  -[place] Wound cx 2/10/23: MRSA (S: clinda, vanc; R: TMP-SMX, tetracyclines)  -Intraoperative wound cx 2/15/23: no growth aerobically or anaerobically   - S/p below on 2/15:   1. exploration of lumbar wound dehiscence  2. removal of lumbar spinal hardware (set caps, rods, and screws)  3. irrigation and debridement of lumbar wound  - Plastic surgery recs  2/15:   1. WTD Kerlix dressings with Dakin's/Saline solution packed to the entire depth of the wound three times daily and PRN if soiled   -Lumbar CT without contrast, obtained on 2/18: soft tissue defect overlying lumbar and lower thoracic spine, soft tissue thickening, scattered soft tissue emphysema; multilevel degenerative changes of lumbar spine  - Plastics decided to place wound vac on 2/22 instead of I&D, reassessed and replaced wound vac on 2/24 and on 2/27. Went to OR on 2/28 for lumbar wound excision and debridement and wound vac placement.  plan to go back to OR on 3/7.  -Patient underwent debridement on 3/7, will need additional debridement on 3/14 going forward  Abx:   Plan:   1) Vancomcyin 2/10 - 4/12 ( need to extend due to debridement on 3/14)  2)  Zosyn 2/10- 2/19 (d/c given no culture evidence of pseudomonas)    #Chronic pain - lower back  -home pain regimen: scheduled acetaminophen, fentanyl patch 50mcg q72h, dilaudid 4mg PO TID prn breakthrough  -bowel regimen  -follows with pain medicine Dr. 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 Recommend follow up outpatient. Currently on Lokelma.   -6.0 on 2/24/2023 with EKG of peaked T-waves, 5.8 on 2/23 resolved peaked T-waves  -hx of hyperkalemic episodes  on review  -no CKD  -gave insulin, d50, calcium gluconate, and lokelma TID  for 48 hrs  -held maxime  -conitnue to monitor  -continue lokelma    #COPD, 4L at baseline  -SpO2 goal 88-92%  -Started on proventill (takes at home per wife)     #HFpEF   #HTN  -TTE Feb 2022: EF 65-70%, normal LV diastolic function, normal RV  -Hold ASA for possible procedure  -C/w home atorva, metop 12.5 BID, maxime 50 BID, losartan 50 qd, amlodipine 10  -Held maxime for hyperkalemia on 2/24    #Seizure disorder - continue Keppra  #Anxiety - continue Buspar   #BPH - continue tamsulosin   #Gout - continue allopurinol     #CHILANGO  -Not compliant with CPAP at home  -Elevated bicarb inpatient, likely chronic    DVT ppx: lovenox   Code status: FULL (confirmed on admission)  NOK: Wife Mike [phone]      Attestation:   Note Completion:  I am a:  Resident/Fellow   Attending Attestation I saw and evaluated the patient.  I personally obtained the key and critical portions of the history and physical exam or was physically present for key and  critical portions performed by the resident/fellow. I reviewed the resident/fellow?s documentation and discussed the patient with the  resident/fellow.  I agree with the resident/fellow?s medical decision making as documented in the note.     I personally evaluated the patient on 14-Mar-2023         Electronic Signatures:  Cande Guillermo (Resident))  (Signed 14-Mar-2023 10:19)   Authored: Service, Subjective Data, Objective Data, Assessment  and Plan, Note Completion  Darrian Galvez)  (Signed 14-Mar-2023 15:00)   Authored: Note Completion   Co-Signer: Service, Subjective Data, Objective Data, Assessment and Plan, Note Completion      Last Updated: 14-Mar-2023 15:00 by Darrian Galvez)

## 2023-09-28 NOTE — PROGRESS NOTES
PROGRESS NOTE    Subjective   Chief complaint: Andrea Ochoa is a 67 y.o. male who is an acute skilled patient being seen and evaluated for weakness    HPI:  7/21/23 Patient has no new complaints or concerns today.  Continues working towards goals in therapy.  Denies constitutional symptoms. He is anxious about the death of his estranged daughter he just found out about.      Objective   Vital signs: 195/87, 98.6, 93, 97%    Physical Exam  Constitutional:       General: He is not in acute distress.  Eyes:      Extraocular Movements: Extraocular movements intact.   Pulmonary:      Effort: Pulmonary effort is normal.   Musculoskeletal:      Cervical back: Neck supple.   Neurological:      Mental Status: He is alert.   Psychiatric:         Mood and Affect: Mood normal.         Behavior: Behavior is cooperative.         Assessment/Plan   Problem List Items Addressed This Visit       CHF (congestive heart failure) (CMS/HCC)    Anxiety - Primary    Seizure (CMS/HCC)    HTN (hypertension)    BPH (benign prostatic hyperplasia)    COPD (chronic obstructive pulmonary disease) (CMS/HCC)    Morbid (severe) obesity with alveolar hypoventilation (CMS/HCC)     Medications, treatments, and labs reviewed  Continue medications and treatments as listed in PCC  Start buspar and atarax for anxiety    Scribe Attestation  I, Delmar Bernal   attest that this documentation has been prepared under the direction and in the presence of Angel Lewis DO.    Provider Attestation - Scribe documentation  All medical record entries made by the Scribe were at my direction and personally dictated by me. I have reviewed the chart and agree that the record accurately reflects my personal performance of the history, physical exam, discussion and plan.    Angel Lewis DO

## 2023-10-02 NOTE — OP NOTE
PROCEDURE DETAILS    Preoperative Diagnosis:  Chronic Back Wound  Postoperative Diagnosis:  Chronic Back Wound  Surgeon: Yeni  Resident/Fellow/Other Assistant: Wilfred/Patrick    Procedure:  Debridement washout, vac placement  Anesthesia: GETA  Estimated Blood Loss: 2 mL  Findings: Clean wound bed debrided, purulent urine output  Specimens(s) Collected: no,     Complications: None  IV Fluids: 500 mL  Urine Output: 20 mL  Drains and/or Catheters: Wound Vacc  Patient Returned To/Condition: PACU floor                                Attestation:   Note Completion:  Attending Attestation I was present for the entire procedure    I am a: Resident/Fellow         Electronic Signatures:  Giovani Hardin (Resident))  (Signed 07-Mar-2023 17:01)   Authored: Post-Operative Note, Chart Review, Note Completion  Sravan Jaime)  (Signed 08-Mar-2023 10:57)   Authored: Note Completion   Co-Signer: Post-Operative Note, Chart Review, Note Completion      Last Updated: 08-Mar-2023 10:57 by Sravan Jaime)

## 2023-10-02 NOTE — OP NOTE
Post Operative Note:     PreOp Diagnosis: Chronic spine wound   Post-Procedure Diagnosis: Chronic spine wound   Procedure: 1. Excision and debridement down to and  including subcutaneous tissue  2. Partial adjacent tissue transfer  3.   4.   5.   Surgeon: Dr. Sravan Jaime   Resident/Fellow/Other Assistant: Loreta Nguyen APRN/RNFA   Anesthesia: General   I.V. Fluids: 1.1 L   Estimated Blood Loss (mL): 10 mL   Specimen: yes. Urine culture   Findings: red/pink healthy granulation tissue   Patient Returned To/Condition: PACU/Stable   Urine Output: 400 mL   Drains and/or Catheters: wound vac     Attestation:   Note Completion:  Attending Attestation I performed the procedure without a resident          Electronic Signatures:  Loreta Nguyen (APRN-CNP)  (Signed 28-Feb-2023 17:00)   Authored: Post Operative Note, Note Completion  Sravan Jaime)  (Signed 01-Mar-2023 12:44)   Authored: Note Completion   Co-Signer: Post Operative Note, Note Completion      Last Updated: 01-Mar-2023 12:44 by Sravan Jaime)

## 2023-10-02 NOTE — OP NOTE
PROCEDURE DETAILS    Preoperative Diagnosis:  chronic wound dehiscence, exposed spinal hardware  Postoperative Diagnosis:  chronic wound dehiscence, exposed spinal hardware  Surgeon: Olga  Resident/Fellow/Other Assistant: Nneka Ivory Norrdahl    Procedure:  1. exploration of lumbar wound dehiscence  2. removal of lumbar spinal hardware (set caps, rods, and screws)  3. irrigation and debridement of lumbar wound  Estimated Blood Loss: 20  Findings: 5 lumbar screws, 2 loose set caps, otherwise intact hardware, no christian purulence  Specimens(s) Collected: yes,  deep culture where screws were removed   Complications: none  Drains and/or Catheters: none  Implants: none  Additional Details: interbody graft left in place  Patient Returned To/Condition: PACU        Operative Report:   Patient was brought into the operating theater. Team huddle was performed including verification of the patient's name, MRN, , procedure to be performed.  Patient was intubated without complication. Patient was placed prone on a Henri 4-post. The wound was prepped with betadine. The wound was draped in the usual sterile fashion. The exposed hardware was visualized and the muscle was dissected with Bovie  electrocautery to expose the remaining lumbar hardware, which included 3 lumbar pedicle screws on the left and 2 lumbar pedicle screws on the right. The hardware appeared intact without fracture. The L4 set screws were noted to be loose and embedded in  the paraspinal tissue. Those set caps were removed without complication. The rods and screws were removed without complication. There was no christian purulence. Deep cultures were taken. Pulsavac irrigation was used to washout the wound.    Our plastic surgery colleague (Dr. Jaime) was present during the majority of the case, and we were instructed to place wet-to-dry dressings on  the wound with plan for wound washout and likely VAC placement next week. Wet-to-dry dressing was  placed and covered with a large Tegaderm. The patient was flipped back onto the regular bed, extubated, and returned to PACU in stable condition.                        Attestation:   Note Completion:  Attending Attestation I was present for key portions of the procedure and the procedure lasted longer than 5 minutes.    I am a: Resident/Fellow         Electronic Signatures:  ANG Espinoza)  (Signed 17-Feb-2023 08:48)   Authored: Note Completion   Co-Signer: Post-Operative Note, Chart Review, Note Completion  Vicente Ivory (Resident))  (Signed 15-Feb-2023 14:13)   Authored: Post-Operative Note, Chart Review, Note Completion      Last Updated: 17-Feb-2023 08:48 by ANG Espinoza)

## 2023-10-02 NOTE — OP NOTE
Post Operative Note:     PreOp Diagnosis: Chronic open wound   Post-Procedure Diagnosis: Chronic open wound   Procedure: 1. Excision and debridement down to and  including spinous process  2. Wound vac exchange (15 x 11 cm)  3.   4.   5.   Surgeon: Dr. Sravan Jaime   Resident/Fellow/Other Assistant: Loreta PARRA/RNFA   Anesthesia: General   I.V. Fluids: 300 mL   Estimated Blood Loss (mL): 5 mL   Specimen: no   Findings: Red/pink healthy granulation tissue with  partial wound dehiscence   Patient Returned To/Condition: PACU/Stable   Drains and/or Catheters: Wound vac       Electronic Signatures:  Loreta Nguyen (APRN-CNP)   (Signed 14-Mar-2023 09:03)   Authored: Post Operative Note, Note Completion  Sravan Jaime)   (Signed 15-Mar-2023 12:04)   Co-Signer: Post Operative Note, Note Completion    Last Updated: 15-Mar-2023 12:04 by Sravan Jaime)

## 2023-10-02 NOTE — PROGRESS NOTES
PROGRESS NOTE    Subjective   Chief complaint: Andrea Ochoa is a 67 y.o. male who is a long term care patient being seen and evaluated for wound.     HPI:  8/31/23 Patient seen and evaluated for general medical care and monthly follow-up. Patient at baseline mentation, calm, cooperative. Offers no complaints at time. No recent seizure activity, headaches or dizziness.  COPD stable- no SOB, cough, wheeze.  Patient denies F/C/N/V/D.  No concerns per nursing.    9/1/23 Patient was seen and evaluated at bedside. Patient has a wound with yellow drainage.       Objective   Vital signs: 143/71, 97.9, 101, 95%    Physical Exam  Constitutional:       General: He is not in acute distress.  Eyes:      Extraocular Movements: Extraocular movements intact.   Pulmonary:      Effort: Pulmonary effort is normal.   Musculoskeletal:      Cervical back: Neck supple.   Neurological:      Mental Status: He is alert.   Psychiatric:         Mood and Affect: Mood normal.         Behavior: Behavior is cooperative.         Assessment/Plan   Problem List Items Addressed This Visit       HTN (hypertension)     Continue metoprolol and amlodipine         Weakness     Continue to encourage strength exercises          COPD (chronic obstructive pulmonary disease) (CMS/HCC)     Stable  No sob, cough, wheeze  Combivent  monitor          Other Visit Diagnoses       Wound infection    -  Primary    start augmentin cx wound is followed by wound team at facility          Medications, treatments, and labs reviewed  Continue medications and treatments as listed in PCC    Scribe Attestation  I, Delmar Bernal   attest that this documentation has been prepared under the direction and in the presence of PATRICIA Angulo.    Provider Attestation - Scribe documentation  All medical record entries made by the Scribe were at my direction and personally dictated by me. I have reviewed the chart and agree that the record accurately reflects  my personal performance of the history, physical exam, discussion and plan.    Angel Arroyo, APRN-CNP

## 2023-10-20 NOTE — LETTER
Patient: Andrea Ochoa  : 1955    Encounter Date: 10/20/2023    PROGRESS NOTE    Subjective  Chief complaint: Andrea Ochoa is a 67 y.o. male who is a long term care patient being seen and evaluated for follow up.     HPI:  10/20/23 Patient was seen and examined at bedside. Patient was given cipro for wound, voiding trial. No new concerns.       Objective  Vital signs: 124/62, 97.2, 63, 18, 96%    Physical Exam  Constitutional:       General: He is not in acute distress.  Eyes:      Extraocular Movements: Extraocular movements intact.   Pulmonary:      Effort: Pulmonary effort is normal.   Musculoskeletal:      Cervical back: Neck supple.   Neurological:      Mental Status: He is alert.   Psychiatric:         Mood and Affect: Mood normal.         Behavior: Behavior is cooperative.         Assessment/Plan  Problem List Items Addressed This Visit       CHF (congestive heart failure) (CMS/HCC)    Anxiety - Primary    HTN (hypertension)    BPH (benign prostatic hyperplasia)    COPD (chronic obstructive pulmonary disease) (CMS/HCC)    Morbid (severe) obesity with alveolar hypoventilation (CMS/HCC)   Cipro for wound  TOV today    Medications, treatments, and labs reviewed  Continue medications and treatments as listed in [facility]    Scribe Attestation  IAni Scribe   attest that this documentation has been prepared under the direction and in the presence of Angel Lewis DO.    Provider Attestation - Scribe documentation  All medical record entries made by the Scribe were at my direction and personally dictated by me. I have reviewed the chart and agree that the record accurately reflects my personal performance of the history, physical exam, discussion and plan.    Angel Lewis DO          Electronically Signed By: Angel Lewis DO   11/10/23  7:26 PM

## 2023-10-27 NOTE — PROGRESS NOTES
PROGRESS NOTE    Subjective   Chief complaint: Andrea Ochoa is a 67 y.o. male who is a long term care patient being seen and evaluated for follow up.     HPI:  10/20/23 Patient was seen and examined at bedside. Patient was given cipro for wound, voiding trial. No new concerns.       Objective   Vital signs: 124/62, 97.2, 63, 18, 96%    Physical Exam  Constitutional:       General: He is not in acute distress.  Eyes:      Extraocular Movements: Extraocular movements intact.   Pulmonary:      Effort: Pulmonary effort is normal.   Musculoskeletal:      Cervical back: Neck supple.   Neurological:      Mental Status: He is alert.   Psychiatric:         Mood and Affect: Mood normal.         Behavior: Behavior is cooperative.         Assessment/Plan   Problem List Items Addressed This Visit       CHF (congestive heart failure) (CMS/[facility])    Anxiety - Primary    HTN (hypertension)    BPH (benign prostatic hyperplasia)    COPD (chronic obstructive pulmonary disease) (CMS/HCC)    Morbid (severe) obesity with alveolar hypoventilation (CMS/HCC)   Cipro for wound  TOV today    Medications, treatments, and labs reviewed  Continue medications and treatments as listed in [facility]    Scribe Attestation  IAni Scribe   attest that this documentation has been prepared under the direction and in the presence of Angel Lewis DO.    Provider Attestation - Scribe documentation  All medical record entries made by the Scribe were at my direction and personally dictated by me. I have reviewed the chart and agree that the record accurately reflects my personal performance of the history, physical exam, discussion and plan.    Angel Lewis DO

## 2023-10-30 NOTE — LETTER
Patient: Andrea Ochoa  : 1955    Encounter Date: 10/30/2023    PROGRESS NOTE    Subjective  Chief complaint: Andrea Ochoa is a 67 y.o. male patient being seen and evaluated for monthly general medical care and follow-up    HPI:  10/30/23 Patient presents for general medical care and f/u.  Patient seen and examined at bedside.  No issues per nursing.  Patient has no acute complaints.          Objective  Vital signs: 124/62, 97.9, 63, 96%    Physical Exam  Constitutional:       General: He is not in acute distress.  Eyes:      Extraocular Movements: Extraocular movements intact.   Pulmonary:      Effort: Pulmonary effort is normal.   Musculoskeletal:      Cervical back: Neck supple.   Neurological:      Mental Status: He is alert.   Psychiatric:         Mood and Affect: Mood normal.         Behavior: Behavior is cooperative.         Assessment/Plan  Problem List Items Addressed This Visit    None    Medications, treatments, and labs reviewed  Continue medications and treatments as listed in EMR    Scribe Attestation  IAni Scribe   attest that this documentation has been prepared under the direction and in the presence of PATRICIA Angulo.    Provider Attestation - Scribe documentation  All medical record entries made by the Scribe were at my direction and personally dictated by me. I have reviewed the chart and agree that the record accurately reflects my personal performance of the history, physical exam, discussion and plan.    PATRICIA Angulo      Electronically Signed By: PATRICIA Angulo   23 12:51 PM

## 2023-10-31 NOTE — LETTER
Patient: Andrea Ochoa  : 1955    Encounter Date: 10/31/2023    PROGRESS NOTE    Subjective  Chief complaint: Andrea Ochoa is a 68 y.o. male patient being seen and evaluated for monthly general medical care and follow-up    HPI:  10/31/23 Patient presents for general medical care and f/u.  Patient seen and examined at bedside.  No issues per nursing.  Patient has no acute complaints.        Objective  Vital signs: 124/62, 97.9, 63, 18, 96%    Physical Exam  Constitutional:       General: He is not in acute distress.  Eyes:      Extraocular Movements: Extraocular movements intact.   Pulmonary:      Effort: Pulmonary effort is normal.   Musculoskeletal:      Cervical back: Neck supple.   Neurological:      Mental Status: He is alert.   Psychiatric:         Mood and Affect: Mood normal.         Behavior: Behavior is cooperative.         Assessment/Plan  Problem List Items Addressed This Visit       CHF (congestive heart failure) (CMS/[facility]) - Primary     Stable continue to monitor weight gain does not appear to be edema and no JVD distention         Seizure (CMS/[facility])     Stable, continue Keppra         HTN (hypertension)     Continue metoprolol and amlodipine         COPD (chronic obstructive pulmonary disease) (CMS/[facility])     Stable  No sob, cough, wheeze  Combivent  monitor          Medications, treatments, and labs reviewed  Continue medications and treatments as listed in EMR    Scribe Attestation  IAni Scribe   attest that this documentation has been prepared under the direction and in the presence of PATRICIA Angulo.    Provider Attestation - Scribe documentation  All medical record entries made by the Scribe were at my direction and personally dictated by me. I have reviewed the chart and agree that the record accurately reflects my personal performance of the history, physical exam, discussion and plan.    PATRICIA Angulo      Electronically Signed By:  Angel Arroyo, APRN-CNP   11/13/23  7:40 PM

## 2023-11-07 NOTE — PROGRESS NOTES
PROGRESS NOTE    Subjective   Chief complaint: Andrea Ochoa is a 67 y.o. male patient being seen and evaluated for monthly general medical care and follow-up    HPI:  10/30/23 Patient presents for general medical care and f/u.  Patient seen and examined at bedside.  No issues per nursing.  Patient has no acute complaints.          Objective   Vital signs: 124/62, 97.9, 63, 96%    Physical Exam  Constitutional:       General: He is not in acute distress.  Eyes:      Extraocular Movements: Extraocular movements intact.   Pulmonary:      Effort: Pulmonary effort is normal.   Musculoskeletal:      Cervical back: Neck supple.   Neurological:      Mental Status: He is alert.   Psychiatric:         Mood and Affect: Mood normal.         Behavior: Behavior is cooperative.         Assessment/Plan   Problem List Items Addressed This Visit    None    Medications, treatments, and labs reviewed  Continue medications and treatments as listed in EMR    Scribe Attestation  IAni Scribe   attest that this documentation has been prepared under the direction and in the presence of PATRICIA Angulo.    Provider Attestation - Scribe documentation  All medical record entries made by the Scribe were at my direction and personally dictated by me. I have reviewed the chart and agree that the record accurately reflects my personal performance of the history, physical exam, discussion and plan.    PATRICIA Angulo

## 2023-11-08 NOTE — PROGRESS NOTES
PROGRESS NOTE    Subjective   Chief complaint: Andrea Ochoa is a 68 y.o. male patient being seen and evaluated for monthly general medical care and follow-up    HPI:  10/31/23 Patient presents for general medical care and f/u.  Patient seen and examined at bedside.  No issues per nursing.  Patient has no acute complaints.        Objective   Vital signs: 124/62, 97.9, 63, 18, 96%    Physical Exam  Constitutional:       General: He is not in acute distress.  Eyes:      Extraocular Movements: Extraocular movements intact.   Pulmonary:      Effort: Pulmonary effort is normal.   Musculoskeletal:      Cervical back: Neck supple.   Neurological:      Mental Status: He is alert.   Psychiatric:         Mood and Affect: Mood normal.         Behavior: Behavior is cooperative.         Assessment/Plan   Problem List Items Addressed This Visit       CHF (congestive heart failure) (CMS/[facility]) - Primary     Stable continue to monitor weight gain does not appear to be edema and no JVD distention         Seizure (CMS/[facility])     Stable, continue Keppra         HTN (hypertension)     Continue metoprolol and amlodipine         COPD (chronic obstructive pulmonary disease) (CMS/[facility])     Stable  No sob, cough, wheeze  Combivent  monitor          Medications, treatments, and labs reviewed  Continue medications and treatments as listed in EMR    Scribe Attestation  Ani CAMARENA Scribe   attest that this documentation has been prepared under the direction and in the presence of PATRICIA Angulo.    Provider Attestation - Scribe documentation  All medical record entries made by the Scribe were at my direction and personally dictated by me. I have reviewed the chart and agree that the record accurately reflects my personal performance of the history, physical exam, discussion and plan.    PATRICIA Angulo

## 2023-12-26 NOTE — LETTER
Patient: Andrea Ochoa  : 1955    Encounter Date: 2023    PROGRESS NOTE    Subjective  Chief complaint: Andrea Ochoa is a 68 y.o. male who is a long term care patient being seen and evaluated for fall.    HPI:  HPI patient fall denies pain  Objective  Vital signs: 130/65, 97.6, 99, 95%    Physical Exam  Constitutional:       General: He is not in acute distress.  Eyes:      Extraocular Movements: Extraocular movements intact.   Pulmonary:      Effort: Pulmonary effort is normal.   Musculoskeletal:      Cervical back: Neck supple.   Neurological:      Mental Status: He is alert.   Psychiatric:         Mood and Affect: Mood normal.         Behavior: Behavior is cooperative.         Assessment/Plan  Problem List Items Addressed This Visit       Fall - Primary     Monitor           Medications, treatments, and labs reviewed  Continue medications and treatments as listed in [facility]    Scribe Attestation  IAni Scribe   attest that this documentation has been prepared under the direction and in the presence of PATRICIA Angulo.    Provider Attestation - Scribe documentation  All medical record entries made by the Scribe were at my direction and personally dictated by me. I have reviewed the chart and agree that the record accurately reflects my personal performance of the history, physical exam, discussion and plan.    PATRICIA Angulo          Electronically Signed By: PATRICIA Angulo   24 12:08 PM

## 2023-12-27 NOTE — LETTER
Patient: Andrea Ochoa  : 1955    Encounter Date: 2023    PROGRESS NOTE    Subjective  Chief complaint: Andrea Ochoa is a 68 y.o. male patient being seen and evaluated for monthly general medical care and follow-up    HPI:  23 Patient presents for general medical care and f/u.  Patient seen and examined at bedside.  No issues per nursing.  Patient has no acute complaints.          Objective  Vital signs: 141/71, 97.8, 90, 96%    Physical Exam  Constitutional:       General: He is not in acute distress.  Eyes:      Extraocular Movements: Extraocular movements intact.   Pulmonary:      Effort: Pulmonary effort is normal.   Musculoskeletal:      Cervical back: Neck supple.   Neurological:      Mental Status: He is alert.   Psychiatric:         Mood and Affect: Mood normal.         Behavior: Behavior is cooperative.         Assessment/Plan  Problem List Items Addressed This Visit       Seizure (CMS/[facility])     Stable, continue Keppra         HTN (hypertension)     Continue metoprolol and amlodipine         Weakness     Continue to encourage strength exercises          COPD (chronic obstructive pulmonary disease) (CMS/[facility]) - Primary     Stable  No sob, cough, wheeze  Combivent  monitor          Medications, treatments, and labs reviewed  Continue medications and treatments as listed in EMR    Scribe Attestation  IAni Scribe   attest that this documentation has been prepared under the direction and in the presence of PATRICIA Angulo.    Provider Attestation - Scribe documentation  All medical record entries made by the Scribe were at my direction and personally dictated by me. I have reviewed the chart and agree that the record accurately reflects my personal performance of the history, physical exam, discussion and plan.    PATRICIA Angulo      Electronically Signed By: PATRICIA Angulo   24  1:30 PM

## 2024-01-01 ENCOUNTER — APPOINTMENT (OUTPATIENT)
Dept: VASCULAR MEDICINE | Facility: HOSPITAL | Age: 69
DRG: 177 | End: 2024-01-01
Payer: COMMERCIAL

## 2024-01-01 ENCOUNTER — APPOINTMENT (OUTPATIENT)
Dept: CARDIOLOGY | Facility: HOSPITAL | Age: 69
DRG: 177 | End: 2024-01-01
Payer: COMMERCIAL

## 2024-01-01 ENCOUNTER — APPOINTMENT (OUTPATIENT)
Dept: RADIOLOGY | Facility: HOSPITAL | Age: 69
DRG: 177 | End: 2024-01-01
Payer: COMMERCIAL

## 2024-01-01 ENCOUNTER — APPOINTMENT (OUTPATIENT)
Dept: PLASTIC SURGERY | Facility: CLINIC | Age: 69
End: 2024-01-01
Payer: MEDICARE

## 2024-01-01 ENCOUNTER — HOSPITAL ENCOUNTER (INPATIENT)
Facility: HOSPITAL | Age: 69
LOS: 4 days | DRG: 177 | End: 2024-03-02
Attending: EMERGENCY MEDICINE | Admitting: NURSE PRACTITIONER
Payer: COMMERCIAL

## 2024-01-01 VITALS
HEIGHT: 72 IN | DIASTOLIC BLOOD PRESSURE: 88 MMHG | TEMPERATURE: 97 F | WEIGHT: 179.9 LBS | HEART RATE: 40 BPM | SYSTOLIC BLOOD PRESSURE: 139 MMHG | RESPIRATION RATE: 25 BRPM | BODY MASS INDEX: 24.37 KG/M2 | OXYGEN SATURATION: 86 %

## 2024-01-01 DIAGNOSIS — R60.0 LOCALIZED EDEMA: ICD-10-CM

## 2024-01-01 DIAGNOSIS — J18.9 PNEUMONIA, UNSPECIFIED ORGANISM: Primary | ICD-10-CM

## 2024-01-01 DIAGNOSIS — E83.42 HYPOMAGNESEMIA: ICD-10-CM

## 2024-01-01 DIAGNOSIS — R60.9 SWELLING: ICD-10-CM

## 2024-01-01 DIAGNOSIS — E87.6 HYPOKALEMIA: ICD-10-CM

## 2024-01-01 DIAGNOSIS — M79.89 OTHER SPECIFIED SOFT TISSUE DISORDERS: ICD-10-CM

## 2024-01-01 DIAGNOSIS — I50.9 CONGESTIVE HEART FAILURE, UNSPECIFIED HF CHRONICITY, UNSPECIFIED HEART FAILURE TYPE (MULTI): ICD-10-CM

## 2024-01-01 DIAGNOSIS — R07.9 CHEST PAIN, UNSPECIFIED TYPE: ICD-10-CM

## 2024-01-01 DIAGNOSIS — S31.000D WOUND OF SACRAL REGION, SUBSEQUENT ENCOUNTER: ICD-10-CM

## 2024-01-01 DIAGNOSIS — M79.604 PAIN IN RIGHT LEG: ICD-10-CM

## 2024-01-01 LAB
ABO GROUP (TYPE) IN BLOOD: NORMAL
ALBUMIN SERPL BCP-MCNC: 2.3 G/DL (ref 3.4–5)
ALP SERPL-CCNC: 94 U/L (ref 33–136)
ALT SERPL W P-5'-P-CCNC: <3 U/L (ref 10–52)
ANION GAP BLDV CALCULATED.4IONS-SCNC: -1 MMOL/L (ref 10–25)
ANION GAP SERPL CALC-SCNC: 10 MMOL/L (ref 10–20)
ANION GAP SERPL CALC-SCNC: 14 MMOL/L (ref 10–20)
ANION GAP SERPL CALC-SCNC: 8 MMOL/L (ref 10–20)
ANION GAP SERPL CALC-SCNC: 9 MMOL/L (ref 10–20)
ANION GAP SERPL CALC-SCNC: <7 MMOL/L (ref 10–20)
ANION GAP SERPL CALC-SCNC: <7 MMOL/L (ref 10–20)
ANTIBODY SCREEN: NORMAL
AORTIC VALVE PEAK VELOCITY: 1.49 M/S
APPEARANCE UR: ABNORMAL
AST SERPL W P-5'-P-CCNC: 13 U/L (ref 9–39)
ATRIAL RATE: 226 BPM
ATRIAL RATE: 99 BPM
AV PEAK GRADIENT: 8.9 MMHG
AVA (PEAK VEL): 2.96 CM2
BACTERIA #/AREA URNS AUTO: ABNORMAL /HPF
BACTERIA BLD CULT: NORMAL
BASE EXCESS BLDV CALC-SCNC: 14.8 MMOL/L (ref -2–3)
BASOPHILS # BLD AUTO: 0.03 X10*3/UL (ref 0–0.1)
BASOPHILS NFR BLD AUTO: 0.2 %
BILIRUB SERPL-MCNC: 0.3 MG/DL (ref 0–1.2)
BILIRUB UR STRIP.AUTO-MCNC: NEGATIVE MG/DL
BNP SERPL-MCNC: 82 PG/ML (ref 0–99)
BODY TEMPERATURE: 37 DEGREES CELSIUS
BUN SERPL-MCNC: 10 MG/DL (ref 6–23)
BUN SERPL-MCNC: 16 MG/DL (ref 6–23)
BUN SERPL-MCNC: 22 MG/DL (ref 6–23)
BUN SERPL-MCNC: 22 MG/DL (ref 6–23)
BUN SERPL-MCNC: 24 MG/DL (ref 6–23)
BUN SERPL-MCNC: 7 MG/DL (ref 6–23)
CA-I BLDV-SCNC: 1.21 MMOL/L (ref 1.1–1.33)
CALCIUM SERPL-MCNC: 7.1 MG/DL (ref 8.6–10.3)
CALCIUM SERPL-MCNC: 7.7 MG/DL (ref 8.6–10.3)
CALCIUM SERPL-MCNC: 7.9 MG/DL (ref 8.6–10.3)
CALCIUM SERPL-MCNC: 8 MG/DL (ref 8.6–10.3)
CALCIUM SERPL-MCNC: 8 MG/DL (ref 8.6–10.3)
CALCIUM SERPL-MCNC: 8.2 MG/DL (ref 8.6–10.3)
CARDIAC TROPONIN I PNL SERPL HS: 4 NG/L (ref 0–20)
CARDIAC TROPONIN I PNL SERPL HS: 4 NG/L (ref 0–20)
CARDIAC TROPONIN I PNL SERPL HS: 5 NG/L (ref 0–20)
CHLORIDE BLDV-SCNC: 96 MMOL/L (ref 98–107)
CHLORIDE SERPL-SCNC: 86 MMOL/L (ref 98–107)
CHLORIDE SERPL-SCNC: 92 MMOL/L (ref 98–107)
CHLORIDE SERPL-SCNC: 94 MMOL/L (ref 98–107)
CHLORIDE SERPL-SCNC: 94 MMOL/L (ref 98–107)
CHLORIDE SERPL-SCNC: 95 MMOL/L (ref 98–107)
CHLORIDE SERPL-SCNC: 96 MMOL/L (ref 98–107)
CHOLEST SERPL-MCNC: 108 MG/DL (ref 0–199)
CHOLESTEROL/HDL RATIO: 2.4
CO2 SERPL-SCNC: 31 MMOL/L (ref 21–32)
CO2 SERPL-SCNC: 34 MMOL/L (ref 21–32)
CO2 SERPL-SCNC: 34 MMOL/L (ref 21–32)
CO2 SERPL-SCNC: 38 MMOL/L (ref 21–32)
COLOR UR: ABNORMAL
CREAT SERPL-MCNC: 0.25 MG/DL (ref 0.5–1.3)
CREAT SERPL-MCNC: 0.29 MG/DL (ref 0.5–1.3)
CREAT SERPL-MCNC: 0.33 MG/DL (ref 0.5–1.3)
CREAT SERPL-MCNC: 0.37 MG/DL (ref 0.5–1.3)
CREAT SERPL-MCNC: 0.44 MG/DL (ref 0.5–1.3)
CREAT SERPL-MCNC: 0.52 MG/DL (ref 0.5–1.3)
CRP SERPL-MCNC: 14.08 MG/DL
D DIMER PPP FEU-MCNC: 335 NG/ML FEU
EGFRCR SERPLBLD CKD-EPI 2021: >90 ML/MIN/1.73M*2
EJECTION FRACTION APICAL 4 CHAMBER: 56.6
EJECTION FRACTION: 47 %
EOSINOPHIL # BLD AUTO: 0.01 X10*3/UL (ref 0–0.7)
EOSINOPHIL NFR BLD AUTO: 0.1 %
ERYTHROCYTE [DISTWIDTH] IN BLOOD BY AUTOMATED COUNT: 15.2 % (ref 11.5–14.5)
ERYTHROCYTE [DISTWIDTH] IN BLOOD BY AUTOMATED COUNT: 15.6 % (ref 11.5–14.5)
ERYTHROCYTE [DISTWIDTH] IN BLOOD BY AUTOMATED COUNT: 15.7 % (ref 11.5–14.5)
ERYTHROCYTE [DISTWIDTH] IN BLOOD BY AUTOMATED COUNT: 15.8 % (ref 11.5–14.5)
ERYTHROCYTE [DISTWIDTH] IN BLOOD BY AUTOMATED COUNT: 15.8 % (ref 11.5–14.5)
ERYTHROCYTE [DISTWIDTH] IN BLOOD BY AUTOMATED COUNT: 15.9 % (ref 11.5–14.5)
ERYTHROCYTE [SEDIMENTATION RATE] IN BLOOD BY WESTERGREN METHOD: 50 MM/H (ref 0–20)
FLUAV RNA RESP QL NAA+PROBE: NOT DETECTED
FLUBV RNA RESP QL NAA+PROBE: NOT DETECTED
GLUCOSE BLD MANUAL STRIP-MCNC: 119 MG/DL (ref 74–99)
GLUCOSE BLD MANUAL STRIP-MCNC: 171 MG/DL (ref 74–99)
GLUCOSE BLDV-MCNC: 125 MG/DL (ref 74–99)
GLUCOSE SERPL-MCNC: 127 MG/DL (ref 74–99)
GLUCOSE SERPL-MCNC: 133 MG/DL (ref 74–99)
GLUCOSE SERPL-MCNC: 149 MG/DL (ref 74–99)
GLUCOSE SERPL-MCNC: 70 MG/DL (ref 74–99)
GLUCOSE SERPL-MCNC: 75 MG/DL (ref 74–99)
GLUCOSE SERPL-MCNC: 90 MG/DL (ref 74–99)
GLUCOSE UR STRIP.AUTO-MCNC: ABNORMAL MG/DL
HCO3 BLDV-SCNC: 40.5 MMOL/L (ref 22–26)
HCT VFR BLD AUTO: 27.1 % (ref 41–52)
HCT VFR BLD AUTO: 27.6 % (ref 41–52)
HCT VFR BLD AUTO: 27.8 % (ref 41–52)
HCT VFR BLD AUTO: 31 % (ref 41–52)
HCT VFR BLD AUTO: 32.4 % (ref 41–52)
HCT VFR BLD AUTO: 32.7 % (ref 41–52)
HCT VFR BLD EST: 27 % (ref 41–52)
HDLC SERPL-MCNC: 45.9 MG/DL
HGB BLD-MCNC: 8 G/DL (ref 13.5–17.5)
HGB BLD-MCNC: 8.3 G/DL (ref 13.5–17.5)
HGB BLD-MCNC: 8.6 G/DL (ref 13.5–17.5)
HGB BLD-MCNC: 9.5 G/DL (ref 13.5–17.5)
HGB BLD-MCNC: 9.9 G/DL (ref 13.5–17.5)
HGB BLD-MCNC: 9.9 G/DL (ref 13.5–17.5)
HGB BLDV-MCNC: 9 G/DL (ref 13.5–17.5)
HOLD SPECIMEN: NORMAL
HOLD SPECIMEN: NORMAL
IMM GRANULOCYTES # BLD AUTO: 0.47 X10*3/UL (ref 0–0.7)
IMM GRANULOCYTES NFR BLD AUTO: 2.6 % (ref 0–0.9)
INHALED O2 CONCENTRATION: 36 %
INR PPP: 1.4 (ref 0.9–1.1)
KETONES UR STRIP.AUTO-MCNC: ABNORMAL MG/DL
LACTATE BLDV-SCNC: 1.1 MMOL/L (ref 0.4–2)
LACTATE SERPL-SCNC: 0.5 MMOL/L (ref 0.4–2)
LACTATE SERPL-SCNC: 1 MMOL/L (ref 0.4–2)
LDLC SERPL CALC-MCNC: 51 MG/DL
LEFT VENTRICLE INTERNAL DIMENSION DIASTOLE: 6 CM (ref 3.5–6)
LEFT VENTRICULAR OUTFLOW TRACT DIAMETER: 2.4 CM
LEUKOCYTE ESTERASE UR QL STRIP.AUTO: ABNORMAL
LIPASE SERPL-CCNC: <3 U/L (ref 9–82)
LYMPHOCYTES # BLD AUTO: 0.94 X10*3/UL (ref 1.2–4.8)
LYMPHOCYTES NFR BLD AUTO: 5.2 %
MAGNESIUM SERPL-MCNC: 1.18 MG/DL (ref 1.6–2.4)
MAGNESIUM SERPL-MCNC: 1.72 MG/DL (ref 1.6–2.4)
MAGNESIUM SERPL-MCNC: 1.84 MG/DL (ref 1.6–2.4)
MCH RBC QN AUTO: 29.4 PG (ref 26–34)
MCH RBC QN AUTO: 29.5 PG (ref 26–34)
MCH RBC QN AUTO: 29.6 PG (ref 26–34)
MCH RBC QN AUTO: 29.9 PG (ref 26–34)
MCH RBC QN AUTO: 29.9 PG (ref 26–34)
MCH RBC QN AUTO: 30.8 PG (ref 26–34)
MCHC RBC AUTO-ENTMCNC: 29.5 G/DL (ref 32–36)
MCHC RBC AUTO-ENTMCNC: 30.1 G/DL (ref 32–36)
MCHC RBC AUTO-ENTMCNC: 30.3 G/DL (ref 32–36)
MCHC RBC AUTO-ENTMCNC: 30.6 G/DL (ref 32–36)
MCHC RBC AUTO-ENTMCNC: 30.6 G/DL (ref 32–36)
MCHC RBC AUTO-ENTMCNC: 30.9 G/DL (ref 32–36)
MCV RBC AUTO: 100 FL (ref 80–100)
MCV RBC AUTO: 100 FL (ref 80–100)
MCV RBC AUTO: 97 FL (ref 80–100)
MCV RBC AUTO: 98 FL (ref 80–100)
MCV RBC AUTO: 98 FL (ref 80–100)
MCV RBC AUTO: 99 FL (ref 80–100)
MITRAL VALVE E/A RATIO: 0.88
MONOCYTES # BLD AUTO: 0.77 X10*3/UL (ref 0.1–1)
MONOCYTES NFR BLD AUTO: 4.3 %
NEUTROPHILS # BLD AUTO: 15.82 X10*3/UL (ref 1.2–7.7)
NEUTROPHILS NFR BLD AUTO: 87.6 %
NITRITE UR QL STRIP.AUTO: NEGATIVE
NON HDL CHOLESTEROL: 62 MG/DL (ref 0–149)
NRBC BLD-RTO: 0 /100 WBCS (ref 0–0)
OXYHGB MFR BLDV: 89.7 % (ref 45–75)
P AXIS: 36 DEGREES
P AXIS: 42 DEGREES
P OFFSET: 197 MS
P ONSET: 149 MS
PCO2 BLDV: 57 MM HG (ref 41–51)
PH BLDV: 7.46 PH (ref 7.33–7.43)
PH UR STRIP.AUTO: 5 [PH]
PLATELET # BLD AUTO: 436 X10*3/UL (ref 150–450)
PLATELET # BLD AUTO: 480 X10*3/UL (ref 150–450)
PLATELET # BLD AUTO: 481 X10*3/UL (ref 150–450)
PLATELET # BLD AUTO: 487 X10*3/UL (ref 150–450)
PLATELET # BLD AUTO: 564 X10*3/UL (ref 150–450)
PLATELET # BLD AUTO: 586 X10*3/UL (ref 150–450)
PO2 BLDV: 62 MM HG (ref 35–45)
POTASSIUM BLDV-SCNC: 3.1 MMOL/L (ref 3.5–5.3)
POTASSIUM SERPL-SCNC: 3.1 MMOL/L (ref 3.5–5.3)
POTASSIUM SERPL-SCNC: 3.2 MMOL/L (ref 3.5–5.3)
POTASSIUM SERPL-SCNC: 3.6 MMOL/L (ref 3.5–5.3)
POTASSIUM SERPL-SCNC: 3.6 MMOL/L (ref 3.5–5.3)
POTASSIUM SERPL-SCNC: 3.7 MMOL/L (ref 3.5–5.3)
POTASSIUM SERPL-SCNC: 4 MMOL/L (ref 3.5–5.3)
PR INTERVAL: 138 MS
PR INTERVAL: 145 MS
PROCALCITONIN SERPL-MCNC: 0.29 NG/ML
PROT SERPL-MCNC: 6.1 G/DL (ref 6.4–8.2)
PROT UR STRIP.AUTO-MCNC: ABNORMAL MG/DL
PROTHROMBIN TIME: 15.9 SECONDS (ref 9.8–12.8)
Q ONSET: 218 MS
Q ONSET: 251 MS
QRS COUNT: 17 BEATS
QRS COUNT: 19 BEATS
QRS DURATION: 100 MS
QRS DURATION: 102 MS
QT INTERVAL: 325 MS
QT INTERVAL: 340 MS
QTC CALCULATION(BAZETT): 436 MS
QTC CALCULATION(BAZETT): 456 MS
QTC FREDERICIA: 401 MS
QTC FREDERICIA: 407 MS
R AXIS: -9 DEGREES
R AXIS: 56 DEGREES
RBC # BLD AUTO: 2.71 X10*6/UL (ref 4.5–5.9)
RBC # BLD AUTO: 2.79 X10*6/UL (ref 4.5–5.9)
RBC # BLD AUTO: 2.82 X10*6/UL (ref 4.5–5.9)
RBC # BLD AUTO: 3.18 X10*6/UL (ref 4.5–5.9)
RBC # BLD AUTO: 3.31 X10*6/UL (ref 4.5–5.9)
RBC # BLD AUTO: 3.35 X10*6/UL (ref 4.5–5.9)
RBC # UR STRIP.AUTO: ABNORMAL /UL
RBC #/AREA URNS AUTO: ABNORMAL /HPF
RH FACTOR (ANTIGEN D): NORMAL
RIGHT VENTRICLE PEAK SYSTOLIC PRESSURE: 33.7 MMHG
SAO2 % BLDV: 90 % (ref 45–75)
SARS-COV-2 RNA RESP QL NAA+PROBE: NOT DETECTED
SODIUM BLDV-SCNC: 132 MMOL/L (ref 136–145)
SODIUM SERPL-SCNC: 129 MMOL/L (ref 136–145)
SODIUM SERPL-SCNC: 133 MMOL/L (ref 136–145)
SODIUM SERPL-SCNC: 134 MMOL/L (ref 136–145)
SODIUM SERPL-SCNC: 135 MMOL/L (ref 136–145)
SP GR UR STRIP.AUTO: 1.02
T AXIS: 106 DEGREES
T AXIS: 46 DEGREES
T OFFSET: 388 MS
T OFFSET: 413 MS
TRICUSPID ANNULAR PLANE SYSTOLIC EXCURSION: 2 CM
TRIGL SERPL-MCNC: 58 MG/DL (ref 0–149)
UROBILINOGEN UR STRIP.AUTO-MCNC: <2 MG/DL
VANCOMYCIN SERPL-MCNC: 14.1 UG/ML (ref 5–20)
VENTRICULAR RATE: 118 BPM
VENTRICULAR RATE: 99 BPM
VLDL: 12 MG/DL (ref 0–40)
WBC # BLD AUTO: 11.7 X10*3/UL (ref 4.4–11.3)
WBC # BLD AUTO: 18 X10*3/UL (ref 4.4–11.3)
WBC # BLD AUTO: 6.3 X10*3/UL (ref 4.4–11.3)
WBC # BLD AUTO: 7.2 X10*3/UL (ref 4.4–11.3)
WBC # BLD AUTO: 7.4 X10*3/UL (ref 4.4–11.3)
WBC # BLD AUTO: 8.9 X10*3/UL (ref 4.4–11.3)
WBC #/AREA URNS AUTO: >50 /HPF
WBC CLUMPS #/AREA URNS AUTO: ABNORMAL /HPF

## 2024-01-01 PROCEDURE — 83735 ASSAY OF MAGNESIUM: CPT

## 2024-01-01 PROCEDURE — 2500000001 HC RX 250 WO HCPCS SELF ADMINISTERED DRUGS (ALT 637 FOR MEDICARE OP): Performed by: PODIATRIST

## 2024-01-01 PROCEDURE — 02HV33Z INSERTION OF INFUSION DEVICE INTO SUPERIOR VENA CAVA, PERCUTANEOUS APPROACH: ICD-10-PCS

## 2024-01-01 PROCEDURE — C9113 INJ PANTOPRAZOLE SODIUM, VIA: HCPCS | Performed by: NURSE PRACTITIONER

## 2024-01-01 PROCEDURE — 2500000002 HC RX 250 W HCPCS SELF ADMINISTERED DRUGS (ALT 637 FOR MEDICARE OP, ALT 636 FOR OP/ED): Performed by: EMERGENCY MEDICINE

## 2024-01-01 PROCEDURE — 36415 COLL VENOUS BLD VENIPUNCTURE: CPT | Performed by: INTERNAL MEDICINE

## 2024-01-01 PROCEDURE — 93005 ELECTROCARDIOGRAM TRACING: CPT

## 2024-01-01 PROCEDURE — 99221 1ST HOSP IP/OBS SF/LOW 40: CPT | Performed by: PODIATRIST

## 2024-01-01 PROCEDURE — 85027 COMPLETE CBC AUTOMATED: CPT | Performed by: INTERNAL MEDICINE

## 2024-01-01 PROCEDURE — 83605 ASSAY OF LACTIC ACID: CPT | Performed by: NURSE PRACTITIONER

## 2024-01-01 PROCEDURE — 5A09357 ASSISTANCE WITH RESPIRATORY VENTILATION, LESS THAN 24 CONSECUTIVE HOURS, CONTINUOUS POSITIVE AIRWAY PRESSURE: ICD-10-PCS

## 2024-01-01 PROCEDURE — 85610 PROTHROMBIN TIME: CPT | Performed by: NURSE PRACTITIONER

## 2024-01-01 PROCEDURE — 80048 BASIC METABOLIC PNL TOTAL CA: CPT | Performed by: INTERNAL MEDICINE

## 2024-01-01 PROCEDURE — 99222 1ST HOSP IP/OBS MODERATE 55: CPT

## 2024-01-01 PROCEDURE — 2060000001 HC INTERMEDIATE ICU ROOM DAILY

## 2024-01-01 PROCEDURE — 87636 SARSCOV2 & INF A&B AMP PRB: CPT

## 2024-01-01 PROCEDURE — 84484 ASSAY OF TROPONIN QUANT: CPT

## 2024-01-01 PROCEDURE — 80202 ASSAY OF VANCOMYCIN: CPT

## 2024-01-01 PROCEDURE — 84145 PROCALCITONIN (PCT): CPT | Mod: PARLAB | Performed by: INTERNAL MEDICINE

## 2024-01-01 PROCEDURE — 2500000004 HC RX 250 GENERAL PHARMACY W/ HCPCS (ALT 636 FOR OP/ED): Performed by: INTERNAL MEDICINE

## 2024-01-01 PROCEDURE — 93971 EXTREMITY STUDY: CPT | Performed by: INTERNAL MEDICINE

## 2024-01-01 PROCEDURE — 84484 ASSAY OF TROPONIN QUANT: CPT | Performed by: NURSE PRACTITIONER

## 2024-01-01 PROCEDURE — 83735 ASSAY OF MAGNESIUM: CPT | Performed by: INTERNAL MEDICINE

## 2024-01-01 PROCEDURE — 2500000002 HC RX 250 W HCPCS SELF ADMINISTERED DRUGS (ALT 637 FOR MEDICARE OP, ALT 636 FOR OP/ED): Performed by: PHYSICIAN ASSISTANT

## 2024-01-01 PROCEDURE — 93971 EXTREMITY STUDY: CPT

## 2024-01-01 PROCEDURE — 99285 EMERGENCY DEPT VISIT HI MDM: CPT | Mod: 25

## 2024-01-01 PROCEDURE — 82947 ASSAY GLUCOSE BLOOD QUANT: CPT

## 2024-01-01 PROCEDURE — 2500000004 HC RX 250 GENERAL PHARMACY W/ HCPCS (ALT 636 FOR OP/ED): Mod: JZ

## 2024-01-01 PROCEDURE — 80053 COMPREHEN METABOLIC PANEL: CPT

## 2024-01-01 PROCEDURE — 93971 EXTREMITY STUDY: CPT | Performed by: SURGERY

## 2024-01-01 PROCEDURE — 85379 FIBRIN DEGRADATION QUANT: CPT | Performed by: INTERNAL MEDICINE

## 2024-01-01 PROCEDURE — 71045 X-RAY EXAM CHEST 1 VIEW: CPT | Performed by: RADIOLOGY

## 2024-01-01 PROCEDURE — 71045 X-RAY EXAM CHEST 1 VIEW: CPT | Performed by: STUDENT IN AN ORGANIZED HEALTH CARE EDUCATION/TRAINING PROGRAM

## 2024-01-01 PROCEDURE — 2500000004 HC RX 250 GENERAL PHARMACY W/ HCPCS (ALT 636 FOR OP/ED): Performed by: NURSE PRACTITIONER

## 2024-01-01 PROCEDURE — 96374 THER/PROPH/DIAG INJ IV PUSH: CPT | Mod: 59

## 2024-01-01 PROCEDURE — 2500000001 HC RX 250 WO HCPCS SELF ADMINISTERED DRUGS (ALT 637 FOR MEDICARE OP): Performed by: INTERNAL MEDICINE

## 2024-01-01 PROCEDURE — 93306 TTE W/DOPPLER COMPLETE: CPT

## 2024-01-01 PROCEDURE — 36415 COLL VENOUS BLD VENIPUNCTURE: CPT | Performed by: PHYSICIAN ASSISTANT

## 2024-01-01 PROCEDURE — 96360 HYDRATION IV INFUSION INIT: CPT

## 2024-01-01 PROCEDURE — 84132 ASSAY OF SERUM POTASSIUM: CPT | Performed by: NURSE PRACTITIONER

## 2024-01-01 PROCEDURE — 94640 AIRWAY INHALATION TREATMENT: CPT

## 2024-01-01 PROCEDURE — 84132 ASSAY OF SERUM POTASSIUM: CPT | Performed by: EMERGENCY MEDICINE

## 2024-01-01 PROCEDURE — 70450 CT HEAD/BRAIN W/O DYE: CPT

## 2024-01-01 PROCEDURE — 2500000005 HC RX 250 GENERAL PHARMACY W/O HCPCS: Performed by: NURSE PRACTITIONER

## 2024-01-01 PROCEDURE — 83605 ASSAY OF LACTIC ACID: CPT | Performed by: PHYSICIAN ASSISTANT

## 2024-01-01 PROCEDURE — 83880 ASSAY OF NATRIURETIC PEPTIDE: CPT | Performed by: NURSE PRACTITIONER

## 2024-01-01 PROCEDURE — 99232 SBSQ HOSP IP/OBS MODERATE 35: CPT

## 2024-01-01 PROCEDURE — 99222 1ST HOSP IP/OBS MODERATE 55: CPT | Performed by: INTERNAL MEDICINE

## 2024-01-01 PROCEDURE — 80061 LIPID PANEL: CPT | Performed by: NURSE PRACTITIONER

## 2024-01-01 PROCEDURE — 83690 ASSAY OF LIPASE: CPT | Performed by: INTERNAL MEDICINE

## 2024-01-01 PROCEDURE — 86901 BLOOD TYPING SEROLOGIC RH(D): CPT | Performed by: NURSE PRACTITIONER

## 2024-01-01 PROCEDURE — 2500000004 HC RX 250 GENERAL PHARMACY W/ HCPCS (ALT 636 FOR OP/ED)

## 2024-01-01 PROCEDURE — 96372 THER/PROPH/DIAG INJ SC/IM: CPT | Performed by: NURSE PRACTITIONER

## 2024-01-01 PROCEDURE — 2500000005 HC RX 250 GENERAL PHARMACY W/O HCPCS: Performed by: INTERNAL MEDICINE

## 2024-01-01 PROCEDURE — 2500000001 HC RX 250 WO HCPCS SELF ADMINISTERED DRUGS (ALT 637 FOR MEDICARE OP)

## 2024-01-01 PROCEDURE — 71045 X-RAY EXAM CHEST 1 VIEW: CPT

## 2024-01-01 PROCEDURE — 99232 SBSQ HOSP IP/OBS MODERATE 35: CPT | Performed by: INTERNAL MEDICINE

## 2024-01-01 PROCEDURE — 70450 CT HEAD/BRAIN W/O DYE: CPT | Performed by: SURGERY

## 2024-01-01 PROCEDURE — 85025 COMPLETE CBC W/AUTO DIFF WBC: CPT

## 2024-01-01 PROCEDURE — 87186 SC STD MICRODIL/AGAR DIL: CPT | Mod: PARLAB | Performed by: NURSE PRACTITIONER

## 2024-01-01 PROCEDURE — 36415 COLL VENOUS BLD VENIPUNCTURE: CPT | Performed by: EMERGENCY MEDICINE

## 2024-01-01 PROCEDURE — 99223 1ST HOSP IP/OBS HIGH 75: CPT | Performed by: NURSE PRACTITIONER

## 2024-01-01 PROCEDURE — 83735 ASSAY OF MAGNESIUM: CPT | Performed by: NURSE PRACTITIONER

## 2024-01-01 PROCEDURE — 36415 COLL VENOUS BLD VENIPUNCTURE: CPT

## 2024-01-01 PROCEDURE — 99238 HOSP IP/OBS DSCHRG MGMT 30/<: CPT | Performed by: INTERNAL MEDICINE

## 2024-01-01 PROCEDURE — 86900 BLOOD TYPING SEROLOGIC ABO: CPT | Performed by: NURSE PRACTITIONER

## 2024-01-01 PROCEDURE — 86140 C-REACTIVE PROTEIN: CPT | Performed by: NURSE PRACTITIONER

## 2024-01-01 PROCEDURE — 94660 CPAP INITIATION&MGMT: CPT

## 2024-01-01 PROCEDURE — 2500000004 HC RX 250 GENERAL PHARMACY W/ HCPCS (ALT 636 FOR OP/ED): Performed by: EMERGENCY MEDICINE

## 2024-01-01 PROCEDURE — 5A0935A ASSISTANCE WITH RESPIRATORY VENTILATION, LESS THAN 24 CONSECUTIVE HOURS, HIGH NASAL FLOW/VELOCITY: ICD-10-PCS

## 2024-01-01 PROCEDURE — 85652 RBC SED RATE AUTOMATED: CPT | Performed by: NURSE PRACTITIONER

## 2024-01-01 PROCEDURE — 81001 URINALYSIS AUTO W/SCOPE: CPT | Performed by: NURSE PRACTITIONER

## 2024-01-01 PROCEDURE — 96375 TX/PRO/DX INJ NEW DRUG ADDON: CPT

## 2024-01-01 PROCEDURE — 83718 ASSAY OF LIPOPROTEIN: CPT | Performed by: NURSE PRACTITIONER

## 2024-01-01 PROCEDURE — 94640 AIRWAY INHALATION TREATMENT: CPT | Performed by: NURSE PRACTITIONER

## 2024-01-01 PROCEDURE — 87040 BLOOD CULTURE FOR BACTERIA: CPT | Mod: PARLAB

## 2024-01-01 PROCEDURE — 87040 BLOOD CULTURE FOR BACTERIA: CPT | Mod: PARLAB | Performed by: NURSE PRACTITIONER

## 2024-01-01 PROCEDURE — 87070 CULTURE OTHR SPECIMN AEROBIC: CPT | Mod: PARLAB | Performed by: NURSE PRACTITIONER

## 2024-01-01 PROCEDURE — 85027 COMPLETE CBC AUTOMATED: CPT | Performed by: NURSE PRACTITIONER

## 2024-01-01 RX ORDER — LIDOCAINE 560 MG/1
1 PATCH PERCUTANEOUS; TOPICAL; TRANSDERMAL EVERY MORNING
COMMUNITY

## 2024-01-01 RX ORDER — BUSPIRONE HYDROCHLORIDE 10 MG/1
10 TABLET ORAL 2 TIMES DAILY
Status: DISCONTINUED | OUTPATIENT
Start: 2024-01-01 | End: 2024-01-01 | Stop reason: HOSPADM

## 2024-01-01 RX ORDER — POTASSIUM CHLORIDE 1.5 G/1.58G
40 POWDER, FOR SOLUTION ORAL ONCE
Status: DISCONTINUED | OUTPATIENT
Start: 2024-01-01 | End: 2024-01-01 | Stop reason: HOSPADM

## 2024-01-01 RX ORDER — POTASSIUM CHLORIDE 1.5 G/1.58G
POWDER, FOR SOLUTION ORAL
Status: COMPLETED
Start: 2024-01-01 | End: 2024-01-01

## 2024-01-01 RX ORDER — MAGNESIUM SULFATE HEPTAHYDRATE 40 MG/ML
4 INJECTION, SOLUTION INTRAVENOUS ONCE
Status: COMPLETED | OUTPATIENT
Start: 2024-01-01 | End: 2024-01-01

## 2024-01-01 RX ORDER — IPRATROPIUM BROMIDE AND ALBUTEROL SULFATE 2.5; .5 MG/3ML; MG/3ML
3 SOLUTION RESPIRATORY (INHALATION)
Status: DISCONTINUED | OUTPATIENT
Start: 2024-01-01 | End: 2024-01-01

## 2024-01-01 RX ORDER — POTASSIUM CHLORIDE 1.5 G/1.58G
20 POWDER, FOR SOLUTION ORAL ONCE
Status: COMPLETED | OUTPATIENT
Start: 2024-01-01 | End: 2024-01-01

## 2024-01-01 RX ORDER — BALSAM PERU/CASTOR OIL
OINTMENT (GRAM) TOPICAL 2 TIMES DAILY
Status: DISCONTINUED | OUTPATIENT
Start: 2024-01-01 | End: 2024-01-01 | Stop reason: HOSPADM

## 2024-01-01 RX ORDER — SODIUM,POTASSIUM PHOSPHATES 280-250MG
2 POWDER IN PACKET (EA) ORAL ONCE
Status: DISCONTINUED | OUTPATIENT
Start: 2024-01-01 | End: 2024-01-01

## 2024-01-01 RX ORDER — SODIUM,POTASSIUM PHOSPHATES 280-250MG
1 POWDER IN PACKET (EA) ORAL ONCE
Status: DISCONTINUED | OUTPATIENT
Start: 2024-01-01 | End: 2024-01-01

## 2024-01-01 RX ORDER — L. ACIDOPHILUS/L.BULGARICUS 1MM CELL
1 TABLET ORAL 2 TIMES DAILY
COMMUNITY
Start: 2024-01-01 | End: 2024-01-01

## 2024-01-01 RX ORDER — FENTANYL 75 UG/H
1 PATCH TRANSDERMAL
COMMUNITY
Start: 2024-01-01

## 2024-01-01 RX ORDER — FERROUS SULFATE 325(65) MG
325 TABLET ORAL
COMMUNITY

## 2024-01-01 RX ORDER — POLYETHYLENE GLYCOL 3350 17 G/17G
17 POWDER, FOR SOLUTION ORAL DAILY
Status: DISCONTINUED | OUTPATIENT
Start: 2024-01-01 | End: 2024-01-01 | Stop reason: HOSPADM

## 2024-01-01 RX ORDER — HYDROMORPHONE HYDROCHLORIDE 2 MG/1
1 TABLET ORAL EVERY 4 HOURS PRN
Status: DISCONTINUED | OUTPATIENT
Start: 2024-01-01 | End: 2024-01-01

## 2024-01-01 RX ORDER — CEFTRIAXONE 1 G/50ML
1 INJECTION, SOLUTION INTRAVENOUS EVERY 24 HOURS
Status: DISCONTINUED | OUTPATIENT
Start: 2024-01-01 | End: 2024-01-01

## 2024-01-01 RX ORDER — FAMOTIDINE 20 MG/1
20 TABLET, FILM COATED ORAL NIGHTLY
COMMUNITY

## 2024-01-01 RX ORDER — TRAMADOL HYDROCHLORIDE 50 MG/1
50 TABLET ORAL EVERY 8 HOURS PRN
Status: DISCONTINUED | OUTPATIENT
Start: 2024-01-01 | End: 2024-01-01 | Stop reason: HOSPADM

## 2024-01-01 RX ORDER — HYDROMORPHONE HYDROCHLORIDE 2 MG/1
2 TABLET ORAL EVERY 4 HOURS PRN
Status: DISCONTINUED | OUTPATIENT
Start: 2024-01-01 | End: 2024-01-01 | Stop reason: HOSPADM

## 2024-01-01 RX ORDER — LEVETIRACETAM 500 MG/1
500 TABLET ORAL 2 TIMES DAILY
COMMUNITY
Start: 2022-05-31

## 2024-01-01 RX ORDER — ONDANSETRON 4 MG/1
4 TABLET, ORALLY DISINTEGRATING ORAL EVERY 6 HOURS PRN
COMMUNITY

## 2024-01-01 RX ORDER — ONDANSETRON HYDROCHLORIDE 2 MG/ML
4 INJECTION, SOLUTION INTRAVENOUS EVERY 4 HOURS PRN
Status: DISCONTINUED | OUTPATIENT
Start: 2024-01-01 | End: 2024-01-01 | Stop reason: HOSPADM

## 2024-01-01 RX ORDER — IPRATROPIUM BROMIDE AND ALBUTEROL 20; 100 UG/1; UG/1
1 SPRAY, METERED RESPIRATORY (INHALATION)
COMMUNITY

## 2024-01-01 RX ORDER — SODIUM CHLORIDE 9 MG/ML
50 INJECTION, SOLUTION INTRAVENOUS CONTINUOUS
Status: ACTIVE | OUTPATIENT
Start: 2024-01-01 | End: 2024-01-01

## 2024-01-01 RX ORDER — POTASSIUM CHLORIDE 20 MEQ/1
20 TABLET, EXTENDED RELEASE ORAL ONCE
Status: DISCONTINUED | OUTPATIENT
Start: 2024-01-01 | End: 2024-01-01 | Stop reason: HOSPADM

## 2024-01-01 RX ORDER — BISACODYL 10 MG/1
10 SUPPOSITORY RECTAL DAILY PRN
COMMUNITY

## 2024-01-01 RX ORDER — ALBUTEROL SULFATE 0.83 MG/ML
2.5 SOLUTION RESPIRATORY (INHALATION) EVERY 2 HOUR PRN
Status: DISCONTINUED | OUTPATIENT
Start: 2024-01-01 | End: 2024-01-01 | Stop reason: HOSPADM

## 2024-01-01 RX ORDER — MAGNESIUM SULFATE HEPTAHYDRATE 40 MG/ML
2 INJECTION, SOLUTION INTRAVENOUS ONCE
Status: COMPLETED | OUTPATIENT
Start: 2024-01-01 | End: 2024-01-01

## 2024-01-01 RX ORDER — CEFAZOLIN 2 G/1
2000 INJECTION, POWDER, FOR SOLUTION INTRAVENOUS 3 TIMES DAILY
COMMUNITY
Start: 2024-01-01 | End: 2024-01-01

## 2024-01-01 RX ORDER — ALUMINUM HYDROXIDE, MAGNESIUM HYDROXIDE, AND SIMETHICONE 1200; 120; 1200 MG/30ML; MG/30ML; MG/30ML
10 SUSPENSION ORAL EVERY 4 HOURS PRN
COMMUNITY
Start: 2022-05-31

## 2024-01-01 RX ORDER — DULOXETIN HYDROCHLORIDE 20 MG/1
20 CAPSULE, DELAYED RELEASE ORAL DAILY
Status: DISCONTINUED | OUTPATIENT
Start: 2024-01-01 | End: 2024-01-01 | Stop reason: HOSPADM

## 2024-01-01 RX ORDER — HYDROMORPHONE HYDROCHLORIDE 4 MG/1
4 TABLET ORAL EVERY 6 HOURS PRN
COMMUNITY
Start: 2024-01-01

## 2024-01-01 RX ORDER — BUSPIRONE HYDROCHLORIDE 10 MG/1
10 TABLET ORAL 2 TIMES DAILY
COMMUNITY

## 2024-01-01 RX ORDER — ACETAMINOPHEN 325 MG/1
650 TABLET ORAL EVERY 4 HOURS PRN
Status: DISCONTINUED | OUTPATIENT
Start: 2024-01-01 | End: 2024-01-01 | Stop reason: HOSPADM

## 2024-01-01 RX ORDER — NYSTATIN 100000 [USP'U]/ML
5 SUSPENSION ORAL 4 TIMES DAILY
Status: DISCONTINUED | OUTPATIENT
Start: 2024-01-01 | End: 2024-01-01 | Stop reason: HOSPADM

## 2024-01-01 RX ORDER — PANTOPRAZOLE SODIUM 40 MG/10ML
40 INJECTION, POWDER, LYOPHILIZED, FOR SOLUTION INTRAVENOUS DAILY
Status: DISCONTINUED | OUTPATIENT
Start: 2024-01-01 | End: 2024-01-01 | Stop reason: HOSPADM

## 2024-01-01 RX ORDER — HYDROXYZINE HYDROCHLORIDE 25 MG/1
25 TABLET, FILM COATED ORAL EVERY 6 HOURS PRN
COMMUNITY

## 2024-01-01 RX ORDER — TAMSULOSIN HYDROCHLORIDE 0.4 MG/1
0.8 CAPSULE ORAL NIGHTLY
COMMUNITY
Start: 2017-12-19

## 2024-01-01 RX ORDER — ALBUTEROL SULFATE 0.83 MG/ML
2.5 SOLUTION RESPIRATORY (INHALATION) EVERY 4 HOURS PRN
Status: DISCONTINUED | OUTPATIENT
Start: 2024-01-01 | End: 2024-01-01

## 2024-01-01 RX ORDER — ADHESIVE BANDAGE
30 BANDAGE TOPICAL DAILY PRN
COMMUNITY

## 2024-01-01 RX ORDER — IPRATROPIUM BROMIDE AND ALBUTEROL SULFATE 2.5; .5 MG/3ML; MG/3ML
3 SOLUTION RESPIRATORY (INHALATION) ONCE
Status: COMPLETED | OUTPATIENT
Start: 2024-01-01 | End: 2024-01-01

## 2024-01-01 RX ORDER — AMLODIPINE BESYLATE 10 MG/1
10 TABLET ORAL DAILY
COMMUNITY
Start: 2021-11-20

## 2024-01-01 RX ORDER — VANCOMYCIN HYDROCHLORIDE 1 G/20ML
INJECTION, POWDER, LYOPHILIZED, FOR SOLUTION INTRAVENOUS DAILY PRN
Status: DISCONTINUED | OUTPATIENT
Start: 2024-01-01 | End: 2024-01-01

## 2024-01-01 RX ORDER — ALLOPURINOL 300 MG/1
300 TABLET ORAL DAILY
COMMUNITY
Start: 2021-11-20

## 2024-01-01 RX ORDER — METOPROLOL TARTRATE 50 MG/1
50 TABLET ORAL 2 TIMES DAILY
COMMUNITY
Start: 2012-08-07

## 2024-01-01 RX ORDER — HEPARIN SODIUM 5000 [USP'U]/ML
5000 INJECTION, SOLUTION INTRAVENOUS; SUBCUTANEOUS EVERY 8 HOURS
Status: DISCONTINUED | OUTPATIENT
Start: 2024-01-01 | End: 2024-01-01 | Stop reason: HOSPADM

## 2024-01-01 RX ORDER — CIPROFLOXACIN 500 MG/1
500 TABLET ORAL 2 TIMES DAILY
COMMUNITY
Start: 2024-01-01 | End: 2024-01-01

## 2024-01-01 RX ORDER — POLYETHYLENE GLYCOL 3350 17 G/17G
17 POWDER, FOR SOLUTION ORAL DAILY
COMMUNITY

## 2024-01-01 RX ORDER — BISMUTH SUBSALICYLATE 262 MG
1 TABLET,CHEWABLE ORAL DAILY
COMMUNITY
Start: 2022-05-31

## 2024-01-01 RX ORDER — ACETAMINOPHEN 325 MG/1
325 TABLET ORAL EVERY 6 HOURS PRN
COMMUNITY
Start: 2022-05-31

## 2024-01-01 RX ORDER — DAPTOMYCIN 50 MG/ML
750 INJECTION, POWDER, LYOPHILIZED, FOR SOLUTION INTRAVENOUS DAILY
COMMUNITY
Start: 2024-01-01 | End: 2024-01-01

## 2024-01-01 RX ORDER — CALCIUM CARBONATE 200(500)MG
2 TABLET,CHEWABLE ORAL 4 TIMES DAILY PRN
COMMUNITY

## 2024-01-01 RX ADMIN — AZITHROMYCIN 500 MG: 500 INJECTION, POWDER, LYOPHILIZED, FOR SOLUTION INTRAVENOUS at 06:40

## 2024-01-01 RX ADMIN — Medication 4 L/MIN: at 07:08

## 2024-01-01 RX ADMIN — IPRATROPIUM BROMIDE AND ALBUTEROL SULFATE 3 ML: 2.5; .5 SOLUTION RESPIRATORY (INHALATION) at 03:30

## 2024-01-01 RX ADMIN — POLYMYXIN B SULFATE 1250000 UNITS: 500000 INJECTION, POWDER, LYOPHILIZED, FOR SOLUTION INTRAMUSCULAR; INTRATHECAL; INTRAVENOUS; OPHTHALMIC at 03:56

## 2024-01-01 RX ADMIN — POTASSIUM CHLORIDE 20 MEQ: 1.5 POWDER, FOR SOLUTION ORAL at 09:30

## 2024-01-01 RX ADMIN — AMPICILLIN SODIUM AND SULBACTAM SODIUM 3 G: 2; 1 INJECTION, POWDER, FOR SOLUTION INTRAMUSCULAR; INTRAVENOUS at 05:46

## 2024-01-01 RX ADMIN — HEPARIN SODIUM 5000 UNITS: 5000 INJECTION INTRAVENOUS; SUBCUTANEOUS at 13:08

## 2024-01-01 RX ADMIN — AMPICILLIN SODIUM AND SULBACTAM SODIUM 3 G: 2; 1 INJECTION, POWDER, FOR SOLUTION INTRAMUSCULAR; INTRAVENOUS at 17:08

## 2024-01-01 RX ADMIN — HEPARIN SODIUM 5000 UNITS: 5000 INJECTION INTRAVENOUS; SUBCUTANEOUS at 20:50

## 2024-01-01 RX ADMIN — HYDROMORPHONE HYDROCHLORIDE 2 MG: 2 TABLET ORAL at 01:27

## 2024-01-01 RX ADMIN — IPRATROPIUM BROMIDE AND ALBUTEROL SULFATE 3 ML: 2.5; .5 SOLUTION RESPIRATORY (INHALATION) at 11:17

## 2024-01-01 RX ADMIN — HEPARIN SODIUM 5000 UNITS: 5000 INJECTION INTRAVENOUS; SUBCUTANEOUS at 14:27

## 2024-01-01 RX ADMIN — MAGNESIUM SULFATE HEPTAHYDRATE 2 G: 40 INJECTION, SOLUTION INTRAVENOUS at 09:33

## 2024-01-01 RX ADMIN — HYDROMORPHONE HYDROCHLORIDE 1 MG: 2 TABLET ORAL at 23:35

## 2024-01-01 RX ADMIN — HEPARIN SODIUM 5000 UNITS: 5000 INJECTION INTRAVENOUS; SUBCUTANEOUS at 20:38

## 2024-01-01 RX ADMIN — IPRATROPIUM BROMIDE AND ALBUTEROL SULFATE 3 ML: 2.5; .5 SOLUTION RESPIRATORY (INHALATION) at 11:45

## 2024-01-01 RX ADMIN — VANCOMYCIN HYDROCHLORIDE 1.25 G: 1.25 INJECTION, POWDER, LYOPHILIZED, FOR SOLUTION INTRAVENOUS at 14:41

## 2024-01-01 RX ADMIN — HEPARIN SODIUM 5000 UNITS: 5000 INJECTION INTRAVENOUS; SUBCUTANEOUS at 20:28

## 2024-01-01 RX ADMIN — CEFTRIAXONE SODIUM 1 G: 1 INJECTION, SOLUTION INTRAVENOUS at 05:26

## 2024-01-01 RX ADMIN — CEFTRIAXONE SODIUM 1 G: 1 INJECTION, SOLUTION INTRAVENOUS at 04:01

## 2024-01-01 RX ADMIN — TRAMADOL HYDROCHLORIDE 50 MG: 50 TABLET, COATED ORAL at 20:39

## 2024-01-01 RX ADMIN — PANTOPRAZOLE SODIUM 40 MG: 40 INJECTION, POWDER, FOR SOLUTION INTRAVENOUS at 09:26

## 2024-01-01 RX ADMIN — ACETAMINOPHEN 650 MG: 325 TABLET ORAL at 18:02

## 2024-01-01 RX ADMIN — NYSTATIN 500000 UNITS: 100000 SUSPENSION ORAL at 17:09

## 2024-01-01 RX ADMIN — AMPICILLIN SODIUM AND SULBACTAM SODIUM 3 G: 2; 1 INJECTION, POWDER, FOR SOLUTION INTRAMUSCULAR; INTRAVENOUS at 09:27

## 2024-01-01 RX ADMIN — HYDROMORPHONE HYDROCHLORIDE 2 MG: 2 TABLET ORAL at 05:45

## 2024-01-01 RX ADMIN — AMPICILLIN SODIUM AND SULBACTAM SODIUM 3 G: 2; 1 INJECTION, POWDER, FOR SOLUTION INTRAMUSCULAR; INTRAVENOUS at 00:52

## 2024-01-01 RX ADMIN — POLYETHYLENE GLYCOL 3350 17 G: 17 POWDER, FOR SOLUTION ORAL at 09:35

## 2024-01-01 RX ADMIN — SODIUM CHLORIDE, POTASSIUM CHLORIDE, SODIUM LACTATE AND CALCIUM CHLORIDE 1000 ML: 600; 310; 30; 20 INJECTION, SOLUTION INTRAVENOUS at 03:42

## 2024-01-01 RX ADMIN — HEPARIN SODIUM 5000 UNITS: 5000 INJECTION INTRAVENOUS; SUBCUTANEOUS at 05:46

## 2024-01-01 RX ADMIN — POLYMYXIN B SULFATE 1250000 UNITS: 500000 INJECTION, POWDER, LYOPHILIZED, FOR SOLUTION INTRAMUSCULAR; INTRATHECAL; INTRAVENOUS; OPHTHALMIC at 16:07

## 2024-01-01 RX ADMIN — CASTOR OIL AND BALSAM, PERU: 788; 87 OINTMENT TOPICAL at 09:26

## 2024-01-01 RX ADMIN — Medication: at 11:04

## 2024-01-01 RX ADMIN — IPRATROPIUM BROMIDE AND ALBUTEROL SULFATE 3 ML: 2.5; .5 SOLUTION RESPIRATORY (INHALATION) at 18:35

## 2024-01-01 RX ADMIN — PANTOPRAZOLE SODIUM 40 MG: 40 INJECTION, POWDER, FOR SOLUTION INTRAVENOUS at 09:44

## 2024-01-01 RX ADMIN — IPRATROPIUM BROMIDE AND ALBUTEROL SULFATE 3 ML: 2.5; .5 SOLUTION RESPIRATORY (INHALATION) at 19:14

## 2024-01-01 RX ADMIN — ACETAMINOPHEN 650 MG: 325 TABLET ORAL at 09:43

## 2024-01-01 RX ADMIN — HYDROMORPHONE HYDROCHLORIDE 2 MG: 2 TABLET ORAL at 10:24

## 2024-01-01 RX ADMIN — ALBUTEROL SULFATE 2.5 MG: 2.5 SOLUTION RESPIRATORY (INHALATION) at 17:19

## 2024-01-01 RX ADMIN — Medication: at 21:00

## 2024-01-01 RX ADMIN — IPRATROPIUM BROMIDE AND ALBUTEROL SULFATE 3 ML: 2.5; .5 SOLUTION RESPIRATORY (INHALATION) at 07:53

## 2024-01-01 RX ADMIN — DULOXETINE HYDROCHLORIDE 20 MG: 20 CAPSULE, DELAYED RELEASE ORAL at 09:38

## 2024-01-01 RX ADMIN — HYDROMORPHONE HYDROCHLORIDE 0.5 MG: 1 INJECTION, SOLUTION INTRAMUSCULAR; INTRAVENOUS; SUBCUTANEOUS at 05:25

## 2024-01-01 RX ADMIN — BUSPIRONE HYDROCHLORIDE 10 MG: 10 TABLET ORAL at 20:50

## 2024-01-01 RX ADMIN — ALBUTEROL SULFATE 2.5 MG: 2.5 SOLUTION RESPIRATORY (INHALATION) at 07:03

## 2024-01-01 RX ADMIN — HEPARIN SODIUM 5000 UNITS: 5000 INJECTION INTRAVENOUS; SUBCUTANEOUS at 14:39

## 2024-01-01 RX ADMIN — HEPARIN SODIUM 5000 UNITS: 5000 INJECTION INTRAVENOUS; SUBCUTANEOUS at 13:52

## 2024-01-01 RX ADMIN — PIPERACILLIN SODIUM AND TAZOBACTAM SODIUM 3.38 G: 3; .375 INJECTION, SOLUTION INTRAVENOUS at 07:57

## 2024-01-01 RX ADMIN — AZITHROMYCIN 500 MG: 500 INJECTION, POWDER, LYOPHILIZED, FOR SOLUTION INTRAVENOUS at 04:01

## 2024-01-01 RX ADMIN — HYDROMORPHONE HYDROCHLORIDE 2 MG: 2 TABLET ORAL at 09:41

## 2024-01-01 RX ADMIN — DULOXETINE HYDROCHLORIDE 20 MG: 20 CAPSULE, DELAYED RELEASE ORAL at 09:31

## 2024-01-01 RX ADMIN — PIPERACILLIN SODIUM AND TAZOBACTAM SODIUM 3.38 G: 3; .375 INJECTION, SOLUTION INTRAVENOUS at 13:52

## 2024-01-01 RX ADMIN — BUSPIRONE HYDROCHLORIDE 10 MG: 10 TABLET ORAL at 20:39

## 2024-01-01 RX ADMIN — IPRATROPIUM BROMIDE AND ALBUTEROL SULFATE 3 ML: 2.5; .5 SOLUTION RESPIRATORY (INHALATION) at 07:08

## 2024-01-01 RX ADMIN — CASTOR OIL AND BALSAM, PERU: 788; 87 OINTMENT TOPICAL at 20:57

## 2024-01-01 RX ADMIN — DULOXETINE HYDROCHLORIDE 20 MG: 20 CAPSULE, DELAYED RELEASE ORAL at 14:34

## 2024-01-01 RX ADMIN — IPRATROPIUM BROMIDE AND ALBUTEROL SULFATE 3 ML: 2.5; .5 SOLUTION RESPIRATORY (INHALATION) at 11:35

## 2024-01-01 RX ADMIN — NYSTATIN 500000 UNITS: 100000 SUSPENSION ORAL at 06:01

## 2024-01-01 RX ADMIN — BUSPIRONE HYDROCHLORIDE 10 MG: 10 TABLET ORAL at 09:41

## 2024-01-01 RX ADMIN — IPRATROPIUM BROMIDE AND ALBUTEROL SULFATE 3 ML: 2.5; .5 SOLUTION RESPIRATORY (INHALATION) at 06:41

## 2024-01-01 RX ADMIN — PANTOPRAZOLE SODIUM 40 MG: 40 INJECTION, POWDER, FOR SOLUTION INTRAVENOUS at 09:33

## 2024-01-01 RX ADMIN — NYSTATIN 500000 UNITS: 100000 SUSPENSION ORAL at 13:08

## 2024-01-01 RX ADMIN — BUSPIRONE HYDROCHLORIDE 10 MG: 10 TABLET ORAL at 09:38

## 2024-01-01 RX ADMIN — AMPICILLIN SODIUM AND SULBACTAM SODIUM 3 G: 2; 1 INJECTION, POWDER, FOR SOLUTION INTRAMUSCULAR; INTRAVENOUS at 20:50

## 2024-01-01 RX ADMIN — PANTOPRAZOLE SODIUM 40 MG: 40 INJECTION, POWDER, FOR SOLUTION INTRAVENOUS at 09:29

## 2024-01-01 RX ADMIN — HYDROMORPHONE HYDROCHLORIDE 2 MG: 2 TABLET ORAL at 19:49

## 2024-01-01 RX ADMIN — TRAMADOL HYDROCHLORIDE 50 MG: 50 TABLET, COATED ORAL at 18:07

## 2024-01-01 RX ADMIN — NYSTATIN 500000 UNITS: 100000 SUSPENSION ORAL at 20:28

## 2024-01-01 RX ADMIN — PIPERACILLIN SODIUM AND TAZOBACTAM SODIUM 3.38 G: 3; .375 INJECTION, SOLUTION INTRAVENOUS at 20:39

## 2024-01-01 RX ADMIN — POLYMYXIN B SULFATE 1250000 UNITS: 500000 INJECTION, POWDER, LYOPHILIZED, FOR SOLUTION INTRAMUSCULAR; INTRATHECAL; INTRAVENOUS; OPHTHALMIC at 03:05

## 2024-01-01 RX ADMIN — HYDROMORPHONE HYDROCHLORIDE 2 MG: 2 TABLET ORAL at 15:03

## 2024-01-01 RX ADMIN — AMPICILLIN SODIUM AND SULBACTAM SODIUM 3 G: 2; 1 INJECTION, POWDER, FOR SOLUTION INTRAMUSCULAR; INTRAVENOUS at 17:05

## 2024-01-01 RX ADMIN — SODIUM CHLORIDE 50 ML/HR: 9 INJECTION, SOLUTION INTRAVENOUS at 09:30

## 2024-01-01 RX ADMIN — HYDROMORPHONE HYDROCHLORIDE 2 MG: 2 TABLET ORAL at 18:43

## 2024-01-01 RX ADMIN — POLYMYXIN B SULFATE 2000000 UNITS: 500000 INJECTION, POWDER, LYOPHILIZED, FOR SOLUTION INTRAMUSCULAR; INTRATHECAL; INTRAVENOUS; OPHTHALMIC at 18:33

## 2024-01-01 RX ADMIN — Medication: at 20:57

## 2024-01-01 RX ADMIN — AMPICILLIN SODIUM AND SULBACTAM SODIUM 3 G: 2; 1 INJECTION, POWDER, FOR SOLUTION INTRAMUSCULAR; INTRAVENOUS at 13:10

## 2024-01-01 RX ADMIN — Medication: at 17:00

## 2024-01-01 RX ADMIN — Medication 4 L/MIN: at 11:45

## 2024-01-01 RX ADMIN — BUSPIRONE HYDROCHLORIDE 10 MG: 10 TABLET ORAL at 20:28

## 2024-01-01 RX ADMIN — METHYLPREDNISOLONE SODIUM SUCCINATE 125 MG: 125 INJECTION INTRAMUSCULAR; INTRAVENOUS at 03:35

## 2024-01-01 RX ADMIN — VANCOMYCIN HYDROCHLORIDE 1.25 G: 1.25 INJECTION, POWDER, LYOPHILIZED, FOR SOLUTION INTRAVENOUS at 02:06

## 2024-01-01 RX ADMIN — AMPICILLIN SODIUM AND SULBACTAM SODIUM 3 G: 2; 1 INJECTION, POWDER, FOR SOLUTION INTRAMUSCULAR; INTRAVENOUS at 22:01

## 2024-01-01 RX ADMIN — HEPARIN SODIUM 5000 UNITS: 5000 INJECTION INTRAVENOUS; SUBCUTANEOUS at 04:01

## 2024-01-01 RX ADMIN — HEPARIN SODIUM 5000 UNITS: 5000 INJECTION INTRAVENOUS; SUBCUTANEOUS at 05:26

## 2024-01-01 RX ADMIN — IPRATROPIUM BROMIDE AND ALBUTEROL SULFATE 3 ML: 2.5; .5 SOLUTION RESPIRATORY (INHALATION) at 12:03

## 2024-01-01 RX ADMIN — SODIUM CHLORIDE 500 ML: 9 INJECTION, SOLUTION INTRAVENOUS at 02:34

## 2024-01-01 RX ADMIN — PIPERACILLIN SODIUM AND TAZOBACTAM SODIUM 3.38 G: 3; .375 INJECTION, SOLUTION INTRAVENOUS at 14:27

## 2024-01-01 RX ADMIN — HYDROMORPHONE HYDROCHLORIDE 2 MG: 2 TABLET ORAL at 14:33

## 2024-01-01 RX ADMIN — BUSPIRONE HYDROCHLORIDE 10 MG: 10 TABLET ORAL at 14:34

## 2024-01-01 RX ADMIN — TRAMADOL HYDROCHLORIDE 50 MG: 50 TABLET, COATED ORAL at 11:33

## 2024-01-01 RX ADMIN — HYDROMORPHONE HYDROCHLORIDE 1 MG: 2 TABLET ORAL at 17:01

## 2024-01-01 RX ADMIN — Medication: at 20:36

## 2024-01-01 RX ADMIN — CASTOR OIL AND BALSAM, PERU: 788; 87 OINTMENT TOPICAL at 21:00

## 2024-01-01 RX ADMIN — PIPERACILLIN SODIUM AND TAZOBACTAM SODIUM 3.38 G: 3; .375 INJECTION, SOLUTION INTRAVENOUS at 01:27

## 2024-01-01 RX ADMIN — HEPARIN SODIUM 5000 UNITS: 5000 INJECTION INTRAVENOUS; SUBCUTANEOUS at 04:00

## 2024-01-01 RX ADMIN — AMPICILLIN SODIUM AND SULBACTAM SODIUM 3 G: 2; 1 INJECTION, POWDER, FOR SOLUTION INTRAMUSCULAR; INTRAVENOUS at 04:00

## 2024-01-01 RX ADMIN — Medication: at 09:40

## 2024-01-01 RX ADMIN — TRAMADOL HYDROCHLORIDE 50 MG: 50 TABLET, COATED ORAL at 00:32

## 2024-01-01 RX ADMIN — Medication: at 02:55

## 2024-01-01 RX ADMIN — VANCOMYCIN HYDROCHLORIDE 1.25 G: 1.25 INJECTION, POWDER, LYOPHILIZED, FOR SOLUTION INTRAVENOUS at 15:00

## 2024-01-01 RX ADMIN — HYDROMORPHONE HYDROCHLORIDE 0.5 MG: 1 INJECTION, SOLUTION INTRAMUSCULAR; INTRAVENOUS; SUBCUTANEOUS at 03:33

## 2024-01-01 RX ADMIN — HEPARIN SODIUM 5000 UNITS: 5000 INJECTION INTRAVENOUS; SUBCUTANEOUS at 20:36

## 2024-01-01 RX ADMIN — MAGNESIUM SULFATE HEPTAHYDRATE 2 G: 40 INJECTION, SOLUTION INTRAVENOUS at 22:34

## 2024-01-01 RX ADMIN — HYDROMORPHONE HYDROCHLORIDE 1 MG: 2 TABLET ORAL at 09:33

## 2024-01-01 RX ADMIN — MAGNESIUM SULFATE IN WATER 4 G: 40 INJECTION, SOLUTION INTRAVENOUS at 03:35

## 2024-01-01 RX ADMIN — HEPARIN SODIUM 5000 UNITS: 5000 INJECTION INTRAVENOUS; SUBCUTANEOUS at 04:47

## 2024-01-01 RX ADMIN — AMPICILLIN SODIUM AND SULBACTAM SODIUM 3 G: 2; 1 INJECTION, POWDER, FOR SOLUTION INTRAMUSCULAR; INTRAVENOUS at 00:32

## 2024-01-01 SDOH — SOCIAL STABILITY: SOCIAL INSECURITY: DOES ANYONE TRY TO KEEP YOU FROM HAVING/CONTACTING OTHER FRIENDS OR DOING THINGS OUTSIDE YOUR HOME?: UNABLE TO ASSESS

## 2024-01-01 SDOH — SOCIAL STABILITY: SOCIAL INSECURITY: HAS ANYONE EVER THREATENED TO HURT YOUR FAMILY OR YOUR PETS?: UNABLE TO ASSESS

## 2024-01-01 SDOH — SOCIAL STABILITY: SOCIAL INSECURITY: ABUSE: ADULT

## 2024-01-01 SDOH — SOCIAL STABILITY: SOCIAL INSECURITY: ARE THERE ANY APPARENT SIGNS OF INJURIES/BEHAVIORS THAT COULD BE RELATED TO ABUSE/NEGLECT?: UNABLE TO ASSESS

## 2024-01-01 SDOH — SOCIAL STABILITY: SOCIAL INSECURITY: ARE YOU OR HAVE YOU BEEN THREATENED OR ABUSED PHYSICALLY, EMOTIONALLY, OR SEXUALLY BY ANYONE?: UNABLE TO ASSESS

## 2024-01-01 SDOH — SOCIAL STABILITY: SOCIAL INSECURITY: WERE YOU ABLE TO COMPLETE ALL THE BEHAVIORAL HEALTH SCREENINGS?: NO

## 2024-01-01 SDOH — SOCIAL STABILITY: SOCIAL INSECURITY: HAVE YOU HAD THOUGHTS OF HARMING ANYONE ELSE?: UNABLE TO ASSESS

## 2024-01-01 SDOH — SOCIAL STABILITY: SOCIAL INSECURITY: DO YOU FEEL UNSAFE GOING BACK TO THE PLACE WHERE YOU ARE LIVING?: UNABLE TO ASSESS

## 2024-01-01 SDOH — SOCIAL STABILITY: SOCIAL INSECURITY: DO YOU FEEL ANYONE HAS EXPLOITED OR TAKEN ADVANTAGE OF YOU FINANCIALLY OR OF YOUR PERSONAL PROPERTY?: UNABLE TO ASSESS

## 2024-01-01 ASSESSMENT — COGNITIVE AND FUNCTIONAL STATUS - GENERAL
WALKING IN HOSPITAL ROOM: TOTAL
MOVING FROM LYING ON BACK TO SITTING ON SIDE OF FLAT BED WITH BEDRAILS: TOTAL
DRESSING REGULAR UPPER BODY CLOTHING: TOTAL
TOILETING: TOTAL
PERSONAL GROOMING: A LOT
CLIMB 3 TO 5 STEPS WITH RAILING: TOTAL
PERSONAL GROOMING: A LOT
CLIMB 3 TO 5 STEPS WITH RAILING: TOTAL
MOBILITY SCORE: 6
CLIMB 3 TO 5 STEPS WITH RAILING: TOTAL
HELP NEEDED FOR BATHING: TOTAL
TURNING FROM BACK TO SIDE WHILE IN FLAT BAD: TOTAL
MOVING TO AND FROM BED TO CHAIR: TOTAL
MOBILITY SCORE: 6
HELP NEEDED FOR BATHING: TOTAL
MOVING FROM LYING ON BACK TO SITTING ON SIDE OF FLAT BED WITH BEDRAILS: TOTAL
EATING MEALS: TOTAL
DRESSING REGULAR LOWER BODY CLOTHING: TOTAL
DRESSING REGULAR UPPER BODY CLOTHING: TOTAL
HELP NEEDED FOR BATHING: TOTAL
EATING MEALS: A LITTLE
TURNING FROM BACK TO SIDE WHILE IN FLAT BAD: TOTAL
DAILY ACTIVITIY SCORE: 6
DAILY ACTIVITIY SCORE: 9
TURNING FROM BACK TO SIDE WHILE IN FLAT BAD: TOTAL
MOVING TO AND FROM BED TO CHAIR: TOTAL
STANDING UP FROM CHAIR USING ARMS: TOTAL
TOILETING: TOTAL
DAILY ACTIVITIY SCORE: 9
HELP NEEDED FOR BATHING: TOTAL
WALKING IN HOSPITAL ROOM: TOTAL
STANDING UP FROM CHAIR USING ARMS: TOTAL
MOVING FROM LYING ON BACK TO SITTING ON SIDE OF FLAT BED WITH BEDRAILS: A LOT
TOILETING: TOTAL
DRESSING REGULAR LOWER BODY CLOTHING: TOTAL
PERSONAL GROOMING: TOTAL
MOVING TO AND FROM BED TO CHAIR: TOTAL
DRESSING REGULAR UPPER BODY CLOTHING: TOTAL
CLIMB 3 TO 5 STEPS WITH RAILING: TOTAL
DAILY ACTIVITIY SCORE: 6
WALKING IN HOSPITAL ROOM: TOTAL
PATIENT BASELINE BEDBOUND: YES
DRESSING REGULAR LOWER BODY CLOTHING: TOTAL
DAILY ACTIVITIY SCORE: 6
WALKING IN HOSPITAL ROOM: TOTAL
PERSONAL GROOMING: TOTAL
MOVING TO AND FROM BED TO CHAIR: TOTAL
CLIMB 3 TO 5 STEPS WITH RAILING: TOTAL
MOBILITY SCORE: 6
EATING MEALS: TOTAL
CLIMB 3 TO 5 STEPS WITH RAILING: TOTAL
MOVING TO AND FROM BED TO CHAIR: TOTAL
DRESSING REGULAR UPPER BODY CLOTHING: TOTAL
HELP NEEDED FOR BATHING: TOTAL
CLIMB 3 TO 5 STEPS WITH RAILING: TOTAL
MOBILITY SCORE: 6
MOVING FROM LYING ON BACK TO SITTING ON SIDE OF FLAT BED WITH BEDRAILS: TOTAL
DRESSING REGULAR UPPER BODY CLOTHING: TOTAL
HELP NEEDED FOR BATHING: TOTAL
PERSONAL GROOMING: TOTAL
PERSONAL GROOMING: TOTAL
STANDING UP FROM CHAIR USING ARMS: TOTAL
MOBILITY SCORE: 6
TURNING FROM BACK TO SIDE WHILE IN FLAT BAD: TOTAL
DAILY ACTIVITIY SCORE: 6
MOBILITY SCORE: 6
MOVING TO AND FROM BED TO CHAIR: TOTAL
TURNING FROM BACK TO SIDE WHILE IN FLAT BAD: TOTAL
STANDING UP FROM CHAIR USING ARMS: TOTAL
TOILETING: TOTAL
WALKING IN HOSPITAL ROOM: TOTAL
WALKING IN HOSPITAL ROOM: TOTAL
EATING MEALS: TOTAL
DRESSING REGULAR LOWER BODY CLOTHING: TOTAL
MOVING TO AND FROM BED TO CHAIR: TOTAL
TURNING FROM BACK TO SIDE WHILE IN FLAT BAD: TOTAL
TOILETING: TOTAL
EATING MEALS: TOTAL
DAILY ACTIVITIY SCORE: 6
STANDING UP FROM CHAIR USING ARMS: TOTAL
EATING MEALS: TOTAL
TOILETING: TOTAL
MOVING FROM LYING ON BACK TO SITTING ON SIDE OF FLAT BED WITH BEDRAILS: A LOT
MOVING FROM LYING ON BACK TO SITTING ON SIDE OF FLAT BED WITH BEDRAILS: TOTAL
DRESSING REGULAR UPPER BODY CLOTHING: TOTAL
MOVING FROM LYING ON BACK TO SITTING ON SIDE OF FLAT BED WITH BEDRAILS: TOTAL
HELP NEEDED FOR BATHING: TOTAL
MOBILITY SCORE: 7
PERSONAL GROOMING: TOTAL
MOVING FROM LYING ON BACK TO SITTING ON SIDE OF FLAT BED WITH BEDRAILS: TOTAL
TURNING FROM BACK TO SIDE WHILE IN FLAT BAD: TOTAL
DRESSING REGULAR LOWER BODY CLOTHING: TOTAL
DRESSING REGULAR LOWER BODY CLOTHING: TOTAL
TOILETING: TOTAL
DRESSING REGULAR UPPER BODY CLOTHING: TOTAL
WALKING IN HOSPITAL ROOM: TOTAL
TOILETING: TOTAL
MOBILITY SCORE: 7
EATING MEALS: TOTAL
STANDING UP FROM CHAIR USING ARMS: TOTAL
CLIMB 3 TO 5 STEPS WITH RAILING: TOTAL
DRESSING REGULAR UPPER BODY CLOTHING: TOTAL
WALKING IN HOSPITAL ROOM: TOTAL
STANDING UP FROM CHAIR USING ARMS: TOTAL
EATING MEALS: A LITTLE
TURNING FROM BACK TO SIDE WHILE IN FLAT BAD: TOTAL
DRESSING REGULAR LOWER BODY CLOTHING: TOTAL
PERSONAL GROOMING: TOTAL
DRESSING REGULAR LOWER BODY CLOTHING: TOTAL
STANDING UP FROM CHAIR USING ARMS: TOTAL
HELP NEEDED FOR BATHING: TOTAL
DAILY ACTIVITIY SCORE: 6
MOVING TO AND FROM BED TO CHAIR: TOTAL

## 2024-01-01 ASSESSMENT — ACTIVITIES OF DAILY LIVING (ADL)
TOILETING: DEPENDENT
DRESSING YOURSELF: NEEDS ASSISTANCE
JUDGMENT_ADEQUATE_SAFELY_COMPLETE_DAILY_ACTIVITIES: NO
ADEQUATE_TO_COMPLETE_ADL: YES
HEARING - RIGHT EAR: FUNCTIONAL
GROOMING: NEEDS ASSISTANCE
BATHING: NEEDS ASSISTANCE
WALKS IN HOME: DEPENDENT
LACK_OF_TRANSPORTATION: NO
HEARING - LEFT EAR: FUNCTIONAL
LACK_OF_TRANSPORTATION: PATIENT UNABLE TO ANSWER
FEEDING YOURSELF: NEEDS ASSISTANCE
PATIENT'S MEMORY ADEQUATE TO SAFELY COMPLETE DAILY ACTIVITIES?: NO

## 2024-01-01 ASSESSMENT — PAIN - FUNCTIONAL ASSESSMENT

## 2024-01-01 ASSESSMENT — ENCOUNTER SYMPTOMS
MUSCULOSKELETAL NEGATIVE: 1
CONSTITUTIONAL NEGATIVE: 1
SLEEP DISTURBANCE: 1
SHORTNESS OF BREATH: 1
WOUND: 1
APPETITE CHANGE: 1
ALLERGIC/IMMUNOLOGIC NEGATIVE: 1
EYES NEGATIVE: 1
GASTROINTESTINAL NEGATIVE: 1
HEMATOLOGIC/LYMPHATIC NEGATIVE: 1
WEAKNESS: 1
ARTHRALGIAS: 1
DYSPHORIC MOOD: 1
CARDIOVASCULAR NEGATIVE: 1
MYALGIAS: 1
ENDOCRINE NEGATIVE: 1
FATIGUE: 1
PSYCHIATRIC NEGATIVE: 1

## 2024-01-01 ASSESSMENT — PAIN SCALES - GENERAL
PAINLEVEL_OUTOF10: 8
PAINLEVEL_OUTOF10: 3
PAINLEVEL_OUTOF10: 6
PAINLEVEL_OUTOF10: 8
PAINLEVEL_OUTOF10: 7
PAINLEVEL_OUTOF10: 8
PAINLEVEL_OUTOF10: 10 - WORST POSSIBLE PAIN
PAINLEVEL_OUTOF10: 6
PAINLEVEL_OUTOF10: 9
PAINLEVEL_OUTOF10: 8
PAINLEVEL_OUTOF10: 7
PAINLEVEL_OUTOF10: 10 - WORST POSSIBLE PAIN
PAINLEVEL_OUTOF10: 8
PAINLEVEL_OUTOF10: 5 - MODERATE PAIN
PAINLEVEL_OUTOF10: 6
PAINLEVEL_OUTOF10: 10 - WORST POSSIBLE PAIN
PAINLEVEL_OUTOF10: 0 - NO PAIN
PAINLEVEL_OUTOF10: 6
PAINLEVEL_OUTOF10: 6
PAINLEVEL_OUTOF10: 8
PAINLEVEL_OUTOF10: 10 - WORST POSSIBLE PAIN
PAINLEVEL_OUTOF10: 0 - NO PAIN
PAINLEVEL_OUTOF10: 8
PAINLEVEL_OUTOF10: 6
PAINLEVEL_OUTOF10: 0 - NO PAIN

## 2024-01-01 ASSESSMENT — COLUMBIA-SUICIDE SEVERITY RATING SCALE - C-SSRS
2. HAVE YOU ACTUALLY HAD ANY THOUGHTS OF KILLING YOURSELF?: NO
2. HAVE YOU ACTUALLY HAD ANY THOUGHTS OF KILLING YOURSELF?: NO
6. HAVE YOU EVER DONE ANYTHING, STARTED TO DO ANYTHING, OR PREPARED TO DO ANYTHING TO END YOUR LIFE?: NO
1. IN THE PAST MONTH, HAVE YOU WISHED YOU WERE DEAD OR WISHED YOU COULD GO TO SLEEP AND NOT WAKE UP?: NO
1. IN THE PAST MONTH, HAVE YOU WISHED YOU WERE DEAD OR WISHED YOU COULD GO TO SLEEP AND NOT WAKE UP?: NO
6. HAVE YOU EVER DONE ANYTHING, STARTED TO DO ANYTHING, OR PREPARED TO DO ANYTHING TO END YOUR LIFE?: NO

## 2024-01-01 ASSESSMENT — LIFESTYLE VARIABLES
HAVE YOU EVER FELT YOU SHOULD CUT DOWN ON YOUR DRINKING: NO
SUBSTANCE_ABUSE_PAST_12_MONTHS: NO
EVER HAD A DRINK FIRST THING IN THE MORNING TO STEADY YOUR NERVES TO GET RID OF A HANGOVER: NO
HOW OFTEN DO YOU HAVE 6 OR MORE DRINKS ON ONE OCCASION: NEVER
AUDIT-C TOTAL SCORE: 0
PRESCIPTION_ABUSE_PAST_12_MONTHS: NO
AUDIT-C TOTAL SCORE: 0
HOW MANY STANDARD DRINKS CONTAINING ALCOHOL DO YOU HAVE ON A TYPICAL DAY: PATIENT DOES NOT DRINK
HOW OFTEN DO YOU HAVE A DRINK CONTAINING ALCOHOL: NEVER
HAVE PEOPLE ANNOYED YOU BY CRITICIZING YOUR DRINKING: NO
SKIP TO QUESTIONS 9-10: 1
EVER FELT BAD OR GUILTY ABOUT YOUR DRINKING: NO

## 2024-01-01 ASSESSMENT — PAIN DESCRIPTION - LOCATION
LOCATION: BACK
LOCATION: CHEST

## 2024-01-01 ASSESSMENT — PATIENT HEALTH QUESTIONNAIRE - PHQ9
SUM OF ALL RESPONSES TO PHQ9 QUESTIONS 1 & 2: 0
2. FEELING DOWN, DEPRESSED OR HOPELESS: NOT AT ALL
1. LITTLE INTEREST OR PLEASURE IN DOING THINGS: NOT AT ALL

## 2024-01-03 NOTE — PROGRESS NOTES
PROGRESS NOTE    Subjective   Chief complaint: Andrea Ochoa is a 68 y.o. male who is a long term care patient being seen and evaluated for fall.    HPI:  HPI patient fall denies pain  Objective   Vital signs: 130/65, 97.6, 99, 95%    Physical Exam  Constitutional:       General: He is not in acute distress.  Eyes:      Extraocular Movements: Extraocular movements intact.   Pulmonary:      Effort: Pulmonary effort is normal.   Musculoskeletal:      Cervical back: Neck supple.   Neurological:      Mental Status: He is alert.   Psychiatric:         Mood and Affect: Mood normal.         Behavior: Behavior is cooperative.         Assessment/Plan   Problem List Items Addressed This Visit       Fall - Primary     Monitor           Medications, treatments, and labs reviewed  Continue medications and treatments as listed in [facility]    Scribe Attestation  IAni Scribe   attest that this documentation has been prepared under the direction and in the presence of PATRICIA Angulo.    Provider Attestation - Scribe documentation  All medical record entries made by the Scribe were at my direction and personally dictated by me. I have reviewed the chart and agree that the record accurately reflects my personal performance of the history, physical exam, discussion and plan.    PATRICIA Angulo

## 2024-01-04 NOTE — PROGRESS NOTES
PROGRESS NOTE    Subjective   Chief complaint: Andrea Ochoa is a 68 y.o. male patient being seen and evaluated for monthly general medical care and follow-up    HPI:  12/27/23 Patient presents for general medical care and f/u.  Patient seen and examined at bedside.  No issues per nursing.  Patient has no acute complaints.          Objective   Vital signs: 141/71, 97.8, 90, 96%    Physical Exam  Constitutional:       General: He is not in acute distress.  Eyes:      Extraocular Movements: Extraocular movements intact.   Pulmonary:      Effort: Pulmonary effort is normal.   Musculoskeletal:      Cervical back: Neck supple.   Neurological:      Mental Status: He is alert.   Psychiatric:         Mood and Affect: Mood normal.         Behavior: Behavior is cooperative.         Assessment/Plan   Problem List Items Addressed This Visit       Seizure (CMS/[facility])     Stable, continue Keppra         HTN (hypertension)     Continue metoprolol and amlodipine         Weakness     Continue to encourage strength exercises          COPD (chronic obstructive pulmonary disease) (CMS/[facility]) - Primary     Stable  No sob, cough, wheeze  Combivent  monitor          Medications, treatments, and labs reviewed  Continue medications and treatments as listed in EMR    Scribe Attestation  IAni Scribe   attest that this documentation has been prepared under the direction and in the presence of PATRICIA Angulo.    Provider Attestation - Scribe documentation  All medical record entries made by the Scribe were at my direction and personally dictated by me. I have reviewed the chart and agree that the record accurately reflects my personal performance of the history, physical exam, discussion and plan.    PATRICIA Angulo
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
## 2024-02-27 PROBLEM — J18.9 PNEUMONIA, UNSPECIFIED ORGANISM: Status: ACTIVE | Noted: 2024-01-01

## 2024-02-27 NOTE — PROGRESS NOTES
02/27/24 1462   Discharge Planning   Living Arrangements Other (Comment)  (SNF)   Support Systems Spouse/significant other   Assistance Needed Dependent   Type of Residence Skilled nursing facility   Who is requesting discharge planning? Provider   Home or Post Acute Services Post acute facilities (Rehab/SNF/etc)   Type of Post Acute Facility Services Skilled nursing;Rehab   Patient expects to be discharged to: SNF   Does the patient need discharge transport arranged? Yes   RoundTrip coordination needed? Yes   Has discharge transport been arranged? No   Financial Resource Strain   How hard is it for you to pay for the very basics like food, housing, medical care, and heating? Pt Unable   Housing Stability   In the last 12 months, was there a time when you were not able to pay the mortgage or rent on time? Pt Unable   In the last 12 months, was there a time when you did not have a steady place to sleep or slept in a shelter (including now)? Pt Unable   Transportation Needs   In the past 12 months, has lack of transportation kept you from medical appointments or from getting medications? Pt Unable   In the past 12 months, has lack of transportation kept you from meetings, work, or from getting things needed for daily living? Pt Unable   Patient Choice   Patient / Family choosing to utilize agency / facility established prior to hospitalization Yes     Andrea Ochoa is a 68 y.o. male on day 0 of admission presenting with Pneumonia, unspecified organism.    Care transitions assessment completed via bedside with patient's spouse, Linette. Patient unavailable. TCC introduced self and explained role. Demographics verified. Patient from [address] [place] [address] for IV antibiotics for sacral wound infection. Linette states does not want patient to return to [address] [place] [address]. Prefers [facility]. Awaiting PT/OT evals/ hospital course. Will need transport to SNF.  TCC to continue to follow for discharge planning needs.      PCP: Angel Lewis  Last seen: Admit here  Pharmacy: Per SNF  Insurance: [employer]/ [employer]  Dispo: SNF, Prefers [facility]. From [address], doesn't want to return.  Awaiting PT/OT. IV antibiotics?        ASHANTI ESTRADA RN TCC

## 2024-02-27 NOTE — ED PROVIDER NOTES
HPI   Chief Complaint   Patient presents with    Chest Pain       HPI    Patient is a 68-year-old male with a past medical history of CHF, hypertension, COPD on 4 L of oxygen, BPH, anxiety, and obesity.  Patient presented to the ED from [address] [place] [address] due to left-sided chest pain that radiated down the arm.  Patient unable to articulate type of chest pain.  Patient states symptoms started 2-3 hours ago.  Patient denies any lightheadedness, dizziness, shortness of breath, diaphoresis, nausea, vomiting, constipation, diarrhea, abdominal pain.  Patient endorses chronic back pain.  Patient has PICC line with wound VAC in for back wound.  Patient was given 4 baby aspirin's and EMS.  Patient bumped the left side of head during transition to hospital bed.                     No data recorded                     Patient History   Past Medical History:   Diagnosis Date    Anxiety     CHF (congestive heart failure) (CMS/HCC)     Chronic respiratory failure with hypoxia (CMS/HCC)     COPD (chronic obstructive pulmonary disease) (CMS/HCC)     Dependence on supplemental oxygen     Difficulty walking     Encounter for removal of internal fixation device     Gout     Lack of coordination     Local infection of the skin and subcutaneous tissue, unspecified     Low back pain     Seizures (CMS/HCC)     Transient cerebral ischemic attack     UTI (urinary tract infection)     Weakness      Past Surgical History:   Procedure Laterality Date    OTHER SURGICAL HISTORY  10/10/2019    Lumbar laminectomy    OTHER SURGICAL HISTORY  10/10/2019    Lumbar vertebral fusion    OTHER SURGICAL HISTORY  10/10/2019    Insertion of implantable intrathecal access system     Family History   Problem Relation Name Age of Onset    No Known Problems Mother      No Known Problems Father       Social History     Tobacco Use    Smoking status: Former     Types: Cigarettes    Smokeless tobacco: Former   Vaping Use    Vaping Use: Never used   Substance Use  Topics    Alcohol use: Not Currently    Drug use: Never       Physical Exam   ED Triage Vitals   Temp Pulse Resp BP   -- -- -- --      SpO2 Temp src Heart Rate Source Patient Position   -- -- -- --      BP Location FiO2 (%)     -- --       Physical Exam  Physical Exam:  General:  Pleasant and cooperative. No apparent distress. Overweight   HEENT:  Normocephalic, atraumatic, no visible bumps or bleeding   Chest:  Clear to auscultation bilaterally. No wheezes, rales, or rhonchi.  CV:  Regular rate and rhythm. No murmurs    Abdomen: Abdomen is soft, non-tender, non-distended. BS +   Extremities:  No lower extremity edema or cyanosis.   Neurological:  AAOx3. No focal deficits.  Skin:  Warm and dry.    ED Course & MDM   ED Course as of 03/16/24 1333   Tue Feb 27, 2024 0202 EKG demonstrates sinus tachycardia.  Rate of 118.  Normal axis.  No acute ischemia.  No STEMI. [MK]      ED Course User Index  [MK] Andrea Padron MD         Diagnoses as of 03/16/24 1333   Pneumonia, unspecified organism   Chest pain, unspecified type   Hypokalemia   Hypomagnesemia   Wound of sacral region, subsequent encounter       Medical Decision Making    Patient is a 68-year-old male with a past medical history of CHF, hypertension, COPD on 4 L of oxygen, BPH, anxiety, and obesity.  Patient presented with chest pain initially concerning for ACS.  Patient's vitals were notable for a low initial blood pressure 85/59, temperature 96.8, heart rate of 112, respirations 20, oxygen 92%.  Patient's EKG revealed normal sinus rhythm.  Patient's x-ray showed low volume lungs with bronchovascular crowding and bibasilar atelectasis as well as superimposed pneumonia that is not excluded in appropriate clinical setting.  Patient's initial troponin 4.  Patient's CBC revealed hypokalemia of 3.1 and magnesium of 1.18.  Patient's CBC revealed leukocytosis of 18 and anemia at 9.9 with elevated absolute neutrophil count of 15.8%.  Patient is currently receiving  cefazolin, Cipro, and daptomycin through PICC line for sacral wound infection and has a PICC line and wound VAC.  UA was ordered and is currently pending.  Patient is following outpatient with ID for recurrent infection.  Patient was given Dilaudid, DuoNebs, 1 L LR, magnesium, Solu-Medrol, potassium.  Patient was discussed with admitting provider who accepted patient for continual management of leukocytosis secondary to back wound versus possible pneumonia and several electrolyte abnormalities and patient with several risk factors and potential for decompensation.    Procedure  Procedures     Mak Lopez MD  Resident  02/27/24 0346       Mak Lopez MD  Resident  02/27/24 0348       Andrea Padron MD  02/27/24 7101

## 2024-02-27 NOTE — H&P
History Of Present Illness  Andrea Ochoa is a 68 y.o. male presenting to emergency department from [facility] nursing [facility] for evaluation of left-sided chest pain.  Patient reports chest pain that radiates down his left arm that started approximately 2 to 3 hours ago.  Patient is unable to describe the chest pain.  Patient was given 4 baby aspirin and route via EMS.  Patient does admit to chronic back pain.  Patient has a large sacral wound for which she has a PICC line and wound VAC.  Patient has a history of COPD and is on 4 L of oxygen chronically.  Patient denies any increased shortness of breath, nausea, vomiting, diarrhea.  Patient denies fever or recent illness.    In ED, magnesium 1.18, glucose 127, sodium 134, potassium 3.1, chloride 92, WBCs 18.0, hemoglobin 9.9, hematocrit 32.4, platelets 586.  ABGs obtained and pending.  EKG completed showing sinus tachycardia at a rate of 104 without ST elevation or depression per ER physician review.  Initial troponin negative with a repeat troponin pending.  Patient initially presented hypotensive and was given IV fluids.  Patient receiving magnesium 4 g IV, potassium chloride 40 mill equivalents p.o.  Patient given Solu-Medrol and medicated for pain in ED.  CT of the head completed and negative for acute process per radiology review.  Chest x-ray completed showing bibasilar opacities concerning for pneumonia.  Due to elevated WBCs patient started on IV azithromycin and ceftriaxone.  Blood cultures x 2 obtained.  Although patient is currently receiving IV cefazolin, Cipro, and daptomycin 2/2 sacral wound.  Consult placed to ID.  Lactate, urinalysis pending.  BNP pending.  Patient given IV fluids.  Type and screen pending.  Sacral wound culture ordered.  Consult placed to wound care nurse for wound VAC management.  Last echocardiogram on file 1/23/2024 with an EF of 60 to 65%.  Patient will be admitted to telemetry under the care of Dr. Angel Lewis who will  continue to follow.  I was asked to do H&P and place initial admission orders.     Past Medical History  CHF, anxiety, COPD on 4 L via nasal cannula, gout, hyperlipidemia, CHILANGO, thrombocytopenia, BPH, seizure disorder, MRSA, hypertension      Surgical History  Lumbar laminectomy, tonsillectomy, adenoidectomy, knee surgery, PICC line, wound VAC sacral wound  Social History  Never smoker, no drug use, no alcohol use    Family History  MI, emphysema  Allergies  Codeine    Review of Systems  A 10 point review of systems was completed and negative except what is listed in HPI  Physical Exam  HENT:      Mouth/Throat:      Mouth: Mucous membranes are dry.      Pharynx: Oropharynx is clear.   Eyes:      Pupils: Pupils are equal, round, and reactive to light.   Cardiovascular:      Rate and Rhythm: Tachycardia present.   Pulmonary:      Breath sounds: Decreased breath sounds present.   Abdominal:      General: Bowel sounds are normal.   Musculoskeletal:      Cervical back: Normal range of motion.      Comments: PICC line right upper extremity   Skin:     General: Skin is warm and dry.      Capillary Refill: Capillary refill takes less than 2 seconds.      Comments: Sacral wound with wound VAC   Neurological:      General: No focal deficit present.      Mental Status: He is alert and oriented to person, place, and time.   Psychiatric:         Mood and Affect: Mood normal.         Behavior: Behavior normal.          Last Recorded Vitals  Blood pressure 100/55, pulse 91, temperature 36 °C (96.8 °F), temperature source Temporal, resp. rate 20, height 1.829 m (6'), weight 81.6 kg (180 lb), SpO2 99 %.    Relevant Results  XR chest 1 view    Result Date: 2/27/2024  Interpreted By:  Finkelstein, Evan, STUDY: XR CHEST 1 VIEW;  2/27/2024 2:40 am   INDICATION: Signs/Symptoms:CP.   COMPARISON: Chest radiograph 10/14/2023   ACCESSION NUMBER(S): [id number]   ORDERING CLINICIAN: JOHN DIXON   FINDINGS: Low lung volumes with  bronchovascular crowding. Right upper extremity PICC present with tip overlying the cavoatrial junction.   CARDIOMEDIASTINAL SILHOUETTE: Cardiomediastinal silhouette is stable in size and configuration.   LUNGS: Bibasilar opacities. No sizable pleural effusion or pneumothorax.   ABDOMEN: No remarkable upper abdominal findings.   BONES: No acute osseous abnormality.       Low lung volumes with bronchovascular crowding and bibasilar atelectasis. Superimposed pneumonia is not excluded in the appropriate clinical setting.   MACRO: None.   Signed by: Evan Finkelstein 2/27/2024 2:47 AM Dictation workstation:   [id number]    CT head wo IV contrast    Result Date: 2/27/2024  Interpreted By:  Tye Wakefield, STUDY: CT HEAD WO IV CONTRAST;  2/27/2024 2:33 am   INDICATION: Signs/Symptoms:Patient hit left side of head during transition to bed.   COMPARISON: Noncontrast head CT of 09/13/2021.   ACCESSION NUMBER(S): [id number]   ORDERING CLINICIAN: JOHN DIXON   TECHNIQUE: Noncontrast axial CT scan of head was performed. Angled reformats in brain and bone windows were generated. The images were reviewed in bone, brain, blood and soft tissue windows.   FINDINGS: CSF Spaces: The ventricles, sulci and basal cisterns are within normal limits. There is no extraaxial fluid collection.   Parenchyma: Moderate to advanced volume loss.  There are multifocal small regions of T2/FLAIR hyperintense signal abnormality in the subcortical and periventricular white matter, as well as patchy signal abnormality in the cody, nonspecific findings seen most often with chronic microangiopathic change or as sequelae of inflammatory demyelination. The grey-white differentiation is intact. There is no mass effect or midline shift.  There is no intracranial hemorrhage.   Calvarium: The calvarium is unremarkable.   Paranasal sinuses and mastoids: Bilateral mastoid effusions, as before. Mild polypoid mucosal thickening in maxillary sinuses, ethmoid air  cells and right sphenoid sinus.       No evidence of acute intracranial abnormality.   MACRO: None   Signed by: Tye Wakefield 2/27/2024 2:37 AM Dictation workstation:   [id number]   Results for orders placed or performed during the hospital encounter of 02/27/24 (from the past 24 hour(s))   Lavender Top   Result Value Ref Range    Extra Tube Hold for add-ons.    Gray Top   Result Value Ref Range    Extra Tube Hold for add-ons.    CBC and Auto Differential   Result Value Ref Range    WBC 18.0 (H) 4.4 - 11.3 x10*3/uL    nRBC 0.0 0.0 - 0.0 /100 WBCs    RBC 3.35 (L) 4.50 - 5.90 x10*6/uL    Hemoglobin 9.9 (L) 13.5 - 17.5 g/dL    Hematocrit 32.4 (L) 41.0 - 52.0 %    MCV 97 80 - 100 fL    MCH 29.6 26.0 - 34.0 pg    MCHC 30.6 (L) 32.0 - 36.0 g/dL    RDW 15.6 (H) 11.5 - 14.5 %    Platelets 586 (H) 150 - 450 x10*3/uL    Neutrophils % 87.6 40.0 - 80.0 %    Immature Granulocytes %, Automated 2.6 (H) 0.0 - 0.9 %    Lymphocytes % 5.2 13.0 - 44.0 %    Monocytes % 4.3 2.0 - 10.0 %    Eosinophils % 0.1 0.0 - 6.0 %    Basophils % 0.2 0.0 - 2.0 %    Neutrophils Absolute 15.82 (H) 1.20 - 7.70 x10*3/uL    Immature Granulocytes Absolute, Automated 0.47 0.00 - 0.70 x10*3/uL    Lymphocytes Absolute 0.94 (L) 1.20 - 4.80 x10*3/uL    Monocytes Absolute 0.77 0.10 - 1.00 x10*3/uL    Eosinophils Absolute 0.01 0.00 - 0.70 x10*3/uL    Basophils Absolute 0.03 0.00 - 0.10 x10*3/uL   Comprehensive metabolic panel   Result Value Ref Range    Glucose 127 (H) 74 - 99 mg/dL    Sodium 134 (L) 136 - 145 mmol/L    Potassium 3.1 (L) 3.5 - 5.3 mmol/L    Chloride 92 (L) 98 - 107 mmol/L    Bicarbonate 31 21 - 32 mmol/L    Anion Gap 14 10 - 20 mmol/L    Urea Nitrogen 22 6 - 23 mg/dL    Creatinine 0.52 0.50 - 1.30 mg/dL    eGFR >90 >60 mL/min/1.73m*2    Calcium 8.2 (L) 8.6 - 10.3 mg/dL    Albumin 2.3 (L) 3.4 - 5.0 g/dL    Alkaline Phosphatase 94 33 - 136 U/L    Total Protein 6.1 (L) 6.4 - 8.2 g/dL    AST 13 9 - 39 U/L    Bilirubin, Total 0.3 0.0 - 1.2 mg/dL    ALT  <3 (L) 10 - 52 U/L   Magnesium   Result Value Ref Range    Magnesium 1.18 (L) 1.60 - 2.40 mg/dL   Troponin I, High Sensitivity, Initial   Result Value Ref Range    Troponin I, High Sensitivity 4 0 - 20 ng/L   Sars-CoV-2 and Influenza A/B PCR   Result Value Ref Range    Flu A Result Not Detected Not Detected    Flu B Result Not Detected Not Detected    Coronavirus 2019, PCR Not Detected Not Detected   Troponin, High Sensitivity, 1 Hour   Result Value Ref Range    Troponin I, High Sensitivity 4 0 - 20 ng/L   Blood Gas Venous Full Panel   Result Value Ref Range    POCT pH, Venous 7.46 (H) 7.33 - 7.43 pH    POCT pCO2, Venous 57 (H) 41 - 51 mm Hg    POCT pO2, Venous 62 (H) 35 - 45 mm Hg    POCT SO2, Venous 90 (H) 45 - 75 %    POCT Oxy Hemoglobin, Venous 89.7 (H) 45.0 - 75.0 %    POCT Hematocrit Calculated, Venous 27.0 (L) 41.0 - 52.0 %    POCT Sodium, Venous 132 (L) 136 - 145 mmol/L    POCT Potassium, Venous 3.1 (L) 3.5 - 5.3 mmol/L    POCT Chloride, Venous 96 (L) 98 - 107 mmol/L    POCT Ionized Calicum, Venous 1.21 1.10 - 1.33 mmol/L    POCT Glucose, Venous 125 (H) 74 - 99 mg/dL    POCT Lactate, Venous 1.1 0.4 - 2.0 mmol/L    POCT Base Excess, Venous 14.8 (H) -2.0 - 3.0 mmol/L    POCT HCO3 Calculated, Venous 40.5 (H) 22.0 - 26.0 mmol/L    POCT Hemoglobin, Venous 9.0 (L) 13.5 - 17.5 g/dL    POCT Anion Gap, Venous -1.0 (L) 10.0 - 25.0 mmol/L    Patient Temperature 37.0 degrees Celsius    FiO2 36 %       Assessment/Plan   Andrea is a 68-year-old male patient who presents from skilled nursing facility with complaint of left-sided chest pain.  Patient has a history of COPD on 4 L of oxygen chronically.  Patient is currently being treated with multiple IV antibiotics for a sacral wound, wound VAC present.  Patient labs show elevated WBC, hypotensive on arrival, tachycardic.  Chest x-ray showing bibasilar opacities concerning for pneumonia.  Patient was started on IV azithromycin and ceftriaxone, ID consult placed due to  multiple other IV antibiotics and continued WBC count.  Wound culture ordered.  Patient given IV steroids in ED, IV fluids.  BNP, urinalysis pending.  Patient found to have a low magnesium, low potassium, replacements ordered.  Patient admitted to stepdown unit for further medical management.    Chest pain/pneumonia  Admit to stepdown unit per Dr. Angel Lewis  See imaging results above  Trend troponin  Blood cultures  Legionella/strep urine  IV azithromycin/ceftriaxone  Consult ID and appreciate input  Urinalysis pending  Lactate ordered and pending  DuoNeb treatments every 6  Aspirin per EMS  Continue oxygen  Repeat labs in p.m.    Hypomagnesemia/hypokalemia  Magnesium 1.18  4 g magnesium IV replacement  Potassium 3.1  40 mEq Kcl  Repeat labs in p.m.  Telemetry monitoring    Chronic sacral wound  Consult ID due to multiple antibiotics (daptomycin, cefazolin, Cipro)  PICC line RUE  Wound culture ordered  Consult wound RN for wound VAC management    CHF/anxiety/hypertension/seizure/BPH/heart failure/hyperlipidemia/CHILANGO  Continue oxygenation at 4 L  Continue home medications when med rec is complete  Hold home antihypertensives due to hypotension upon arrival  Continue to monitor blood pressure  Telemetry monitoring  BNP ordered and pending  Last echocardiogram on file 1/23/2024: EF 60 to 65%  Lactate pending  Type and screen ordered and pending  Urinalysis pending  Cardiac diet    DVT Ppx  SCDs  Heparin subcutaneous  Up to chair for meals  PT/OT    I spent 45 minutes in the professional and overall care of this patient.      Jaylene Curiel, APRN-CNP

## 2024-02-27 NOTE — PROGRESS NOTES
Physical Therapy                 Therapy Communication Note    Patient Name: Andrea Ochoa  MRN: [id number]  Today's Date: 2/27/2024     Discipline: Physical Therapy    Missed Visit Reason: Missed Visit Reason:  (PTscreen performed.Patient [id number], nonambulatory, primarily bedbound w/occasional transfer out of bed w/use of el.  Assist for adl's.  No skilled PT needs in acute care settting due to dependent base line status,will sign off at this time)

## 2024-02-27 NOTE — PROGRESS NOTES
Occupational Therapy                 Therapy Communication Note    Patient Name: Andrea Ochoa  MRN: [id number]  Today's Date: 2/27/2024     Discipline: Occupational Therapy    Missed Visit Reason: Missed Visit Reason:  (per patient report, pt. has been a resident at [facility] x 6 months, describes total assist for bed bath/dressing/toileting per brief, assist for meals/grooming as pt. only has limited use of RUE.  will discharge ot as pt. at [facility] function.)    Missed Time: Attempt    Comment:

## 2024-02-27 NOTE — CARE PLAN
Problem: Pain  Goal: My pain/discomfort is manageable  Outcome: Progressing     Problem: Safety  Goal: Patient will be injury free during hospitalization  Outcome: Progressing     Problem: Daily Care  Goal: Daily care needs are met  Outcome: Progressing     Problem: Psychosocial Needs  Goal: Demonstrates ability to cope with hospitalization/illness  Outcome: Progressing     Problem: Skin  Goal: Decreased wound size/increased tissue granulation at next dressing change  Outcome: Progressing  Flowsheets (Taken 2/27/2024 1428)  Decreased wound size/increased tissue granulation at next dressing change: Promote sleep for wound healing  Goal: Participates in plan/prevention/treatment measures  Outcome: Progressing  Flowsheets (Taken 2/27/2024 1428)  Participates in plan/prevention/treatment measures: Elevate heels  Goal: Prevent/manage excess moisture  Outcome: Progressing  Flowsheets (Taken 2/27/2024 1428)  Prevent/manage excess moisture:   Cleanse incontinence/protect with barrier cream   Monitor for/manage infection if present

## 2024-02-27 NOTE — CONSULTS
“Wound” Ostomy Consultation        1. Chief Complaint: wound check and evaluation   2. History of Present Illness:   lower lumbar and upper sacrum pressure injury present at least over a year. Multiple treatments used per wife. The last nursing home place negative pressure wound therapy over multiple areas of sharp bone    3. Past Medical History: Reviewed   4. Past Surgical History: Reviewed  5. Allergies:     Reviewed  6. Social/Family History: Reviewed  7. Medications:  Reviewed  8. Labs/Data/Test Results: Reviewed   9. Progress Notes:  Reviewed     10. System Review: system review was performed with these findings:   Integumentary: Will be described below    11.  Physical Examination:   Integumentary: Normal skin color, texture, and turgor, No dry/scaly/cracked skin; No ecchymotic areas; No friable skin; No rash/lesions/excoriation/burns; No diaphoresis; No jaundice, fermin, pallor skin; N    12.  Wound Pain/Discomfort: No      14. Wound Assessment  Type of Wound: pressure   Stage/Thickness Level: IV  Location: lower back upper sacrum   Present on admission: yes   Tunneling: No    Undermining: No     Edges: macerated,   Odor: strong  Edema: not present  Exudate: yellowish  Tissue Color: pale pink, yellow, and white   Tissue Type: necrotic, unhealthy granulation, sharp bone  ????????????????????????  Kya Wound Tissue: No erythema or warmth, indurated tissue, macerated tissue, desiccated tissue, healthy scar tissue??????????????????????????  Kya Wound Tissue Color: pink,     Assessment/Plan/Treatment Recommendations:     Lumbar spine: wash with full strength Dakins solution, apply calcium alginate AG, cover with ABD Pads or foams and tape, change 2 times a day   Envella bed, one flat sheet and one blue pad, turn every 2 hours.   Discussed with wife, he has not followed up with his plastic surgeon due to care home, directed wife to call office so he can be reassigned to a new surgeon   Turn as per policy  offloading from pressure sites(s)   Interventions as per Jose G Scale are in place    Education provided; Treatment demonstrated; Questions answered  D/W RN / MD   Orders received     I have spent 30 minutes with the patient for physical and wound assessment, wound cleaning, dressing demonstration and answering questions. More than 50% of the time spent with the patient / wife included education/counselling/coordination of care.     Hyun Rao RN [facility]    Addendum: 2/28/2024 = patient is in the Envella bed, comfortable, education provided to wife on the bed functions

## 2024-02-28 NOTE — H&P
History Of Present Illness  Andrea Ochoa is a 68 y.o. male presenting to emergency department from [facility] nursing [facility] for evaluation of left-sided chest pain.  Patient reports chest pain that radiates down his left arm that started approximately 2 to 3 hours ago.  Patient is unable to describe the chest pain.  Patient was given 4 baby aspirin and route via EMS.  Patient does admit to chronic back pain.  Patient has a large sacral wound for which she has a PICC line and wound VAC.  Patient has a history of COPD and is on 4 L of oxygen chronically.  Patient denies any increased shortness of breath, nausea, vomiting, diarrhea.  Patient denies fever or recent illness.    In ED, magnesium 1.18, glucose 127, sodium 134, potassium 3.1, chloride 92, WBCs 18.0, hemoglobin 9.9, hematocrit 32.4, platelets 586.  ABGs obtained and pending.  EKG completed showing sinus tachycardia at a rate of 104 without ST elevation or depression per ER physician review.  Initial troponin negative with a repeat troponin pending.  Patient initially presented hypotensive and was given IV fluids.  Patient receiving magnesium 4 g IV, potassium chloride 40 mill equivalents p.o.  Patient given Solu-Medrol and medicated for pain in ED.  CT of the head completed and negative for acute process per radiology review.  Chest x-ray completed showing bibasilar opacities concerning for pneumonia.  Due to elevated WBCs patient started on IV azithromycin and ceftriaxone.  Blood cultures x 2 obtained.  Although patient is currently receiving IV cefazolin, Cipro, and daptomycin 2/2 sacral wound.  Consult placed to ID.  Lactate, urinalysis pending.  BNP pending.  Patient given IV fluids.  Type and screen pending.  Sacral wound culture ordered.  Consult placed to wound care nurse for wound VAC management.  Last echocardiogram on file 1/23/2024 with an EF of 60 to 65%.  Patient will be admitted to telemetry under the care of Dr. Angel Lewis who will  continue to follow.  I was asked to do H&P and place initial admission orders.     Past Medical History  CHF, anxiety, COPD on 4 L via nasal cannula, gout, hyperlipidemia, CHILANGO, thrombocytopenia, BPH, seizure disorder, MRSA, hypertension      Surgical History  Lumbar laminectomy, tonsillectomy, adenoidectomy, knee surgery, PICC line, wound VAC sacral wound  Social History  Never smoker, no drug use, no alcohol use    Family History  MI, emphysema  Allergies  Adhesive tape-silicones, Iodinated contrast media, Codeine, and Opioids - morphine analogues    Review of Systems  A 10 point review of systems was completed and negative except what is listed in HPI  Physical Exam  HENT:      Mouth/Throat:      Mouth: Mucous membranes are dry.      Pharynx: Oropharynx is clear.   Eyes:      Pupils: Pupils are equal, round, and reactive to light.   Cardiovascular:      Rate and Rhythm: Tachycardia present.   Pulmonary:      Breath sounds: Decreased breath sounds present.   Abdominal:      General: Bowel sounds are normal.   Musculoskeletal:      Cervical back: Normal range of motion.      Comments: PICC line right upper extremity   Skin:     General: Skin is warm and dry.      Capillary Refill: Capillary refill takes less than 2 seconds.      Comments: Sacral wound with wound VAC   Neurological:      General: No focal deficit present.      Mental Status: He is alert and oriented to person, place, and time.   Psychiatric:         Mood and Affect: Mood normal.         Behavior: Behavior normal.          Last Recorded Vitals  Blood pressure 115/62, pulse 88, temperature 36.4 °C (97.5 °F), resp. rate 21, height 1.829 m (6'), weight 81.6 kg (180 lb), SpO2 97 %.    Relevant Results  XR chest 1 view    Result Date: 2/27/2024  Interpreted By:  Finkelstein, Evan, STUDY: XR CHEST 1 VIEW;  2/27/2024 2:40 am   INDICATION: Signs/Symptoms:CP.   COMPARISON: Chest radiograph 10/14/2023   ACCESSION NUMBER(S): [id number]   ORDERING CLINICIAN:  JOHN DIXON   FINDINGS: Low lung volumes with bronchovascular crowding. Right upper extremity PICC present with tip overlying the cavoatrial junction.   CARDIOMEDIASTINAL SILHOUETTE: Cardiomediastinal silhouette is stable in size and configuration.   LUNGS: Bibasilar opacities. No sizable pleural effusion or pneumothorax.   ABDOMEN: No remarkable upper abdominal findings.   BONES: No acute osseous abnormality.       Low lung volumes with bronchovascular crowding and bibasilar atelectasis. Superimposed pneumonia is not excluded in the appropriate clinical setting.   MACRO: None.   Signed by: Evan Finkelstein 2/27/2024 2:47 AM Dictation workstation:   [id number]    CT head wo IV contrast    Result Date: 2/27/2024  Interpreted By:  Tye Wakefield, STUDY: CT HEAD WO IV CONTRAST;  2/27/2024 2:33 am   INDICATION: Signs/Symptoms:Patient hit left side of head during transition to bed.   COMPARISON: Noncontrast head CT of 09/13/2021.   ACCESSION NUMBER(S): [id number]   ORDERING CLINICIAN: JOHN DIXON   TECHNIQUE: Noncontrast axial CT scan of head was performed. Angled reformats in brain and bone windows were generated. The images were reviewed in bone, brain, blood and soft tissue windows.   FINDINGS: CSF Spaces: The ventricles, sulci and basal cisterns are within normal limits. There is no extraaxial fluid collection.   Parenchyma: Moderate to advanced volume loss.  There are multifocal small regions of T2/FLAIR hyperintense signal abnormality in the subcortical and periventricular white matter, as well as patchy signal abnormality in the cody, nonspecific findings seen most often with chronic microangiopathic change or as sequelae of inflammatory demyelination. The grey-white differentiation is intact. There is no mass effect or midline shift.  There is no intracranial hemorrhage.   Calvarium: The calvarium is unremarkable.   Paranasal sinuses and mastoids: Bilateral mastoid effusions, as before. Mild polypoid  mucosal thickening in maxillary sinuses, ethmoid air cells and right sphenoid sinus.       No evidence of acute intracranial abnormality.   MACRO: None   Signed by: Tye Wakefield 2/27/2024 2:37 AM Dictation workstation:   [id number]   Results for orders placed or performed during the hospital encounter of 02/27/24 (from the past 24 hour(s))   Lavender Top   Result Value Ref Range    Extra Tube Hold for add-ons.    Gray Top   Result Value Ref Range    Extra Tube Hold for add-ons.    CBC and Auto Differential   Result Value Ref Range    WBC 18.0 (H) 4.4 - 11.3 x10*3/uL    nRBC 0.0 0.0 - 0.0 /100 WBCs    RBC 3.35 (L) 4.50 - 5.90 x10*6/uL    Hemoglobin 9.9 (L) 13.5 - 17.5 g/dL    Hematocrit 32.4 (L) 41.0 - 52.0 %    MCV 97 80 - 100 fL    MCH 29.6 26.0 - 34.0 pg    MCHC 30.6 (L) 32.0 - 36.0 g/dL    RDW 15.6 (H) 11.5 - 14.5 %    Platelets 586 (H) 150 - 450 x10*3/uL    Neutrophils % 87.6 40.0 - 80.0 %    Immature Granulocytes %, Automated 2.6 (H) 0.0 - 0.9 %    Lymphocytes % 5.2 13.0 - 44.0 %    Monocytes % 4.3 2.0 - 10.0 %    Eosinophils % 0.1 0.0 - 6.0 %    Basophils % 0.2 0.0 - 2.0 %    Neutrophils Absolute 15.82 (H) 1.20 - 7.70 x10*3/uL    Immature Granulocytes Absolute, Automated 0.47 0.00 - 0.70 x10*3/uL    Lymphocytes Absolute 0.94 (L) 1.20 - 4.80 x10*3/uL    Monocytes Absolute 0.77 0.10 - 1.00 x10*3/uL    Eosinophils Absolute 0.01 0.00 - 0.70 x10*3/uL    Basophils Absolute 0.03 0.00 - 0.10 x10*3/uL   Comprehensive metabolic panel   Result Value Ref Range    Glucose 127 (H) 74 - 99 mg/dL    Sodium 134 (L) 136 - 145 mmol/L    Potassium 3.1 (L) 3.5 - 5.3 mmol/L    Chloride 92 (L) 98 - 107 mmol/L    Bicarbonate 31 21 - 32 mmol/L    Anion Gap 14 10 - 20 mmol/L    Urea Nitrogen 22 6 - 23 mg/dL    Creatinine 0.52 0.50 - 1.30 mg/dL    eGFR >90 >60 mL/min/1.73m*2    Calcium 8.2 (L) 8.6 - 10.3 mg/dL    Albumin 2.3 (L) 3.4 - 5.0 g/dL    Alkaline Phosphatase 94 33 - 136 U/L    Total Protein 6.1 (L) 6.4 - 8.2 g/dL    AST 13 9 -  39 U/L    Bilirubin, Total 0.3 0.0 - 1.2 mg/dL    ALT <3 (L) 10 - 52 U/L   Magnesium   Result Value Ref Range    Magnesium 1.18 (L) 1.60 - 2.40 mg/dL   Troponin I, High Sensitivity, Initial   Result Value Ref Range    Troponin I, High Sensitivity 4 0 - 20 ng/L   Sars-CoV-2 and Influenza A/B PCR   Result Value Ref Range    Flu A Result Not Detected Not Detected    Flu B Result Not Detected Not Detected    Coronavirus 2019, PCR Not Detected Not Detected   Troponin, High Sensitivity, 1 Hour   Result Value Ref Range    Troponin I, High Sensitivity 4 0 - 20 ng/L   Blood Gas Venous Full Panel   Result Value Ref Range    POCT pH, Venous 7.46 (H) 7.33 - 7.43 pH    POCT pCO2, Venous 57 (H) 41 - 51 mm Hg    POCT pO2, Venous 62 (H) 35 - 45 mm Hg    POCT SO2, Venous 90 (H) 45 - 75 %    POCT Oxy Hemoglobin, Venous 89.7 (H) 45.0 - 75.0 %    POCT Hematocrit Calculated, Venous 27.0 (L) 41.0 - 52.0 %    POCT Sodium, Venous 132 (L) 136 - 145 mmol/L    POCT Potassium, Venous 3.1 (L) 3.5 - 5.3 mmol/L    POCT Chloride, Venous 96 (L) 98 - 107 mmol/L    POCT Ionized Calicum, Venous 1.21 1.10 - 1.33 mmol/L    POCT Glucose, Venous 125 (H) 74 - 99 mg/dL    POCT Lactate, Venous 1.1 0.4 - 2.0 mmol/L    POCT Base Excess, Venous 14.8 (H) -2.0 - 3.0 mmol/L    POCT HCO3 Calculated, Venous 40.5 (H) 22.0 - 26.0 mmol/L    POCT Hemoglobin, Venous 9.0 (L) 13.5 - 17.5 g/dL    POCT Anion Gap, Venous -1.0 (L) 10.0 - 25.0 mmol/L    Patient Temperature 37.0 degrees Celsius    FiO2 36 %   Lipid Panel   Result Value Ref Range    Cholesterol 108 0 - 199 mg/dL    HDL-Cholesterol 45.9 mg/dL    Cholesterol/HDL Ratio 2.4     LDL Calculated 51 <=99 mg/dL    VLDL 12 0 - 40 mg/dL    Triglycerides 58 0 - 149 mg/dL    Non HDL Cholesterol 62 0 - 149 mg/dL   Sedimentation rate, automated   Result Value Ref Range    Sedimentation Rate 50 (H) 0 - 20 mm/h   C-reactive protein   Result Value Ref Range    C-Reactive Protein 14.08 (H) <1.00 mg/dL   B-type natriuretic peptide    Result Value Ref Range    BNP 82 0 - 99 pg/mL   Blood Culture    Specimen: Peripheral Venipuncture; Blood culture   Result Value Ref Range    Blood Culture Loaded on Instrument - Culture in progress    Blood Culture    Specimen: Peripheral Venipuncture; Blood culture   Result Value Ref Range    Blood Culture       Identification and susceptibility testing to follow    Gram Stain Gram negative bacilli (AA)    Lactate   Result Value Ref Range    Lactate 0.5 0.4 - 2.0 mmol/L   Protime-INR   Result Value Ref Range    Protime 15.9 (H) 9.8 - 12.8 seconds    INR 1.4 (H) 0.9 - 1.1   Type and Screen   Result Value Ref Range    ABO TYPE A     Rh TYPE POS     ANTIBODY SCREEN NEG    D-dimer, VTE Exclusion   Result Value Ref Range    D-Dimer, Quantitative VTE Exclusion 335 <=500 ng/mL FEU   Lipase   Result Value Ref Range    Lipase <3 (L) 9 - 82 U/L   Urinalysis with Reflex Microscopic   Result Value Ref Range    Color, Urine Kandice (N) Straw, Yellow    Appearance, Urine Hazy (N) Clear    Specific Gravity, Urine 1.018 1.005 - 1.035    pH, Urine 5.0 5.0, 5.5, 6.0, 6.5, 7.0, 7.5, 8.0    Protein, Urine 100 (2+) (N) NEGATIVE mg/dL    Glucose, Urine 50 (1+) (A) NEGATIVE mg/dL    Blood, Urine SMALL (1+) (A) NEGATIVE    Ketones, Urine 5 (TRACE) (A) NEGATIVE mg/dL    Bilirubin, Urine NEGATIVE NEGATIVE    Urobilinogen, Urine <2.0 <2.0 mg/dL    Nitrite, Urine NEGATIVE NEGATIVE    Leukocyte Esterase, Urine LARGE (3+) (A) NEGATIVE   Microscopic Only, Urine   Result Value Ref Range    WBC, Urine >50 (A) 1-5, NONE /HPF    WBC Clumps, Urine MANY Reference range not established. /HPF    RBC, Urine 11-20 (A) NONE, 1-2, 3-5 /HPF    Bacteria, Urine 2+ (A) NONE SEEN /HPF   Transthoracic Echo (TTE) Complete   Result Value Ref Range    BSA 2.04 m2   CBC   Result Value Ref Range    WBC 8.9 4.4 - 11.3 x10*3/uL    nRBC 0.0 0.0 - 0.0 /100 WBCs    RBC 3.18 (L) 4.50 - 5.90 x10*6/uL    Hemoglobin 9.5 (L) 13.5 - 17.5 g/dL    Hematocrit 31.0 (L) 41.0 - 52.0  %    MCV 98 80 - 100 fL    MCH 29.9 26.0 - 34.0 pg    MCHC 30.6 (L) 32.0 - 36.0 g/dL    RDW 15.7 (H) 11.5 - 14.5 %    Platelets 480 (H) 150 - 450 x10*3/uL   Basic Metabolic Panel   Result Value Ref Range    Glucose 149 (H) 74 - 99 mg/dL    Sodium 134 (L) 136 - 145 mmol/L    Potassium 3.7 3.5 - 5.3 mmol/L    Chloride 94 (L) 98 - 107 mmol/L    Bicarbonate 34 (H) 21 - 32 mmol/L    Anion Gap 10 10 - 20 mmol/L    Urea Nitrogen 24 (H) 6 - 23 mg/dL    Creatinine 0.44 (L) 0.50 - 1.30 mg/dL    eGFR >90 >60 mL/min/1.73m*2    Calcium 8.0 (L) 8.6 - 10.3 mg/dL   Magnesium   Result Value Ref Range    Magnesium 1.72 1.60 - 2.40 mg/dL   Troponin I, High Sensitivity   Result Value Ref Range    Troponin I, High Sensitivity 5 0 - 20 ng/L       Assessment/Plan   Andrea is a 68-year-old male patient who presents from skilled nursing facility with complaint of left-sided chest pain.  Patient has a history of COPD on 4 L of oxygen chronically.  Patient is currently being treated with multiple IV antibiotics for a sacral wound, wound VAC present.  Patient labs show elevated WBC, hypotensive on arrival, tachycardic.  Chest x-ray showing bibasilar opacities concerning for pneumonia.  Patient was started on IV azithromycin and ceftriaxone, ID consult placed due to multiple other IV antibiotics and continued WBC count.  Wound culture ordered.  Patient given IV steroids in ED, IV fluids.  BNP, urinalysis pending.  Patient found to have a low magnesium, low potassium, replacements ordered.  Patient admitted to stepdown unit for further medical management.    Chest pain/pneumonia  Admit to stepdown unit per Dr. Angel Lewis  See imaging results above  Trend troponin  Blood cultures  Legionella/strep urine  IV azithromycin/ceftriaxone  Consult ID and appreciate input  Urinalysis pending  Lactate ordered and pending  DuoNeb treatments every 6  Aspirin per EMS  Continue oxygen  Repeat labs in p.m.  Check lipase  Check procalcitonin  Adjust  pain medications  Check right lower extremity ultrasound for swelling    Hypomagnesemia/hypokalemia  Magnesium 1.18  4 g magnesium IV replacement  Potassium 3.1  40 mEq Kcl  Repeat labs in p.m.  Telemetry monitoring    Chronic sacral wound  Consult ID due to multiple antibiotics (daptomycin, cefazolin, Cipro)  PICC line RUE  Wound culture ordered  Consult wound RN for wound VAC management    CHF/anxiety/hypertension/seizure/BPH/heart failure/hyperlipidemia/CHILANGO  Continue oxygenation at 4 L  Continue home medications when med rec is complete  Hold home antihypertensives due to hypotension upon arrival  Continue to monitor blood pressure  Telemetry monitoring  BNP ordered and pending  Last echocardiogram on file 1/23/2024: EF 60 to 65%  Lactate pending  Type and screen ordered and pending  Urinalysis pending  Cardiac diet    DVT Ppx  SCDs  Heparin subcutaneous  Up to chair for meals  PT/OT    I spent 45 minutes in the professional and overall care of this patient.      Angel Lewis, DO

## 2024-02-28 NOTE — CONSULTS
Consults    Reason For Consult  Sacral wound, pneumonia    History Of Present Illness  Andrea Ochoa is a 68 y.o. male with past medical history COPD on chronic oxygen, TIA, UTI, CHF, sacral wound who was at [facility] being treated for sacral wound, UTI, gram-negative bacteremia, and pneumonia.  He had a PICC placed on 2/13 and has been at skilled nursing facility receiving a multitude of different antibiotics for suspected sacral osteomyelitis.  His white blood cell count is 7.4 his urinalysis is growing more than 50 white blood cells, 2+ bacteria, positive leukocyte Estrace.  ID was subsequently consulted for antibiotic management..     Past Medical History  As above  Surgical History  Lumbar laminectomy, knee surgery, PICC line  Social History  Denies illicit drug use  Family History  Family History   Problem Relation Name Age of Onset    No Known Problems Mother      No Known Problems Father          Allergies  Adhesive tape-silicones, Iodinated contrast media, Codeine, and Opioids - morphine analogues    Review of Systems   Constitutional: Negative.    HENT: Negative.     Eyes: Negative.    Respiratory:  Positive for shortness of breath.    Cardiovascular: Negative.    Gastrointestinal: Negative.    Endocrine: Negative.    Genitourinary: Negative.    Musculoskeletal: Negative.    Skin:  Positive for rash and wound.   Allergic/Immunologic: Negative.    Neurological:  Positive for weakness.   Hematological: Negative.    Psychiatric/Behavioral: Negative.          Physical Exam  Constitutional:       Appearance: Normal appearance.   HENT:      Head: Normocephalic and atraumatic.      Right Ear: External ear normal.      Left Ear: External ear normal.      Nose: Nose normal.      Mouth/Throat:      Mouth: Mucous membranes are dry.   Eyes:      Extraocular Movements: Extraocular movements intact.      Pupils: Pupils are equal, round, and reactive to light.   Cardiovascular:      Rate and Rhythm: Normal rate and  regular rhythm.   Pulmonary:      Effort: Pulmonary effort is normal.      Breath sounds: Normal breath sounds.      Comments: Coarse sounds bilaterally, on 4 L supplemental oxygen.  Abdominal:      General: Abdomen is flat.      Palpations: Abdomen is soft.   Genitourinary:     Comments: Seay in place  Musculoskeletal:      Right lower leg: Edema present.      Left lower leg: Edema present.   Neurological:      Mental Status: He is alert.      Motor: Weakness present.   Psychiatric:         Mood and Affect: Mood normal.         Behavior: Behavior normal.          Last Recorded Vitals  /56   Pulse 95   Temp 36.3 °C (97.3 °F)   Resp 18   Wt 81.6 kg (179 lb 14.3 oz)   SpO2 95%     Relevant Results  Vascular US upper extremity venous duplex left    Result Date: 2/28/2024  Preliminary Cardiology Report          [place] [address] Tel [phone] and Fax [phone]       Preliminary Vascular Lab Report  VASC US UPPER EXTREMITY VENOUS DUPLEX LEFT  Patient Name:      RAJ Holland Physician:  [id number] Nasra Mendez MD Study Date:        2/28/2024      Ordering Physician: [id number] BAILEY MRN/PID:           [id number]       Technologist:       Meeta Obregon RVT Accession#:        [id number]   Technologist 2: Date of Birth/Age: 1955     Encounter#:         [id number] Gender:            M Admission Status:  Inpatient      Location Performed: [facility]  Diagnosis/ICD: Left arm swelling-M79.89 Procedure/CPT: 52485 Peripheral venous duplex scan for DVT Limited  PRELIMINARY CONCLUSIONS: Right Upper Venous: The subclavian vein demonstrates a normal spontaneous and phasic flow. Left Upper Venous: No evidence of acute deep vein thrombus visualized in the left upper extremity.  Imaging & Doppler Findings:  Left                Compress Thrombus        Flow Internal Jugular      Yes      None   Spontaneous/Phasic Subclavian Proximal   Yes       None   Spontaneous/Phasic Subclavian Mid        Yes      None Subclavian Distal     Yes      None   Spontaneous/Phasic Axillary              Yes      None   Spontaneous/Phasic Brachial              Yes      None Cephalic              Yes      None Basilic               Yes      None VASCULAR PRELIMINARY REPORT completed by Meeta Obregon RVT on 2/28/2024 at 2:42:23 PM  ** Final **     ECG 12 lead    Result Date: 2/28/2024  Atrial flutter/fibrillation Anteroseptal infarct, age indeterminate    Transthoracic Echo (TTE) Complete    Result Date: 2/28/2024    [facility], [address] [place] [id number] [phone] and                                 Fax [phone] TRANSTHORACIC ECHOCARDIOGRAM REPORT  Patient Name:      RAJ Holland Physician:    [id number] Janes Barnhart MD Study Date:        2/27/2024            Ordering Provider:    [id number] JOSE BAILEY MRN/PID:           [id number]             Fellow: Accession#:        [id number]         Nurse: Date of Birth/Age: 1955 / 68      Sonographer:          Mike Abbott RDCS                    years Gender:            M                    Additional Staff: Height:            182.88 cm            Admit Date:           2/27/2024 Weight:            81.65 kg             Admission Status:     Inpatient -                                                               Routine BSA / BMI:         2.04 m2 / 24.41      Encounter#:           [id number]                    kg/m2                                         Department Location:  [facility] Blood Pressure: 101 /62 mmHg Study Type:    TRANSTHORACIC ECHO (TTE) COMPLETE Diagnosis/ICD: Chest pain, unspecified-R07.9 CPT Code:      Echo Complete w Full Doppler-85145 Patient History: Pertinent History: Chest Pain, CHF, COPD, Renal Failure and Dialysis. Study Detail: The  following Echo studies were performed: 2D, M-Mode, Doppler and               color flow. Technically challenging study due to body habitus,               patient lying in supine position and the patient's lack of               cooperation.  PHYSICIAN INTERPRETATION: Left Ventricle: The left ventricular systolic function is mildly decreased, with an estimated ejection fraction of 40-45%. There is global hypokinesis of the left ventricle with minor regional variations. The left ventricular cavity size is mildly dilated. Spectral Doppler shows an impaired relaxation pattern of left ventricular diastolic filling. Left Atrium: The left atrium is normal in size. Right Ventricle: The right ventricle is normal in size. There is normal right ventricular global systolic function. Right Atrium: The right atrium is normal in size. Aortic Valve: The aortic valve appears structurally normal. There is no evidence of aortic valve regurgitation. The peak instantaneous gradient of the aortic valve is 8.9 mmHg. Mitral Valve: The mitral valve is normal in structure. There is mild mitral annular calcification. There is mild mitral valve regurgitation. Tricuspid Valve: The tricuspid valve is structurally normal. There is mild tricuspid regurgitation. The Doppler estimated RVSP is slightly elevated at 33.7 mmHg. Pulmonic Valve: The pulmonic valve is not well visualized. The pulmonic valve regurgitation was not well visualized. Pericardium: There is no pericardial effusion noted. Aorta: The aortic root is normal.  CONCLUSIONS:  1. Left ventricular systolic function is mildly decreased with a 40-45% estimated ejection fraction.  2. Spectral Doppler shows an impaired relaxation pattern of left ventricular diastolic filling.  3. Slightly elevated RVSP. QUANTITATIVE DATA SUMMARY: 2D MEASUREMENTS:                          Normal Ranges: LAs:           3.80 cm   (2.7-4.0cm) IVSd:          0.64 cm   (0.6-1.1cm) LVPWd:         0.84 cm    (0.6-1.1cm) LVIDd:         6.00 cm   (3.9-5.9cm) LVIDs:         4.62 cm LV Mass Index: 83.5 g/m2 LV % FS        23.0 % LA VOLUME:                             Normal Ranges: LA Volume Index: 28.0 ml/m2 RA VOLUME BY A/L METHOD:                       Normal Ranges: RA Area A4C: 15.0 cm2 M-MODE MEASUREMENTS:                  Normal Ranges: Ao Root: 3.40 cm (2.0-3.7cm) LV SYSTOLIC FUNCTION BY 2D PLANIMETRY (MOD):                     Normal Ranges: EF-A4C View: 56.6 % (>=55%) EF-A2C View: 35.4 % EF-Biplane:  47.1 % LV DIASTOLIC FUNCTION:                               Normal Ranges: MV Peak E:        0.71 m/s    (0.7-1.2 m/s) MV Peak A:        0.81 m/s    (0.42-0.7 m/s) E/A Ratio:        0.88        (1.0-2.2) MV medial e'      0.09 m/s MV A Dur:         148.00 msec PulmV Sys Joshua:    67.40 cm/s PulmV Will Joshua:   54.40 cm/s PulmV S/D Joshua:    1.20 PulmV A Revs Joshua: 19.90 cm/s MITRAL VALVE:                 Normal Ranges: MV DT: 232 msec (150-240msec) AORTIC VALVE:                         Normal Ranges: AoV Vmax:      1.49 m/s (<=1.7m/s) AoV Peak P.9 mmHg (<20mmHg) LVOT Max Joshua:  0.97 m/s (<=1.1m/s) LVOT VTI:      20.70 cm LVOT Diameter: 2.40 cm  (1.8-2.4cm) AoV Area,Vmax: 2.96 cm2 (2.5-4.5cm2)  RIGHT VENTRICLE: RV Basal 3.83 cm RV Mid   3.71 cm RV Major 8.9 cm TAPSE:   19.8 mm TRICUSPID VALVE/RVSP:                             Normal Ranges: Peak TR Velocity: 2.77 m/s RV Syst Pressure: 33.7 mmHg (< 30mmHg) PULMONIC VALVE:                      Normal Ranges: PV Max Joshua: 1.0 m/s  (0.6-0.9m/s) PV Max P.1 mmHg Pulmonary Veins: PulmV A Revs Joshua: 19.90 cm/s PulmV Will Joshua:   54.40 cm/s PulmV S/D Joshua:    1.20 PulmV Sys Joshua:    67.40 cm/s  [id number] Janes Barnhart MD Electronically signed on 2024 at 8:10:32 AM  ** Final **     Lower extremity venous duplex right    Result Date: 2024           [place] [address] [address] [place], [place] [address] Tel [phone] and Fax [phone]  Vascular Lab Report VASC US LOWER  EXTREMITY VENOUS DUPLEX RIGHT  Patient Name:      RAJ HARMON        [place] Physician:  [id number] Kandis Schmitt MD Study Date:        2/27/2024             Ordering Physician: [id number] JOSE BAILEY MRN/PID:           [id number]              Technologist:       Marybel Villagran T Accession#:        [id number]          Technologist 2: Date of Birth/Age: 1955 / 68 years Encounter#:         [id number] Gender:            M Admission Status:  Outpatient            Location Performed: [facility]  Diagnosis/ICD: Pain in right leg-M79.604 Indication:    Limb pain. CPT Codes:     69116 Peripheral venous duplex scan for DVT Limited  CONCLUSIONS: Right Lower Venous: No evidence of acute deep vein thrombus visualized in the right lower extremity. Left Lower Venous: Left common femoral vein is negative for deep vein thrombus.  Imaging & Doppler Findings:  Right                 Compressible Thrombus        Flow Distal External Iliac     Yes        None   Spontaneous/Phasic CFV                       Yes        None   Spontaneous/Phasic PFV                       Yes        None FV Proximal               Yes        None   Spontaneous/Phasic FV Mid                    Yes        None FV Distal                 Yes        None Popliteal                 Yes        None   Spontaneous/Phasic Peroneal                  Yes        None PTV                       Yes        None  Left Compress Thrombus        Flow CFV    Yes      None   Spontaneous/Phasic  [id number] Kandis Schmitt MD Electronically signed by [id number] Kandis Schmitt MD on 2/27/2024 at 4:36:46 PM  ** Final **     XR chest 1 view    Result Date: 2/27/2024  Interpreted By:  Finkelstein, Evan, STUDY: XR CHEST 1 VIEW;  2/27/2024 2:40 am   INDICATION: Signs/Symptoms:CP.   COMPARISON: Chest  radiograph 10/14/2023   ACCESSION NUMBER(S): [id number]   ORDERING CLINICIAN: JOHN DIXON   FINDINGS: Low lung volumes with bronchovascular crowding. Right upper extremity PICC present with tip overlying the cavoatrial junction.   CARDIOMEDIASTINAL SILHOUETTE: Cardiomediastinal silhouette is stable in size and configuration.   LUNGS: Bibasilar opacities. No sizable pleural effusion or pneumothorax.   ABDOMEN: No remarkable upper abdominal findings.   BONES: No acute osseous abnormality.       Low lung volumes with bronchovascular crowding and bibasilar atelectasis. Superimposed pneumonia is not excluded in the appropriate clinical setting.   MACRO: None.   Signed by: Evan Finkelstein 2/27/2024 2:47 AM Dictation workstation:   [id number]    CT head wo IV contrast    Result Date: 2/27/2024  Interpreted By:  Tye Wakefield, STUDY: CT HEAD WO IV CONTRAST;  2/27/2024 2:33 am   INDICATION: Signs/Symptoms:Patient hit left side of head during transition to bed.   COMPARISON: Noncontrast head CT of 09/13/2021.   ACCESSION NUMBER(S): [id number]   ORDERING CLINICIAN: JOHN DIXON   TECHNIQUE: Noncontrast axial CT scan of head was performed. Angled reformats in brain and bone windows were generated. The images were reviewed in bone, brain, blood and soft tissue windows.   FINDINGS: CSF Spaces: The ventricles, sulci and basal cisterns are within normal limits. There is no extraaxial fluid collection.   Parenchyma: Moderate to advanced volume loss.  There are multifocal small regions of T2/FLAIR hyperintense signal abnormality in the subcortical and periventricular white matter, as well as patchy signal abnormality in the cody, nonspecific findings seen most often with chronic microangiopathic change or as sequelae of inflammatory demyelination. The grey-white differentiation is intact. There is no mass effect or midline shift.  There is no intracranial hemorrhage.   Calvarium: The calvarium is unremarkable.   Paranasal  sinuses and mastoids: Bilateral mastoid effusions, as before. Mild polypoid mucosal thickening in maxillary sinuses, ethmoid air cells and right sphenoid sinus.       No evidence of acute intracranial abnormality.   MACRO: None   Signed by: Tye Wakefield 2/27/2024 2:37 AM Dictation workstation:   [id number]      Results for orders placed or performed during the hospital encounter of 02/27/24 (from the past 24 hour(s))   Tissue/Wound Culture/Smear    Specimen: Buttock; Tissue/Biopsy   Result Value Ref Range    Gram Stain (1+) Rare Polymorphonuclear leukocytes     Gram Stain No organisms seen    CBC   Result Value Ref Range    WBC 7.4 4.4 - 11.3 x10*3/uL    nRBC 0.0 0.0 - 0.0 /100 WBCs    RBC 2.82 (L) 4.50 - 5.90 x10*6/uL    Hemoglobin 8.3 (L) 13.5 - 17.5 g/dL    Hematocrit 27.6 (L) 41.0 - 52.0 %    MCV 98 80 - 100 fL    MCH 29.4 26.0 - 34.0 pg    MCHC 30.1 (L) 32.0 - 36.0 g/dL    RDW 15.9 (H) 11.5 - 14.5 %    Platelets 487 (H) 150 - 450 x10*3/uL   Basic Metabolic Panel   Result Value Ref Range    Glucose 133 (H) 74 - 99 mg/dL    Sodium 135 (L) 136 - 145 mmol/L    Potassium 4.0 3.5 - 5.3 mmol/L    Chloride 96 (L) 98 - 107 mmol/L    Bicarbonate 34 (H) 21 - 32 mmol/L    Anion Gap 9 (L) 10 - 20 mmol/L    Urea Nitrogen 22 6 - 23 mg/dL    Creatinine 0.37 (L) 0.50 - 1.30 mg/dL    eGFR >90 >60 mL/min/1.73m*2    Calcium 7.9 (L) 8.6 - 10.3 mg/dL   Magnesium   Result Value Ref Range    Magnesium 1.84 1.60 - 2.40 mg/dL   Lactate   Result Value Ref Range    Lactate 1.0 0.4 - 2.0 mmol/L   Vascular US upper extremity venous duplex left   Result Value Ref Range    BSA 2.04 m2         Assessment/Plan     #Sacral osteomyelitis  #Urinary tract infection  #Gram-negative bacteremia  #Pneumonia  #History of congestive heart failure  #History of gout  #History of CHILANGO  #History of hypertension    Antibiotics  Antibiotic day 2  Vancomycin day 1  Zosyn day 1    -will repeat blood cultures tomorrow morning  -Antibiotics changed from azithromycin  Rocephin to vancomycin and Zosyn  -Patient had the following blood cultures while at [facility] 1/21: MSSA, 1/24: MRSA, 1/28 no growth, urine culture 1/21: MRSA  -placed order for more to be changed 2/28, communicated with Nurse     I reviewed and interpreted all lab test imaging studies and documentations from other healthcare providers  I am monitoring for antibiotic side effects and toxicity    Johnathan Wesley DO

## 2024-02-28 NOTE — CONSULTS
Vancomycin Dosing by Pharmacy- INITIAL    Andrea Ochoa is a 68 y.o. year old male who Pharmacy has been consulted for vancomycin dosing for osteomyelitis/septic arthritis. Based on the patient's indication and renal status this patient will be dosed based on a goal AUC of 400-600.     Renal function is currently stable.    Visit Vitals  /64   Pulse 96   Temp 36.3 °C (97.3 °F)   Resp 18        Lab Results   Component Value Date    CREATININE 0.37 (L) 02/28/2024    CREATININE 0.44 (L) 02/27/2024    CREATININE 0.52 02/27/2024    CREATININE 0.48 (L) 03/18/2023    CREATININE 0.60 03/16/2023    CREATININE 0.91 03/15/2023    CREATININE 0.53 03/11/2023        Lab Results   Component Value Date    BLOODCULT No growth at 1 day 02/27/2024    BLOODCULT  02/27/2024     Identification and susceptibility testing to follow    URINECULTURE NO GROWTH 03/01/2023       I/O last 3 completed shifts:  In: 1806.7 (22.1 mL/kg) [I.V.:1206.7 (14.8 mL/kg); IV Piggyback:600]  Out: 1050 (12.9 mL/kg) [Urine:1050 (0.4 mL/kg/hr)]  Dosing Weight: 81.6 kg       Assessment/Plan     Patient will not be given a loading dose.  Will initiate vancomycin maintenance,  1250 mg every 12 hours.    This dosing regimen is predicted by [employer] to result in the following pharmacokinetic parameters:  AUC24,ss: 459 mg/L.hr  Probability of AUC24 > 400: 64 %  Ctrough,ss: 13.2 mg/L  Probability of Ctrough,ss > 20: 20 %  Probability of nephrotoxicity (Lodise SEN 2009): 8 %    Follow-up level will be ordered on 2/29 with Mid-AM labs unless clinically indicated sooner.  Will continue to monitor renal function daily while on vancomycin and order serum creatinine at least every 48 hours if not already ordered.  Will follow for continued vancomycin needs, clinical response, and signs/symptoms of toxicity.     Please call with any questions.  Jamee Barron, PharmD, BCCCP  [id number]

## 2024-02-28 NOTE — CONSULTS
"Inpatient consult to Palliative Care  Consult performed by: FIDEL Claros-CNP  Consult ordered by: Angel Lewis DO          Reason For Consult  Reason for Consult: Communication/decision making     History Of Present Illness  Andrea Ochoa is a 68 y.o. male with past medical history of  heart failure, COPD on chronic 4 L nasal cannula, hypertension, hyperlipidemia, CHILANGO, seizures, BPH, anxiety, and sacral wound status post PICC line and wound VAC,  is presenting from SNF with left-sided chest pain.  ED workup was significant for leukocytosis, hypotension, and tachycardia; septic workup was initiated and the patient was placed on broad-spectrum IV antibiotics.  Chest x-ray does show bibasilar opacities concerning for pneumonia.  ID was consulted due to management of pneumonia as well as sacral wound antibiotics.  A wound culture was obtained, and wound care was consulted.  The patient was admitted under the medicine service with pneumonia.  Palliative care was consulted to discuss goals of care and advance care planning.    Upon assessment of the patient this morning, the patient was drowsy but arousable to verbal stimuli.  The patient stated he has severe depression, which keeps him from sleeping at night.  He stated, \"look at my life; look at what I have.\"  The patient also endorsed severe pain \"all over\", which he stated has been going on for \"a long time\", and of which he stated is improved by medication administration.  The patient did state his shortness of breath is improved; he denied feeling nauseated or constipated, but did state he has an overall poor appetite.  The patient stated he is unsure what his goals are, because of his pain.  No family was present during my assessment.  Nursing indicated the patient does wish to speak about hospice care with his wife present.    ESAS ([place] Symptom Assessment System):  Pain: Moderate to severe  Shortness of breath: Mild  Loss of appetite: " "Moderate to severe  Nausea: None  Constipation: None  Tiredness: Moderate  Drowsiness: Severe  Anxiety: Mild  Depression: Severe  How you feel overall: \"I just hurt.\"    PPS (Palliative Prognostic Scale): 40%    PMH: as above       Personal/Social History  The patient previously resided in an [facility].  He reports that he has quit smoking. His smoking use included cigarettes. He has quit using smokeless tobacco. He reports that he does not currently use alcohol. He reports that he does not use drugs.    Past Medical History  He has a past medical history of Anxiety, CHF (congestive heart failure) (CMS/[facility]), Chronic respiratory failure with hypoxia (CMS/[facility]), COPD (chronic obstructive pulmonary disease) (CMS/[facility]), Dependence on supplemental oxygen, Difficulty walking, Encounter for removal of internal fixation device, Gout, Lack of coordination, Local infection of the skin and subcutaneous tissue, unspecified, Low back pain, Seizures (CMS/[facility]), Transient cerebral ischemic attack, UTI (urinary tract infection), and Weakness.    Surgical History  He has a past surgical history that includes Other surgical history (10/10/2019); Other surgical history (10/10/2019); and Other surgical history (10/10/2019).     Family History  Family History   Problem Relation Name Age of Onset    No Known Problems Mother      No Known Problems Father       Allergies  Adhesive tape-silicones, Iodinated contrast media, Codeine, and Opioids - morphine analogues    Review of Systems   Constitutional:  Positive for appetite change and fatigue.   HENT: Negative.     Eyes: Negative.    Respiratory:  Positive for shortness of breath.    Cardiovascular:  Positive for chest pain.   Gastrointestinal: Negative.    Endocrine: Negative.    Genitourinary: Negative.    Musculoskeletal:  Positive for arthralgias and myalgias.   Skin: Negative.    Allergic/Immunologic: Negative.    Neurological:  Positive for weakness.   Hematological: Negative.  "   Psychiatric/Behavioral:  Positive for dysphoric mood and sleep disturbance.         Physical Exam  Constitutional:       General: He is not in acute distress.     Appearance: He is ill-appearing. He is not toxic-appearing or diaphoretic.      Comments: Appears older than stated age, frail, withdrawn   HENT:      Head: Normocephalic and atraumatic.      Nose: Nose normal.      Mouth/Throat:      Mouth: Mucous membranes are moist.      Pharynx: Oropharynx is clear.   Eyes:      Extraocular Movements: Extraocular movements intact.      Pupils: Pupils are equal, round, and reactive to light.   Cardiovascular:      Rate and Rhythm: Normal rate and regular rhythm.      Heart sounds: Normal heart sounds.   Pulmonary:      Effort: Pulmonary effort is normal. No respiratory distress.      Breath sounds: Rhonchi present.      Comments: Expiratory rhonchi noted to bilateral bases, greater in the right  Abdominal:      General: Bowel sounds are normal. There is no distension.      Palpations: Abdomen is soft.      Tenderness: There is no abdominal tenderness. There is no guarding or rebound.   Musculoskeletal:      Cervical back: Normal range of motion. No rigidity.      Right lower leg: Edema present.      Left lower leg: Edema present.      Comments: Generalized atrophy of muscles   Skin:     General: Skin is warm and dry.      Coloration: Skin is pale.      Comments: Sacral wound being managed by wound care   Neurological:      Mental Status: He is oriented to person, place, and time. Mental status is at baseline.      Motor: Weakness present.   Psychiatric:         Behavior: Behavior normal.         Thought Content: Thought content normal.      Comments: Expresses severe depression that does interfere with his sleep; noted to be withdrawn         Last Recorded Vitals  Blood pressure 114/61, pulse 85, temperature 36.5 °C (97.7 °F), resp. rate 20, height 1.829 m (6'), weight 81.6 kg (180 lb), SpO2 95 %.    Relevant  Results  Scheduled medications  azithromycin, 500 mg, intravenous, q24h  cefTRIAXone, 1 g, intravenous, q24h  heparin (porcine), 5,000 Units, subcutaneous, q8h  ipratropium-albuteroL, 3 mL, nebulization, TID  magnesium sulfate, 2 g, intravenous, Once  pantoprazole, 40 mg, intravenous, Daily  perflutren lipid microspheres, 0.5-10 mL of dilution, intravenous, Once in imaging  perflutren protein A microsphere, 0.5 mL, intravenous, Once in imaging  potassium chloride CR, 20 mEq, oral, Once  potassium chloride CR, 20 mEq, oral, Once  sodium hypochlorite, , irrigation, BID  sulfur hexafluoride microsphr, 2 mL, intravenous, Once in imaging      Continuous medications     PRN medications  PRN medications: acetaminophen, HYDROmorphone, ondansetron, oxygen, traMADol    Results for orders placed or performed during the hospital encounter of 02/27/24 (from the past 96 hour(s))   Lavender Top   Result Value Ref Range    Extra Tube Hold for add-ons.    Gray Top   Result Value Ref Range    Extra Tube Hold for add-ons.    CBC and Auto Differential   Result Value Ref Range    WBC 18.0 (H) 4.4 - 11.3 x10*3/uL    nRBC 0.0 0.0 - 0.0 /100 WBCs    RBC 3.35 (L) 4.50 - 5.90 x10*6/uL    Hemoglobin 9.9 (L) 13.5 - 17.5 g/dL    Hematocrit 32.4 (L) 41.0 - 52.0 %    MCV 97 80 - 100 fL    MCH 29.6 26.0 - 34.0 pg    MCHC 30.6 (L) 32.0 - 36.0 g/dL    RDW 15.6 (H) 11.5 - 14.5 %    Platelets 586 (H) 150 - 450 x10*3/uL    Neutrophils % 87.6 40.0 - 80.0 %    Immature Granulocytes %, Automated 2.6 (H) 0.0 - 0.9 %    Lymphocytes % 5.2 13.0 - 44.0 %    Monocytes % 4.3 2.0 - 10.0 %    Eosinophils % 0.1 0.0 - 6.0 %    Basophils % 0.2 0.0 - 2.0 %    Neutrophils Absolute 15.82 (H) 1.20 - 7.70 x10*3/uL    Immature Granulocytes Absolute, Automated 0.47 0.00 - 0.70 x10*3/uL    Lymphocytes Absolute 0.94 (L) 1.20 - 4.80 x10*3/uL    Monocytes Absolute 0.77 0.10 - 1.00 x10*3/uL    Eosinophils Absolute 0.01 0.00 - 0.70 x10*3/uL    Basophils Absolute 0.03 0.00 - 0.10  x10*3/uL   Comprehensive metabolic panel   Result Value Ref Range    Glucose 127 (H) 74 - 99 mg/dL    Sodium 134 (L) 136 - 145 mmol/L    Potassium 3.1 (L) 3.5 - 5.3 mmol/L    Chloride 92 (L) 98 - 107 mmol/L    Bicarbonate 31 21 - 32 mmol/L    Anion Gap 14 10 - 20 mmol/L    Urea Nitrogen 22 6 - 23 mg/dL    Creatinine 0.52 0.50 - 1.30 mg/dL    eGFR >90 >60 mL/min/1.73m*2    Calcium 8.2 (L) 8.6 - 10.3 mg/dL    Albumin 2.3 (L) 3.4 - 5.0 g/dL    Alkaline Phosphatase 94 33 - 136 U/L    Total Protein 6.1 (L) 6.4 - 8.2 g/dL    AST 13 9 - 39 U/L    Bilirubin, Total 0.3 0.0 - 1.2 mg/dL    ALT <3 (L) 10 - 52 U/L   Magnesium   Result Value Ref Range    Magnesium 1.18 (L) 1.60 - 2.40 mg/dL   Troponin I, High Sensitivity, Initial   Result Value Ref Range    Troponin I, High Sensitivity 4 0 - 20 ng/L   Sars-CoV-2 and Influenza A/B PCR   Result Value Ref Range    Flu A Result Not Detected Not Detected    Flu B Result Not Detected Not Detected    Coronavirus 2019, PCR Not Detected Not Detected   Troponin, High Sensitivity, 1 Hour   Result Value Ref Range    Troponin I, High Sensitivity 4 0 - 20 ng/L   Blood Gas Venous Full Panel   Result Value Ref Range    POCT pH, Venous 7.46 (H) 7.33 - 7.43 pH    POCT pCO2, Venous 57 (H) 41 - 51 mm Hg    POCT pO2, Venous 62 (H) 35 - 45 mm Hg    POCT SO2, Venous 90 (H) 45 - 75 %    POCT Oxy Hemoglobin, Venous 89.7 (H) 45.0 - 75.0 %    POCT Hematocrit Calculated, Venous 27.0 (L) 41.0 - 52.0 %    POCT Sodium, Venous 132 (L) 136 - 145 mmol/L    POCT Potassium, Venous 3.1 (L) 3.5 - 5.3 mmol/L    POCT Chloride, Venous 96 (L) 98 - 107 mmol/L    POCT Ionized Calicum, Venous 1.21 1.10 - 1.33 mmol/L    POCT Glucose, Venous 125 (H) 74 - 99 mg/dL    POCT Lactate, Venous 1.1 0.4 - 2.0 mmol/L    POCT Base Excess, Venous 14.8 (H) -2.0 - 3.0 mmol/L    POCT HCO3 Calculated, Venous 40.5 (H) 22.0 - 26.0 mmol/L    POCT Hemoglobin, Venous 9.0 (L) 13.5 - 17.5 g/dL    POCT Anion Gap, Venous -1.0 (L) 10.0 - 25.0 mmol/L     Patient Temperature 37.0 degrees Celsius    FiO2 36 %   Lipid Panel   Result Value Ref Range    Cholesterol 108 0 - 199 mg/dL    HDL-Cholesterol 45.9 mg/dL    Cholesterol/HDL Ratio 2.4     LDL Calculated 51 <=99 mg/dL    VLDL 12 0 - 40 mg/dL    Triglycerides 58 0 - 149 mg/dL    Non HDL Cholesterol 62 0 - 149 mg/dL   Sedimentation rate, automated   Result Value Ref Range    Sedimentation Rate 50 (H) 0 - 20 mm/h   C-reactive protein   Result Value Ref Range    C-Reactive Protein 14.08 (H) <1.00 mg/dL   B-type natriuretic peptide   Result Value Ref Range    BNP 82 0 - 99 pg/mL   Blood Culture    Specimen: Peripheral Venipuncture; Blood culture   Result Value Ref Range    Blood Culture Loaded on Instrument - Culture in progress    Blood Culture    Specimen: Peripheral Venipuncture; Blood culture   Result Value Ref Range    Blood Culture       Identification and susceptibility testing to follow    Gram Stain Gram negative bacilli (AA)    Lactate   Result Value Ref Range    Lactate 0.5 0.4 - 2.0 mmol/L   Protime-INR   Result Value Ref Range    Protime 15.9 (H) 9.8 - 12.8 seconds    INR 1.4 (H) 0.9 - 1.1   Type and Screen   Result Value Ref Range    ABO TYPE A     Rh TYPE POS     ANTIBODY SCREEN NEG    D-dimer, VTE Exclusion   Result Value Ref Range    D-Dimer, Quantitative VTE Exclusion 335 <=500 ng/mL FEU   Lipase   Result Value Ref Range    Lipase <3 (L) 9 - 82 U/L   Urinalysis with Reflex Microscopic   Result Value Ref Range    Color, Urine Kandice (N) Straw, Yellow    Appearance, Urine Hazy (N) Clear    Specific Gravity, Urine 1.018 1.005 - 1.035    pH, Urine 5.0 5.0, 5.5, 6.0, 6.5, 7.0, 7.5, 8.0    Protein, Urine 100 (2+) (N) NEGATIVE mg/dL    Glucose, Urine 50 (1+) (A) NEGATIVE mg/dL    Blood, Urine SMALL (1+) (A) NEGATIVE    Ketones, Urine 5 (TRACE) (A) NEGATIVE mg/dL    Bilirubin, Urine NEGATIVE NEGATIVE    Urobilinogen, Urine <2.0 <2.0 mg/dL    Nitrite, Urine NEGATIVE NEGATIVE    Leukocyte Esterase, Urine LARGE  (3+) (A) NEGATIVE   Microscopic Only, Urine   Result Value Ref Range    WBC, Urine >50 (A) 1-5, NONE /HPF    WBC Clumps, Urine MANY Reference range not established. /HPF    RBC, Urine 11-20 (A) NONE, 1-2, 3-5 /HPF    Bacteria, Urine 2+ (A) NONE SEEN /HPF   Transthoracic Echo (TTE) Complete   Result Value Ref Range    AV pk epi 1.49 m/s    LVOT diam 2.40 cm    LV biplane EF 47 %    MV E/A ratio 0.88     Tricuspid annular plane systolic excursion 2.0 cm    LVIDd 6.00 cm    RVSP 33.7 mmHg    Aortic Valve Area by Continuity of Peak Velocity 2.96 cm2    AV pk grad 8.9 mmHg    LV A4C EF 56.6    CBC   Result Value Ref Range    WBC 8.9 4.4 - 11.3 x10*3/uL    nRBC 0.0 0.0 - 0.0 /100 WBCs    RBC 3.18 (L) 4.50 - 5.90 x10*6/uL    Hemoglobin 9.5 (L) 13.5 - 17.5 g/dL    Hematocrit 31.0 (L) 41.0 - 52.0 %    MCV 98 80 - 100 fL    MCH 29.9 26.0 - 34.0 pg    MCHC 30.6 (L) 32.0 - 36.0 g/dL    RDW 15.7 (H) 11.5 - 14.5 %    Platelets 480 (H) 150 - 450 x10*3/uL   Basic Metabolic Panel   Result Value Ref Range    Glucose 149 (H) 74 - 99 mg/dL    Sodium 134 (L) 136 - 145 mmol/L    Potassium 3.7 3.5 - 5.3 mmol/L    Chloride 94 (L) 98 - 107 mmol/L    Bicarbonate 34 (H) 21 - 32 mmol/L    Anion Gap 10 10 - 20 mmol/L    Urea Nitrogen 24 (H) 6 - 23 mg/dL    Creatinine 0.44 (L) 0.50 - 1.30 mg/dL    eGFR >90 >60 mL/min/1.73m*2    Calcium 8.0 (L) 8.6 - 10.3 mg/dL   Magnesium   Result Value Ref Range    Magnesium 1.72 1.60 - 2.40 mg/dL   Troponin I, High Sensitivity   Result Value Ref Range    Troponin I, High Sensitivity 5 0 - 20 ng/L   Procalcitonin   Result Value Ref Range    Procalcitonin 0.29 (H) <=0.07 ng/mL   CBC   Result Value Ref Range    WBC 7.4 4.4 - 11.3 x10*3/uL    nRBC 0.0 0.0 - 0.0 /100 WBCs    RBC 2.82 (L) 4.50 - 5.90 x10*6/uL    Hemoglobin 8.3 (L) 13.5 - 17.5 g/dL    Hematocrit 27.6 (L) 41.0 - 52.0 %    MCV 98 80 - 100 fL    MCH 29.4 26.0 - 34.0 pg    MCHC 30.1 (L) 32.0 - 36.0 g/dL    RDW 15.9 (H) 11.5 - 14.5 %    Platelets 487 (H)  150 - 450 x10*3/uL   Basic Metabolic Panel   Result Value Ref Range    Glucose 133 (H) 74 - 99 mg/dL    Sodium 135 (L) 136 - 145 mmol/L    Potassium 4.0 3.5 - 5.3 mmol/L    Chloride 96 (L) 98 - 107 mmol/L    Bicarbonate 34 (H) 21 - 32 mmol/L    Anion Gap 9 (L) 10 - 20 mmol/L    Urea Nitrogen 22 6 - 23 mg/dL    Creatinine 0.37 (L) 0.50 - 1.30 mg/dL    eGFR >90 >60 mL/min/1.73m*2    Calcium 7.9 (L) 8.6 - 10.3 mg/dL   Magnesium   Result Value Ref Range    Magnesium 1.84 1.60 - 2.40 mg/dL     Transthoracic Echo (TTE) Complete    Result Date: 2/28/2024    [facility], [address] [place] [id number] [phone] and                                 Fax [phone] TRANSTHORACIC ECHOCARDIOGRAM REPORT  Patient Name:      RAJ Holland Physician:    [id number] Janes Barnhart MD Study Date:        2/27/2024            Ordering Provider:    [id number] JOSE BAILEY MRN/PID:           [id number]             Fellow: Accession#:        [id number]         Nurse: Date of Birth/Age: 1955 / 68      Sonographer:          Mike Abbott RDCS                    years Gender:            M                    Additional Staff: Height:            182.88 cm            Admit Date:           2/27/2024 Weight:            81.65 kg             Admission Status:     Inpatient -                                                               Routine BSA / BMI:         2.04 m2 / 24.41      Encounter#:           [id number]                    kg/m2                                         Department Location:  [facility] Blood Pressure: 101 /62 mmHg Study Type:    TRANSTHORACIC ECHO (TTE) COMPLETE Diagnosis/ICD: Chest pain, unspecified-R07.9 CPT Code:      Echo Complete w Full Doppler-69257 Patient History: Pertinent History: Chest Pain, CHF, COPD, Renal Failure and Dialysis. Study Detail: The  following Echo studies were performed: 2D, M-Mode, Doppler and               color flow. Technically challenging study due to body habitus,               patient lying in supine position and the patient's lack of               cooperation.  PHYSICIAN INTERPRETATION: Left Ventricle: The left ventricular systolic function is mildly decreased, with an estimated ejection fraction of 40-45%. There is global hypokinesis of the left ventricle with minor regional variations. The left ventricular cavity size is mildly dilated. Spectral Doppler shows an impaired relaxation pattern of left ventricular diastolic filling. Left Atrium: The left atrium is normal in size. Right Ventricle: The right ventricle is normal in size. There is normal right ventricular global systolic function. Right Atrium: The right atrium is normal in size. Aortic Valve: The aortic valve appears structurally normal. There is no evidence of aortic valve regurgitation. The peak instantaneous gradient of the aortic valve is 8.9 mmHg. Mitral Valve: The mitral valve is normal in structure. There is mild mitral annular calcification. There is mild mitral valve regurgitation. Tricuspid Valve: The tricuspid valve is structurally normal. There is mild tricuspid regurgitation. The Doppler estimated RVSP is slightly elevated at 33.7 mmHg. Pulmonic Valve: The pulmonic valve is not well visualized. The pulmonic valve regurgitation was not well visualized. Pericardium: There is no pericardial effusion noted. Aorta: The aortic root is normal.  CONCLUSIONS:  1. Left ventricular systolic function is mildly decreased with a 40-45% estimated ejection fraction.  2. Spectral Doppler shows an impaired relaxation pattern of left ventricular diastolic filling.  3. Slightly elevated RVSP. QUANTITATIVE DATA SUMMARY: 2D MEASUREMENTS:                          Normal Ranges: LAs:           3.80 cm   (2.7-4.0cm) IVSd:          0.64 cm   (0.6-1.1cm) LVPWd:         0.84 cm    (0.6-1.1cm) LVIDd:         6.00 cm   (3.9-5.9cm) LVIDs:         4.62 cm LV Mass Index: 83.5 g/m2 LV % FS        23.0 % LA VOLUME:                             Normal Ranges: LA Volume Index: 28.0 ml/m2 RA VOLUME BY A/L METHOD:                       Normal Ranges: RA Area A4C: 15.0 cm2 M-MODE MEASUREMENTS:                  Normal Ranges: Ao Root: 3.40 cm (2.0-3.7cm) LV SYSTOLIC FUNCTION BY 2D PLANIMETRY (MOD):                     Normal Ranges: EF-A4C View: 56.6 % (>=55%) EF-A2C View: 35.4 % EF-Biplane:  47.1 % LV DIASTOLIC FUNCTION:                               Normal Ranges: MV Peak E:        0.71 m/s    (0.7-1.2 m/s) MV Peak A:        0.81 m/s    (0.42-0.7 m/s) E/A Ratio:        0.88        (1.0-2.2) MV medial e'      0.09 m/s MV A Dur:         148.00 msec PulmV Sys Joshua:    67.40 cm/s PulmV Will Joshua:   54.40 cm/s PulmV S/D Joshua:    1.20 PulmV A Revs Joshua: 19.90 cm/s MITRAL VALVE:                 Normal Ranges: MV DT: 232 msec (150-240msec) AORTIC VALVE:                         Normal Ranges: AoV Vmax:      1.49 m/s (<=1.7m/s) AoV Peak P.9 mmHg (<20mmHg) LVOT Max Joshua:  0.97 m/s (<=1.1m/s) LVOT VTI:      20.70 cm LVOT Diameter: 2.40 cm  (1.8-2.4cm) AoV Area,Vmax: 2.96 cm2 (2.5-4.5cm2)  RIGHT VENTRICLE: RV Basal 3.83 cm RV Mid   3.71 cm RV Major 8.9 cm TAPSE:   19.8 mm TRICUSPID VALVE/RVSP:                             Normal Ranges: Peak TR Velocity: 2.77 m/s RV Syst Pressure: 33.7 mmHg (< 30mmHg) PULMONIC VALVE:                      Normal Ranges: PV Max Joshua: 1.0 m/s  (0.6-0.9m/s) PV Max P.1 mmHg Pulmonary Veins: PulmV A Revs Joshua: 19.90 cm/s PulmV Will Joshua:   54.40 cm/s PulmV S/D Joshua:    1.20 PulmV Sys Joshua:    67.40 cm/s  [id number] Janes Barnhart MD Electronically signed on 2024 at 8:10:32 AM  ** Final **     Lower extremity venous duplex right    Result Date: 2024           [place] [address] [place], [place] [address] Tel [phone] and Fax [phone]  Vascular Lab Report VASC US LOWER  EXTREMITY VENOUS DUPLEX RIGHT  Patient Name:      RAJ HARMON        [place] Physician:  [id number] Kandis Schmitt MD Study Date:        2/27/2024             Ordering Physician: [id number] JOSE BAILEY MRN/PID:           [id number]              Technologist:       Marybel Villagran T Accession#:        [id number]          Technologist 2: Date of Birth/Age: 1955 / 68 years Encounter#:         [id number] Gender:            M Admission Status:  Outpatient            Location Performed: [facility]  Diagnosis/ICD: Pain in right leg-M79.604 Indication:    Limb pain. CPT Codes:     60591 Peripheral venous duplex scan for DVT Limited  CONCLUSIONS: Right Lower Venous: No evidence of acute deep vein thrombus visualized in the right lower extremity. Left Lower Venous: Left common femoral vein is negative for deep vein thrombus.  Imaging & Doppler Findings:  Right                 Compressible Thrombus        Flow Distal External Iliac     Yes        None   Spontaneous/Phasic CFV                       Yes        None   Spontaneous/Phasic PFV                       Yes        None FV Proximal               Yes        None   Spontaneous/Phasic FV Mid                    Yes        None FV Distal                 Yes        None Popliteal                 Yes        None   Spontaneous/Phasic Peroneal                  Yes        None PTV                       Yes        None  Left Compress Thrombus        Flow CFV    Yes      None   Spontaneous/Phasic  [id number] Kandis Schmitt MD Electronically signed by [id number] Kandis Schmitt MD on 2/27/2024 at 4:36:46 PM  ** Final **     XR chest 1 view    Result Date: 2/27/2024  Interpreted By:  Finkelstein, Evan, STUDY: XR CHEST 1 VIEW;  2/27/2024 2:40 am   INDICATION: Signs/Symptoms:CP.   COMPARISON: Chest  radiograph 10/14/2023   ACCESSION NUMBER(S): [id number]   ORDERING CLINICIAN: JOHN DIXON   FINDINGS: Low lung volumes with bronchovascular crowding. Right upper extremity PICC present with tip overlying the cavoatrial junction.   CARDIOMEDIASTINAL SILHOUETTE: Cardiomediastinal silhouette is stable in size and configuration.   LUNGS: Bibasilar opacities. No sizable pleural effusion or pneumothorax.   ABDOMEN: No remarkable upper abdominal findings.   BONES: No acute osseous abnormality.       Low lung volumes with bronchovascular crowding and bibasilar atelectasis. Superimposed pneumonia is not excluded in the appropriate clinical setting.   MACRO: None.   Signed by: Evan Finkelstein 2/27/2024 2:47 AM Dictation workstation:   [id number]    CT head wo IV contrast    Result Date: 2/27/2024  Interpreted By:  Tye Wakefield, STUDY: CT HEAD WO IV CONTRAST;  2/27/2024 2:33 am   INDICATION: Signs/Symptoms:Patient hit left side of head during transition to bed.   COMPARISON: Noncontrast head CT of 09/13/2021.   ACCESSION NUMBER(S): [id number]   ORDERING CLINICIAN: JOHN DIXON   TECHNIQUE: Noncontrast axial CT scan of head was performed. Angled reformats in brain and bone windows were generated. The images were reviewed in bone, brain, blood and soft tissue windows.   FINDINGS: CSF Spaces: The ventricles, sulci and basal cisterns are within normal limits. There is no extraaxial fluid collection.   Parenchyma: Moderate to advanced volume loss.  There are multifocal small regions of T2/FLAIR hyperintense signal abnormality in the subcortical and periventricular white matter, as well as patchy signal abnormality in the cody, nonspecific findings seen most often with chronic microangiopathic change or as sequelae of inflammatory demyelination. The grey-white differentiation is intact. There is no mass effect or midline shift.  There is no intracranial hemorrhage.   Calvarium: The calvarium is unremarkable.   Paranasal  "sinuses and mastoids: Bilateral mastoid effusions, as before. Mild polypoid mucosal thickening in maxillary sinuses, ethmoid air cells and right sphenoid sinus.       No evidence of acute intracranial abnormality.   MACRO: None   Signed by: Tye Wakefield 2/27/2024 2:37 AM Dictation workstation:   [id number]        Assessment/Plan   IMP:  Andrea Ochoa is a 68 y.o. male with past medical history of heart failure, COPD on chronic 4 L nasal cannula, hypertension, hyperlipidemia, CHILANGO, seizures, BPH, anxiety, and sacral wound status post PICC line and wound VAC, is presenting from SNF with left-sided chest pain.  ED workup was significant for leukocytosis, hypotension, and tachycardia; septic workup was initiated and the patient was placed on broad-spectrum IV antibiotics.  Chest x-ray does show bibasilar opacities concerning for pneumonia.  ID was consulted due to management of pneumonia as well as sacral wound antibiotics.  A wound culture was obtained, and wound care was consulted.  The patient was admitted under the medicine service with pneumonia.  Palliative care was consulted to discuss goals of care and advance care planning.    2/28/2024-   The patient described he has severe depression, which keeps him from sleeping at night.  He stated, \"look at my life; look at what I have.\"  The patient also endorsed severe pain \"all over\", which he stated has been going on for \"a long time\", and of which he stated is improved by medication administration.  The patient did state his shortness of breath is improved; he denied feeling nauseated or constipated, but did state he has an overall poor appetite.  The patient stated he is unsure what his goals are, because of his pain.  Nursing indicated the patient does wish to speak about hospice care with his wife present.  The patient's generalized pain is significant for likely existential suffering, especially with his description of his underlying depression regarding his " quality of life.  I did ask the patient what his [employer] beliefs are, and he stated he practices the Hindu juan carlos; I did order a pastoral care consult to address the patient's existential suffering.  I did also ask the patient if he would be agreeable to pet therapy with the therapy dogs that are brought around by volunteers, and the patient was highly agreeable; I did order this treatment as well.  I discussed with the patient that suffering is not always only a physical origin, and that we do also need to address internal suffering that can be spiritual, mental, and emotional, to which the patient verbalized understanding.  I did ask the patient's nurse to provide the patient with a dose of his pain medication this morning to also assist with the physical nature of his discomfort; as the patient does have a substantial sacral wound, this is likely causing physical pain to that locality and exacerbating his other suffering.  Of note, the patient is typically on Dilaudid tablets 4 mg every 6 hours as needed as well as fentanyl patch 75 mcg for pain management in the outpatient setting; if the primary care team does agree, we would recommend at least initiating the patient's home fentanyl patch for improved pain management.  I did speak with the patient's wife, Linette, over the phone this morning, and she stated she would very much like to meet with me and the patient later this morning to discuss goals of care, as she does have concerns regarding the patient's quality of life as well.  We made plans to meet at 1130.    1150-   I did meet with the patient, his wife, and the hospital , at the patient's bedside, in order to discuss goals of care moving forward.  I introduced the practice of palliative care and the services it provides. I then reviewed at length with the patient and his wife the clinical diagnosis/medical problems that the patient presented with and the treatments provided so far. We  discussed the implications of the current circumstances and option plans going forward.  The patient and his wife were interested to learn more about outpatient palliative care versus hospice care.  I did discuss with them outpatient palliative care services which would cover symptom management and ongoing goals of care discussions, and would follow with the patient wherever he may be in the outpatient setting on more of a monthly basis, traditionally.  I did clarify that outpatient palliative care will not impede or restrict available treatment options for the patient, and both the patient and his wife verbalized understanding.  I then introduced the philosophy of hospice care and the services it provides.  I explained how hospice attends to patient's quality of life and dignity while the disease takes its natural course.  I emphasized how hospice focuses on decreasing the burden of the disease and providing comfort not only for the patient but also for the family and caregivers.  We discussed that hospice is more focused at the end of life, and while it does not prevent death, it is designed to alleviate suffering during the dying process.  The patient and his wife discussed these options of care, and the patient does defer to his wife for ultimate decision making; the patient and his wife agreed that initiating outpatient palliative care would likely be best for him moving forward, and we did discuss that if they would choose to initiate hospice care in the future, outpatient palliative care could transition at that time.  We then discussed the CODE STATUS and what are the differences between being a Full Code versus a DNR (DO NOT RESUSCITATE).  I first started by explaining that DNR does not mean do not treat.  I then explained in details what does it mean to be a full code and what actions/procedures are taken when an individual has a cardiac/respiratory arrest and CPR (cardiopulmonary resuscitation) is done.   I did explain that by definition, when there is no pulse, that means death.  CPR is an intervention that tries to reverse death but not a treatment to prevent it from happening.  While this is considered an appropriate intervention in many situations; however, for those with advanced medical problems, especially an underlying irreversible/progressive/incurable disease, such an intervention can cause more harm than benefit and is most of the time considered futile.  The patient stated he was unsure what his CODE STATUS preferences would be, and after further discussion, detailing what chest compressions and intubation with mechanical ventilation are, the patient and his wife agreed that they would not want chest compressions nor mechanical ventilation for the patient, although they would be open to placing the patient to the ICU if needed.  I did confirm that this would mean changing the patient's CODE STATUS to DNR CCA DNI, and both the patient and his wife agreed.  I did change the CODE STATUS to DNR CCA DNI in the computer and placed a paper copy on the chart.  The patient does have generalized pain, and he is not currently on his home medications which would include higher dose Dilaudid tablets and fentanyl patch; also of note, the patient is not receiving his daily BuSpar.  I would recommend initiating his home dose BuSpar 10 mg twice daily, if the primary care team does agree.  It may also benefit the patient to initiate on Cymbalta daily, which may address his depression as well as his chronic pain, if the primary care team does agree.  The patient's wife did request a podiatry consult, as the patient's feet have had declining in care since the patient was placed to his previous care facility; I did notify both the patient's nurse and Dr. Lewis.  I answered the patient's and his wife's questions and concerns to the best of my ability and offered emotional support.  After discussion with Dr. Lewis of all of  the above, we did increase the patient's as needed Dilaudid tablet to 2 mg, added the patient's home dose of BuSpar, started the patient on low-dose Cymbalta daily, and did add a podiatry consult.  Palliative care will continue to follow.    Thank you for allowing us to participate in the care of this gentleman.  Should you have any further questions or concerns regarding his care, please do not hesitate to contact us via [employer] (Meeta Torres during weekdays work hours and Russ Corrigan during weekdays after hours and over the weekend)    (This note was generated with voice recognition software and may contain errors including spelling, grammar, syntax and misrecognition of what was dictated, that are not fully corrected)        Patient/proxy preference for information  Prefers full information    Goals of Care  The patient remains a full code at this time.  We will be having a family meeting at 1130 this morning.      I spent 60 minutes in the professional and overall care of this patient.      Meeta Torres, APRN-CNP

## 2024-02-28 NOTE — CARE PLAN
The patient's goals for the shift include      The clinical goals for the shift include maintain SBP > 100

## 2024-02-28 NOTE — CONSULTS
"Nutrition Initial Assessment:     Reason for Assessment  Reason for Assessment: Dietitian discretion    Visit Reason: chest pain  Recommendation(s):  Continue cardiac diet as long as pt eats well, sending natalio X 2 and magic cup at lunch to help meet nutritional needs.        Nutrition Assessment    Nutrition History  Food and Nutrient History: Pt is on a regular texture diet and has no problems eating.  He is unable to use his arms so he needs to be fed.  His family is very involved.  Vitamin/Herbal Supplement Use: pt uses prostat at home        Per Flowsheet Percent Meal intake:    Dietary Orders (From admission, onward)       Start     Ordered    02/28/24 1457  Oral nutritional supplements  Until discontinued        Question Answer Comment   Deliver with Breakfast    Deliver with Dinner    Select supplement: Natalio        02/28/24 1456    02/27/24 0458  Adult diet Cardiac; 70 gm fat; 2 - 3 grams Sodium  Diet effective now        Question Answer Comment   Diet type Cardiac    Fat restriction: 70 gm fat    Sodium restriction: 2 - 3 grams Sodium        02/27/24 0457                     Results from last 7 days   Lab Units 02/28/24  0532 02/27/24  1416 02/27/24  0212   GLUCOSE mg/dL 133* 149* 127*   SODIUM mmol/L 135* 134* 134*   POTASSIUM mmol/L 4.0 3.7 3.1*   CHLORIDE mmol/L 96* 94* 92*   CO2 mmol/L 34* 34* 31   BUN mg/dL 22 24* 22   CREATININE mg/dL 0.37* 0.44* 0.52   EGFR mL/min/1.73m*2 >90 >90 >90   CALCIUM mg/dL 7.9* 8.0* 8.2*   MAGNESIUM mg/dL 1.84 1.72 1.18*     No results found for: \"HGBA1C\"      GI per flowsheet:  Gastrointestinal  Gastrointestinal (WDL): Exceptions to WDL  Bowel Sounds: All quadrants  Bowel Sounds (All Quadrants): Active  Last BM Date: 02/25/24  Last bowel movement documented: 02/25/24  PMH: chronic back pain; COPD; CHF: anxiety; Gout; CHILANGO; seizure disorder; HTN  Allergies: Adhesive tape-silicones, Iodinated contrast media, Codeine, and Opioids - morphine analogues " "    Anthropometrics:  Height: 182.9 cm (6' 0.01\")  Weight: 81.6 kg (179 lb 14.3 oz)  BMI (Calculated): 24.39  IBW: 81 kg  %IBW: 100 %          Estimated Nutritional Needs:  Method for Estimating Needs: 6803-0774  MSJ    Method for Estimating Needs:     1.2-1.5 gm kg of IBW    Method for Estimating Needs: 2037-0287   20-30 ml kg of IBW as medically indicated    Nutrition Focused Physical Findings:   Orbital Fat Pads:  (pt not appropriate)       Skin: Positive (wounds on back, sacrum and Rt elbow)  Pain Score: 6     Nutrition Diagnosis        Patient has Nutrition Diagnosis: Yes  Ongoing  Nutrition Diagnosis 1: Increased nutrient needs  Related to (1): physiological causes  As Evidenced by (1): wound healing needs       Nutrition Interventions/Recommendations   Interventions        Individualized Nutrition Prescription Provided for : Continue cardiac diet as long as pt eats well,  sending torres X 2 and magic cup at lunch to help meet nutritional needs.    Nutrition Monitoring and Evaluation   Biochemical Data, Medical Tests and Procedures  Monitoring and Evaluation Plan: Electrolyte/renal panel, Glucose/endocrine profile  Food/Nutrient Related History Monitoring  Monitoring and Evaluation Plan: Energy intake, Fluid intake, Amount of food    Progress towards goals: Met    Education Documentation  No documentation found.         Time Spent (min): 45 minutes  Last Date of Nutrition Visit: 02/28/24  Nutrition Follow-Up Needed?: 3-5 days  Follow up Comment: 3/4  FLEX   "

## 2024-02-28 NOTE — PROGRESS NOTES
SW met with patient, wife and Palliative CNP at bedside to discuss palliative care, hospice care, skilled nursing and LTC. SW provided choices for palliative and hospice agencies and discussed SNF placement. Wife Linette ([phone]) would prefer to start with referrals sent to [facility] facility, with palliative options. Linette to review list of palliative agencies and update this SW. Fany asked this SW to [facility]. SW to complete. SW updated TCC and will continue to follow.  JUNG NOWAK, BENI

## 2024-02-29 NOTE — PROGRESS NOTES
Vancomycin Dosing by Pharmacy- FOLLOW UP    Andrea Ochoa is a 68 y.o. year old male who Pharmacy has been consulted for vancomycin dosing for sacral osteomyelitis. Based on the patient's indication and renal status this patient is being dosed based on a goal AUC of 400-600.     Renal function is currently stable.    Current vancomycin dose: 1250 mg given every 12 hours    Estimated vancomycin AUC on current dose: 539 mg/L.hr     Visit Vitals  /58   Pulse 81   Temp 36.8 °C (98.2 °F)   Resp 20        Lab Results   Component Value Date    CREATININE 0.29 (L) 02/29/2024    CREATININE 0.37 (L) 02/28/2024    CREATININE 0.44 (L) 02/27/2024    CREATININE 0.52 02/27/2024        Lab Results   Component Value Date    BLOODCULT Loaded on Instrument - Culture in progress 02/29/2024    URINECULTURE NO GROWTH 03/01/2023       I/O last 3 completed shifts:  In: 2146.7 (26.3 mL/kg) [P.O.:330; I.V.:816.7 (10 mL/kg); IV Piggyback:1000]  Out: 2525 (30.9 mL/kg) [Urine:2525 (0.9 mL/kg/hr)]  Dosing Weight: 81.6 kg       Assessment/Plan    Day #2 of vancomycin therapy.   Within goal AUC range. Continue current vancomycin regimen.    This dosing regimen is predicted by [employer] to result in the following pharmacokinetic parameters:  AUC24,ss: 539 mg/L.hr  Probability of AUC24 > 400: 96 %  Ctrough,ss: 15.8 mg/L  Probability of Ctrough,ss > 20: 19 %  Probability of nephrotoxicity (Lodise SEN 2009): 11 %    The next level will be obtained on 3/3 with [facility] labs. May be obtained sooner if clinically indicated.   Will continue to monitor renal function daily while on vancomycin and order serum creatinine at least every 48 hours if not already ordered.  Will follow for continued vancomycin needs, clinical response, and signs/symptoms of toxicity.     Please call with any questions.  Jamee Barron, PharmD, BCCCP  [id number]

## 2024-02-29 NOTE — CARE PLAN
The patient's goals for the shift include      The clinical goals for the shift include pain management    Problem: Pain  Goal: My pain/discomfort is manageable  Outcome: Progressing     Problem: Safety  Goal: Patient will be injury free during hospitalization  Outcome: Progressing     Problem: Daily Care  Goal: Daily care needs are met  Outcome: Progressing     Problem: Psychosocial Needs  Goal: Demonstrates ability to cope with hospitalization/illness  Outcome: Progressing     Problem: Skin  Goal: Decreased wound size/increased tissue granulation at next dressing change  Outcome: Progressing  Flowsheets (Taken 2/29/2024 0119)  Decreased wound size/increased tissue granulation at next dressing change:   Promote sleep for wound healing   Protective dressings over bony prominences  Goal: Participates in plan/prevention/treatment measures  Outcome: Progressing  Flowsheets (Taken 2/29/2024 0119)  Participates in plan/prevention/treatment measures: Elevate heels  Goal: Prevent/manage excess moisture  Outcome: Progressing  Flowsheets (Taken 2/29/2024 0119)  Prevent/manage excess moisture:   Cleanse incontinence/protect with barrier cream   Moisturize dry skin   Follow provider orders for dressing changes   Monitor for/manage infection if present

## 2024-02-29 NOTE — CONSULTS
Inpatient consult to Podiatry  Consult performed by: Tori Hernandez DPM  Consult ordered by: Angle Lewis DO  Reason for consult: foot care          Reason For Consult  Footcare    History Of Present Illness  Andrea Ochoa is a 68 y.o. male with past medical history of anxiety, CHF, COPD seizures, TIA, weakness, gout, chronic respiratory failure, on oxygen, UTI and sacral wound who presented to emergency department from [facility] nursing [facility] for evaluation of left-sided chest pain.  Podiatry was consulted for foot care.  Patient is known from previous hospital stays.  Patient is requesting nail debridement.  Patient is also stating that he has pain in his posterior left heel even though previous medical staff states that there is nothing there.  Pain in the left heel times several days.  Patient states that overall he is not feeling well today.  States he is having a lot of pain in his back and sacral area.  Patient is nonambulatory.  ROS negative except for what is stated in HPI.  Past Medical History  He has a past medical history of Anxiety, CHF (congestive heart failure) (CMS/HCC), Chronic respiratory failure with hypoxia (CMS/HCC), COPD (chronic obstructive pulmonary disease) (CMS/HCC), Dependence on supplemental oxygen, Difficulty walking, Encounter for removal of internal fixation device, Gout, Lack of coordination, Local infection of the skin and subcutaneous tissue, unspecified, Low back pain, Seizures (CMS/HCC), Transient cerebral ischemic attack, UTI (urinary tract infection), and Weakness.    Surgical History  He has a past surgical history that includes Other surgical history (10/10/2019); Other surgical history (10/10/2019); and Other surgical history (10/10/2019).     Social History  He reports that he has quit smoking. His smoking use included cigarettes. He has quit using smokeless tobacco. He reports that he does not currently use alcohol. He reports that he does not use  "drugs.    Family History  Family History   Problem Relation Name Age of Onset    No Known Problems Mother      No Known Problems Father          Allergies  Adhesive tape-silicones, Iodinated contrast media, Codeine, and Opioids - morphine analogues    Review of Systems     Physical Exam  Patient alert, oriented, no acute distress, resting in bed.    VASC: +2/4 pedal pulses B/L.  CFT brisk all digits.  Feet warm to touch.  (-)hair growth B/L.   Mild LE edema B/L.    NEURO: Neurological findings are consistent with neuropathy, there is decreased sensation noted on the distal left foot.  Light touch intact R.     DERM:Nails 1-5 bilateral are thickened, discolored, crumbly, painful and elongated with subungual debris. Pain on palpation to the nails. No cellulitis noted.   There is no ulcerations, no bulla, no fissures noted bilateral foot  Webspaces are dry, clean, and intact bilateral foot.    MUSCULOSKEL: Patient is able to wiggle his toes bilaterally.  Patient is able to minimally lift the left heel off the bed.  There is pain upon palpation of the posterior aspect of the left heel.  There is no bulla or ecchymosis noted in this area.  There is very mild erythema consistent with pressure.  No ulcerations noted.  No calor, no edema noted in this area.      Last Recorded Vitals  Blood pressure 122/63, pulse 95, temperature 37 °C (98.6 °F), resp. rate 22, height 1.829 m (6' 0.01\"), weight 81.6 kg (179 lb 14.3 oz), SpO2 99 %.    Relevant Results  Results for orders placed or performed during the hospital encounter of 02/27/24   Blood Culture    Specimen: Peripheral Venipuncture; Blood culture   Result Value Ref Range    Blood Culture No growth at 2 days    Blood Culture    Specimen: Peripheral Venipuncture; Blood culture   Result Value Ref Range    Blood Culture Acinetobacter calcoaceticus-baumannii complex (AA)     BLOOD CULTURE BOTTLE  Positive Aerobic Bottle     Gram Stain Gram negative bacilli (AA)     Gram Stain " Gram positive bacilli (AA)        Susceptibility    Acinetobacter calcoaceticus-baumannii complex - MICROSCAN     Amikacin  Resistant      Ampicillin/Sulbactam  Resistant      Cefepime  Resistant      Ceftazidime  Resistant      Ceftriaxone  Resistant      Ciprofloxacin  Resistant      Gentamicin  Resistant      Imipenem  Resistant      Levofloxacin  Resistant      Meropenem  Resistant      Piperacillin/Tazobactam        Tobramycin  Resistant      Trimethoprim/Sulfamethoxazole  Susceptible    Tissue/Wound Culture/Smear    Specimen: Buttock; Tissue/Biopsy   Result Value Ref Range    Tissue/Wound Culture/Smear (A)      (1+) Rare Acinetobacter calcoaceticus-baumannii complex    Gram Stain (1+) Rare Polymorphonuclear leukocytes     Gram Stain No organisms seen    Blood Culture    Specimen: Peripheral Venipuncture; Blood culture   Result Value Ref Range    Blood Culture Loaded on Instrument - Culture in progress    CBC and Auto Differential   Result Value Ref Range    WBC 18.0 (H) 4.4 - 11.3 x10*3/uL    nRBC 0.0 0.0 - 0.0 /100 WBCs    RBC 3.35 (L) 4.50 - 5.90 x10*6/uL    Hemoglobin 9.9 (L) 13.5 - 17.5 g/dL    Hematocrit 32.4 (L) 41.0 - 52.0 %    MCV 97 80 - 100 fL    MCH 29.6 26.0 - 34.0 pg    MCHC 30.6 (L) 32.0 - 36.0 g/dL    RDW 15.6 (H) 11.5 - 14.5 %    Platelets 586 (H) 150 - 450 x10*3/uL    Neutrophils % 87.6 40.0 - 80.0 %    Immature Granulocytes %, Automated 2.6 (H) 0.0 - 0.9 %    Lymphocytes % 5.2 13.0 - 44.0 %    Monocytes % 4.3 2.0 - 10.0 %    Eosinophils % 0.1 0.0 - 6.0 %    Basophils % 0.2 0.0 - 2.0 %    Neutrophils Absolute 15.82 (H) 1.20 - 7.70 x10*3/uL    Immature Granulocytes Absolute, Automated 0.47 0.00 - 0.70 x10*3/uL    Lymphocytes Absolute 0.94 (L) 1.20 - 4.80 x10*3/uL    Monocytes Absolute 0.77 0.10 - 1.00 x10*3/uL    Eosinophils Absolute 0.01 0.00 - 0.70 x10*3/uL    Basophils Absolute 0.03 0.00 - 0.10 x10*3/uL   Comprehensive metabolic panel   Result Value Ref Range    Glucose 127 (H) 74 - 99 mg/dL     Sodium 134 (L) 136 - 145 mmol/L    Potassium 3.1 (L) 3.5 - 5.3 mmol/L    Chloride 92 (L) 98 - 107 mmol/L    Bicarbonate 31 21 - 32 mmol/L    Anion Gap 14 10 - 20 mmol/L    Urea Nitrogen 22 6 - 23 mg/dL    Creatinine 0.52 0.50 - 1.30 mg/dL    eGFR >90 >60 mL/min/1.73m*2    Calcium 8.2 (L) 8.6 - 10.3 mg/dL    Albumin 2.3 (L) 3.4 - 5.0 g/dL    Alkaline Phosphatase 94 33 - 136 U/L    Total Protein 6.1 (L) 6.4 - 8.2 g/dL    AST 13 9 - 39 U/L    Bilirubin, Total 0.3 0.0 - 1.2 mg/dL    ALT <3 (L) 10 - 52 U/L   Magnesium   Result Value Ref Range    Magnesium 1.18 (L) 1.60 - 2.40 mg/dL   Sars-CoV-2 and Influenza A/B PCR   Result Value Ref Range    Flu A Result Not Detected Not Detected    Flu B Result Not Detected Not Detected    Coronavirus 2019, PCR Not Detected Not Detected   Troponin I, High Sensitivity, Initial   Result Value Ref Range    Troponin I, High Sensitivity 4 0 - 20 ng/L   Troponin, High Sensitivity, 1 Hour   Result Value Ref Range    Troponin I, High Sensitivity 4 0 - 20 ng/L   Lavender Top   Result Value Ref Range    Extra Tube Hold for add-ons.    Gray Top   Result Value Ref Range    Extra Tube Hold for add-ons.    Blood Gas Venous Full Panel   Result Value Ref Range    POCT pH, Venous 7.46 (H) 7.33 - 7.43 pH    POCT pCO2, Venous 57 (H) 41 - 51 mm Hg    POCT pO2, Venous 62 (H) 35 - 45 mm Hg    POCT SO2, Venous 90 (H) 45 - 75 %    POCT Oxy Hemoglobin, Venous 89.7 (H) 45.0 - 75.0 %    POCT Hematocrit Calculated, Venous 27.0 (L) 41.0 - 52.0 %    POCT Sodium, Venous 132 (L) 136 - 145 mmol/L    POCT Potassium, Venous 3.1 (L) 3.5 - 5.3 mmol/L    POCT Chloride, Venous 96 (L) 98 - 107 mmol/L    POCT Ionized Calicum, Venous 1.21 1.10 - 1.33 mmol/L    POCT Glucose, Venous 125 (H) 74 - 99 mg/dL    POCT Lactate, Venous 1.1 0.4 - 2.0 mmol/L    POCT Base Excess, Venous 14.8 (H) -2.0 - 3.0 mmol/L    POCT HCO3 Calculated, Venous 40.5 (H) 22.0 - 26.0 mmol/L    POCT Hemoglobin, Venous 9.0 (L) 13.5 - 17.5 g/dL    POCT  Anion Gap, Venous -1.0 (L) 10.0 - 25.0 mmol/L    Patient Temperature 37.0 degrees Celsius    FiO2 36 %   Urinalysis with Reflex Microscopic   Result Value Ref Range    Color, Urine Kandice (N) Straw, Yellow    Appearance, Urine Hazy (N) Clear    Specific Gravity, Urine 1.018 1.005 - 1.035    pH, Urine 5.0 5.0, 5.5, 6.0, 6.5, 7.0, 7.5, 8.0    Protein, Urine 100 (2+) (N) NEGATIVE mg/dL    Glucose, Urine 50 (1+) (A) NEGATIVE mg/dL    Blood, Urine SMALL (1+) (A) NEGATIVE    Ketones, Urine 5 (TRACE) (A) NEGATIVE mg/dL    Bilirubin, Urine NEGATIVE NEGATIVE    Urobilinogen, Urine <2.0 <2.0 mg/dL    Nitrite, Urine NEGATIVE NEGATIVE    Leukocyte Esterase, Urine LARGE (3+) (A) NEGATIVE   Sedimentation rate, automated   Result Value Ref Range    Sedimentation Rate 50 (H) 0 - 20 mm/h   C-reactive protein   Result Value Ref Range    C-Reactive Protein 14.08 (H) <1.00 mg/dL   B-type natriuretic peptide   Result Value Ref Range    BNP 82 0 - 99 pg/mL   Lactate   Result Value Ref Range    Lactate 0.5 0.4 - 2.0 mmol/L   Protime-INR   Result Value Ref Range    Protime 15.9 (H) 9.8 - 12.8 seconds    INR 1.4 (H) 0.9 - 1.1   Lipid Panel   Result Value Ref Range    Cholesterol 108 0 - 199 mg/dL    HDL-Cholesterol 45.9 mg/dL    Cholesterol/HDL Ratio 2.4     LDL Calculated 51 <=99 mg/dL    VLDL 12 0 - 40 mg/dL    Triglycerides 58 0 - 149 mg/dL    Non HDL Cholesterol 62 0 - 149 mg/dL   Type and Screen   Result Value Ref Range    ABO TYPE A     Rh TYPE POS     ANTIBODY SCREEN NEG    CBC   Result Value Ref Range    WBC 8.9 4.4 - 11.3 x10*3/uL    nRBC 0.0 0.0 - 0.0 /100 WBCs    RBC 3.18 (L) 4.50 - 5.90 x10*6/uL    Hemoglobin 9.5 (L) 13.5 - 17.5 g/dL    Hematocrit 31.0 (L) 41.0 - 52.0 %    MCV 98 80 - 100 fL    MCH 29.9 26.0 - 34.0 pg    MCHC 30.6 (L) 32.0 - 36.0 g/dL    RDW 15.7 (H) 11.5 - 14.5 %    Platelets 480 (H) 150 - 450 x10*3/uL   Basic Metabolic Panel   Result Value Ref Range    Glucose 149 (H) 74 - 99 mg/dL    Sodium 134 (L) 136 - 145  mmol/L    Potassium 3.7 3.5 - 5.3 mmol/L    Chloride 94 (L) 98 - 107 mmol/L    Bicarbonate 34 (H) 21 - 32 mmol/L    Anion Gap 10 10 - 20 mmol/L    Urea Nitrogen 24 (H) 6 - 23 mg/dL    Creatinine 0.44 (L) 0.50 - 1.30 mg/dL    eGFR >90 >60 mL/min/1.73m*2    Calcium 8.0 (L) 8.6 - 10.3 mg/dL   Magnesium   Result Value Ref Range    Magnesium 1.72 1.60 - 2.40 mg/dL   Troponin I, High Sensitivity   Result Value Ref Range    Troponin I, High Sensitivity 5 0 - 20 ng/L   D-dimer, VTE Exclusion   Result Value Ref Range    D-Dimer, Quantitative VTE Exclusion 335 <=500 ng/mL FEU   Lipase   Result Value Ref Range    Lipase <3 (L) 9 - 82 U/L   Procalcitonin   Result Value Ref Range    Procalcitonin 0.29 (H) <=0.07 ng/mL   Microscopic Only, Urine   Result Value Ref Range    WBC, Urine >50 (A) 1-5, NONE /HPF    WBC Clumps, Urine MANY Reference range not established. /HPF    RBC, Urine 11-20 (A) NONE, 1-2, 3-5 /HPF    Bacteria, Urine 2+ (A) NONE SEEN /HPF   CBC   Result Value Ref Range    WBC 7.4 4.4 - 11.3 x10*3/uL    nRBC 0.0 0.0 - 0.0 /100 WBCs    RBC 2.82 (L) 4.50 - 5.90 x10*6/uL    Hemoglobin 8.3 (L) 13.5 - 17.5 g/dL    Hematocrit 27.6 (L) 41.0 - 52.0 %    MCV 98 80 - 100 fL    MCH 29.4 26.0 - 34.0 pg    MCHC 30.1 (L) 32.0 - 36.0 g/dL    RDW 15.9 (H) 11.5 - 14.5 %    Platelets 487 (H) 150 - 450 x10*3/uL   Basic Metabolic Panel   Result Value Ref Range    Glucose 133 (H) 74 - 99 mg/dL    Sodium 135 (L) 136 - 145 mmol/L    Potassium 4.0 3.5 - 5.3 mmol/L    Chloride 96 (L) 98 - 107 mmol/L    Bicarbonate 34 (H) 21 - 32 mmol/L    Anion Gap 9 (L) 10 - 20 mmol/L    Urea Nitrogen 22 6 - 23 mg/dL    Creatinine 0.37 (L) 0.50 - 1.30 mg/dL    eGFR >90 >60 mL/min/1.73m*2    Calcium 7.9 (L) 8.6 - 10.3 mg/dL   Magnesium   Result Value Ref Range    Magnesium 1.84 1.60 - 2.40 mg/dL   Lactate   Result Value Ref Range    Lactate 1.0 0.4 - 2.0 mmol/L   CBC   Result Value Ref Range    WBC 7.2 4.4 - 11.3 x10*3/uL    nRBC 0.0 0.0 - 0.0 /100 WBCs     RBC 2.79 (L) 4.50 - 5.90 x10*6/uL    Hemoglobin 8.6 (L) 13.5 - 17.5 g/dL    Hematocrit 27.8 (L) 41.0 - 52.0 %     80 - 100 fL    MCH 30.8 26.0 - 34.0 pg    MCHC 30.9 (L) 32.0 - 36.0 g/dL    RDW 15.8 (H) 11.5 - 14.5 %    Platelets 481 (H) 150 - 450 x10*3/uL   Basic Metabolic Panel   Result Value Ref Range    Glucose 75 74 - 99 mg/dL    Sodium 134 (L) 136 - 145 mmol/L    Potassium 3.6 3.5 - 5.3 mmol/L    Chloride 95 (L) 98 - 107 mmol/L    Bicarbonate 38 (H) 21 - 32 mmol/L    Anion Gap <7 (L) 10 - 20 mmol/L    Urea Nitrogen 16 6 - 23 mg/dL    Creatinine 0.29 (L) 0.50 - 1.30 mg/dL    eGFR >90 >60 mL/min/1.73m*2    Calcium 8.0 (L) 8.6 - 10.3 mg/dL   Vancomycin   Result Value Ref Range    Vancomycin 14.1 5.0 - 20.0 ug/mL   POCT GLUCOSE   Result Value Ref Range    POCT Glucose 171 (H) 74 - 99 mg/dL   ECG 12 lead   Result Value Ref Range    Ventricular Rate 118 BPM    Atrial Rate 226 BPM    NH Interval 145 ms    QRS Duration 102 ms    QT Interval 325 ms    QTC Calculation(Bazett) 456 ms    P Axis 42 degrees    R Axis -9 degrees    T Axis 106 degrees    QRS Count 19 beats    Q Onset 251 ms    T Offset 413 ms    QTC Fredericia 407 ms   Electrocardiogram, 12-lead PRN ACS symptoms   Result Value Ref Range    Ventricular Rate 99 BPM    Atrial Rate 99 BPM    NH Interval 138 ms    QRS Duration 100 ms    QT Interval 340 ms    QTC Calculation(Bazett) 436 ms    P Axis 36 degrees    R Axis 56 degrees    T Axis 46 degrees    QRS Count 17 beats    Q Onset 218 ms    P Onset 149 ms    P Offset 197 ms    T Offset 388 ms    QTC Fredericia 401 ms   Transthoracic Echo (TTE) Complete   Result Value Ref Range    AV pk epi 1.49 m/s    LVOT diam 2.40 cm    LV biplane EF 47 %    MV E/A ratio 0.88     Tricuspid annular plane systolic excursion 2.0 cm    LVIDd 6.00 cm    RVSP 33.7 mmHg    Aortic Valve Area by Continuity of Peak Velocity 2.96 cm2    AV pk grad 8.9 mmHg    LV A4C EF 56.6          Assessment/Plan     # Onychomycosis  # Stage I  pressure ulcer posterior left heel    -All findings discussed in detail with patient  -Will debride toenails at bedside  -Venelex ointment and offloading the left heel  -Will continue to follow    I spent 40 minutes in the professional and overall care of this patient.

## 2024-02-29 NOTE — PROGRESS NOTES
"Andrea Ochoa is a 68 y.o. male on day 2 of admission presenting with Pneumonia, unspecified organism.    Subjective   Upon assessment of the patient this morning, the patient was sleeping and did not appear to be in any obvious distress.    Objective     Physical Exam  Constitutional:       General: He is not in acute distress.     Appearance: He is ill-appearing. He is not toxic-appearing or diaphoretic.      Comments: Appears older than stated age, frail, sleeping  HENT:      Head: Normocephalic and atraumatic.      Nose: Nose normal.      Mouth/Throat:      Mouth: Mucous membranes are moist.      Pharynx: Oropharynx is clear.   Eyes:      Extraocular Movements: Deferred due to sleeping     Pupils: Deferred due to sleeping  Cardiovascular:      Rate and Rhythm: Normal rate and regular rhythm.      Heart sounds: Normal heart sounds.   Pulmonary:      Effort: Pulmonary effort is normal. No respiratory distress.      Breath sounds: Rhonchi present.      Comments: Expiratory rhonchi noted to bilateral bases, greater in the right  Abdominal:      General: Bowel sounds are normal. There is no distension.      Palpations: Abdomen is soft.      Tenderness: There is no abdominal tenderness. There is no guarding or rebound.   Musculoskeletal:      Cervical back: Normal range of motion. No rigidity.      Right lower leg: Edema present.      Left lower leg: Edema present.      Comments: Generalized atrophy of muscles   Skin:     General: Skin is warm and dry.      Coloration: Skin is pale.      Comments: Sacral wound being managed by wound care   Neurological:      Mental Status: Sleeping     Motor: Weakness present.   Psychiatric:         Behavior: Behavior normal.         Thought Content: Deferred due to sleeping     Comments: Without restlessness    Last Recorded Vitals  Blood pressure 141/69, pulse 77, temperature 35.8 °C (96.4 °F), resp. rate 18, height 1.829 m (6' 0.01\"), weight 81.6 kg (179 lb 14.3 oz), SpO2 96 " %.  Intake/Output last 3 Shifts:  I/O last 3 completed shifts:  In: 2146.7 (26.3 mL/kg) [P.O.:330; I.V.:816.7 (10 mL/kg); IV Piggyback:1000]  Out: 2525 (30.9 mL/kg) [Urine:2525 (0.9 mL/kg/hr)]  Dosing Weight: 81.6 kg     Relevant Results  Scheduled medications  busPIRone, 10 mg, oral, BID  DULoxetine, 20 mg, oral, Daily  heparin (porcine), 5,000 Units, subcutaneous, q8h  ipratropium-albuteroL, 3 mL, nebulization, TID  pantoprazole, 40 mg, intravenous, Daily  perflutren lipid microspheres, 0.5-10 mL of dilution, intravenous, Once in imaging  perflutren protein A microsphere, 0.5 mL, intravenous, Once in imaging  piperacillin-tazobactam, 3.375 g, intravenous, q6h  potassium chloride CR, 20 mEq, oral, Once  potassium chloride CR, 20 mEq, oral, Once  sodium hypochlorite, , irrigation, BID  sulfur hexafluoride microsphr, 2 mL, intravenous, Once in imaging  vancomycin, 1,250 mg, intravenous, q12h      Continuous medications     PRN medications  PRN medications: acetaminophen, albuterol, HYDROmorphone, ondansetron, oxygen, traMADol, vancomycin    Results for orders placed or performed during the hospital encounter of 02/27/24 (from the past 24 hour(s))   Lactate   Result Value Ref Range    Lactate 1.0 0.4 - 2.0 mmol/L   POCT GLUCOSE   Result Value Ref Range    POCT Glucose 171 (H) 74 - 99 mg/dL   CBC   Result Value Ref Range    WBC 7.2 4.4 - 11.3 x10*3/uL    nRBC 0.0 0.0 - 0.0 /100 WBCs    RBC 2.79 (L) 4.50 - 5.90 x10*6/uL    Hemoglobin 8.6 (L) 13.5 - 17.5 g/dL    Hematocrit 27.8 (L) 41.0 - 52.0 %     80 - 100 fL    MCH 30.8 26.0 - 34.0 pg    MCHC 30.9 (L) 32.0 - 36.0 g/dL    RDW 15.8 (H) 11.5 - 14.5 %    Platelets 481 (H) 150 - 450 x10*3/uL   Basic Metabolic Panel   Result Value Ref Range    Glucose 75 74 - 99 mg/dL    Sodium 134 (L) 136 - 145 mmol/L    Potassium 3.6 3.5 - 5.3 mmol/L    Chloride 95 (L) 98 - 107 mmol/L    Bicarbonate 38 (H) 21 - 32 mmol/L    Anion Gap <7 (L) 10 - 20 mmol/L    Urea Nitrogen 16 6 - 23  mg/dL    Creatinine 0.29 (L) 0.50 - 1.30 mg/dL    eGFR >90 >60 mL/min/1.73m*2    Calcium 8.0 (L) 8.6 - 10.3 mg/dL     Vascular US upper extremity venous duplex left    Result Date: 2/28/2024           [place] [id number] Tel [phone] and Fax [phone]  Vascular Lab Report VASC US UPPER EXTREMITY VENOUS DUPLEX LEFT  Patient Name:      RAJ Holland Physician:  [id number] Nasra Mendez MD Study Date:        2/28/2024             Ordering Physician: [id number] JOSE BAILEY MRN/PID:           [id number]              Technologist:       Meeta Obregon RVT Accession#:        [id number]          Technologist 2: Date of Birth/Age: 1955 / 68 years Encounter#:         [id number] Gender:            M Admission Status:  Inpatient             Location Performed: [facility]  Diagnosis/ICD: Left arm swelling-M79.89 CPT Codes:     17393 Peripheral venous duplex scan for DVT Limited  CONCLUSIONS: Right Upper Venous: The subclavian vein demonstrates a normal spontaneous and phasic flow. Left Upper Venous: No evidence of acute deep vein thrombus visualized in the left upper extremity.  Additional Findings: Technically difficult exam.  Imaging & Doppler Findings:  Left                Compress Thrombus        Flow Internal Jugular      Yes      None   Spontaneous/Phasic Subclavian Proximal   Yes      None   Spontaneous/Phasic Subclavian Mid        Yes      None Subclavian Distal     Yes      None   Spontaneous/Phasic Axillary              Yes      None   Spontaneous/Phasic Brachial              Yes      None Cephalic              Yes      None Basilic               Yes      None  [id number] Nasra Mendez MD Electronically signed by [id number] Nasra Mendez MD on 2/28/2024 at 5:49:11 PM   ** Final **     Transthoracic Echo (TTE) Complete    Result Date: 2/28/2024    [facility], [address], [facility] [address] [phone] and                                 Fax [phone] TRANSTHORACIC ECHOCARDIOGRAM REPORT  Patient Name:      RAJ HARMON       [place] Physician:    [id number] Janes Barnhart MD Study Date:        2/27/2024            Ordering Provider:    [id number] JOSE BAILEY MRN/PID:           [id number]             Fellow: Accession#:        [id number]         Nurse: Date of Birth/Age: 1955 / 68      Sonographer:          Mike Abbott RDCS                    years Gender:            M                    Additional Staff: Height:            182.88 cm            Admit Date:           2/27/2024 Weight:            81.65 kg             Admission Status:     Inpatient -                                                               Routine BSA / BMI:         2.04 m2 / 24.41      Encounter#:           [id number]                    kg/m2                                         Department Location:  [facility] Blood Pressure: 101 /62 mmHg Study Type:    TRANSTHORACIC ECHO (TTE) COMPLETE Diagnosis/ICD: Chest pain, unspecified-R07.9 CPT Code:      Echo Complete w Full Doppler-22262 Patient History: Pertinent History: Chest Pain, CHF, COPD, Renal Failure and Dialysis. Study Detail: The following Echo studies were performed: 2D, M-Mode, Doppler and               color flow. Technically challenging study due to body habitus,               patient lying in supine position and the patient's lack of               cooperation.  PHYSICIAN INTERPRETATION: Left Ventricle: The left ventricular systolic function is mildly decreased, with an estimated ejection fraction of 40-45%. There is global hypokinesis of the left ventricle with minor regional variations. The left  ventricular cavity size is mildly dilated. Spectral Doppler shows an impaired relaxation pattern of left ventricular diastolic filling. Left Atrium: The left atrium is normal in size. Right Ventricle: The right ventricle is normal in size. There is normal right ventricular global systolic function. Right Atrium: The right atrium is normal in size. Aortic Valve: The aortic valve appears structurally normal. There is no evidence of aortic valve regurgitation. The peak instantaneous gradient of the aortic valve is 8.9 mmHg. Mitral Valve: The mitral valve is normal in structure. There is mild mitral annular calcification. There is mild mitral valve regurgitation. Tricuspid Valve: The tricuspid valve is structurally normal. There is mild tricuspid regurgitation. The Doppler estimated RVSP is slightly elevated at 33.7 mmHg. Pulmonic Valve: The pulmonic valve is not well visualized. The pulmonic valve regurgitation was not well visualized. Pericardium: There is no pericardial effusion noted. Aorta: The aortic root is normal.  CONCLUSIONS:  1. Left ventricular systolic function is mildly decreased with a 40-45% estimated ejection fraction.  2. Spectral Doppler shows an impaired relaxation pattern of left ventricular diastolic filling.  3. Slightly elevated RVSP. QUANTITATIVE DATA SUMMARY: 2D MEASUREMENTS:                          Normal Ranges: LAs:           3.80 cm   (2.7-4.0cm) IVSd:          0.64 cm   (0.6-1.1cm) LVPWd:         0.84 cm   (0.6-1.1cm) LVIDd:         6.00 cm   (3.9-5.9cm) LVIDs:         4.62 cm LV Mass Index: 83.5 g/m2 LV % FS        23.0 % LA VOLUME:                             Normal Ranges: LA Volume Index: 28.0 ml/m2 RA VOLUME BY A/L METHOD:                       Normal Ranges: RA Area A4C: 15.0 cm2 M-MODE MEASUREMENTS:                  Normal Ranges: Ao Root: 3.40 cm (2.0-3.7cm) LV SYSTOLIC FUNCTION BY 2D PLANIMETRY (MOD):                     Normal Ranges: EF-A4C View: 56.6 % (>=55%) EF-A2C View:  35.4 % EF-Biplane:  47.1 % LV DIASTOLIC FUNCTION:                               Normal Ranges: MV Peak E:        0.71 m/s    (0.7-1.2 m/s) MV Peak A:        0.81 m/s    (0.42-0.7 m/s) E/A Ratio:        0.88        (1.0-2.2) MV medial e'      0.09 m/s MV A Dur:         148.00 msec PulmV Sys Joshua:    67.40 cm/s PulmV Will Joshua:   54.40 cm/s PulmV S/D Joshua:    1.20 PulmV A Revs Joshua: 19.90 cm/s MITRAL VALVE:                 Normal Ranges: MV DT: 232 msec (150-240msec) AORTIC VALVE:                         Normal Ranges: AoV Vmax:      1.49 m/s (<=1.7m/s) AoV Peak P.9 mmHg (<20mmHg) LVOT Max Joshua:  0.97 m/s (<=1.1m/s) LVOT VTI:      20.70 cm LVOT Diameter: 2.40 cm  (1.8-2.4cm) AoV Area,Vmax: 2.96 cm2 (2.5-4.5cm2)  RIGHT VENTRICLE: RV Basal 3.83 cm RV Mid   3.71 cm RV Major 8.9 cm TAPSE:   19.8 mm TRICUSPID VALVE/RVSP:                             Normal Ranges: Peak TR Velocity: 2.77 m/s RV Syst Pressure: 33.7 mmHg (< 30mmHg) PULMONIC VALVE:                      Normal Ranges: PV Max Joshua: 1.0 m/s  (0.6-0.9m/s) PV Max P.1 mmHg Pulmonary Veins: PulmV A Revs Joshua: 19.90 cm/s PulmV Will Joshua:   54.40 cm/s PulmV S/D Joshua:    1.20 PulmV Sys Joshua:    67.40 cm/s  [id number] Janes Barnhart MD Electronically signed on 2024 at 8:10:32 AM  ** Final **     Lower extremity venous duplex right    Result Date: 2024           [address] [address] Tel [phone] and Fax [phone]  Vascular Lab Report VASC US LOWER EXTREMITY VENOUS DUPLEX RIGHT  Patient Name:      RAJ Holland Physician:  [id number] Kandis Schmitt MD Study Date:        2024             Ordering Physician: [id number] JOSE BAILEY MRN/PID:           [id number]              Technologist:       Marybel Villagran MERLE Accession#:        [id number]          Technologist 2: Date of Birth/Age:  1955 / 68 years Encounter#:         [id number] Gender:            M Admission Status:  Outpatient            Location Performed: [facility]  Diagnosis/ICD: Pain in right leg-M79.604 Indication:    Limb pain. CPT Codes:     77392 Peripheral venous duplex scan for DVT Limited  CONCLUSIONS: Right Lower Venous: No evidence of acute deep vein thrombus visualized in the right lower extremity. Left Lower Venous: Left common femoral vein is negative for deep vein thrombus.  Imaging & Doppler Findings:  Right                 Compressible Thrombus        Flow Distal External Iliac     Yes        None   Spontaneous/Phasic CFV                       Yes        None   Spontaneous/Phasic PFV                       Yes        None FV Proximal               Yes        None   Spontaneous/Phasic FV Mid                    Yes        None FV Distal                 Yes        None Popliteal                 Yes        None   Spontaneous/Phasic Peroneal                  Yes        None PTV                       Yes        None  Left Compress Thrombus        Flow CFV    Yes      None   Spontaneous/Phasic  [id number] Kandis Schmitt MD Electronically signed by [id number] Kandis Schmitt MD on 2/27/2024 at 4:36:46 PM  ** Final **          Assessment/Plan   IMP:  Andrea Ochoa is a 68 y.o. male with past medical history of heart failure, COPD on chronic 4 L nasal cannula, hypertension, hyperlipidemia, CHILANGO, seizures, BPH, anxiety, and sacral wound status post PICC line and wound VAC, is presenting from SNF with left-sided chest pain.  ED workup was significant for leukocytosis, hypotension, and tachycardia; septic workup was initiated and the patient was placed on broad-spectrum IV antibiotics.  Chest x-ray does show bibasilar opacities concerning for pneumonia.  ID was consulted due to management of pneumonia as well as sacral wound antibiotics.  A wound culture was obtained, and  "wound care was consulted.  The patient was admitted under the medicine service with pneumonia.  Palliative care was consulted to discuss goals of care and advance care planning.     2/28/2024-   The patient described he has severe depression, which keeps him from sleeping at night.  He stated, \"look at my life; look at what I have.\"  The patient also endorsed severe pain \"all over\", which he stated has been going on for \"a long time\", and of which he stated is improved by medication administration.  The patient did state his shortness of breath is improved; he denied feeling nauseated or constipated, but did state he has an overall poor appetite.  The patient stated he is unsure what his goals are, because of his pain.  Nursing indicated the patient does wish to speak about hospice care with his wife present.  The patient's generalized pain is significant for likely existential suffering, especially with his description of his underlying depression regarding his quality of life.  I did ask the patient what his Scientologist beliefs are, and he stated he practices the Pentecostalism juan carlos; I did order a pastoral care consult to address the patient's existential suffering.  I did also ask the patient if he would be agreeable to pet therapy with the therapy dogs that are brought around by volunteers, and the patient was highly agreeable; I did order this treatment as well.  I discussed with the patient that suffering is not always only a physical origin, and that we do also need to address internal suffering that can be spiritual, mental, and emotional, to which the patient verbalized understanding.  I did ask the patient's nurse to provide the patient with a dose of his pain medication this morning to also assist with the physical nature of his discomfort; as the patient does have a substantial sacral wound, this is likely causing physical pain to that locality and exacerbating his other suffering.  Of note, the patient is " typically on Dilaudid tablets 4 mg every 6 hours as needed as well as fentanyl patch 75 mcg for pain management in the outpatient setting; if the primary care team does agree, we would recommend at least initiating the patient's home fentanyl patch for improved pain management.  I did speak with the patient's wife, Linette, over the phone this morning, and she stated she would very much like to meet with me and the patient later this morning to discuss goals of care, as she does have concerns regarding the patient's quality of life as well.  We made plans to meet at 1130.    1150-   I did meet with the patient, his wife, and the hospital , at the patient's bedside, in order to discuss goals of care moving forward.  I introduced the practice of palliative care and the services it provides. I then reviewed at length with the patient and his wife the clinical diagnosis/medical problems that the patient presented with and the treatments provided so far. We discussed the implications of the current circumstances and option plans going forward.  The patient and his wife were interested to learn more about outpatient palliative care versus hospice care.  I did discuss with them outpatient palliative care services which would cover symptom management and ongoing goals of care discussions, and would follow with the patient wherever he may be in the outpatient setting on more of a monthly basis, traditionally.  I did clarify that outpatient palliative care will not impede or restrict available treatment options for the patient, and both the patient and his wife verbalized understanding.  I then introduced the philosophy of hospice care and the services it provides.  I explained how hospice attends to patient's quality of life and dignity while the disease takes its natural course.  I emphasized how hospice focuses on decreasing the burden of the disease and providing comfort not only for the patient but also for  the family and caregivers.  We discussed that hospice is more focused at the end of life, and while it does not prevent death, it is designed to alleviate suffering during the dying process.  The patient and his wife discussed these options of care, and the patient does defer to his wife for ultimate decision making; the patient and his wife agreed that initiating outpatient palliative care would likely be best for him moving forward, and we did discuss that if they would choose to initiate hospice care in the future, outpatient palliative care could transition at that time.  We then discussed the CODE STATUS and what are the differences between being a Full Code versus a DNR (DO NOT RESUSCITATE).  I first started by explaining that DNR does not mean do not treat.  I then explained in details what does it mean to be a full code and what actions/procedures are taken when an individual has a cardiac/respiratory arrest and CPR (cardiopulmonary resuscitation) is done.  I did explain that by definition, when there is no pulse, that means death.  CPR is an intervention that tries to reverse death but not a treatment to prevent it from happening.  While this is considered an appropriate intervention in many situations; however, for those with advanced medical problems, especially an underlying irreversible/progressive/incurable disease, such an intervention can cause more harm than benefit and is most of the time considered futile.  The patient stated he was unsure what his CODE STATUS preferences would be, and after further discussion, detailing what chest compressions and intubation with mechanical ventilation are, the patient and his wife agreed that they would not want chest compressions nor mechanical ventilation for the patient, although they would be open to placing the patient to the ICU if needed.  I did confirm that this would mean changing the patient's CODE STATUS to DNR CCA DNI, and both the patient and his  wife agreed.  I did change the CODE STATUS to DNR CCA DNI in the computer and placed a paper copy on the chart.  The patient does have generalized pain, and he is not currently on his home medications which would include higher dose Dilaudid tablets and fentanyl patch; also of note, the patient is not receiving his daily BuSpar.  I would recommend initiating his home dose BuSpar 10 mg twice daily, if the primary care team does agree.  It may also benefit the patient to initiate on Cymbalta daily, which may address his depression as well as his chronic pain, if the primary care team does agree.  The patient's wife did request a podiatry consult, as the patient's feet have had declining in care since the patient was placed to his previous care facility; I did notify both the patient's nurse and Dr. Lewis.  I answered the patient's and his wife's questions and concerns to the best of my ability and offered emotional support.  After discussion with Dr. Lewis of all of the above, we did increase the patient's as needed Dilaudid tablet to 2 mg, added the patient's home dose of BuSpar, started the patient on low-dose Cymbalta daily, and did add a podiatry consult.  Palliative care will continue to follow.    2/29/2024-   The patient did not appear to be in any obvious distress, and so no additional medications will be recommended per our service at this time.  Multiple medication adjustments were made yesterday, and so we will continue to monitor for further need of symptom management.  Of note, the patient's last bowel movement was 3 days ago, and so we will monitor also for bowel regimen needs, specifically.  Following discussion with the patient and his wife yesterday, the patient is now DNR CCA DNI CODE STATUS, and they do wish to pursue rehabilitation and outpatient palliative care.  Palliative care will continue to follow.     Thank you for allowing us to participate in the care of this gentleman.  Should you have  any further questions or concerns regarding his care, please do not hesitate to contact us via [employer] (Meeta Torres during weekdays work hours and Russ Corrigan during weekdays after hours and over the weekend)     (This note was generated with voice recognition software and may contain errors including spelling, grammar, syntax and misrecognition of what was dictated, that are not fully corrected)      Patient/proxy preference for information  Prefers full information    Goals of Care  The patient is now DNR CCA DNI CODE STATUS.  The patient and his wife would like to pursue SNF with outpatient palliative care.           I spent 30 minutes in the professional and overall care of this patient.      Meeta Torres, APRN-CNP

## 2024-02-29 NOTE — PROGRESS NOTES
AFSHIN received update that [facility] at [facility] would not accept the patient. AFSHIN asked [facility] at [facility] if they would consider the patient under skilled care until LTC bed became available due to wound care needs. AFSHIN will await response.   AFSHIN received update that [facility] can accept the patient.   AFSHIN called the [facility] and was transferred to Patient Advocate Daljit ([phone] ext [phone]) and discussed service connectedness. Daljit reviewed pt's demographics and per Daljit, the patient and wife attempted to obtain benefits in 2013 but were over income. Per Daljit, over income would be a combined annual income between social security/pension of over $55,000 per year. Per Daljit, wife Linette can come to [facility] and reapply but will need to bring in DD-214 along with tax information to determine eligibility. Currently patient is not service connected. AFSHIN asked if there were other options in the future if the patient and wife remain over income, but also would need assistance. Per Daljit, if the medical expenses get too high for the pt/wife, the wife can come to the [facility] and apply for hardship benefits which would possibly include aide care or possibility of LTC assistance. AFSHIN asked if Daljit could provide information regarding this process to this SW in order to best assist wife. Daljit agreed and will sent to this SW's email.   AFSHIN to discuss discharge planning with wife. SW received VA information and will present to wife.   UPDATE 10:41am  AFSHIN called wife Linette and discussed VA update, LTC update and Palliative care referral. Linette would prefer to see if [facility] would accept in skilled dept before going with another option like [facility] SNF/LTC. AFSHIN reviewed VA update and will provide VA education at bedside. AFSHIN reviewed palliative care options, Linette agreeable to referral being sent to [facility]. SW sent referrals and will continue to follow for SNF acceptance and  discharge needs.   UPDATE 2:45pm  SW called wife Linette and discussed [facility] at [facility] for both skilled and LTC. SW asked if she would like to consider the acceptance with [facility], Linette would prefer referral to be sent to [facility] first to see about bed availability. SW asked [facility] to send referral and will continue to follow.  JUNG NOWAK, LSW

## 2024-02-29 NOTE — PROGRESS NOTES
Vancomycin Dosing by Pharmacy- Cessation of Therapy    Consult to pharmacy for vancomycin dosing has been discontinued by the prescriber, pharmacy will sign off at this time.    Please call pharmacy if there are further questions or re-enter a consult if vancomycin is resumed.     Jamee Barron, PharmD

## 2024-02-29 NOTE — PROGRESS NOTES
"Andrea Ochoa is a 68 y.o. male on day 2 of admission presenting with Pneumonia, unspecified organism.    Subjective   Patient remains on antibiotic treatment for sacral osteomyelitis, positive blood cultures, pneumonia.       Objective     Physical Exam  Constitutional:       Appearance: Normal appearance.   HENT:      Head: Normocephalic and atraumatic.      Right Ear: External ear normal.      Left Ear: External ear normal.      Nose: Nose normal.      Mouth/Throat:      Mouth: Mucous membranes are dry.   Eyes:      Extraocular Movements: Extraocular movements intact.      Pupils: Pupils are equal, round, and reactive to light.   Cardiovascular:      Rate and Rhythm: Normal rate and regular rhythm.   Pulmonary:      Comments: On supplemental oxygen, decreased sounds bilaterally  Abdominal:      General: Abdomen is flat.      Palpations: Abdomen is soft.   Musculoskeletal:         General: Signs of injury present. No deformity.      Cervical back: Normal range of motion and neck supple.   Skin:     Capillary Refill: Capillary refill takes 2 to 3 seconds.      Findings: Erythema and lesion present.      Comments: Sacral wound   Neurological:      Mental Status: He is alert.      Motor: Weakness present.   Psychiatric:         Mood and Affect: Mood normal.         Behavior: Behavior normal.         Last Recorded Vitals  Blood pressure 122/63, pulse 95, temperature 37 °C (98.6 °F), resp. rate 22, height 1.829 m (6' 0.01\"), weight 81.6 kg (179 lb 14.3 oz), SpO2 99 %.  Intake/Output last 3 Shifts:  I/O last 3 completed shifts:  In: 2146.7 (26.3 mL/kg) [P.O.:330; I.V.:816.7 (10 mL/kg); IV Piggyback:1000]  Out: 2525 (30.9 mL/kg) [Urine:2525 (0.9 mL/kg/hr)]  Dosing Weight: 81.6 kg     Relevant Results  ECG 12 lead    Result Date: 2/29/2024  Atrial flutter/fibrillation Anteroseptal infarct, age indeterminate See ED provider note for full interpretation and clinical correlation Confirmed by Viviane Wan ([id number]) on 2/29/2024 " 11:51:57 AM    Electrocardiogram, 12-lead PRN ACS symptoms    Result Date: 2/29/2024  Sinus rhythm with Premature atrial complexes Low voltage QRS Cannot rule out Anterior infarct , age undetermined Abnormal ECG When compared with ECG of 27-FEB-2024 01:55, PREVIOUS ECG IS PRESENT Confirmed by Janes Barnhart ([id number]) on 2/29/2024 11:18:06 AM    Vascular US upper extremity venous duplex left    Result Date: 2/28/2024           [place] [id number] [employer] [place] [address] Tel [phone] and Fax [phone]  Vascular Lab Report VASC US UPPER EXTREMITY VENOUS DUPLEX LEFT  Patient Name:      RAJ HARMON        Reading Physician:  [id number] Nasra Mendez MD Study Date:        2/28/2024             Ordering Physician: [id number] JOSE BAILEY MRN/PID:           [id number]              Technologist:       Meeta Obregon RVT Accession#:        [id number]          Technologist 2: Date of Birth/Age: 1955 / 68 years Encounter#:         [id number] Gender:            M Admission Status:  Inpatient             Location Performed: [facility]  Diagnosis/ICD: Left arm swelling-M79.89 CPT Codes:     96854 Peripheral venous duplex scan for DVT Limited  CONCLUSIONS: Right Upper Venous: The subclavian vein demonstrates a normal spontaneous and phasic flow. Left Upper Venous: No evidence of acute deep vein thrombus visualized in the left upper extremity.  Additional Findings: Technically difficult exam.  Imaging & Doppler Findings:  Left                Compress Thrombus        Flow Internal Jugular      Yes      None   Spontaneous/Phasic Subclavian Proximal   Yes      None   Spontaneous/Phasic Subclavian Mid        Yes      None Subclavian Distal     Yes      None   Spontaneous/Phasic Axillary              Yes      None    Spontaneous/Phasic Brachial              Yes      None Cephalic              Yes      None Basilic               Yes      None  [id number] Nasra Mendez MD Electronically signed by [id number] Nasra Mendez MD on 2/28/2024 at 5:49:11 PM  ** Final **     Transthoracic Echo (TTE) Complete    Result Date: 2/28/2024    [facility], [facility], [facility] [id number] [phone] and                                 Fax [phone] TRANSTHORACIC ECHOCARDIOGRAM REPORT  Patient Name:      RAJ Holland Physician:    [id number] Janes Barnhart MD Study Date:        2/27/2024            Ordering Provider:    [id number] JOSE BAILEY MRN/PID:           [id number]             Fellow: Accession#:        [id number]         Nurse: Date of Birth/Age: 1955 / 68      Sonographer:          Mike Abbott RDCS                    years Gender:            M                    Additional Staff: Height:            182.88 cm            Admit Date:           2/27/2024 Weight:            81.65 kg             Admission Status:     Inpatient -                                                               Routine BSA / BMI:         2.04 m2 / 24.41      Encounter#:           [id number]                    kg/m2                                         Department Location:  [facility] Blood Pressure: 101 /62 mmHg Study Type:    TRANSTHORACIC ECHO (TTE) COMPLETE Diagnosis/ICD: Chest pain, unspecified-R07.9 CPT Code:      Echo Complete w Full Doppler-37512 Patient History: Pertinent History: Chest Pain, CHF, COPD, Renal Failure and Dialysis. Study Detail: The following Echo studies were performed: 2D, M-Mode, Doppler and               color flow. Technically challenging study due to body habitus,               patient lying in supine position and the patient's lack of               cooperation.  PHYSICIAN  INTERPRETATION: Left Ventricle: The left ventricular systolic function is mildly decreased, with an estimated ejection fraction of 40-45%. There is global hypokinesis of the left ventricle with minor regional variations. The left ventricular cavity size is mildly dilated. Spectral Doppler shows an impaired relaxation pattern of left ventricular diastolic filling. Left Atrium: The left atrium is normal in size. Right Ventricle: The right ventricle is normal in size. There is normal right ventricular global systolic function. Right Atrium: The right atrium is normal in size. Aortic Valve: The aortic valve appears structurally normal. There is no evidence of aortic valve regurgitation. The peak instantaneous gradient of the aortic valve is 8.9 mmHg. Mitral Valve: The mitral valve is normal in structure. There is mild mitral annular calcification. There is mild mitral valve regurgitation. Tricuspid Valve: The tricuspid valve is structurally normal. There is mild tricuspid regurgitation. The Doppler estimated RVSP is slightly elevated at 33.7 mmHg. Pulmonic Valve: The pulmonic valve is not well visualized. The pulmonic valve regurgitation was not well visualized. Pericardium: There is no pericardial effusion noted. Aorta: The aortic root is normal.  CONCLUSIONS:  1. Left ventricular systolic function is mildly decreased with a 40-45% estimated ejection fraction.  2. Spectral Doppler shows an impaired relaxation pattern of left ventricular diastolic filling.  3. Slightly elevated RVSP. QUANTITATIVE DATA SUMMARY: 2D MEASUREMENTS:                          Normal Ranges: LAs:           3.80 cm   (2.7-4.0cm) IVSd:          0.64 cm   (0.6-1.1cm) LVPWd:         0.84 cm   (0.6-1.1cm) LVIDd:         6.00 cm   (3.9-5.9cm) LVIDs:         4.62 cm LV Mass Index: 83.5 g/m2 LV % FS        23.0 % LA VOLUME:                             Normal Ranges: LA Volume Index: 28.0 ml/m2 RA VOLUME BY A/L METHOD:                       Normal  Ranges: RA Area A4C: 15.0 cm2 M-MODE MEASUREMENTS:                  Normal Ranges: Ao Root: 3.40 cm (2.0-3.7cm) LV SYSTOLIC FUNCTION BY 2D PLANIMETRY (MOD):                     Normal Ranges: EF-A4C View: 56.6 % (>=55%) EF-A2C View: 35.4 % EF-Biplane:  47.1 % LV DIASTOLIC FUNCTION:                               Normal Ranges: MV Peak E:        0.71 m/s    (0.7-1.2 m/s) MV Peak A:        0.81 m/s    (0.42-0.7 m/s) E/A Ratio:        0.88        (1.0-2.2) MV medial e'      0.09 m/s MV A Dur:         148.00 msec PulmV Sys Joshua:    67.40 cm/s PulmV Will Joshua:   54.40 cm/s PulmV S/D Joshua:    1.20 PulmV A Revs Joshua: 19.90 cm/s MITRAL VALVE:                 Normal Ranges: MV DT: 232 msec (150-240msec) AORTIC VALVE:                         Normal Ranges: AoV Vmax:      1.49 m/s (<=1.7m/s) AoV Peak P.9 mmHg (<20mmHg) LVOT Max Joshua:  0.97 m/s (<=1.1m/s) LVOT VTI:      20.70 cm LVOT Diameter: 2.40 cm  (1.8-2.4cm) AoV Area,Vmax: 2.96 cm2 (2.5-4.5cm2)  RIGHT VENTRICLE: RV Basal 3.83 cm RV Mid   3.71 cm RV Major 8.9 cm TAPSE:   19.8 mm TRICUSPID VALVE/RVSP:                             Normal Ranges: Peak TR Velocity: 2.77 m/s RV Syst Pressure: 33.7 mmHg (< 30mmHg) PULMONIC VALVE:                      Normal Ranges: PV Max Joshua: 1.0 m/s  (0.6-0.9m/s) PV Max P.1 mmHg Pulmonary Veins: PulmV A Revs Joshua: 19.90 cm/s PulmV Will Joshua:   54.40 cm/s PulmV S/D Joshua:    1.20 PulmV Sys Joshua:    67.40 cm/s  [id number] Janes Barnhart MD Electronically signed on 2024 at 8:10:32 AM  ** Final **     Lower extremity venous duplex right    Result Date: 2024           [place] [address] Tel [phone] and Fax [phone]  Vascular Lab Report VASC US LOWER EXTREMITY VENOUS DUPLEX RIGHT  Patient Name:      RAJ Holland Physician:  [id number] Kandis Schmitt MD Study Date:        2024             Ordering Physician: [id number] GARCIA  MARSHA                                                              LYNN MRN/PID:           [id number]              Technologist:       Marybel Villagran RVT Accession#:        [id number]          Technologist 2: Date of Birth/Age: 1955 / 68 years Encounter#:         [id number] Gender:            M Admission Status:  Outpatient            Location Performed: [facility]  Diagnosis/ICD: Pain in right leg-M79.604 Indication:    Limb pain. CPT Codes:     42057 Peripheral venous duplex scan for DVT Limited  CONCLUSIONS: Right Lower Venous: No evidence of acute deep vein thrombus visualized in the right lower extremity. Left Lower Venous: Left common femoral vein is negative for deep vein thrombus.  Imaging & Doppler Findings:  Right                 Compressible Thrombus        Flow Distal External Iliac     Yes        None   Spontaneous/Phasic CFV                       Yes        None   Spontaneous/Phasic PFV                       Yes        None FV Proximal               Yes        None   Spontaneous/Phasic FV Mid                    Yes        None FV Distal                 Yes        None Popliteal                 Yes        None   Spontaneous/Phasic Peroneal                  Yes        None PTV                       Yes        None  Left Compress Thrombus        Flow CFV    Yes      None   Spontaneous/Phasic  [id number] Kandis Schmitt MD Electronically signed by [id number] Kandis Schmitt MD on 2/27/2024 at 4:36:46 PM  ** Final **     XR chest 1 view    Result Date: 2/27/2024  Interpreted By:  Finkelstein, Evan, STUDY: XR CHEST 1 VIEW;  2/27/2024 2:40 am   INDICATION: Signs/Symptoms:CP.   COMPARISON: Chest radiograph 10/14/2023   ACCESSION NUMBER(S): [id number]   ORDERING CLINICIAN: JOHN DIXON   FINDINGS: Low lung volumes with bronchovascular crowding. Right upper extremity PICC present with tip overlying the cavoatrial junction.   CARDIOMEDIASTINAL  SILHOUETTE: Cardiomediastinal silhouette is stable in size and configuration.   LUNGS: Bibasilar opacities. No sizable pleural effusion or pneumothorax.   ABDOMEN: No remarkable upper abdominal findings.   BONES: No acute osseous abnormality.       Low lung volumes with bronchovascular crowding and bibasilar atelectasis. Superimposed pneumonia is not excluded in the appropriate clinical setting.   MACRO: None.   Signed by: Evan Finkelstein 2/27/2024 2:47 AM Dictation workstation:   [id number]    CT head wo IV contrast    Result Date: 2/27/2024  Interpreted By:  Tye Wakefield, STUDY: CT HEAD WO IV CONTRAST;  2/27/2024 2:33 am   INDICATION: Signs/Symptoms:Patient hit left side of head during transition to bed.   COMPARISON: Noncontrast head CT of 09/13/2021.   ACCESSION NUMBER(S): [id number]   ORDERING CLINICIAN: JOHN DIXON   TECHNIQUE: Noncontrast axial CT scan of head was performed. Angled reformats in brain and bone windows were generated. The images were reviewed in bone, brain, blood and soft tissue windows.   FINDINGS: CSF Spaces: The ventricles, sulci and basal cisterns are within normal limits. There is no extraaxial fluid collection.   Parenchyma: Moderate to advanced volume loss.  There are multifocal small regions of T2/FLAIR hyperintense signal abnormality in the subcortical and periventricular white matter, as well as patchy signal abnormality in the cody, nonspecific findings seen most often with chronic microangiopathic change or as sequelae of inflammatory demyelination. The grey-white differentiation is intact. There is no mass effect or midline shift.  There is no intracranial hemorrhage.   Calvarium: The calvarium is unremarkable.   Paranasal sinuses and mastoids: Bilateral mastoid effusions, as before. Mild polypoid mucosal thickening in maxillary sinuses, ethmoid air cells and right sphenoid sinus.       No evidence of acute intracranial abnormality.   MACRO: None   Signed by: Tye Wakefield  2/27/2024 2:37 AM Dictation workstation:   [id number]      Results for orders placed or performed during the hospital encounter of 02/27/24 (from the past 24 hour(s))   POCT GLUCOSE   Result Value Ref Range    POCT Glucose 171 (H) 74 - 99 mg/dL   CBC   Result Value Ref Range    WBC 7.2 4.4 - 11.3 x10*3/uL    nRBC 0.0 0.0 - 0.0 /100 WBCs    RBC 2.79 (L) 4.50 - 5.90 x10*6/uL    Hemoglobin 8.6 (L) 13.5 - 17.5 g/dL    Hematocrit 27.8 (L) 41.0 - 52.0 %     80 - 100 fL    MCH 30.8 26.0 - 34.0 pg    MCHC 30.9 (L) 32.0 - 36.0 g/dL    RDW 15.8 (H) 11.5 - 14.5 %    Platelets 481 (H) 150 - 450 x10*3/uL   Basic Metabolic Panel   Result Value Ref Range    Glucose 75 74 - 99 mg/dL    Sodium 134 (L) 136 - 145 mmol/L    Potassium 3.6 3.5 - 5.3 mmol/L    Chloride 95 (L) 98 - 107 mmol/L    Bicarbonate 38 (H) 21 - 32 mmol/L    Anion Gap <7 (L) 10 - 20 mmol/L    Urea Nitrogen 16 6 - 23 mg/dL    Creatinine 0.29 (L) 0.50 - 1.30 mg/dL    eGFR >90 >60 mL/min/1.73m*2    Calcium 8.0 (L) 8.6 - 10.3 mg/dL   Blood Culture    Specimen: Peripheral Venipuncture; Blood culture   Result Value Ref Range    Blood Culture Loaded on Instrument - Culture in progress    Electrocardiogram, 12-lead PRN ACS symptoms   Result Value Ref Range    Ventricular Rate 99 BPM    Atrial Rate 99 BPM    CO Interval 138 ms    QRS Duration 100 ms    QT Interval 340 ms    QTC Calculation(Bazett) 436 ms    P Axis 36 degrees    R Axis 56 degrees    T Axis 46 degrees    QRS Count 17 beats    Q Onset 218 ms    P Onset 149 ms    P Offset 197 ms    T Offset 388 ms    QTC Fredericia 401 ms   Vancomycin   Result Value Ref Range    Vancomycin 14.1 5.0 - 20.0 ug/mL          Assessment/Plan     #Sacral osteomyelitis  #Urinary tract infection  #Gram-negative bacteremia with MDRO acinetobacter  #Pneumonia  #History of congestive heart failure  #History of gout  #History of CHILANGO  #History of hypertension     Antibiotics  Antibiotic day 3  Vancomycin day 2, stop 2/29  Zosyn day 2,  stop 2/29  Unasyn day 1  Polymixin day 1     -Change antibiotics to Unasyn and polymyxin  -Follow repeat blood culture results  -Patient had the following blood cultures while at [facility] 1/21: MSSA, 1/24: MRSA, 1/28 no growth, urine culture 1/21: MRSA  -placed order for more to be changed 2/28, communicated with Nurse      I reviewed and interpreted all lab test imaging studies and documentations from other healthcare providers  I am monitoring for antibiotic side effects and toxicity      Johnathan Wesley DO

## 2024-02-29 NOTE — PROGRESS NOTES
Andrea Harmon is a 68 y.o. male on day 1 of admission presenting with Pneumonia, unspecified organism.      Subjective   No events overnight.  Patient seen and examined at bedside.  He complains of significant pain.  He also has new onset swelling of his left upper extremity.  He states that he just wants to die.  He inquires about hospice care.  He states that he would like to have a discussion with his wife involved.       Objective     Last Recorded Vitals  /72   Pulse 95   Temp 35.9 °C (96.6 °F)   Resp 18   Wt 81.6 kg (179 lb 14.3 oz)   SpO2 97%   Intake/Output last 3 Shifts:    Intake/Output Summary (Last 24 hours) at 2/28/2024 2049  Last data filed at 2/28/2024 1615  Gross per 24 hour   Intake 1466.66 ml   Output 950 ml   Net 516.66 ml       Admission Weight  Weight: 81.6 kg (180 lb) (02/27/24 0143)    Daily Weight  02/28/24 : 81.6 kg (179 lb 14.3 oz)    Image Results  Vascular US upper extremity venous duplex left             [place] [id number]  Tel [phone] and Fax [phone]       Vascular Lab Report  VASC US UPPER EXTREMITY VENOUS DUPLEX LEFT       Patient Name:      ANDREA HARMON        Reading Physician:  [id number] Nasra Mendez MD  Study Date:        2/28/2024             Ordering Physician: [id number] BAILEY  MRN/PID:           [id number]              Technologist:       Meeta Obregon MERLE  Accession#:        [id number]          Technologist 2:  Date of Birth/Age: 1955 / 68 years Encounter#:         [id number]  Gender:            M  Admission Status:  Inpatient             Location Performed: [facility]       Diagnosis/ICD: Left arm swelling-M79.89  CPT Codes:     19036 Peripheral venous duplex scan for DVT Limited        CONCLUSIONS:  Right Upper Venous: The subclavian vein demonstrates a normal spontaneous and phasic flow.  Left Upper Venous: No evidence of acute deep vein thrombus visualized in the left upper extremity.     Additional Findings:  Technically difficult exam.       Imaging & Doppler Findings:     Left                Compress Thrombus        Flow  Internal Jugular      Yes      None   Spontaneous/Phasic  Subclavian Proximal   Yes      None   Spontaneous/Phasic  Subclavian Mid        Yes      None  Subclavian Distal     Yes      None   Spontaneous/Phasic  Axillary              Yes      None   Spontaneous/Phasic  Brachial              Yes      None  Cephalic              Yes      None  Basilic               Yes      None       [id number] Nasra Mendez MD  Electronically signed by [id number] Nasra Mendez MD on 2/28/2024 at 5:49:11 PM       ** Final **  ECG 12 lead  Atrial flutter/fibrillation  Anteroseptal infarct, age indeterminate  Transthoracic Echo (TTE) [facility], [address] [place] [id number] [phone] and                                  Fax [phone]    TRANSTHORACIC ECHOCARDIOGRAM REPORT       Patient Name:      RAJ Holland Physician:    [id number] Janes Barnhart MD  Study Date:        2/27/2024            Ordering Provider:    [id number] JOSE BAILEY  MRN/PID:           [id number]             Fellow:  Accession#:        [id number]         Nurse:  Date of Birth/Age: 1955 / 68      Sonographer:          Mike Abbott RDCS                     years  Gender:            M                    Additional Staff:  Height:            182.88 cm            Admit Date:           2/27/2024  Weight:            81.65 kg             Admission Status:     Inpatient -                                                                Routine  BSA / BMI:          2.04 m2 / 24.41      Encounter#:           [id number]                     kg/m2                                          Department Location:  [facility]  Blood Pressure: 101 /62 mmHg    Study Type:    TRANSTHORACIC ECHO (TTE) COMPLETE  Diagnosis/ICD: Chest pain, unspecified-R07.9  CPT Code:      Echo Complete w Full Doppler-52205    Patient History:  Pertinent History: Chest Pain, CHF, COPD, Renal Failure and Dialysis.    Study Detail: The following Echo studies were performed: 2D, M-Mode, Doppler and                color flow. Technically challenging study due to body habitus,                patient lying in supine position and the patient's lack of                cooperation.       PHYSICIAN INTERPRETATION:  Left Ventricle: The left ventricular systolic function is mildly decreased, with an estimated ejection fraction of 40-45%. There is global hypokinesis of the left ventricle with minor regional variations. The left ventricular cavity size is mildly dilated. Spectral Doppler shows an impaired relaxation pattern of left ventricular diastolic filling.  Left Atrium: The left atrium is normal in size.  Right Ventricle: The right ventricle is normal in size. There is normal right ventricular global systolic function.  Right Atrium: The right atrium is normal in size.  Aortic Valve: The aortic valve appears structurally normal. There is no evidence of aortic valve regurgitation. The peak instantaneous gradient of the aortic valve is 8.9 mmHg.  Mitral Valve: The mitral valve is normal in structure. There is mild mitral annular calcification. There is mild mitral valve regurgitation.  Tricuspid Valve: The tricuspid valve is structurally normal. There is mild tricuspid regurgitation. The Doppler estimated RVSP is slightly elevated at 33.7 mmHg.  Pulmonic Valve: The pulmonic valve is not well visualized. The pulmonic valve regurgitation was not well visualized.  Pericardium: There is no pericardial effusion  noted.  Aorta: The aortic root is normal.       CONCLUSIONS:   1. Left ventricular systolic function is mildly decreased with a 40-45% estimated ejection fraction.   2. Spectral Doppler shows an impaired relaxation pattern of left ventricular diastolic filling.   3. Slightly elevated RVSP.    QUANTITATIVE DATA SUMMARY:  2D MEASUREMENTS:                           Normal Ranges:  LAs:           3.80 cm   (2.7-4.0cm)  IVSd:          0.64 cm   (0.6-1.1cm)  LVPWd:         0.84 cm   (0.6-1.1cm)  LVIDd:         6.00 cm   (3.9-5.9cm)  LVIDs:         4.62 cm  LV Mass Index: 83.5 g/m2  LV % FS        23.0 %    LA VOLUME:                              Normal Ranges:  LA Volume Index: 28.0 ml/m2    RA VOLUME BY A/L METHOD:                        Normal Ranges:  RA Area A4C: 15.0 cm2    M-MODE MEASUREMENTS:                   Normal Ranges:  Ao Root: 3.40 cm (2.0-3.7cm)    LV SYSTOLIC FUNCTION BY 2D PLANIMETRY (MOD):                      Normal Ranges:  EF-A4C View: 56.6 % (>=55%)  EF-A2C View: 35.4 %  EF-Biplane:  47.1 %    LV DIASTOLIC FUNCTION:                                Normal Ranges:  MV Peak E:        0.71 m/s    (0.7-1.2 m/s)  MV Peak A:        0.81 m/s    (0.42-0.7 m/s)  E/A Ratio:        0.88        (1.0-2.2)  MV medial e'      0.09 m/s  MV A Dur:         148.00 msec  PulmV Sys Joshua:    67.40 cm/s  PulmV Will Joshua:   54.40 cm/s  PulmV S/D Joshua:    1.20  PulmV A Revs Joshua: 19.90 cm/s    MITRAL VALVE:                  Normal Ranges:  MV DT: 232 msec (150-240msec)    AORTIC VALVE:                          Normal Ranges:  AoV Vmax:      1.49 m/s (<=1.7m/s)  AoV Peak P.9 mmHg (<20mmHg)  LVOT Max Joshua:  0.97 m/s (<=1.1m/s)  LVOT VTI:      20.70 cm  LVOT Diameter: 2.40 cm  (1.8-2.4cm)  AoV Area,Vmax: 2.96 cm2 (2.5-4.5cm2)       RIGHT VENTRICLE:  RV Basal 3.83 cm  RV Mid   3.71 cm  RV Major 8.9 cm  TAPSE:   19.8 mm    TRICUSPID VALVE/RVSP:                              Normal Ranges:  Peak TR Velocity: 2.77 m/s  RV Syst  Pressure: 33.7 mmHg (< 30mmHg)    PULMONIC VALVE:                       Normal Ranges:  PV Max Joshua: 1.0 m/s  (0.6-0.9m/s)  PV Max P.1 mmHg    Pulmonary Veins:  PulmV A Revs Joshua: 19.90 cm/s  PulmV Will Joshua:   54.40 cm/s  PulmV S/D Joshua:    1.20  PulmV Sys Joshua:    67.40 cm/s       [id number] Janes Barnhart MD  Electronically signed on 2024 at 8:10:32 AM       ** Final **      Physical Exam  HENT:      Mouth/Throat:      Mouth: Mucous membranes are dry.      Pharynx: Oropharynx is clear.   Eyes:      Pupils: Pupils are equal, round, and reactive to light.   Cardiovascular:      Rate and Rhythm: Tachycardia present.   Pulmonary:      Breath sounds: Decreased breath sounds present.   Abdominal:      General: Bowel sounds are normal.   Musculoskeletal:      Cervical back: Normal range of motion.      Comments: PICC line right upper extremity   Skin:     General: Skin is warm and dry.      Capillary Refill: Capillary refill takes less than 2 seconds.      Comments: Sacral wound with wound VAC   Neurological:      General: No focal deficit present.      Mental Status: He is alert and oriented to person, place, and time.      Relevant Results               Assessment/Plan   This patient currently has cardiac telemetry ordered; if you would like to modify or discontinue the telemetry order, click here to go to the orders activity to modify/discontinue the order.    This patient has a central line   Reason for the central line remaining today? Parenteral medication    This patient has a urinary catheter   Reason for the urinary catheter remaining today? critically ill patient who need accurate urinary output measurements          Principal Problem:    Pneumonia, unspecified organism    Andrea is a 68-year-old male patient who presents from skilled nursing facility with complaint of left-sided chest pain.  Patient has a history of COPD on 4 L of oxygen chronically.  Patient is currently being treated with multiple IV  antibiotics for a sacral wound, wound VAC present.  Patient labs show elevated WBC, hypotensive on arrival, tachycardic.  Chest x-ray showing bibasilar opacities concerning for pneumonia.  Patient was started on IV azithromycin and ceftriaxone, ID consult placed due to multiple other IV antibiotics and continued WBC count.  Wound culture ordered.  Patient given IV steroids in ED, IV fluids.  BNP, urinalysis pending.  Patient found to have a low magnesium, low potassium, replacements ordered.  Patient admitted to stepdown unit for further medical management.     Chest pain/pneumonia  Admit to stepdown unit per Dr. Angel Lewis  See imaging results above  Trend troponin  Blood cultures  Legionella/strep urine  IV azithromycin/ceftriaxone  Consult ID and appreciate input  Urinalysis pending  Lactate ordered and pending  DuoNeb treatments every 6  Aspirin per EMS  Continue oxygen  Repeat labs in p.m.  Check lipase  Check procalcitonin  Adjust pain medications  Check right lower extremity ultrasound for swelling     Hypomagnesemia/hypokalemia  Magnesium 1.18  4 g magnesium IV replacement  Potassium 3.1  40 mEq Kcl  Repeat labs in p.m.  Telemetry monitoring     Chronic sacral wound  Consult ID due to multiple antibiotics (daptomycin, cefazolin, Cipro)  PICC line RUE  Wound culture ordered  Consult wound RN for wound VAC management     CHF/anxiety/hypertension/seizure/BPH/heart failure/hyperlipidemia/CHILANGO  Continue oxygenation at 4 L  Continue home medications when med rec is complete  Hold home antihypertensives due to hypotension upon arrival  Continue to monitor blood pressure  Telemetry monitoring  BNP ordered and pending  Last echocardiogram on file 1/23/2024: EF 60 to 65%  Lactate pending  Type and screen ordered and pending  Urinalysis pending  Cardiac diet     DVT Ppx  SCDs  Heparin subcutaneous  Up to chair for meals  PT/OT     I spent 45 minutes in the professional and overall care of this patient.     2/28:  Patient with new left upper extremity swelling, will check duplex ultrasound.  Ultrasound of lower extremities was negative.  Replete magnesium.  Troponin negative x 3.  Procalcitonin minimally elevated 0.29 but does support pneumonia.  Lipase and D-dimer within normal limits.  Adjust pain regimen.  Palliative care consulted to further discuss potential hospice per patient request.              Angel Lewis, DO

## 2024-03-01 NOTE — CARE PLAN
Problem: Pain  Goal: My pain/discomfort is manageable  Outcome: Progressing     Problem: Safety  Goal: Patient will be injury free during hospitalization  Outcome: Progressing     Problem: Psychosocial Needs  Goal: Demonstrates ability to cope with hospitalization/illness  Outcome: Progressing   The patient's goals for the shift include  rest and comfort    The clinical goals for the shift include patient will have decreased anxiety during shift    Over the shift, the patient did not make progress toward the following goals. Barriers to progression include Dx. Recommendations to address these barriers include follow POC.

## 2024-03-01 NOTE — CARE PLAN
The patient's goals for the shift include      The clinical goals for the shift include patient will have decreased anxiety during shift      Problem: Psychosocial Needs  Goal: Demonstrates ability to cope with hospitalization/illness  Outcome: Not Progressing     Problem: Skin  Goal: Decreased wound size/increased tissue granulation at next dressing change  Outcome: Not Progressing  Flowsheets (Taken 2/29/2024 1853 by Abby Salgado RN)  Decreased wound size/increased tissue granulation at next dressing change:   Promote sleep for wound healing   Protective dressings over bony prominences  Goal: Participates in plan/prevention/treatment measures  Outcome: Not Progressing  Flowsheets (Taken 2/29/2024 1853 by Abby Salgado RN)  Participates in plan/prevention/treatment measures: Elevate heels  Goal: Prevent/manage excess moisture  Outcome: Not Progressing  Flowsheets (Taken 2/29/2024 1853 by Abby Salgado RN)  Prevent/manage excess moisture:   Cleanse incontinence/protect with barrier cream   Moisturize dry skin   Follow provider orders for dressing changes   Monitor for/manage infection if present

## 2024-03-01 NOTE — PROGRESS NOTES
AFSHIN met with patient and wife at bedside and discussed decline from [facility]. Pt and wife agreeable to LTC with Amanda and [facility] Pal care. AFSHIN asked Amanda to initiate precert for LTC if needed. AFSHIN updated TCC and will continue to follow.  JUNG NOWAK, YAMILAW

## 2024-03-01 NOTE — PROGRESS NOTES
Andrea Ochoa is a 68 y.o. male on day 3 of admission presenting with Pneumonia, unspecified organism.    Subjective   Upon assessment of the patient this morning, the patient was awake and expressed frustration that he is unable to feed himself and continues to require total care.    Objective     Physical Exam  Constitutional:       General: He is not in acute distress.     Appearance: He is ill-appearing. He is not toxic-appearing or diaphoretic.      Comments: Appears older than stated age, frail, awake  HENT:      Head: Normocephalic and atraumatic.      Nose: Nose normal.      Mouth/Throat:      Mouth: Mucous membranes are moist.      Pharynx: Oropharynx is clear.   Eyes:      Extraocular Movements: Intact     Pupils: PERRL  Cardiovascular:      Rate and Rhythm: Normal rate and regular rhythm.      Heart sounds: Normal heart sounds.   Pulmonary:      Effort: Pulmonary effort is normal. No respiratory distress.      Breath sounds: Rhonchi present.      Comments: Slightly improved lung sounds on auscultation  Abdominal:      General: Bowel sounds are normal. There is no distension.      Palpations: Abdomen is soft.      Tenderness: There is no abdominal tenderness. There is no guarding or rebound.   Musculoskeletal:      Cervical back: Normal range of motion. No rigidity.      Right lower leg: Edema present.      Left lower leg: Edema present.      Comments: Generalized atrophy of muscles   Skin:     General: Skin is warm and dry.      Coloration: Skin is pale.      Comments: Sacral wound being managed by wound care   Neurological:      Mental Status: A&O x 4.  Speech is clear.  Able to follow commands.     Motor: Weakness present.   Psychiatric:         Behavior: Behavior normal.         Thought Content: Normal     Comments: Without restlessness; did express frustration that he continues to be total care and requiring assistance for all ADLs    Last Recorded Vitals  Blood pressure 115/61, pulse 98, temperature  "35.7 °C (96.3 °F), resp. rate 20, height 1.829 m (6' 0.01\"), weight 81.6 kg (179 lb 14.3 oz), SpO2 98 %.  Intake/Output last 3 Shifts:  I/O last 3 completed shifts:  In: 1430 (17.5 mL/kg) [P.O.:630; IV Piggyback:800]  Out: 3800 (46.6 mL/kg) [Urine:3800 (1.3 mL/kg/hr)]  Dosing Weight: 81.6 kg     Relevant Results  Scheduled medications  ampicillin-sulbactam, 3 g, intravenous, q4h  balsam peru-castor oiL, , Topical, BID  busPIRone, 10 mg, oral, BID  DULoxetine, 20 mg, oral, Daily  heparin (porcine), 5,000 Units, subcutaneous, q8h  ipratropium-albuteroL, 3 mL, nebulization, TID  pantoprazole, 40 mg, intravenous, Daily  perflutren lipid microspheres, 0.5-10 mL of dilution, intravenous, Once in imaging  perflutren protein A microsphere, 0.5 mL, intravenous, Once in imaging  polyethylene glycol, 17 g, oral, Daily  polymixin B, 15,000 Units/kg, intravenous, q12h  potassium chloride CR, 20 mEq, oral, Once  potassium chloride CR, 20 mEq, oral, Once  sodium hypochlorite, , irrigation, BID  sulfur hexafluoride microsphr, 2 mL, intravenous, Once in imaging      Continuous medications     PRN medications  PRN medications: acetaminophen, albuterol, HYDROmorphone, ondansetron, oxygen, traMADol    Results for orders placed or performed during the hospital encounter of 02/27/24 (from the past 24 hour(s))   Vancomycin   Result Value Ref Range    Vancomycin 14.1 5.0 - 20.0 ug/mL   Basic Metabolic Panel   Result Value Ref Range    Glucose 70 (L) 74 - 99 mg/dL    Sodium 133 (L) 136 - 145 mmol/L    Potassium 3.6 3.5 - 5.3 mmol/L    Chloride 94 (L) 98 - 107 mmol/L    Bicarbonate 38 (H) 21 - 32 mmol/L    Anion Gap <7 (L) 10 - 20 mmol/L    Urea Nitrogen 10 6 - 23 mg/dL    Creatinine 0.25 (L) 0.50 - 1.30 mg/dL    eGFR >90 >60 mL/min/1.73m*2    Calcium 7.7 (L) 8.6 - 10.3 mg/dL   CBC   Result Value Ref Range    WBC 6.3 4.4 - 11.3 x10*3/uL    nRBC 0.0 0.0 - 0.0 /100 WBCs    RBC 2.71 (L) 4.50 - 5.90 x10*6/uL    Hemoglobin 8.0 (L) 13.5 - 17.5 " g/dL    Hematocrit 27.1 (L) 41.0 - 52.0 %     80 - 100 fL    MCH 29.5 26.0 - 34.0 pg    MCHC 29.5 (L) 32.0 - 36.0 g/dL    RDW 15.8 (H) 11.5 - 14.5 %    Platelets 436 150 - 450 x10*3/uL     Electrocardiogram, 12-lead PRN ACS symptoms    Result Date: 2/29/2024  Sinus rhythm with Premature atrial complexes Low voltage QRS Cannot rule out Anterior infarct , age undetermined Abnormal ECG When compared with ECG of 27-FEB-2024 01:55, PREVIOUS ECG IS PRESENT Confirmed by Janes Barnhart ([id number]) on 2/29/2024 11:18:06 AM    Vascular US upper extremity venous duplex left    Result Date: 2/28/2024           [place] [address] Tel [phone] and Fax [phone]  Vascular Lab Report VASC US UPPER EXTREMITY VENOUS DUPLEX LEFT  Patient Name:      RAJ Holland Physician:  [id number] Nasra Mendez MD Study Date:        2/28/2024             Ordering Physician: [id number] JOSE BAILEY MRN/PID:           [id number]              Technologist:       Meeta Obregon T Accession#:        [id number]          Technologist 2: Date of Birth/Age: 1955 / 68 years Encounter#:         [id number] Gender:            M Admission Status:  Inpatient             Location Performed: [facility]  Diagnosis/ICD: Left arm swelling-M79.89 CPT Codes:     26903 Peripheral venous duplex scan for DVT Limited  CONCLUSIONS: Right Upper Venous: The subclavian vein demonstrates a normal spontaneous and phasic flow. Left Upper Venous: No evidence of acute deep vein thrombus visualized in the left upper extremity.  Additional Findings: Technically difficult exam.  Imaging & Doppler Findings:  Left                Compress Thrombus        Flow Internal Jugular      Yes      None   Spontaneous/Phasic Subclavian  "Proximal   Yes      None   Spontaneous/Phasic Subclavian Mid        Yes      None Subclavian Distal     Yes      None   Spontaneous/Phasic Axillary              Yes      None   Spontaneous/Phasic Brachial              Yes      None Cephalic              Yes      None Basilic               Yes      None  [id number] Nasra Mendez MD Electronically signed by [id number] Nasra Mendez MD on 2/28/2024 at 5:49:11 PM  ** Final **          Assessment/Plan   IMP:  Andrea Ochoa is a 68 y.o. male with past medical history of heart failure, COPD on chronic 4 L nasal cannula, hypertension, hyperlipidemia, CHILANGO, seizures, BPH, anxiety, and sacral wound status post PICC line and wound VAC, is presenting from SNF with left-sided chest pain.  ED workup was significant for leukocytosis, hypotension, and tachycardia; septic workup was initiated and the patient was placed on broad-spectrum IV antibiotics.  Chest x-ray does show bibasilar opacities concerning for pneumonia.  ID was consulted due to management of pneumonia as well as sacral wound antibiotics.  A wound culture was obtained, and wound care was consulted.  The patient was admitted under the medicine service with pneumonia.  Palliative care was consulted to discuss goals of care and advance care planning.     2/28/2024-   The patient described he has severe depression, which keeps him from sleeping at night.  He stated, \"look at my life; look at what I have.\"  The patient also endorsed severe pain \"all over\", which he stated has been going on for \"a long time\", and of which he stated is improved by medication administration.  The patient did state his shortness of breath is improved; he denied feeling nauseated or constipated, but did state he has an overall poor appetite.  The patient stated he is unsure what his goals are, because of his pain.  Nursing indicated the patient does wish to speak about hospice care with his wife present.  The patient's generalized pain is " significant for likely existential suffering, especially with his description of his underlying depression regarding his quality of life.  I did ask the patient what his Roman Catholic beliefs are, and he stated he practices the Jain juan carlos; I did order a pastoral care consult to address the patient's existential suffering.  I did also ask the patient if he would be agreeable to pet therapy with the therapy dogs that are brought around by volunteers, and the patient was highly agreeable; I did order this treatment as well.  I discussed with the patient that suffering is not always only a physical origin, and that we do also need to address internal suffering that can be spiritual, mental, and emotional, to which the patient verbalized understanding.  I did ask the patient's nurse to provide the patient with a dose of his pain medication this morning to also assist with the physical nature of his discomfort; as the patient does have a substantial sacral wound, this is likely causing physical pain to that locality and exacerbating his other suffering.  Of note, the patient is typically on Dilaudid tablets 4 mg every 6 hours as needed as well as fentanyl patch 75 mcg for pain management in the outpatient setting; if the primary care team does agree, we would recommend at least initiating the patient's home fentanyl patch for improved pain management.  I did speak with the patient's wife, Linette, over the phone this morning, and she stated she would very much like to meet with me and the patient later this morning to discuss goals of care, as she does have concerns regarding the patient's quality of life as well.  We made plans to meet at 1130.    1150-   I did meet with the patient, his wife, and the hospital , at the patient's bedside, in order to discuss goals of care moving forward.  I introduced the practice of palliative care and the services it provides. I then reviewed at length with the patient and  his wife the clinical diagnosis/medical problems that the patient presented with and the treatments provided so far. We discussed the implications of the current circumstances and option plans going forward.  The patient and his wife were interested to learn more about outpatient palliative care versus hospice care.  I did discuss with them outpatient palliative care services which would cover symptom management and ongoing goals of care discussions, and would follow with the patient wherever he may be in the outpatient setting on more of a monthly basis, traditionally.  I did clarify that outpatient palliative care will not impede or restrict available treatment options for the patient, and both the patient and his wife verbalized understanding.  I then introduced the philosophy of hospice care and the services it provides.  I explained how hospice attends to patient's quality of life and dignity while the disease takes its natural course.  I emphasized how hospice focuses on decreasing the burden of the disease and providing comfort not only for the patient but also for the family and caregivers.  We discussed that hospice is more focused at the end of life, and while it does not prevent death, it is designed to alleviate suffering during the dying process.  The patient and his wife discussed these options of care, and the patient does defer to his wife for ultimate decision making; the patient and his wife agreed that initiating outpatient palliative care would likely be best for him moving forward, and we did discuss that if they would choose to initiate hospice care in the future, outpatient palliative care could transition at that time.  We then discussed the CODE STATUS and what are the differences between being a Full Code versus a DNR (DO NOT RESUSCITATE).  I first started by explaining that DNR does not mean do not treat.  I then explained in details what does it mean to be a full code and what  actions/procedures are taken when an individual has a cardiac/respiratory arrest and CPR (cardiopulmonary resuscitation) is done.  I did explain that by definition, when there is no pulse, that means death.  CPR is an intervention that tries to reverse death but not a treatment to prevent it from happening.  While this is considered an appropriate intervention in many situations; however, for those with advanced medical problems, especially an underlying irreversible/progressive/incurable disease, such an intervention can cause more harm than benefit and is most of the time considered futile.  The patient stated he was unsure what his CODE STATUS preferences would be, and after further discussion, detailing what chest compressions and intubation with mechanical ventilation are, the patient and his wife agreed that they would not want chest compressions nor mechanical ventilation for the patient, although they would be open to placing the patient to the ICU if needed.  I did confirm that this would mean changing the patient's CODE STATUS to DNR CCA DNI, and both the patient and his wife agreed.  I did change the CODE STATUS to DNR CCA DNI in the computer and placed a paper copy on the chart.  The patient does have generalized pain, and he is not currently on his home medications which would include higher dose Dilaudid tablets and fentanyl patch; also of note, the patient is not receiving his daily BuSpar.  I would recommend initiating his home dose BuSpar 10 mg twice daily, if the primary care team does agree.  It may also benefit the patient to initiate on Cymbalta daily, which may address his depression as well as his chronic pain, if the primary care team does agree.  The patient's wife did request a podiatry consult, as the patient's feet have had declining in care since the patient was placed to his previous care facility; I did notify both the patient's nurse and Dr. Lewis.  I answered the patient's and his  wife's questions and concerns to the best of my ability and offered emotional support.  After discussion with Dr. Lewis of all of the above, we did increase the patient's as needed Dilaudid tablet to 2 mg, added the patient's home dose of BuSpar, started the patient on low-dose Cymbalta daily, and did add a podiatry consult.  Palliative care will continue to follow.     2/29/2024-   The patient did not appear to be in any obvious distress, and so no additional medications will be recommended per our service at this time.  Multiple medication adjustments were made yesterday, and so we will continue to monitor for further need of symptom management.  Of note, the patient's last bowel movement was 3 days ago, and so we will monitor also for bowel regimen needs, specifically.  Following discussion with the patient and his wife yesterday, the patient is now DNR CCA DNI CODE STATUS, and they do wish to pursue rehabilitation and outpatient palliative care.  Palliative care will continue to follow.    3/1/2024-   The patient did not appear to be in obvious distress, but did express frustration and requiring total assistance for all ADLs, including eating his breakfast.  I did speak with the patient and told him that I would ask staff to please assist him with his breakfast this morning, after which the patient did become less frustrated and outwardly angry.  We discussed the patient's clinical status, and acknowledged his level of care; this did seem to help alleviate the patient's emotional distress.  As the patient's last bowel movement was 4 days ago, we did add MiraLAX to be given daily in order to address constipation.  The plan remains for the patient to be placed to rehabilitation and to have outpatient palliative care.  Palliative care will continue to follow.     Thank you for allowing us to participate in the care of this gentleman.  Should you have any further questions or concerns regarding his care, please do  not hesitate to contact us via [employer] (Meeta Torres during weekdays work hours and Russ Corrigan during weekdays after hours and over the weekend)     (This note was generated with voice recognition software and may contain errors including spelling, grammar, syntax and misrecognition of what was dictated, that are not fully corrected)      Patient/proxy preference for information  Prefers full information    Goals of Care  The patient remains DNR CCA DNI CODE STATUS.  He and his wife would like to pursue SNF with outpatient palliative care.           I spent 30 minutes in the professional and overall care of this patient.      Meeta Torres, APRN-CNP

## 2024-03-01 NOTE — PROGRESS NOTES
"Andrea Ochoa is a 68 y.o. male on day 3 of admission presenting with Pneumonia, unspecified organism.    Subjective   Patient remains on antibiotic treatment for sacral osteomyelitis, positive blood cultures, pneumonia.       Objective     Physical Exam  Constitutional:       Appearance: Normal appearance.   HENT:      Head: Normocephalic and atraumatic.      Right Ear: External ear normal.      Left Ear: External ear normal.      Nose: Nose normal.      Mouth/Throat:      Mouth: Mucous membranes are dry.   Eyes:      Extraocular Movements: Extraocular movements intact.      Pupils: Pupils are equal, round, and reactive to light.   Cardiovascular:      Rate and Rhythm: Normal rate and regular rhythm.   Pulmonary:      Comments: On supplemental oxygen, decreased sounds bilaterally  Abdominal:      General: Abdomen is flat.      Palpations: Abdomen is soft.   Musculoskeletal:         General: Signs of injury present. No deformity.      Cervical back: Normal range of motion and neck supple.   Skin:     Capillary Refill: Capillary refill takes 2 to 3 seconds.      Findings: Erythema and lesion present.      Comments: Sacral wound   Neurological:      Mental Status: He is alert.      Motor: Weakness present.   Psychiatric:         Mood and Affect: Mood normal.         Behavior: Behavior normal.         Last Recorded Vitals  Blood pressure 115/61, pulse 98, temperature 35.7 °C (96.3 °F), resp. rate 20, height 1.829 m (6' 0.01\"), weight 81.6 kg (179 lb 14.3 oz), SpO2 98 %.  Intake/Output last 3 Shifts:  I/O last 3 completed shifts:  In: 1430 (17.5 mL/kg) [P.O.:630; IV Piggyback:800]  Out: 3800 (46.6 mL/kg) [Urine:3800 (1.3 mL/kg/hr)]  Dosing Weight: 81.6 kg     Relevant Results  ECG 12 lead    Result Date: 2/29/2024  Atrial flutter/fibrillation Anteroseptal infarct, age indeterminate See ED provider note for full interpretation and clinical correlation Confirmed by Viviane Wan ([id number]) on 2/29/2024 11:51:57 " AM    Electrocardiogram, 12-lead PRN ACS symptoms    Result Date: 2/29/2024  Sinus rhythm with Premature atrial complexes Low voltage QRS Cannot rule out Anterior infarct , age undetermined Abnormal ECG When compared with ECG of 27-FEB-2024 01:55, PREVIOUS ECG IS PRESENT Confirmed by Janes Barnhart ([id number]) on 2/29/2024 11:18:06 AM    Vascular US upper extremity venous duplex left    Result Date: 2/28/2024           [place] [address] [address] [place] [id number] Tel [phone] and Fax [phone]  Vascular Lab Report VASC US UPPER EXTREMITY VENOUS DUPLEX LEFT  Patient Name:      RAJ HARMON        Reading Physician:  [id number] Nasra Mendez MD Study Date:        2/28/2024             Ordering Physician: [id number] JOSE BAILEY MRN/PID:           [id number]              Technologist:       Meeta Obregon RVT Accession#:        [id number]          Technologist 2: Date of Birth/Age: 1955 / 68 years Encounter#:         [id number] Gender:            M Admission Status:  Inpatient             Location Performed: [facility]  Diagnosis/ICD: Left arm swelling-M79.89 CPT Codes:     30641 Peripheral venous duplex scan for DVT Limited  CONCLUSIONS: Right Upper Venous: The subclavian vein demonstrates a normal spontaneous and phasic flow. Left Upper Venous: No evidence of acute deep vein thrombus visualized in the left upper extremity.  Additional Findings: Technically difficult exam.  Imaging & Doppler Findings:  Left                Compress Thrombus        Flow Internal Jugular      Yes      None   Spontaneous/Phasic Subclavian Proximal   Yes      None   Spontaneous/Phasic Subclavian Mid        Yes      None Subclavian Distal     Yes      None   Spontaneous/Phasic Axillary              Yes      None    Spontaneous/Phasic Brachial              Yes      None Cephalic              Yes      None Basilic               Yes      None  [id number] Nasra Mendez MD Electronically signed by [id number] Nasra Mendez MD on 2/28/2024 at 5:49:11 PM  ** Final **     Transthoracic Echo (TTE) Complete    Result Date: 2/28/2024    [facility], [facility], [facility] [id number] [phone] and                                 Fax [phone] TRANSTHORACIC ECHOCARDIOGRAM REPORT  Patient Name:      RAJ Holland Physician:    [id number] Janes Barnhart MD Study Date:        2/27/2024            Ordering Provider:    [id number] JOSE BAILEY MRN/PID:           [id number]             Fellow: Accession#:        [id number]         Nurse: Date of Birth/Age: 1955 / 68      Sonographer:          Mike Abbott RDCS                    years Gender:            M                    Additional Staff: Height:            182.88 cm            Admit Date:           2/27/2024 Weight:            81.65 kg             Admission Status:     Inpatient -                                                               Routine BSA / BMI:         2.04 m2 / 24.41      Encounter#:           [id number]                    kg/m2                                         Department Location:  [facility] Blood Pressure: 101 /62 mmHg Study Type:    TRANSTHORACIC ECHO (TTE) COMPLETE Diagnosis/ICD: Chest pain, unspecified-R07.9 CPT Code:      Echo Complete w Full Doppler-62919 Patient History: Pertinent History: Chest Pain, CHF, COPD, Renal Failure and Dialysis. Study Detail: The following Echo studies were performed: 2D, M-Mode, Doppler and               color flow. Technically challenging study due to body habitus,               patient lying in supine position and the patient's lack of               cooperation.  PHYSICIAN  INTERPRETATION: Left Ventricle: The left ventricular systolic function is mildly decreased, with an estimated ejection fraction of 40-45%. There is global hypokinesis of the left ventricle with minor regional variations. The left ventricular cavity size is mildly dilated. Spectral Doppler shows an impaired relaxation pattern of left ventricular diastolic filling. Left Atrium: The left atrium is normal in size. Right Ventricle: The right ventricle is normal in size. There is normal right ventricular global systolic function. Right Atrium: The right atrium is normal in size. Aortic Valve: The aortic valve appears structurally normal. There is no evidence of aortic valve regurgitation. The peak instantaneous gradient of the aortic valve is 8.9 mmHg. Mitral Valve: The mitral valve is normal in structure. There is mild mitral annular calcification. There is mild mitral valve regurgitation. Tricuspid Valve: The tricuspid valve is structurally normal. There is mild tricuspid regurgitation. The Doppler estimated RVSP is slightly elevated at 33.7 mmHg. Pulmonic Valve: The pulmonic valve is not well visualized. The pulmonic valve regurgitation was not well visualized. Pericardium: There is no pericardial effusion noted. Aorta: The aortic root is normal.  CONCLUSIONS:  1. Left ventricular systolic function is mildly decreased with a 40-45% estimated ejection fraction.  2. Spectral Doppler shows an impaired relaxation pattern of left ventricular diastolic filling.  3. Slightly elevated RVSP. QUANTITATIVE DATA SUMMARY: 2D MEASUREMENTS:                          Normal Ranges: LAs:           3.80 cm   (2.7-4.0cm) IVSd:          0.64 cm   (0.6-1.1cm) LVPWd:         0.84 cm   (0.6-1.1cm) LVIDd:         6.00 cm   (3.9-5.9cm) LVIDs:         4.62 cm LV Mass Index: 83.5 g/m2 LV % FS        23.0 % LA VOLUME:                             Normal Ranges: LA Volume Index: 28.0 ml/m2 RA VOLUME BY A/L METHOD:                       Normal  Ranges: RA Area A4C: 15.0 cm2 M-MODE MEASUREMENTS:                  Normal Ranges: Ao Root: 3.40 cm (2.0-3.7cm) LV SYSTOLIC FUNCTION BY 2D PLANIMETRY (MOD):                     Normal Ranges: EF-A4C View: 56.6 % (>=55%) EF-A2C View: 35.4 % EF-Biplane:  47.1 % LV DIASTOLIC FUNCTION:                               Normal Ranges: MV Peak E:        0.71 m/s    (0.7-1.2 m/s) MV Peak A:        0.81 m/s    (0.42-0.7 m/s) E/A Ratio:        0.88        (1.0-2.2) MV medial e'      0.09 m/s MV A Dur:         148.00 msec PulmV Sys Joshua:    67.40 cm/s PulmV Will Joshua:   54.40 cm/s PulmV S/D Joshua:    1.20 PulmV A Revs Joshua: 19.90 cm/s MITRAL VALVE:                 Normal Ranges: MV DT: 232 msec (150-240msec) AORTIC VALVE:                         Normal Ranges: AoV Vmax:      1.49 m/s (<=1.7m/s) AoV Peak P.9 mmHg (<20mmHg) LVOT Max Joshua:  0.97 m/s (<=1.1m/s) LVOT VTI:      20.70 cm LVOT Diameter: 2.40 cm  (1.8-2.4cm) AoV Area,Vmax: 2.96 cm2 (2.5-4.5cm2)  RIGHT VENTRICLE: RV Basal 3.83 cm RV Mid   3.71 cm RV Major 8.9 cm TAPSE:   19.8 mm TRICUSPID VALVE/RVSP:                             Normal Ranges: Peak TR Velocity: 2.77 m/s RV Syst Pressure: 33.7 mmHg (< 30mmHg) PULMONIC VALVE:                      Normal Ranges: PV Max Joshua: 1.0 m/s  (0.6-0.9m/s) PV Max P.1 mmHg Pulmonary Veins: PulmV A Revs Joshua: 19.90 cm/s PulmV Will Joshua:   54.40 cm/s PulmV S/D Joshua:    1.20 PulmV Sys Joshua:    67.40 cm/s  [id number] Janes Barnhart MD Electronically signed on 2024 at 8:10:32 AM  ** Final **     Lower extremity venous duplex right    Result Date: 2024           [place] [address] Tel [phone] and Fax [phone]  Vascular Lab Report VASC US LOWER EXTREMITY VENOUS DUPLEX RIGHT  Patient Name:      RAJ Holland Physician:  [id number] Kandis Schmitt MD Study Date:        2024             Ordering Physician: [id number] GARCIA  MARSHA                                                              LYNN MRN/PID:           [id number]              Technologist:       Marybel Villagran RVT Accession#:        [id number]          Technologist 2: Date of Birth/Age: 1955 / 68 years Encounter#:         [id number] Gender:            M Admission Status:  Outpatient            Location Performed: [facility]  Diagnosis/ICD: Pain in right leg-M79.604 Indication:    Limb pain. CPT Codes:     42295 Peripheral venous duplex scan for DVT Limited  CONCLUSIONS: Right Lower Venous: No evidence of acute deep vein thrombus visualized in the right lower extremity. Left Lower Venous: Left common femoral vein is negative for deep vein thrombus.  Imaging & Doppler Findings:  Right                 Compressible Thrombus        Flow Distal External Iliac     Yes        None   Spontaneous/Phasic CFV                       Yes        None   Spontaneous/Phasic PFV                       Yes        None FV Proximal               Yes        None   Spontaneous/Phasic FV Mid                    Yes        None FV Distal                 Yes        None Popliteal                 Yes        None   Spontaneous/Phasic Peroneal                  Yes        None PTV                       Yes        None  Left Compress Thrombus        Flow CFV    Yes      None   Spontaneous/Phasic  [id number] Kandis Schmitt MD Electronically signed by [id number] Kandis Schmitt MD on 2/27/2024 at 4:36:46 PM  ** Final **     XR chest 1 view    Result Date: 2/27/2024  Interpreted By:  Finkelstein, Evan, STUDY: XR CHEST 1 VIEW;  2/27/2024 2:40 am   INDICATION: Signs/Symptoms:CP.   COMPARISON: Chest radiograph 10/14/2023   ACCESSION NUMBER(S): [id number]   ORDERING CLINICIAN: JOHN DIXON   FINDINGS: Low lung volumes with bronchovascular crowding. Right upper extremity PICC present with tip overlying the cavoatrial junction.   CARDIOMEDIASTINAL  SILHOUETTE: Cardiomediastinal silhouette is stable in size and configuration.   LUNGS: Bibasilar opacities. No sizable pleural effusion or pneumothorax.   ABDOMEN: No remarkable upper abdominal findings.   BONES: No acute osseous abnormality.       Low lung volumes with bronchovascular crowding and bibasilar atelectasis. Superimposed pneumonia is not excluded in the appropriate clinical setting.   MACRO: None.   Signed by: Evan Finkelstein 2/27/2024 2:47 AM Dictation workstation:   [id number]    CT head wo IV contrast    Result Date: 2/27/2024  Interpreted By:  Tye Wakefield, STUDY: CT HEAD WO IV CONTRAST;  2/27/2024 2:33 am   INDICATION: Signs/Symptoms:Patient hit left side of head during transition to bed.   COMPARISON: Noncontrast head CT of 09/13/2021.   ACCESSION NUMBER(S): [id number]   ORDERING CLINICIAN: JOHN DIXON   TECHNIQUE: Noncontrast axial CT scan of head was performed. Angled reformats in brain and bone windows were generated. The images were reviewed in bone, brain, blood and soft tissue windows.   FINDINGS: CSF Spaces: The ventricles, sulci and basal cisterns are within normal limits. There is no extraaxial fluid collection.   Parenchyma: Moderate to advanced volume loss.  There are multifocal small regions of T2/FLAIR hyperintense signal abnormality in the subcortical and periventricular white matter, as well as patchy signal abnormality in the cody, nonspecific findings seen most often with chronic microangiopathic change or as sequelae of inflammatory demyelination. The grey-white differentiation is intact. There is no mass effect or midline shift.  There is no intracranial hemorrhage.   Calvarium: The calvarium is unremarkable.   Paranasal sinuses and mastoids: Bilateral mastoid effusions, as before. Mild polypoid mucosal thickening in maxillary sinuses, ethmoid air cells and right sphenoid sinus.       No evidence of acute intracranial abnormality.   MACRO: None   Signed by: Tye Wakefield  2/27/2024 2:37 AM Dictation workstation:   [id number]      Results for orders placed or performed during the hospital encounter of 02/27/24 (from the past 24 hour(s))   Basic Metabolic Panel   Result Value Ref Range    Glucose 70 (L) 74 - 99 mg/dL    Sodium 133 (L) 136 - 145 mmol/L    Potassium 3.6 3.5 - 5.3 mmol/L    Chloride 94 (L) 98 - 107 mmol/L    Bicarbonate 38 (H) 21 - 32 mmol/L    Anion Gap <7 (L) 10 - 20 mmol/L    Urea Nitrogen 10 6 - 23 mg/dL    Creatinine 0.25 (L) 0.50 - 1.30 mg/dL    eGFR >90 >60 mL/min/1.73m*2    Calcium 7.7 (L) 8.6 - 10.3 mg/dL   CBC   Result Value Ref Range    WBC 6.3 4.4 - 11.3 x10*3/uL    nRBC 0.0 0.0 - 0.0 /100 WBCs    RBC 2.71 (L) 4.50 - 5.90 x10*6/uL    Hemoglobin 8.0 (L) 13.5 - 17.5 g/dL    Hematocrit 27.1 (L) 41.0 - 52.0 %     80 - 100 fL    MCH 29.5 26.0 - 34.0 pg    MCHC 29.5 (L) 32.0 - 36.0 g/dL    RDW 15.8 (H) 11.5 - 14.5 %    Platelets 436 150 - 450 x10*3/uL          Assessment/Plan     #Sacral osteomyelitis  #Urinary tract infection  #Gram-negative bacteremia with MDRO acinetobacter  #Pneumonia  #History of congestive heart failure  #History of gout  #History of CHILANGO  #History of hypertension     Antibiotics  Antibiotic day 4  Vancomycin day 2, stop 2/29  Zosyn day 2, stop 2/29  Unasyn high dose day 2  Polymixin day 2     -Await wound culture sensitivities  -Follow kidney function while on polymyxin, BMP ordered for 3/3  -If patient's blood cultures are not clearing from 2/3:29 days we will need to repeat cultures either Sunday or Monday  -Patient had the following blood cultures while at [facility] 1/21: MSSA, 1/24: MRSA, 1/28 no growth, urine culture 1/21: MRSA  -placed order for more to be changed 2/28, communicated with Nurse      I reviewed and interpreted all lab test imaging studies and documentations from other healthcare providers  I am monitoring for antibiotic side effects and toxicity      Johnathan Wesley,

## 2024-03-01 NOTE — PROGRESS NOTES
Andrea Ochoa is a 68 y.o. male on day 2 of admission presenting with Pneumonia, unspecified organism.      Subjective   No events overnight.  Patient feeling better today.       Objective     Last Recorded Vitals  /63   Pulse 95   Temp 37 °C (98.6 °F) (Temporal)   Resp 22   Wt 81.6 kg (179 lb 14.3 oz)   SpO2 98%   Intake/Output last 3 Shifts:    Intake/Output Summary (Last 24 hours) at 2/29/2024 1939  Last data filed at 2/29/2024 1735  Gross per 24 hour   Intake 1430 ml   Output 975 ml   Net 455 ml         Admission Weight  Weight: 81.6 kg (180 lb) (02/27/24 0143)    Daily Weight  02/28/24 : 81.6 kg (179 lb 14.3 oz)    Image Results  ECG 12 lead  Atrial flutter/fibrillation  Anteroseptal infarct, age indeterminate    See ED provider note for full interpretation and clinical correlation  Confirmed by Viviane Wan ([id number]) on 2/29/2024 11:51:57 AM  Electrocardiogram, 12-lead PRN ACS symptoms  Sinus rhythm with Premature atrial complexes  Low voltage QRS  Cannot rule out Anterior infarct , age undetermined  Abnormal ECG  When compared with ECG of 27-FEB-2024 01:55,  PREVIOUS ECG IS PRESENT  Confirmed by Janes Barnhart ([id number]) on 2/29/2024 11:18:06 AM      Physical Exam  HENT:      Mouth/Throat:      Mouth: Mucous membranes are dry.      Pharynx: Oropharynx is clear.   Eyes:      Pupils: Pupils are equal, round, and reactive to light.   Cardiovascular:      Rate and Rhythm: Tachycardia present.   Pulmonary:      Breath sounds: Decreased breath sounds present.   Abdominal:      General: Bowel sounds are normal.   Musculoskeletal:      Cervical back: Normal range of motion.      Comments: PICC line right upper extremity   Skin:     General: Skin is warm and dry.      Capillary Refill: Capillary refill takes less than 2 seconds.      Comments: Sacral wound with wound VAC   Neurological:      General: No focal deficit present.      Mental Status: He is alert and oriented to person, place, and time.      Relevant  Results               Assessment/Plan   This patient currently has cardiac telemetry ordered; if you would like to modify or discontinue the telemetry order, click here to go to the orders activity to modify/discontinue the order.    This patient has a central line   Reason for the central line remaining today? Parenteral medication    This patient has a urinary catheter   Reason for the urinary catheter remaining today? critically ill patient who need accurate urinary output measurements          Principal Problem:    Pneumonia, unspecified organism    Andrea is a 68-year-old male patient who presents from skilled nursing facility with complaint of left-sided chest pain.  Patient has a history of COPD on 4 L of oxygen chronically.  Patient is currently being treated with multiple IV antibiotics for a sacral wound, wound VAC present.  Patient labs show elevated WBC, hypotensive on arrival, tachycardic.  Chest x-ray showing bibasilar opacities concerning for pneumonia.  Patient was started on IV azithromycin and ceftriaxone, ID consult placed due to multiple other IV antibiotics and continued WBC count.  Wound culture ordered.  Patient given IV steroids in ED, IV fluids.  BNP, urinalysis pending.  Patient found to have a low magnesium, low potassium, replacements ordered.  Patient admitted to stepdown unit for further medical management.     Chest pain/pneumonia  Admit to stepdown unit per Dr. Angel Lewis  See imaging results above  Trend troponin  Blood cultures  Legionella/strep urine  IV azithromycin/ceftriaxone  Consult ID and appreciate input  Urinalysis pending  Lactate ordered and pending  DuoNeb treatments every 6  Aspirin per EMS  Continue oxygen  Repeat labs in p.m.  Check lipase  Check procalcitonin  Adjust pain medications  Check right lower extremity ultrasound for swelling     Hypomagnesemia/hypokalemia  Magnesium 1.18  4 g magnesium IV replacement  Potassium 3.1  40 mEq Kcl  Repeat labs in  p.m.  Telemetry monitoring     Chronic sacral wound  Consult ID due to multiple antibiotics (daptomycin, cefazolin, Cipro)  PICC line RUE  Wound culture ordered  Consult wound RN for wound VAC management     CHF/anxiety/hypertension/seizure/BPH/heart failure/hyperlipidemia/CHILANGO  Continue oxygenation at 4 L  Continue home medications when med rec is complete  Hold home antihypertensives due to hypotension upon arrival  Continue to monitor blood pressure  Telemetry monitoring  BNP ordered and pending  Last echocardiogram on file 1/23/2024: EF 60 to 65%  Lactate pending  Type and screen ordered and pending  Urinalysis pending  Cardiac diet     DVT Ppx  SCDs  Heparin subcutaneous  Up to chair for meals  PT/OT     I spent 45 minutes in the professional and overall care of this patient.     2/28: Patient with new left upper extremity swelling, will check duplex ultrasound.  Ultrasound of lower extremities was negative.  Replete magnesium.  Troponin negative x 3.  Procalcitonin minimally elevated 0.29 but does support pneumonia.  Lipase and D-dimer within normal limits.  Adjust pain regimen.  Palliative care consulted to further discuss potential hospice per patient request.    2/29: Antibiotics changed to Vanco and Zosyn yesterday.  Now changed to Unasyn and polymyxin.  Patient had positive blood cultures at hospitalization at [facility] 1 month ago.  Seay to be exchanged.  Podiatry consulted, appreciate recs.  Palliative care following.            Angel Lewis DO

## 2024-03-01 NOTE — PROGRESS NOTES
Andrea Ochoa is a 68 y.o. male on day 3 of admission presenting with Pneumonia, unspecified organism.      Subjective   No events overnight.  Patient seen and examined at bedside with wife present.  He complains of difficulty swallowing.  He does have some white plaques on the back of his throat.       Objective     Last Recorded Vitals  /63   Pulse 96   Temp 36.2 °C (97.2 °F)   Resp 19   Wt 81.6 kg (179 lb 14.3 oz)   SpO2 94%   Intake/Output last 3 Shifts:    Intake/Output Summary (Last 24 hours) at 3/1/2024 1845  Last data filed at 3/1/2024 1738  Gross per 24 hour   Intake 800 ml   Output 2825 ml   Net -2025 ml         Admission Weight  Weight: 81.6 kg (180 lb) (02/27/24 0143)    Daily Weight  02/28/24 : 81.6 kg (179 lb 14.3 oz)    Image Results  ECG 12 lead  Atrial flutter/fibrillation  Anteroseptal infarct, age indeterminate    See ED provider note for full interpretation and clinical correlation  Confirmed by Viviane Wan ([id number]) on 2/29/2024 11:51:57 AM  Electrocardiogram, 12-lead PRN ACS symptoms  Sinus rhythm with Premature atrial complexes  Low voltage QRS  Cannot rule out Anterior infarct , age undetermined  Abnormal ECG  When compared with ECG of 27-FEB-2024 01:55,  PREVIOUS ECG IS PRESENT  Confirmed by Janes Barnhart ([id number]) on 2/29/2024 11:18:06 AM      Physical Exam  HENT:      Mouth/Throat:      Mouth: Mucous membranes are dry.      Pharynx: Oropharynx is clear.   Eyes:      Pupils: Pupils are equal, round, and reactive to light.   Cardiovascular:      Rate and Rhythm: Tachycardia present.   Pulmonary:      Breath sounds: Decreased breath sounds present.   Abdominal:      General: Bowel sounds are normal.   Musculoskeletal:      Cervical back: Normal range of motion.      Comments: PICC line right upper extremity   Skin:     General: Skin is warm and dry.      Capillary Refill: Capillary refill takes less than 2 seconds.      Comments: Sacral wound with wound VAC   Neurological:      General:  No focal deficit present.      Mental Status: He is alert and oriented to person, place, and time.      Relevant Results               Assessment/Plan   This patient currently has cardiac telemetry ordered; if you would like to modify or discontinue the telemetry order, click here to go to the orders activity to modify/discontinue the order.    This patient has a central line   Reason for the central line remaining today? Parenteral medication    This patient has a urinary catheter   Reason for the urinary catheter remaining today? critically ill patient who need accurate urinary output measurements          Principal Problem:    Pneumonia, unspecified organism    Andrea is a 68-year-old male patient who presents from skilled nursing facility with complaint of left-sided chest pain.  Patient has a history of COPD on 4 L of oxygen chronically.  Patient is currently being treated with multiple IV antibiotics for a sacral wound, wound VAC present.  Patient labs show elevated WBC, hypotensive on arrival, tachycardic.  Chest x-ray showing bibasilar opacities concerning for pneumonia.  Patient was started on IV azithromycin and ceftriaxone, ID consult placed due to multiple other IV antibiotics and continued WBC count.  Wound culture ordered.  Patient given IV steroids in ED, IV fluids.  BNP, urinalysis pending.  Patient found to have a low magnesium, low potassium, replacements ordered.  Patient admitted to stepdown unit for further medical management.     Chest pain/pneumonia  Admit to stepdown unit per Dr. Angel Lewis  See imaging results above  Trend troponin  Blood cultures  Legionella/strep urine  IV azithromycin/ceftriaxone  Consult ID and appreciate input  Urinalysis pending  Lactate ordered and pending  DuoNeb treatments every 6  Aspirin per EMS  Continue oxygen  Repeat labs in p.m.  Check lipase  Check procalcitonin  Adjust pain medications  Check right lower extremity ultrasound for swelling      Hypomagnesemia/hypokalemia  Magnesium 1.18  4 g magnesium IV replacement  Potassium 3.1  40 mEq Kcl  Repeat labs in p.m.  Telemetry monitoring     Chronic sacral wound  Consult ID due to multiple antibiotics (daptomycin, cefazolin, Cipro)  PICC line RUE  Wound culture ordered  Consult wound RN for wound VAC management     CHF/anxiety/hypertension/seizure/BPH/heart failure/hyperlipidemia/CHILANGO  Continue oxygenation at 4 L  Continue home medications when med rec is complete  Hold home antihypertensives due to hypotension upon arrival  Continue to monitor blood pressure  Telemetry monitoring  BNP ordered and pending  Last echocardiogram on file 1/23/2024: EF 60 to 65%  Lactate pending  Type and screen ordered and pending  Urinalysis pending  Cardiac diet     DVT Ppx  SCDs  Heparin subcutaneous  Up to chair for meals  PT/OT     I spent 45 minutes in the professional and overall care of this patient.     2/28: Patient with new left upper extremity swelling, will check duplex ultrasound.  Ultrasound of lower extremities was negative.  Replete magnesium.  Troponin negative x 3.  Procalcitonin minimally elevated 0.29 but does support pneumonia.  Lipase and D-dimer within normal limits.  Adjust pain regimen.  Palliative care consulted to further discuss potential hospice per patient request.    2/29: Antibiotics changed to Vanco and Zosyn yesterday.  Now changed to Unasyn and polymyxin.  Patient had positive blood cultures at hospitalization at [facility] 1 month ago.  Seay to be exchanged.  Podiatry consulted, appreciate recs.  Palliative care following.    3/1: Ultrasound of the left upper extremity was negative for DVT.  Start nystatin swish and swallow for thrush with possible esophageal candidiasis.  Working on arrangements for new SNF.            Angel Lewis DO

## 2024-03-02 NOTE — CARE PLAN
The patient's goals for the shift include      The clinical goals for the shift include patient will have decreased anxiety during shift

## 2024-03-02 NOTE — SIGNIFICANT EVENT
Rapid response called to the patient's room for decreased mental status and increased oxygen requirements.    Arrived to the room to find the patient on BiPAP which had been initiated recently.  Patient is here for sepsis in the setting of polymicrobial bloodstream infection.  He has a sacral wound growing multidrug-resistant Acinetobacter.  He is being followed by multiple specialist including palliative care.  He has an order and well-documented DNR/DNI in the chart.    Apparently, overnight, he is oxygen requirements increased from 4 L to 15 L.  The house nurse practitioner was contacted and requested a chest x-ray.  Chest x-ray at this time has not been read however there are worsening bilateral infiltrates and new pleural effusions left greater than right.  There is extremely poor aeration consistent with diminished respiratory effort which does explain his hypoxemia.    On arrival to the room the patient is completely unresponsive to sternal rub.  His pupils are fixed however not pinpoint.  It is very difficult to feel pulse particularly given that he is on a vibrating sand bed.  Nursing staff is attempting to get vitals however was having trouble finding a pulse even with tissue Doppler.  We eventually placed the pads on the patient which showed significant bradycardia into the 30s.  This is obviously not a perfusing rhythm and prior to initiating ACLS and extreme measures we confirmed several times that the patient is DNR/DNI.    Extensive CODE STATUS conversations have been had with the palliative care team.  As such, we will respect his wishes and not pursue additional interventions which would have included chest compressions and likely intubation.    Brief physical exam:  Sickly appearing, wasted temporal muscles  Obese body habitus, no respirations  Poor peripheral perfusion  Obese abdomen  Mottled extremities  Unresponsive, GCS of 3    Vitals:  Unable to obtain blood pressure  Heart rate in the  30s    Assessment and plan:    1.  Circulatory collapse, complete heart block  In the setting of sepsis, altered mental status, and hypoxemia.  Patient was tachycardic all night along with increasing oxygen requirements.  Likely went into complete heart block in the setting of hypoxemia and circulatory shock.  As he was DNR, no indication for transcutaneous pacing or ACLS.  We did not pursue any of these measures.    2.  Acute on chronic hypoxemic respiratory failure with likely hypercapnia  On 4 L chronically, escalated to 15 overnight.  Was on BiPAP on arrival to the room.  No spontaneous respirations, tidal volumes were extremely inadequate.  Chest x-ray confirms poor respiratory effort with bilateral effusions and infiltrates.  Warrants endotracheal intubation however as above DNR, DNI.    3.  Multidrug-resistant Acinetobacter infection, bloodstream infection  Being treated by infectious disease.    4.  End-of-life care  As above, CODE STATUS discussions have been happening daily.  He is DNR, DNI.  Nursing staff called patient's wife during this event, I attempted to call several times afterwards and appears that she may have already left her house to come to hospital.  Will head up to the floor to speak with wife assuming that she is here.  Primary team contacted.    Discussed with attending and nursing staff.

## 2024-03-02 NOTE — SIGNIFICANT EVENT
See rapid response note for further details.    Patient is here for sepsis and multiorgan dysfunction.  Likely had an aspiration event overnight leading to complete heart block and circulatory arrest.    0658 hrs no pulse was palpated over carotid or femoral vessels.  He is in PEA on the monitor and complete heart block.    As he is DNR/DNI,  no additional measures were taken.    Patient pronounced dead.  Primary team and family contacted.

## 2024-03-02 NOTE — NURSING NOTE
Rapid response called do to patient bradycardia down to 40s. Patient already placed on BIPAP by respiratory prior to rapid response called. Patent is a DNR/DNI no intubation which was respected patients wishes. Patient passed away  at 0658.

## 2024-03-02 NOTE — DISCHARGE SUMMARY
Discharge Diagnosis  Pneumonia, unspecified organism    Issues Requiring Follow-Up  N/A    Discharge Meds     Your medication list        ASK your doctor about these medications        Instructions Last Dose Given Next Dose Due   acetaminophen 325 mg tablet  Commonly known as: Tylenol           allopurinol 300 mg tablet  Commonly known as: Zyloprim           alum-mag hydroxide-simeth 200-200-20 mg/5 mL oral suspension  Commonly known as: Mylanta           amLODIPine 10 mg tablet  Commonly known as: Norvasc           benzocaine 20 % mucosal gel           bisacodyl 10 mg suppository  Commonly known as: Dulcolax           busPIRone 10 mg tablet  Commonly known as: Buspar           calcium carbonate 200 mg calcium chewable tablet  Commonly known as: Tums           ceFAZolin 2 gram recon soln  Ask about: Should I take this medication?           ciprofloxacin 500 mg tablet  Commonly known as: Cipro  Ask about: Should I take this medication?           Combivent Respimat  mcg/actuation inhaler  Generic drug: ipratropium-albuteroL           DAPTOmycin 500 mg injection  Commonly known as: Cubicin  Ask about: Should I take this medication?           Dilaudid 4 mg tablet  Generic drug: HYDROmorphone           famotidine 20 mg tablet  Commonly known as: Pepcid           fentaNYL 75 mcg/hr patch  Commonly known as: Duragesic           ferrous sulfate (325 mg ferrous sulfate) tablet           hydrOXYzine HCL 25 mg tablet  Commonly known as: Atarax           lactobacillus acidophilus tablet tablet  Ask about: Should I take this medication?           levETIRAcetam 500 mg tablet  Commonly known as: Keppra           lidocaine 4 % patch           magnesium hydroxide 400 mg/5 mL suspension  Commonly known as: Milk of Magnesia           metoprolol tartrate 50 mg tablet  Commonly known as: Lopressor           multivitamin tablet           ondansetron ODT 4 mg disintegrating tablet  Commonly known as: Zofran-ODT            polyethylene glycol 17 gram packet  Commonly known as: Glycolax, Miralax           tamsulosin 0.4 mg 24 hr capsule  Commonly known as: Flomax                    Test Results Pending At Discharge  Pending Labs       Order Current Status    Blood Culture Preliminary result    Blood Culture Preliminary result    Tissue/Wound Culture/Smear Preliminary result    Blood Culture Edited            Hospital Course   Andrea is a 68-year-old male patient who presents from skilled nursing facility with complaint of left-sided chest pain.  Patient has a history of COPD on 4 L of oxygen chronically.  Patient is currently being treated with multiple IV antibiotics for a sacral wound, wound VAC present.  Patient labs show elevated WBC, hypotensive on arrival, tachycardic.  Chest x-ray showing bibasilar opacities concerning for pneumonia.  Patient was started on IV azithromycin and ceftriaxone, ID consult placed due to multiple other IV antibiotics and continued WBC count.  Wound culture ordered.  Patient given IV steroids in ED, IV fluids.  BNP, urinalysis pending.  Patient found to have a low magnesium, low potassium, replacements ordered.  Patient admitted to stepdown unit for further medical management.     Chest pain/pneumonia  Admit to stepdown unit per Dr. Angel Lewis  See imaging results above  Trend troponin  Blood cultures  Legionella/strep urine  IV azithromycin/ceftriaxone  Consult ID and appreciate input  Urinalysis pending  Lactate ordered and pending  DuoNeb treatments every 6  Aspirin per EMS  Continue oxygen  Repeat labs in p.m.  Check lipase  Check procalcitonin  Adjust pain medications  Check right lower extremity ultrasound for swelling     Hypomagnesemia/hypokalemia  Magnesium 1.18  4 g magnesium IV replacement  Potassium 3.1  40 mEq Kcl  Repeat labs in p.m.  Telemetry monitoring     Chronic sacral wound  Consult ID due to multiple antibiotics (daptomycin, cefazolin, Cipro)  PICC line RUE  Wound culture  ordered  Consult wound RN for wound VAC management     CHF/anxiety/hypertension/seizure/BPH/heart failure/hyperlipidemia/CHILANGO  Continue oxygenation at 4 L  Continue home medications when med rec is complete  Hold home antihypertensives due to hypotension upon arrival  Continue to monitor blood pressure  Telemetry monitoring  BNP ordered and pending  Last echocardiogram on file 2024: EF 60 to 65%  Lactate pending  Type and screen ordered and pending  Urinalysis pending  Cardiac diet     DVT Ppx  SCDs  Heparin subcutaneous  Up to chair for meals  PT/OT     I spent 45 minutes in the professional and overall care of this patient.     : Patient with new left upper extremity swelling, will check duplex ultrasound.  Ultrasound of lower extremities was negative.  Replete magnesium.  Troponin negative x 3.  Procalcitonin minimally elevated 0.29 but does support pneumonia.  Lipase and D-dimer within normal limits.  Adjust pain regimen.  Palliative care consulted to further discuss potential hospice per patient request.     : Antibiotics changed to Vanco and Zosyn yesterday.  Now changed to Unasyn and polymyxin.  Patient had positive blood cultures at hospitalization at [facility] 1 month ago.  Seay to be exchanged.  Podiatry consulted, appreciate recs.  Palliative care following.     3/1: Ultrasound of the left upper extremity was negative for DVT.  Start nystatin swish and swallow for thrush with possible esophageal candidiasis.  Working on arrangements for new SNF.     3/2: Patient suffered cardiopulmonary arrest and  in the hospital. He was DNR-DNI.    Pertinent Physical Exam At Time of Discharge  Physical Exam  No spontaneous movements  Absent heart sounds  Absent breath sounds  Absent reflexes    Outpatient Follow-Up  No future appointments.      Angel Lewis DO

## 2024-03-02 NOTE — PROGRESS NOTES
Infectious disease progress note  Subjective   Positive blood cultures Acinetobacter multidrug-resistant  Sacral osteomyelitis  UTI  Pneumonia    Antibiotics  Antibiotic day 5 out of 42 days  Unasyn day 3 high-dose out   Polymyxin B day 3    Objective   Range of Vitals (last 24 hours)  Heart Rate:  []   Temp:  [36.1 °C (97 °F)-36.4 °C (97.5 °F)]   Resp:  [16-25]   BP: (119-149)/(63-88)   SpO2:  [86 %-100 %]   Daily Weight  02/28/24 : 81.6 kg (179 lb 14.3 oz)    Body mass index is 24.39 kg/m².      Physical Exam    HEENT PERRLA  Chest fair air entry bilaterally  CVS S1-S2 regular  Abdomen soft nontender positive bowel sounds  Sacral wound per wound team    Relevant Results  Labs  Lab Results   Component Value Date    WBC 11.7 (H) 03/02/2024    HGB 9.9 (L) 03/02/2024    HCT 32.7 (L) 03/02/2024    MCV 99 03/02/2024     (H) 03/02/2024     Lab Results   Component Value Date    GLUCOSE 90 03/02/2024    CALCIUM 7.1 (L) 03/02/2024     (L) 03/02/2024    K 3.2 (L) 03/02/2024    CO2 38 (H) 03/02/2024    CL 86 (L) 03/02/2024    BUN 7 03/02/2024    CREATININE 0.33 (L) 03/02/2024   ESR: --  Lab Results   Component Value Date    SEDRATE 50 (H) 02/27/2024     Lab Results   Component Value Date    CRP 14.08 (H) 02/27/2024     Lab Results   Component Value Date    ALT <3 (L) 02/27/2024    AST 13 02/27/2024    ALKPHOS 94 02/27/2024    BILITOT 0.3 02/27/2024       Microbiology  2- blood cultures multidrug-resistant Acinetobacter  2- blood cultures no growth 1 day  2- wound culture rare Acinetobacter multidrug-resistant    Imaging  3-2-2024 chest x-ray moderate right and small left pleural effusion with dense consolidative airspace opacities in the right mid to lower lung zones    Assessment/Plan   1.  Continue current antibiotics  2.  We will be monitoring kidney function and white count closely    Other issues  Sacral osteomyelitis  #Urinary tract infection  #Gram-negative bacteremia with MDRO  acinetobacter  #Pneumonia  #History of congestive heart failure  #History of gout  #History of CHILANGO  #History of hypertension    I reviewed and interpreted all lab test imaging studies and documentations from other healthcare providers  I am monitoring for antibiotic side effects and toxicity     Ana Roberto MD

## 2024-03-04 LAB — BACTERIA BLD CULT: NORMAL

## 2024-03-06 NOTE — CONSULTS
Service:   Service: Wound Care     Consult:  Consult requested by (Attending Name): Ankur Harrison   Reason: unstageable wound, picture taken     History of Present Illness:   HPI:    RAJ HARMON is a 67 year old Male           Intolerances:  ·  codeine : GI Upset      Assessment:    Wound location:Right sacrum    size:15 x 11 x 1 cm                             undermining: 3 cm - 9-3 o'clock       tracking: no                                        Wound type: stage 4 pressure injury   Wound bed: full thickness pink, red with visible instrumentation   Draining: moderate- large serous   Periwound skin: pink   Therapeutic surface: Versacare Bed       Recommendation: 2x/day  Irrigate with normal saline or wound cleanser.  Pack with kerlix moistened with  1/4 strength Dakins   cover with sacral Mepilex border dressing  Turn and reposition at least every 2 hours.    Patient awaiting Plastic surgery and neurosurgery recommendations     Plan:  While inpatient, call with questions or if condition changes.  Please review recommendation. If you agree with this recommendation, please enter as a wound orders in EMR. Thank you.  Angie PELAYO RN CWON  Pager [phone]/ [facility]            Consult Status:  Consult Order ID: [id number]       Electronic Signatures:  Angie Ricardo (STAFF N)  (Signed 14-Feb-2023 19:28)   Authored: Service, History of Present Illness, Allergies,  Assessment/Recommendations, Note Completion      Last Updated: 14-Feb-2023 19:28 by Angie Ricardo (STAFF N)

## 2024-03-06 NOTE — CONSULTS
"    Service:   Service: Plastic Surgery     Consult:  Consult requested by (Attending Name): Ankur Harrison   Reason: debridement and closure of lumbar wound     History of Present Illness:   HPI:    RAJ OCHOA is a 67 year old Male known to the plastic surgery team. He has a PMH significant for CHF, COPD (currently on 4L O2 at home), seizures, gout, HLD,  ESRD, BPH, MRDD, h/o multiple lumbar spinal surgeries which have been complicated by recurrent MRSA infections requiring extensive plastic surgery interventions including multiple debridements and attempted closures. He presented to [place] with worsening  lower back pain as well as generalized fatigue and weakness, found to have MRSA infection of sacral wound with exposed hardware. He states over the past few weeks he has had worsening lower back pain, as well as feeling \"out of it\" and becoming more fatigued  during this time. Mr. Ochoa was admitted to [facility] on 2/9, and subsequently transferred to [facility] on 2/14. Plastic surgery was consulted for potential debridement of wound and subsequent closure.    ROS: All 10 systems were reviewed and are unremarkable except for those mentioned in HPI.     PMH: see HPI  PSH: see HPI  Family hx: not pertinent to chief complaint  Social hx: Denies EtOH, tobacco, or illicit drugs             Intolerances:  ·  codeine : GI Upset    Objective:     Objective Information:        T   P  R  BP   MAP  SpO2   Value  36.9  73  18  121/63      98%  Date/Time 2/14 12:51 2/14 12:51 2/14 12:51 2/14 12:51    2/14 12:51  Range  (36.2C - 36.9C )  (73 - 101 )  (16 - 18 )  (120 - 134 )/ (63 - 74 )    (98% - 98% )   As of 14-Feb-2023 13:10:00, patient is on 5 L/min of oxygen via nasal cannula.  Highest temp of 36.9 C was recorded at 2/14 12:51    Physical Exam by System:    Constitutional: NAD, alert, calm and cooperative   Eyes: EOMI, PERRL   ENMT: MMM, no ulceration/lesion   Head/Neck: NC/AT   Respiratory/Thorax: Unlabored " respirations on 4L  NC   Gastrointestinal: soft, NT/ND   Musculoskeletal: ROJO   Extremities: No pitting edema, clubbing of the nails,  or peripheral cyanosis present   Neurological: A&Ox3. 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 Wound bed pink with some fibrinous slough, exposed hardware, no drainage or foul odor.     Medications:    Medications:      ALTERNATIVE MEDICINES:    1. Melatonin:  3  mg  Oral  At Bedtime   PRN         ANTI-INFECTIVES:    1. Piperacillin - Tazobactam 4.5 gram/Iso-osmotic 100 mL Premix IVPB:  100  mL  IntraVenous Piggyback  Every 6 Hours      CARDIOVASCULAR AGENTS:    1. Spironolactone:  50  mg  Oral  2 Times a Day    2. Losartan:  50  mg  Oral  Daily    3. Tamsulosin:  0.4  mg  Oral  Daily    4. Metoprolol Tartrate:  12.5  mg  Oral  2 Times a Day    5. amLODIPine (NORVASC):  10  mg  Oral  Daily      CENTRAL NERVOUS SYSTEM AGENTS:    1. Acetaminophen:  650  mg  Oral  Every 6 Hours    2. fentaNYL 50 micrograms/ hour TransDermal:  1  patch  TransDermal  Every 72 Hours    3. HYDROmorphone:  4  mg  Oral  Every 4 Hours   PRN       4. levETIRAcetam (KEPPRA):  500  mg  Oral  2 Times a Day    5. busPIRone (BUSPAR):  5  mg  Oral  Every 12 Hours      COAGULATION MODIFIERS:    1. Enoxaparin SubCutaneous:  40  mg  SubCutaneous  Every 12 Hours      GASTROINTESTINAL AGENTS:    1. Docusate 50 mg - Senna 8.6 m  tablet(s)  Oral  2 Times a Day    2. Polyethylene Glycol:  17  gram(s)  Oral  Daily      METABOLIC AGENTS:    1. Allopurinol:  300  mg  Oral  Every 24 Hours    2. Atorvastatin:  20  mg  Oral  Daily      MISCELLANEOUS AGENTS:    1. Naloxone Injectable:  0.2  mg  IntraVenous Push  Once   PRN         NUTRITIONAL PRODUCTS:    1. Sodium Chloride 0.9% Injectable Flush:  10  mL  IntraVenous Flush  Every 8 Hours and as Needed   PRN       2. Multivitamin with  "Minerals:  1  tablet(s)  Oral  Daily    3. Ascorbic Acid:  1000  mg  Oral  Daily      Radiology Results:    Results:      Impression:    Newly seen large high-grade wound overlying the thoracolumbar spine  with protruding hardware.     Wound extends near or to the margin of posterior elements including  increased heterotopic ossifications since remote CT scan. Subtle  osteomyelitis not reliably excluded due to the complexity of  underlying abnormality although no well-delineated evident discrete  bony destructive tract identified. Further evaluation with nuclear  medicine imaging, and or MRI could also be considered.     Unchanged pelvic calcification which is inseparable from the  posteroinferior bladder wall. The calcification may be within the  wall within the lumen or along the margin of the wall. If there is  clinical concern, bladder ultrasound correlation could be considered.     Additional similar pre-existing findings as reported.     CT L Spine without Contrast [Feb 10 2023  6:27PM]        Assessment:    RAJ OCHOA is a 67 year old Male known to the plastic surgery team. He has a PMH significant for CHF, COPD (currently on 4L O2 at home), seizures, gout, HLD,  ESRD, BPH, MRDD, h/o multiple lumbar spinal surgeries which have been complicated by recurrent MRSA infections requiring extensive plastic surgery interventions including multiple debridements and attempted closures. He presented to [place] with worsening  lower back pain as well as generalized fatigue and weakness, found to have MRSA infection of sacral wound with exposed hardware. He states over the past few weeks he has had worsening lower back pain, as well as feeling \"out of it\" and becoming more fatigued  during this time. Mr. Ochoa was admitted to [facility] on 2/9, and subsequently transferred to [facility] on 2/14. Plastic surgery was consulted for potential debridement of wound and subsequent closure.    Plan/Recommendations:  - No " acute surgical interventions at this time from plastic surgery     · Neurosurgery to remove hardware prior to any plastics intervention  - Recommend daily WTD dressing changes and consult to wound care  - Pressure offloading with Q2 hour turns  - Recommend Clinitron bed  - Plastic surgery will continue to follow in the interim  - All other care per primary  - Plan discussed with PATRICIA Jones  Plastic and Reconstructive Surgery  Doc Halo, Pager #[phone], Team phones: [phone]     Time spent on the assessment of patient, gathering and interpreting data, review of medical record/patient history, personally reviewing radiographic imaging and formulation of this note 60 minutes. With greater than 50% spent in personal discussion with  patient.      Consult Status:  Consult Order ID: [id number]       Electronic Signatures:  Angel Johnson (FIDEL-CNP)  (Signed 14-Feb-2023 18:35)   Authored: Service, History of Present Illness, Allergies,  Objective, Assessment/Recommendations, Note Completion      Last Updated: 14-Feb-2023 18:35 by Angel Johnson (APRN-CNP)

## 2024-03-06 NOTE — CONSULTS
"    Service:   Service:  Service: Surgery     Consult:  Consult requested by (Attending Name): Ankur Harrison   Reason: nonhealing lumbar wound  with exposed  hardware     History of Present Illness:   History Present Illness:  HPI:    Andrea Ochoa is a 67 yr old F with h/o multiple lumbar surgeries c/b 3 MRSA infections s/p intrathecal  morphine pump (2013 SWG), COPD (on 4L home O2), CPAP QHS, gout, HLD, ESRD, BPH, seizures, MRDD p/w infected decubitus ulcer, 2/6/22 s/p debridement of midback, sacral, left gluteal, left heel ulcers, dc'd on PO doxy to SNF, 2/28 p/w L heel ulcer, open  thoracic and lower back wound, 2/28 s/p low back I &D, exposed catheter found, 3/7 p/w nonhealing low back wound w/ exposed catheter, 3/19 s/p removal of ITP reservoir and catheter, placement  of wound vac by plastics.  Now presenting with worsening sacral ulcer with exposed spinal hardware.      The patient's radiographic findings were personally reviewed and the following significant findings were identified: CT L spine with hardware from L2-L4 and demonstrating good fusion of bone.     Patient reports feeling \"off\" for about a week and reports chronic back pain but denies any weakness/numbness/parasthesias. Denies nausea/vomiting. No visual changes. Denies any fever/chills.     PMH: as above  PSH: as above  SH: ambulatory with rollator at baseline, wife is caretaker  FH: non contributory  Allergies: reviewed and as listed in EMR    ROS: 10 point ROS NEGATIVE other than per above HPI.      EXAM:  NAD, AOx3, no aphasia or dysarthria, normal fund of knowledge  Cranial Nerves II-XII: VFF, PERRL, EOMI, Face Symm, Facial SILT, Palate/Tongue midline and symmetric  Motor: BLE HF 4+/h (pain limited), otherwise 5/5 throughout,  No drift, no dysmetria on finger to nose  Sensation: SILT throughout all extremities  DTRS: 2+ Throughout, No Hoffmans or Clonus    Gen: NAD  Chest wall rise symmetric  Carotid pulses 2+ and symmetric " bilaterally  Skin warm/dry , large ulcer on back with hardware exposed  No pitting edema  Abdomen soft/nontender/nondistended.            Intolerances:  ·  codeine : GI Upset      Assessment/Recommendations:  Assessment:    Assessment:  Andrea Ochoa is a 67 yr old F with h/o multiple lumbar surgeries c/b 3 MRSA infections s/p intrathecal morphine pump (2013 SWG), COPD (on 4L home O2), CPAP QHS, gout, HLD, ESRD, BPH, seizures,  MRDD p/w infected decubitus ulcer, 2/6/22 s/p debridement of midback, sacral, left gluteal, left heel ulcers, dc'd on PO doxy to SNF, 2/28 p/w L heel ulcer, open thoracic and lower back wound, 2/28 s/p low back I &D, exposed catheter found, 3/7 p/w nonhealing  low back wound w/ exposed catheter, 3/19 s/p removal of ITP reservoir and catheter, placement of wound vac by plastics.  Now presenting  with worsening sacral ulcer with exposed spinal hardware.      The patient's radiographic findings were personally reviewed and the following significant findings were identified: CT L spine with hardware from L2-L4 and demonstrating good fusion of bone.     Recommendations:  will discuss removal of hardware and further plans for ulcer with plastic surgery     Consult Status:  Consult Order ID: [id number]     Attestation:   Note Completion:  I am a:  Resident/Fellow   Attending Attestation I saw and evaluated the patient.  I personally obtained the key and critical portions of the history and physical exam or was physically present for key and  critical portions performed by the resident/fellow. I reviewed the resident/fellow?s documentation and discussed the patient with the resident/fellow.  I agree with the resident/fellow?s medical decision making as documented in the note.     I personally evaluated the patient on 15-Feb-2023         Electronic Signatures:  ANG Espinoza)  (Signed 15-Feb-2023 07:28)   Authored: Note Completion   Co-Signer: Service, History of Present Illness, Allergies,  Assessment/Recommendations, Note Completion  Rachel Acosta (Resident))  (Signed 14-Feb-2023 17:48)   Authored: Service, History of Present Illness, Allergies,  Assessment/Recommendations, Note Completion      Last Updated: 15-Feb-2023 07:28 by ANG Espinoza)

## 2024-03-08 NOTE — DOCUMENTATION CLARIFICATION NOTE
"    PATIENT:               RAJ HARMON  ACCT #:                  [id number]  MRN:                       [id number]  :                       1955  ADMIT DATE:       2024 1:39 AM  DISCH DATE:        3/2/2024 12:00 PM  RESPONDING PROVIDER #:        [id number]          PROVIDER RESPONSE TEXT:    Sepsis is ruled out after study    CDI QUERY TEXT:    [facility]_Diagnosis Ruled Out In        Instruction:    Based on your assessment of the patient and the clinical information, please provide the requested documentation by clicking on the appropriate radio button and enter any additional information if prompted.    Question: Please further clarify the diagnosis of sepsis only noted on 3/2    When answering this query, please exercise your independent professional judgment. The fact that a question is being asked, does not imply that any particular answer is desired or expected.    The patient's clinical indicators include:  Clinical Information: 68 year old man from SNF with chest pain. Pt's diagnoses include pneumonia. Pt with recent hospitalization for bacteremia and sacral osteomyelitis. Pt's PMH includes COPD, chronic respiratory failure, HTN, CHF, and BPH    Clinical Indicators:  ID Consult : Jamshid Wesley/Felisa  \"..male who was at [facility] being treated for sacral wound, UTI, gram-negative bacteremia, and pneumonia.  He had a PICC placed on  and has been at skilled nursing facility receiving a multitude of different antibiotics for suspected sacral osteomyelitis.\"    Significant Event Note 3/2: Dr. Cordoba  \"Patient is here for sepsis..\"    Treatment:  -NS 500cc iv bolus and LR 1L iv bolus:   -Ceftriaxone 1gm iv/Azithromycin 500mg iv: -  -Zosyn 3.375 iv every 6 hours: -  -Vancomycin 1.25gm iv every 12 hours: -  -Unasyn 3gm iv every 4 hours: -3/2  -Polymyxin B 1,250,000 units iv every 12 hours: 3/1-3/2      Risk Factors: age, COPD, CHF  Options provided:  -- Sepsis is ruled out " after study  -- Sepsis with sepsis related organ dysfunction is confirmed as evidenced by, Please specify sepsis associated organ dysfunction below  -- Other - I will add my own diagnosis  -- Refer to Clinical Documentation Reviewer    Query created by: Vita Hirsch on 3/7/2024 3:24 PM      Electronically signed by:  JOSE BAILEY DO 3/8/2024 3:50 PM

## 2024-03-11 NOTE — DOCUMENTATION CLARIFICATION NOTE
"    PATIENT:               RAJ HARMON  ACCT #:                  [id number]  MRN:                       [id number]  :                       1955  ADMIT DATE:       2024 1:39 AM  DISCH DATE:        3/2/2024 12:00 PM  RESPONDING PROVIDER #:        [id number]          PROVIDER RESPONSE TEXT:    Aspiration pneumonia    CDI QUERY TEXT:    [facility]_Pneumonia Specificity        Instruction:    Based on your assessment of the patient and the clinical information, please provide the requested documentation by clicking on the appropriate radio button and enter any additional information if prompted.    Question: Please further specify the type of pneumonia being treated    When answering this query, please exercise your independent professional judgment. The fact that a question is being asked, does not imply that any particular answer is desired or expected.    The patient's clinical indicators include:  Clinical Information: 68 year old man from SNF with chest pain. Pt's diagnoses include pneumonia. Pt with recent hospitalization for bacteremia and sacral osteomyelitis. Pt's PMH includes COPD, chronic respiratory failure, HTN, CHF, and BPH.    Clinical Indicators:  Progress Notes : Dr. Lewis  \"Chest pain/pneumonia.. Procalcitonin minimally elevated 0.29 but does support pneumonia...\"    Treatment:  -Ceftriaxone 1gm iv/Azithromycin 500mg iv: -  -Zosyn 3.375 iv every 6 hours: -  -Vancomycin 1.25gm iv every 12 hours: -  -Unasyn 3gm iv every 4 hours: -3/2  -Polymyxin B 1,250,000 units iv every 12 hours: 3/1-3/2    Risk Factors: age, recent hospitalization, SNF, antibiotics  Options provided:  -- Aspiration pneumonia  -- Gram negative pneumonia  -- Pseudomonas pneumonia  -- Other - I will add my own diagnosis  -- Refer to Clinical Documentation Reviewer    Query created by: Vita Hirsch on 3/7/2024 1:43 PM      Electronically signed by:  JOSE LEWIS DO 3/11/2024 4:24 PM          "

## 2024-03-15 LAB
BACTERIA BLD AEROBE CULT: ABNORMAL
BACTERIA BLD AEROBE CULT: ABNORMAL
BACTERIA BLD CULT: ABNORMAL
BACTERIA BLD CULT: ABNORMAL
BACTERIA SPEC CULT: ABNORMAL
GRAM STN SPEC: ABNORMAL
LABORATORY COMMENT REPORT: ABNORMAL
LABORATORY COMMENT REPORT: ABNORMAL

## 2024-04-17 NOTE — PROGRESS NOTES
Service: Infectious Disease     Subjective Data:   RAJ HARMON is a 67 year old Male who is Hospital Day # 32 and POD #3 for 1. Excision and debridement down to and including spinous process;2. Wound vac exchange (15 x 11 cm);3. ;4. ;5.     Patient denies any complaints today, reports pain is a little worse with new oral regimen but overall well controlled.    Objective Data:     Objective Information:      T   P  R  BP   MAP  SpO2   Value  36.6  79  24  156/79   81  97%  Date/Time 3/17 12:38 3/17 12:38 3/17 12:38 3/17 12:38  3/17 6:38 3/17 12:38  Range  (36.1C - 36.6C )  (62 - 79 )  (18 - 24 )  (125 - 156 )/ (55 - 79 )  (81 - 81 )  (97% - 99% )   As of 17-Mar-2023 09:43:00, patient is on 3 L/min of oxygen via nasal cannula.      Pain reported at 3/17 10:43: 6 = Moderate    Physical Exam Narrative:  ·  Physical Exam:    Gen: well-appearing, NAD  Head and neck: NCAT  HEENT: MMM, poor dentition  CV: RRR no mrg  Pulm: CTAB, decreased airflow, no wheezing or crackles, normal WOB on 4L  Abd: obese, soft, non-tender, non-distended  Ext: WWP, mild peripheral edema  Neuro: alert and oriented x3, no gross FND  Back: Wound vac placed and secured, serosanguineous drainage in wound vac      Medication:    Medications:          Continuous Medications       --------------------------------  No continuous medications are active       Scheduled Medications       --------------------------------    1. Acetaminophen:  975  mg  Oral  Every 8 Hours    2. Allopurinol:  300  mg  Oral  Every 24 Hours    3. amLODIPine (NORVASC):  10  mg  Oral  Daily    4. Ascorbic Acid:  1000  mg  Oral  Daily    5. Atorvastatin:  20  mg  Oral  Daily    6. busPIRone (BUSPAR):  5  mg  Oral  Every 12 Hours    7. Docusate 50 mg - Senna 8.6 m  tablet(s)  Oral  2 Times a Day    8. DULoxetine:  30  mg  Oral  At Bedtime    9. Enoxaparin SubCutaneous:  40  mg  SubCutaneous  Every 24 Hours    10. fentaNYL 75 micrograms/ hour TransDermal:  1  patch   ATTEMPTED TO CALL PATIENT, VM HAS BEEN LEFT FOR RETURN CALL.    TransDermal  Every 72 Hours    11. Hydrocortisone 1% Topical:  1  application(s)  Topical  2 Times a Day    12. levETIRAcetam (KEPPRA):  500  mg  Oral  2 Times a Day    13. Lidocaine 4% TransDermal:  1  patch  TransDermal  Every 24 Hours    14. Metoprolol Tartrate:  12.5  mg  Oral  2 Times a Day    15. Nystatin 100,000 Units/ gram Topical:  1  application(s)  Topical  3 Times a Day    16. Polyethylene Glycol:  17  gram(s)  Oral  Daily    17. Sodium Hypochlorite 0.125% (Dakins Quarter):  1  application(s)  Topical  3 Times a Day    18. Sodium Zirconium Cyclosilicate:  10  gram(s)  Oral  Daily    19. Tamsulosin:  0.8  mg  Oral  Daily    20. Vancomycin - RPh to Dose - IV Piggy Back:  1  each  As Specified  Variable    21. Vancomycin 750 mg/ D5W IVPB Premixed Soln 150 mL:  750  mg  IntraVenous Piggyback  Every 12 Hours         PRN Medications       --------------------------------    1. Albuterol 2.5 mg - Ipratropium 0.5 mg/ 3 mL Neb Soln:  3  mL  Inhalation  Every 6 Hours    2. diphenhydrAMINE:  25  mg  Oral  Every 24 Hours    3. Heparin Flush 10 unit/ mL Injectable PRN:  5  mL  IntraVenous Flush  According to Flush Policy    4. HYDROmorphone:  4  mg  Oral  Every 6 Hours    5. HYDROmorphone Injectable:  0.2  mg  IntraVenous Push  Every 8 Hours    6. Naloxone Injectable:  0.2  mg  IntraVenous Push  Once    7. Sodium Chloride 0.9% Injectable Flush:  10  mL  IntraVenous Flush  Every 8 Hours and as Needed        Recent Lab Results:    Results:    CBC: 3/17/2023 12:08              \     Hgb     /                              \     8.0 L    /  WBC  ----------------  Plt               8.8       ----------------    292              /     Hct     \                              /     26.7 L    \            RBC: 2.44 L    MCV: 109 H    Neutrophil  %: 81.5    Assessment and Plan:   Daily Risk Screen:  ·  Does patient have a central line? yes   ·  Central Line Type PICC   ·  Plan for PICC removal today? no   ·  The patient  continues to require a PICC for parenteral medication     Code Status:  ·  Code Status Full Code     Assessment:    67 year old Male with CHF, COPD on 4L at home, seizure disorder and a history of multiple lumbar spine surgeries complicated  by MRSA wound infection with a known chronic open lumbar wound status post multiple debridements by plastic surgery, who presented to [facility] with worsening low back pain as well as generalized fatigue and weakness, found to have MRSA infection of sacral  wound with exposed hardware, and elevated inflammatory markers (ESR >130,  CRP elevated at 5.26). He presented to [facility] as a transfer for surgical evaluation by spine team. He was started on vancomcyin and zosyn  on 2/10 in [facility], which were continued on admission. BCx from 2/10 with NGTD. Nsgy was consulted, and he underwent removal of lumbar spinal hardware and irrigation and debridement of lumbar wound on 2/15. Plastic surgery was consulted and recommended  leaving wound open with TID dressing changes and plan for return to OR on 2/22 for lumbar spinal wound I&D and potential wound vac. Intraoperative wound cultures had no growth aerobically or anaerobically. Pt has a hx of chronic pain, and pain was managed  after surgery with fentanyl patch (per home regimen), Tylenol (per home regimen), and IV dilaudid 1mg IV q3h for moderate pain and 2mg IV q3h for severe pain. Pain regimen can be down-titrated to home regimen after plastic surgery intervention. Zosyn  was discontinued on 2/19 given no evidence of pseudomonas. Pt continues to be on vancomycin. Patient was having loose bowel movements multiple times daily but was negative for C.diff and stool softeners were stopped. Plastic surgery recommended wound  vac and it was placed on 2/22, replaced on 2/24. Patient had hyperkalemic episode on 2/34 of potassium 6.0 (repeat from 6.1) with EKG showing  peaked T-waves, ordered insulin & D5 & Ca gluconate & Lokelma 10 mg TID, held  Spironolactone . Hyperkalemia resolved with these interventions. Patient had another wound vac replaced on 2/27. Went to OR with plastic surgery on 2/28 for debridement. Patient went for another debridement on 3/7 with plastic surgery; noted to have improved since previous  procedure but still required continued debridement with next procedure planned for 3/14. Going forward patient will no longer need any more debridements at this time, set for wound vac with changes MWF. Pending SNF placement    Updates 3/17:  -Wound vac change today  -C/w Vancomycin  -ID appointment set for april 25th, also needs ortho spine, pcp, and podiatry follow up (all ordered)  -Pending SNF placement  -Pt still pending RT evaluation for bipap at night (refusing in past and noncompliant at home due to discomfort)    Cx history:     ·  2/10 OSH bedside wound cx - MRSA (final)      ·  2/15 OR deep wound cs - NGTD (final 2/17)    Abx hx:  1) Vancomcyin 2/10-   2) Zosyn 2/10- 2/19 (d/c given no culture evidence of pseudomonas)      #MRSA sacral wound infection  Micro:   -BCx 2/10 x2 NGTD  -[place] Wound cx 2/10/23: MRSA (S: clinda, vanc; R: TMP-SMX, tetracyclines)  -Intraoperative wound cx 2/15/23: no growth aerobically or anaerobically   - S/p below on 2/15:   1. exploration of lumbar wound dehiscence  2. removal of lumbar spinal hardware (set caps, rods, and screws)  3. irrigation and debridement of lumbar wound  - Plastic surgery recs  2/15:   1. WTD Kerlix dressings with Dakin's/Saline solution packed to the entire depth of the wound three times daily and PRN if soiled   -Lumbar CT without contrast, obtained on 2/18: soft tissue defect overlying lumbar and lower thoracic spine, soft tissue thickening, scattered soft tissue emphysema; multilevel degenerative changes of lumbar spine  - Plastics decided to place wound vac on 2/22 instead of I&D, reassessed and replaced wound vac on 2/24 and on 2/27. Went to OR on 2/28 for lumbar wound excision  and debridement and wound vac placement.  plan to go back to OR on 3/7.  -Patient underwent debridement on 3/7, additional debridement on 3/14 going forward  Abx:   Plan:   1) Vancomcyin 2/10 - 4/12 ( need to extend due to debridement on 3/14)  2) Zosyn 2/10- 2/19 (d/c given no culture evidence of pseudomonas)  3) No need for further debridement patient will be discharged to SNF with MWF wound vac changes  4) Follow up with Infectious disease on april 25th, 2023    #Chronic pain - lower back  -home pain regimen: scheduled acetaminophen, fentanyl patch 50mcg q72h, dilaudid 4mg PO TID prn breakthrough  -bowel regimen  -follows with pain medicine Dr. 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 Recommend follow up outpatient. Currently on Lokelma.   -6.0 on 2/24/2023 with EKG of peaked T-waves, 5.8 on 2/23 resolved peaked T-waves  -hx of hyperkalemic episodes  on review  -no CKD  -gave insulin, d50, calcium gluconate, and lokelma TID  for 48 hrs  -held maxime  -conitnue to monitor  -continue lokelma    #COPD, 4L at baseline  -SpO2 goal 88-92%  -Started on proventill (takes at home per wife)     #HFpEF   #HTN  -TTE Feb 2022: EF 65-70%, normal LV diastolic function, normal RV  -Hold ASA for possible procedure  -C/w home atorva, metop 12.5 BID, maxime 50 BID, losartan 50 qd, amlodipine 10  -Held maxime for hyperkalemia on 2/24    #Seizure disorder - continue Keppra  #Anxiety - continue Buspar   #BPH - continue tamsulosin   #Gout - continue allopurinol     #CHILANGO  -Not compliant with CPAP at home  -Elevated bicarb inpatient, likely chronic    DVT ppx: lovenox   Code status: FULL (confirmed on admission)  NOK: Wife Mike  [phone]      Attestation:   Note Completion:  I am a:  Resident/Fellow   Attending Attestation I saw and evaluated the patient.  I personally obtained the key and critical portions of the history and physical exam or was physically present for key and  critical portions performed by the resident/fellow. I reviewed the resident/fellow?s documentation and discussed the patient with the resident/fellow.  I agree with the resident/fellow?s medical decision making as documented in the note.     I personally evaluated the patient on 17-Mar-2023         Electronic Signatures:  Cande Guillermo (Resident))  (Signed 17-Mar-2023 15:51)   Authored: Service, Subjective Data, Objective Data, Assessment  and Plan, Note Completion  Angel Ham)  (Signed 17-Mar-2023 15:58)   Authored: Note Completion   Co-Signer: Service, Subjective Data, Objective Data, Assessment and Plan, Note Completion      Last Updated: 17-Mar-2023 15:58 by Angel Ham)

## 2024-04-19 NOTE — OP NOTE
PREOPERATIVE DIAGNOSIS:  Complicated open wound to the back.    POSTOPERATIVE DIAGNOSIS:  Complicated open wound to the back.    OPERATION/PROCEDURE:  Debridement of skin, subcutaneous tissue, muscle, bone of a wound 15  x 11 cm and closure of the wound with a VAC.     SURGEON:  Sravan Jaime MD, DDS.    ASSISTANT(S):  Loreta Nguyen, nurse practitioner.    ANESTHESIA:  General orotracheal.    FINDINGS:  The base of the wound appears to be somewhat pale and very few signs  of active granulation tissue developing in the base of the wound.  The margins of the wound have a few areas of necrosis and there were  exposed spinous processes in the central portion of the wound.     INDICATIONS FOR SURGERY:  The patient is a 67-year-old male very well known to me who has  developed a wound infection after placement of high-profile spine  hardware.  The patient has had numerous spinal operations and  eventually became infected resulting  in a complicated  open wound with his  wound opening.  Just recently,  the neurosurgical spine surgeons agreed to remove deep hardware,  which was eroding through.  However, the wound was not really moving  forward and will need continuous debridement until we can generate  some granulation tissue.  The problem stems from the fact that the  patient is constantly on his back.  He is unable to move and the  pressure on the wound was not helping us.  He was brought back to the  operating theater today for debridement and placement of a VAC.  The  pros and cons of this have been discussed with him.  He understands  these things and is eager to move ahead with surgery.     DESCRIPTION OF PROCEDURE:  The patient was placed on the operating room table in the prone  position.  He was put to sleep on his bed and turned into the prone  position following his endotracheal intubation.  The patient was then  prepped and draped in usual fashion for his procedures of the back.  All his bony  prominences were padded for his protection.  We turned  our attention to the back wound, which was 15 x 11 cm.  Using a 10  blade, I debrided the edges of the wound down to healthy bleeding  tissue.  I used a large rongeur to reduce the height of the spinous  processes, which were sticking up through the soft tissue.  Once the  bone was removed, I was able to further sharply debride the base of  the wound and any necrotic tissue.  I tried to bring everything back  to healthy bleeding tissue.  We established hemostasis, and now, a  large VAC sponge was placed, VAC tape was placed, and the wound was  covered.  The VAC was placed on suction with excellent results.  At  the termination of our procedure, the patient will be transferred  into his hospital bed, turned into the supine position and  transferred to recovery room awake and responsive.       Sravan Jaime MD, DDS    DD:  03/21/2023 13:25:54 EST  DT:  03/22/2023 10:25:50 EST  DICTATION NUMBER:  [id number]  INTERNAL JOB NUMBER:  [id number]    CC:  Sravan Jaime MD, DDS, Fax: [phone]       Electronic Signatures:  Sravan Jaime) (Signed on 23-Mar-2023 09:38)   Authored   Unsigned, Draft (SYS GENERATED) (Entered on 22-Mar-2023 10:25)   Entered     Last Updated: 23-Mar-2023 09:38 by Sravan Jaime)

## 2024-04-19 NOTE — OP NOTE
PREOPERATIVE DIAGNOSIS:  Complicated open wound of the back.    POSTOPERATIVE DIAGNOSIS:  Complicated open wound of the back.    OPERATION/PROCEDURE:  Debridement of skin of skin, subcutaneous tissue, muscle, fascia and  bone, for a wound 15 x 12 cm.  Closure of wound with a  fasciocutaneous flap from the right side and placement of a large VAC  dressing.     SURGEON:  Sravan Jaime MD, DDS.    ASSISTANT(S):  There is no resident or fellow involved with this patient's care.    ANESTHESIA:  General orotracheal.    FINDINGS:  Open wound of the back, which appears to be much better than it was.  However, there are were  significant problems and will need considerable  debridement.  The patient's hardware has been removed by the  Neurosurgery Spine Service, and now perhaps we can move forward with  this closure.     INDICATIONS FOR SURGERY:  The patient is a 67-year-old male with a long history of spinal  problems post surgery and infection.  The patient has been in the  hospital numerous times with infected hardware.  Eventually, the  Neurosurgery team stated that his bone graft had fused well and they  could remove the hardware, doing so perhaps will let us move forward  with closure of the wound.  I told the patient we debrided.  I do not  think we will be able to close the whole thing at this time.  It is  too complicated and a dirty wound and we will try to close as much as  we can and he may need a VAC in further surgery.  He understood these  things and was eager to move ahead with surgery.     SURGICAL PROCEDURE IN DETAIL:  The patient was placed on the operating room table in supine  position.  General anesthesia achieved via orotracheal intubation.  He was then turned into the left lateral back decubitus position so  that we can access excess soft tissue from his abdominal wall to  partially close the wound.  He was prepped and draped in the  usual  fashion for procedures of the back as well as  extending onto the  abdominal wall.  I began by extensive excisional debridement of this  wound of 12 x 15 cm, which although is large is quite much smaller  than it was.  All hypertrophic granulation tissue was removed.  The  skin was excised back to healthy bleeding tissue.  The spinous  processes of the lower portions of his lumbar spine are exposed.  Using a rongeur, all this bone is removed back to healthy bleeding  bone.  Once this is done now, we created a large fasciocutaneous flap  extending out to the patient's right side.  Three small perforators  are identified entering the flap.  We undermine anteriorly, make a  back cut at the most caudal aspect of the flap.  The tissue was moved  into the defect and sutured now with multiple interrupted 2-0 PDS  sutures.  Partial advancement of this flap allows us to close just  about half of the wound.  This is a dirty wound and we elect not to  close the whole thing.  The donor site was closed primarily by  advancing soft tissue of  the abdominal wall.   The flap was  inset with multiple interrupted 2-0 PDS sutures, trying to anchor to  stable deep tissue and even some sutures through the remnants of the  spinous processes.  2-0 PDS followed by 3-0 PDS multiple interrupted  sutures placed to reinforce flap.  Once this was done, we now take a  silver VAC and trim it to size to fit the remaining open area which  was approximately 15 x 6 cm.  The VAC was placed.  The sponge VAC was  placed.  The VAC tape was placed.  The VAC was placed on suction with  excellent results.  At the termination of our procedure, the patient  was turned back into the supine position, extubated on the table and  transferred to his hospital bed.  He has tolerated the procedure well  patient.     Sravan Jaime MD, DDS    DD:  03/01/2023 15:25:00 EST  DT:  03/02/2023 06:47:47 EST  DICTATION NUMBER:  [id number]  INTERNAL JOB NUMBER:  [id number]       Electronic Signatures:  Srvaan Jaime  (MD) (Signed on 02-Mar-2023 15:16)   Authored   Unsigned, Draft (SYS GENERATED) (Entered on 02-Mar-2023 06:47)   Entered     Last Updated: 02-Mar-2023 15:16 by Sravan Jaime)

## 2024-04-19 NOTE — OP NOTE
PREOPERATIVE DIAGNOSIS:  Complicated open wound of the back.    POSTOPERATIVE DIAGNOSIS:  Complicated open wound of the back.    OPERATION/PROCEDURE:  1. Excisional debridement of skin, subcutaneous tissue, muscle,  fascia.   2. Placement of a large VAC.    SURGEON:  Sravan Jaime MD,  DDS.    ASSISTANT(S):  Dr. Giovani Hardin, General Surgery resident.    ANESTHESIA:    FINDINGS:  A large open wound that is not ready for closure, particularly in  light of the fact that the patient continues to keep pressure on the  wound.  My thoughts were we cannot proceed with a flap to cover this  until we can move him off his back and get him better conditioned.     INDICATIONS FOR SURGERY:  The patient is a 67-year-old male, well known to me, who has had  numerous problems of the wound on his back secondary to exposed  spinal hardware and wound  infection.  Finally, the hardware has been removed and we  continued to try to improve the situation of the back wound.  However, the patient is bed bound or appears to be bed bound.  He is  severely deconditioned and puts a lot of pressure on this wound.  I  am reluctant to attempt a large flap, which would create a large  donor defect unless we can get him off his back to ensure pressure  relief. Today, we are going to debride this wound and try to move it  towards a better outcome.     DESCRIPTION OF PROCEDURE:  The patient was put to sleep in supine position via orotracheal  intubation.  He was then turned into the left side down lateral  decubitus position and we prepped and draped in usual fashion.  We  turned our attention to the back where using a 15 blade, we excised  the margins of the wound and any seemingly nonviable tissue in the  central portion of the large defect, which was approximately 16 x 14  cm in size.  It appeared at today's visit the bone was mostly  covered.  We debrided a large amount of apparently nonviable tissue.  We established hemostasis, and  to protect the skin, we placed DuoDerm  around the margins of the wound and a large black VAC sponge was  placed.  VAC tape was then placed.  The patient was then turned into  the supine position on his back, extubated in the operating room, and  transferred to the recovery room awake and responsive.       Sravan Jaime MD, DDS    DD:  03/08/2023 11:08:51 EST  DT:  03/08/2023 13:34:55 EST  DICTATION NUMBER:  [id number]  INTERNAL JOB NUMBER:  [id number]       Electronic Signatures:  Sravan Jaime) (Signed on 23-Mar-2023 09:33)   Authored   Unsigned, Draft (SYS GENERATED) (Entered on 08-Mar-2023 13:34)   Entered     Last Updated: 23-Mar-2023 09:33 by Sravan Jaime)

## 2024-11-18 LAB
BACTERIA SPEC CULT: ABNORMAL
GRAM STN SPEC: ABNORMAL
GRAM STN SPEC: ABNORMAL
LABORATORY COMMENT REPORT: ABNORMAL